# Patient Record
Sex: FEMALE | Race: WHITE | NOT HISPANIC OR LATINO
[De-identification: names, ages, dates, MRNs, and addresses within clinical notes are randomized per-mention and may not be internally consistent; named-entity substitution may affect disease eponyms.]

---

## 2017-09-20 ENCOUNTER — APPOINTMENT (OUTPATIENT)
Dept: VASCULAR SURGERY | Facility: CLINIC | Age: 72
End: 2017-09-20
Payer: MEDICARE

## 2017-09-20 VITALS
WEIGHT: 145 LBS | HEART RATE: 65 BPM | SYSTOLIC BLOOD PRESSURE: 150 MMHG | HEIGHT: 65 IN | BODY MASS INDEX: 24.16 KG/M2 | DIASTOLIC BLOOD PRESSURE: 77 MMHG | TEMPERATURE: 97.4 F

## 2017-09-20 DIAGNOSIS — I83.93 ASYMPTOMATIC VARICOSE VEINS OF BILATERAL LOWER EXTREMITIES: ICD-10-CM

## 2017-09-20 DIAGNOSIS — Z87.19 PERSONAL HISTORY OF OTHER DISEASES OF THE DIGESTIVE SYSTEM: ICD-10-CM

## 2017-09-20 DIAGNOSIS — Z87.891 PERSONAL HISTORY OF NICOTINE DEPENDENCE: ICD-10-CM

## 2017-09-20 DIAGNOSIS — Z86.79 PERSONAL HISTORY OF OTHER DISEASES OF THE CIRCULATORY SYSTEM: ICD-10-CM

## 2017-09-20 PROCEDURE — 93970 EXTREMITY STUDY: CPT

## 2017-09-20 PROCEDURE — 99204 OFFICE O/P NEW MOD 45 MIN: CPT | Mod: 25

## 2017-09-20 RX ORDER — RANITIDINE 150 MG/1
150 TABLET ORAL
Qty: 180 | Refills: 0 | Status: ACTIVE | COMMUNITY
Start: 2017-07-17

## 2017-09-20 RX ORDER — SOLIFENACIN SUCCINATE 5 MG/1
5 TABLET, FILM COATED ORAL
Qty: 90 | Refills: 0 | Status: ACTIVE | COMMUNITY
Start: 2017-05-10

## 2017-09-20 RX ORDER — ATENOLOL 50 MG/1
50 TABLET ORAL
Qty: 90 | Refills: 0 | Status: ACTIVE | COMMUNITY
Start: 2017-05-22

## 2017-09-20 RX ORDER — NAPROXEN 500 MG/1
500 TABLET ORAL
Qty: 20 | Refills: 0 | Status: ACTIVE | COMMUNITY
Start: 2017-03-30

## 2017-09-20 RX ORDER — LOSARTAN POTASSIUM 100 MG/1
100 TABLET, FILM COATED ORAL
Qty: 90 | Refills: 0 | Status: ACTIVE | COMMUNITY
Start: 2017-02-06

## 2017-12-18 ENCOUNTER — RECORD ABSTRACTING (OUTPATIENT)
Age: 72
End: 2017-12-18

## 2017-12-29 ENCOUNTER — APPOINTMENT (OUTPATIENT)
Dept: VASCULAR SURGERY | Facility: CLINIC | Age: 72
End: 2017-12-29

## 2018-01-03 ENCOUNTER — APPOINTMENT (OUTPATIENT)
Dept: CARDIOLOGY | Facility: CLINIC | Age: 73
End: 2018-01-03

## 2018-01-05 ENCOUNTER — APPOINTMENT (OUTPATIENT)
Dept: UROGYNECOLOGY | Facility: CLINIC | Age: 73
End: 2018-01-05
Payer: MEDICARE

## 2018-01-05 VITALS
SYSTOLIC BLOOD PRESSURE: 120 MMHG | HEART RATE: 60 BPM | HEIGHT: 65 IN | WEIGHT: 140 LBS | BODY MASS INDEX: 23.32 KG/M2 | DIASTOLIC BLOOD PRESSURE: 72 MMHG

## 2018-01-05 DIAGNOSIS — Z37.9 ENCOUNTER FOR FULL-TERM UNCOMPLICATED DELIVERY: ICD-10-CM

## 2018-01-05 LAB
BILIRUB UR QL STRIP: NORMAL
CLARITY UR: CLEAR
COLLECTION METHOD: NORMAL
GLUCOSE UR-MCNC: NORMAL
HCG UR QL: 0.2 EU/DL
HGB UR QL STRIP.AUTO: NORMAL
KETONES UR-MCNC: NORMAL
LEUKOCYTE ESTERASE UR QL STRIP: NORMAL
NITRITE UR QL STRIP: NORMAL
PH UR STRIP: 5
PROT UR STRIP-MCNC: NORMAL
SP GR UR STRIP: 1.02

## 2018-01-05 PROCEDURE — 51725 SIMPLE CYSTOMETROGRAM: CPT

## 2018-01-05 PROCEDURE — 99204 OFFICE O/P NEW MOD 45 MIN: CPT | Mod: 25

## 2018-01-05 RX ORDER — TIZANIDINE 4 MG/1
4 TABLET ORAL
Qty: 10 | Refills: 0 | Status: DISCONTINUED | COMMUNITY
Start: 2017-03-30 | End: 2018-01-05

## 2018-01-05 RX ORDER — FLUTICASONE PROPIONATE 50 UG/1
50 SPRAY, METERED NASAL
Qty: 16 | Refills: 0 | Status: DISCONTINUED | COMMUNITY
Start: 2017-01-11

## 2018-01-05 RX ORDER — VENLAFAXINE HYDROCHLORIDE 75 MG/1
75 CAPSULE, EXTENDED RELEASE ORAL
Qty: 90 | Refills: 0 | Status: DISCONTINUED | COMMUNITY
Start: 2016-08-30 | End: 2018-01-05

## 2018-01-17 ENCOUNTER — APPOINTMENT (OUTPATIENT)
Dept: CARDIOLOGY | Facility: CLINIC | Age: 73
End: 2018-01-17

## 2018-02-05 ENCOUNTER — EMERGENCY (EMERGENCY)
Facility: HOSPITAL | Age: 73
LOS: 1 days | Discharge: HOME | End: 2018-02-05
Attending: EMERGENCY MEDICINE | Admitting: EMERGENCY MEDICINE
Payer: MEDICARE

## 2018-02-05 VITALS
HEIGHT: 65 IN | HEART RATE: 87 BPM | SYSTOLIC BLOOD PRESSURE: 154 MMHG | DIASTOLIC BLOOD PRESSURE: 78 MMHG | TEMPERATURE: 98 F | RESPIRATION RATE: 20 BRPM | WEIGHT: 139.99 LBS

## 2018-02-05 PROCEDURE — 99282 EMERGENCY DEPT VISIT SF MDM: CPT | Mod: GC

## 2018-02-05 PROCEDURE — 99282 EMERGENCY DEPT VISIT SF MDM: CPT

## 2018-02-05 NOTE — ED PROVIDER NOTE - OBJECTIVE STATEMENT
73 yo F pmh htn, right sided sciatica presents with left lumbar paraspinal back pain with radiation into the right leg. Pt reports she first noticed it on Wednesday, 5 days ago, and was seen at an urgent care who told her she needs further MRI imaging. No trauma to the area or injury that she knows of. Pt endorses a change in sensation in the right foot. Reports a sharp pain that goes from the lumbar region down to the toes. Pt was also given a rx for oxycodone and gabapentin. There has been no change in sx since Wednesday; however pt is having difficulty making appointments for follow-up care. Was supposed to have an appt with her pcp today but pcp was unable to take her. No other systemic complaints at this time 73 yo F pmh htn, right sided sciatica presents with left lumbar paraspinal back pain with radiation into the left leg. Pt reports she first noticed it on Wednesday, 5 days ago, and was seen at an urgent care who told her she needs further MRI imaging. No trauma to the area or injury that she knows of. Pt endorses a change in sensation in the left foot. Reports a sharp pain that goes from the lumbar region down to the toes. Pt was also given a rx for oxycodone and gabapentin. There has been no change in sx since Wednesday; however pt is having difficulty making appointments for follow-up care. Was supposed to have an appt with her pcp today but pcp was unable to take her. No other systemic complaints at this time  Patient denies bowel/bladder incontinence. no fevers, no chills.

## 2018-02-05 NOTE — ED PROVIDER NOTE - MEDICAL DECISION MAKING DETAILS
Back pain with radiation into leg. Ambulating. ROM ok. Motor pain limited but focal findings on neuro exam. Pt appears well and HD stable. Came for MRI but I explained that this is an outpatient procedure, [facility] will [facility] her tomorrow and be able to give her rx. She denied pain medications and already has gabapentin. No midline spine tenderness that would warrant Xray.  Lynne Peña, PGY-1 EM left lower back pain with radiation into leg. Ambulating. ROM ok. Motor pain limited but focal findings on neuro exam. Pt appears well and HD stable. Came for MRI but I explained that this is an outpatient procedure, [facility] that will see her tomorrow and be able to give her rx. She denied pain medications and already has gabapentin. No midline spine tenderness that would warrant Xray.  Lynne Peña, PGY-1 [facility]: Patient with left buttock pain radiating down left leg x few days. 5/5 b/l le. No midline c-spine/t-spine/l-spine tenderness.  + 2 dp b/l le. patient already on multiple pain medications. requesting MRI. no indication for emergent MRI in ER at this time. no bowel/bladder incontinence. 5/5 b/l le. will give outpatient referral.

## 2018-02-05 NOTE — ED PROVIDER NOTE - EXTREMITY EXAM
Left leg strength limited 3/5, straight leg test + left side, no sensory deficits in the peroneal area/no deformity, pain or tenderness, no restriction of movement Left leg strength 5/5, straight leg test + left side, no sensory deficits in the peroneal area/no deformity, pain or tenderness, no restriction of movement

## 2018-02-10 ENCOUNTER — OUTPATIENT (OUTPATIENT)
Dept: OUTPATIENT SERVICES | Facility: HOSPITAL | Age: 73
LOS: 1 days | End: 2018-02-10
Payer: MEDICARE

## 2018-02-10 ENCOUNTER — APPOINTMENT (OUTPATIENT)
Dept: MRI IMAGING | Facility: CLINIC | Age: 73
End: 2018-02-10
Payer: MEDICARE

## 2018-02-10 DIAGNOSIS — Z00.8 ENCOUNTER FOR OTHER GENERAL EXAMINATION: ICD-10-CM

## 2018-02-10 PROCEDURE — 72148 MRI LUMBAR SPINE W/O DYE: CPT | Mod: 26

## 2018-02-10 PROCEDURE — 72148 MRI LUMBAR SPINE W/O DYE: CPT

## 2018-11-06 ENCOUNTER — RX RENEWAL (OUTPATIENT)
Age: 73
End: 2018-11-06

## 2019-01-10 ENCOUNTER — APPOINTMENT (OUTPATIENT)
Dept: UROGYNECOLOGY | Facility: CLINIC | Age: 74
End: 2019-01-10
Payer: MEDICARE

## 2019-01-10 PROCEDURE — 99214 OFFICE O/P EST MOD 30 MIN: CPT

## 2019-01-25 ENCOUNTER — APPOINTMENT (OUTPATIENT)
Dept: UROGYNECOLOGY | Facility: CLINIC | Age: 74
End: 2019-01-25

## 2019-02-20 ENCOUNTER — APPOINTMENT (OUTPATIENT)
Dept: UROGYNECOLOGY | Facility: CLINIC | Age: 74
End: 2019-02-20
Payer: MEDICARE

## 2019-02-20 ENCOUNTER — OUTPATIENT (OUTPATIENT)
Dept: OUTPATIENT SERVICES | Facility: HOSPITAL | Age: 74
LOS: 1 days | End: 2019-02-20

## 2019-02-20 LAB
BILIRUB UR QL STRIP: NORMAL
CLARITY UR: CLEAR
COLLECTION METHOD: NORMAL
GLUCOSE UR-MCNC: NORMAL
HCG UR QL: 0.2 EU/DL
HGB UR QL STRIP.AUTO: NORMAL
KETONES UR-MCNC: NORMAL
LEUKOCYTE ESTERASE UR QL STRIP: NORMAL
NITRITE UR QL STRIP: NORMAL
PH UR STRIP: 5.5
PROT UR STRIP-MCNC: 30
SP GR UR STRIP: 1.02

## 2019-02-20 PROCEDURE — 51797 INTRAABDOMINAL PRESSURE TEST: CPT | Mod: 26

## 2019-02-20 PROCEDURE — 51729 CYSTOMETROGRAM W/VP&UP: CPT | Mod: 26

## 2019-02-20 PROCEDURE — 51784 ANAL/URINARY MUSCLE STUDY: CPT | Mod: 26

## 2019-02-22 ENCOUNTER — RESULT REVIEW (OUTPATIENT)
Age: 74
End: 2019-02-22

## 2019-02-22 LAB
APPEARANCE: CLEAR
BACTERIA: NEGATIVE
BILIRUBIN URINE: NEGATIVE
BLOOD URINE: ABNORMAL
COLOR: YELLOW
GLUCOSE QUALITATIVE U: NEGATIVE
HYALINE CASTS: 0 /LPF
KETONES URINE: NEGATIVE
LEUKOCYTE ESTERASE URINE: ABNORMAL
MICROSCOPIC-UA: NORMAL
NITRITE URINE: NEGATIVE
PH URINE: 5
PROTEIN URINE: NEGATIVE
RED BLOOD CELLS URINE: 2 /HPF
SPECIFIC GRAVITY URINE: 1.02
SQUAMOUS EPITHELIAL CELLS: 1 /HPF
URINE COMMENTS: NORMAL
UROBILINOGEN URINE: NORMAL
WHITE BLOOD CELLS URINE: 4 /HPF

## 2019-02-25 ENCOUNTER — RESULT REVIEW (OUTPATIENT)
Age: 74
End: 2019-02-25

## 2019-02-25 LAB — BACTERIA UR CULT: ABNORMAL

## 2019-03-01 ENCOUNTER — APPOINTMENT (OUTPATIENT)
Dept: UROGYNECOLOGY | Facility: CLINIC | Age: 74
End: 2019-03-01

## 2019-03-07 ENCOUNTER — APPOINTMENT (OUTPATIENT)
Dept: UROGYNECOLOGY | Facility: CLINIC | Age: 74
End: 2019-03-07
Payer: MEDICARE

## 2019-03-07 PROCEDURE — 51701 INSERT BLADDER CATHETER: CPT

## 2019-03-07 PROCEDURE — 99214 OFFICE O/P EST MOD 30 MIN: CPT | Mod: 25

## 2019-03-07 NOTE — ASSESSMENT
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 We also extensively reviewed the risk of  injury to the bladder, ureters, urethra, vagina, rectum, and bowel. We also discussed the risk of sling mesh exposure, fistula formation, neuropathy, erosion, pain, and need for reoperation.  Risks were explained in layman's terms. She expressed understanding of these risks and continues to desire the planned surgical procedure. We reviewed her hospital stay as well as preoperative and postoperative instructions. She is aware that she must be NPO after midnight prior to the surgery. Consent was signed in the office and all questions were answered.\par \par She understands the overactive bladder component mat persist or be exacerbated \par \par \par All questions answered and informed consent provided\par \par

## 2019-03-13 ENCOUNTER — OUTPATIENT (OUTPATIENT)
Dept: OUTPATIENT SERVICES | Facility: HOSPITAL | Age: 74
LOS: 1 days | End: 2019-03-13
Payer: MEDICARE

## 2019-03-13 VITALS
HEIGHT: 63 IN | DIASTOLIC BLOOD PRESSURE: 77 MMHG | HEART RATE: 60 BPM | WEIGHT: 136.03 LBS | SYSTOLIC BLOOD PRESSURE: 130 MMHG | TEMPERATURE: 98 F | OXYGEN SATURATION: 98 %

## 2019-03-13 DIAGNOSIS — Z01.818 ENCOUNTER FOR OTHER PREPROCEDURAL EXAMINATION: ICD-10-CM

## 2019-03-13 DIAGNOSIS — N39.3 STRESS INCONTINENCE (FEMALE) (MALE): ICD-10-CM

## 2019-03-13 DIAGNOSIS — K85.90 ACUTE PANCREATITIS WITHOUT NECROSIS OR INFECTION, UNSPECIFIED: ICD-10-CM

## 2019-03-13 DIAGNOSIS — K21.9 GASTRO-ESOPHAGEAL REFLUX DISEASE WITHOUT ESOPHAGITIS: ICD-10-CM

## 2019-03-13 DIAGNOSIS — I10 ESSENTIAL (PRIMARY) HYPERTENSION: ICD-10-CM

## 2019-03-13 DIAGNOSIS — N81.11 CYSTOCELE, MIDLINE: ICD-10-CM

## 2019-03-13 LAB
ANION GAP SERPL CALC-SCNC: 12 MMOL/L — SIGNIFICANT CHANGE UP (ref 5–17)
BLD GP AB SCN SERPL QL: NEGATIVE — SIGNIFICANT CHANGE UP
BUN SERPL-MCNC: 17 MG/DL — SIGNIFICANT CHANGE UP (ref 7–23)
CALCIUM SERPL-MCNC: 9.5 MG/DL — SIGNIFICANT CHANGE UP (ref 8.4–10.5)
CHLORIDE SERPL-SCNC: 102 MMOL/L — SIGNIFICANT CHANGE UP (ref 96–108)
CO2 SERPL-SCNC: 26 MMOL/L — SIGNIFICANT CHANGE UP (ref 22–31)
CREAT SERPL-MCNC: 0.6 MG/DL — SIGNIFICANT CHANGE UP (ref 0.5–1.3)
GLUCOSE SERPL-MCNC: 100 MG/DL — HIGH (ref 70–99)
HCT VFR BLD CALC: 40.7 % — SIGNIFICANT CHANGE UP (ref 34.5–45)
HGB BLD-MCNC: 12.8 G/DL — SIGNIFICANT CHANGE UP (ref 11.5–15.5)
MCHC RBC-ENTMCNC: 31.4 GM/DL — LOW (ref 32–36)
MCHC RBC-ENTMCNC: 32.7 PG — SIGNIFICANT CHANGE UP (ref 27–34)
MCV RBC AUTO: 104.1 FL — HIGH (ref 80–100)
PLATELET # BLD AUTO: 317 K/UL — SIGNIFICANT CHANGE UP (ref 150–400)
POTASSIUM SERPL-MCNC: 4.1 MMOL/L — SIGNIFICANT CHANGE UP (ref 3.5–5.3)
POTASSIUM SERPL-SCNC: 4.1 MMOL/L — SIGNIFICANT CHANGE UP (ref 3.5–5.3)
RBC # BLD: 3.91 M/UL — SIGNIFICANT CHANGE UP (ref 3.8–5.2)
RBC # FLD: 13.2 % — SIGNIFICANT CHANGE UP (ref 10.3–14.5)
RH IG SCN BLD-IMP: POSITIVE — SIGNIFICANT CHANGE UP
SODIUM SERPL-SCNC: 140 MMOL/L — SIGNIFICANT CHANGE UP (ref 135–145)
WBC # BLD: 6.12 K/UL — SIGNIFICANT CHANGE UP (ref 3.8–10.5)
WBC # FLD AUTO: 6.12 K/UL — SIGNIFICANT CHANGE UP (ref 3.8–10.5)

## 2019-03-13 PROCEDURE — G0463: CPT

## 2019-03-13 PROCEDURE — 86900 BLOOD TYPING SEROLOGIC ABO: CPT

## 2019-03-13 PROCEDURE — 85027 COMPLETE CBC AUTOMATED: CPT

## 2019-03-13 PROCEDURE — 86850 RBC ANTIBODY SCREEN: CPT

## 2019-03-13 PROCEDURE — 86901 BLOOD TYPING SEROLOGIC RH(D): CPT

## 2019-03-13 PROCEDURE — 80048 BASIC METABOLIC PNL TOTAL CA: CPT

## 2019-03-13 RX ORDER — SODIUM CHLORIDE 9 MG/ML
3 INJECTION INTRAMUSCULAR; INTRAVENOUS; SUBCUTANEOUS EVERY 8 HOURS
Qty: 0 | Refills: 0 | Status: DISCONTINUED | OUTPATIENT
Start: 2019-03-27 | End: 2019-04-11

## 2019-03-13 RX ORDER — LIDOCAINE HCL 20 MG/ML
0.2 VIAL (ML) INJECTION ONCE
Qty: 0 | Refills: 0 | Status: DISCONTINUED | OUTPATIENT
Start: 2019-03-27 | End: 2019-04-11

## 2019-03-13 NOTE — H&P PST ADULT - NSANTHOSAYNRD_GEN_A_CORE
No. AYALA screening performed.  STOP BANG Legend: 0-2 = LOW Risk; 3-4 = INTERMEDIATE Risk; 5-8 = HIGH Risk

## 2019-03-13 NOTE — H&P PST ADULT - NSICDXPASTMEDICALHX_GEN_ALL_CORE_FT
PAST MEDICAL HISTORY:  Anxiety and depression     GERD (gastroesophageal reflux disease)     History of pancreatitis 2018 taking creon been stable    Hypertension     Rosacea     Stress incontinence, female     Stress incontinence, female

## 2019-03-13 NOTE — H&P PST ADULT - ACTIVITY
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

## 2019-03-13 NOTE — H&P PST ADULT - HISTORY OF PRESENT ILLNESS
73 year old female with hx of HTN, GERD, Pancreatitis, depression came in for PSt today for Midurethral sling , anterior colporrhaphy. Patient has hx of stress incontinence and [id number] symptoms gotten worse . patient was referred to Dr Palencia, evaluated ans scheduled this procedure for surgical treatment.

## 2019-03-13 NOTE — H&P PST ADULT - NSICDXPROBLEM_GEN_ALL_CORE_FT
PROBLEM DIAGNOSES  Problem: Female stress incontinence  Assessment and Plan:  Midurethral sling,anterior colporrhapy,cystoscopy  PST instruction given, CBC,BMP, type and screen , has urine culture result from [facility] office    PAtient has own appointment with Dr Valenzuela  on Monday March 18/2019    Problem: GERD (gastroesophageal reflux disease)  Assessment and Plan: To continue  taking ranitidine regularly and on DOS     Problem: Hypertension  Assessment and Plan: To continue taking blood pressure medications regularly as prescribed  and on DOS     Problem: Pancreatitis  Assessment and Plan: To continue taking creon regularly as instructed

## 2019-03-13 NOTE — H&P PST ADULT - NSICDXPASTSURGICALHX_GEN_ALL_CORE_FT
PAST SURGICAL HISTORY:  H/O dilation and curettage     H/O left inguinal hernia repair     Hx of appendectomy     Hx of tonsillectomy

## 2019-03-14 PROBLEM — Z87.19 PERSONAL HISTORY OF OTHER DISEASES OF THE DIGESTIVE SYSTEM: Chronic | Status: ACTIVE | Noted: 2019-03-13

## 2019-03-22 ENCOUNTER — OUTPATIENT (OUTPATIENT)
Dept: OUTPATIENT SERVICES | Facility: HOSPITAL | Age: 74
LOS: 1 days | End: 2019-03-22
Payer: MEDICARE

## 2019-03-22 ENCOUNTER — APPOINTMENT (OUTPATIENT)
Dept: UROGYNECOLOGY | Facility: CLINIC | Age: 74
End: 2019-03-22
Payer: MEDICARE

## 2019-03-22 DIAGNOSIS — R35.0 FREQUENCY OF MICTURITION: ICD-10-CM

## 2019-03-22 DIAGNOSIS — Z01.818 ENCOUNTER FOR OTHER PREPROCEDURAL EXAMINATION: ICD-10-CM

## 2019-03-22 DIAGNOSIS — N81.11 CYSTOCELE, MIDLINE: ICD-10-CM

## 2019-03-22 DIAGNOSIS — N39.3 STRESS INCONTINENCE (FEMALE) (MALE): ICD-10-CM

## 2019-03-22 PROCEDURE — 52000 CYSTOURETHROSCOPY: CPT

## 2019-03-26 ENCOUNTER — TRANSCRIPTION ENCOUNTER (OUTPATIENT)
Age: 74
End: 2019-03-26

## 2019-03-27 ENCOUNTER — OUTPATIENT (OUTPATIENT)
Dept: OUTPATIENT SERVICES | Facility: HOSPITAL | Age: 74
LOS: 1 days | End: 2019-03-27
Payer: MEDICARE

## 2019-03-27 ENCOUNTER — APPOINTMENT (OUTPATIENT)
Dept: UROGYNECOLOGY | Facility: HOSPITAL | Age: 74
End: 2019-03-27
Payer: MEDICARE

## 2019-03-27 ENCOUNTER — RX RENEWAL (OUTPATIENT)
Age: 74
End: 2019-03-27

## 2019-03-27 VITALS
HEART RATE: 63 BPM | RESPIRATION RATE: 16 BRPM | DIASTOLIC BLOOD PRESSURE: 77 MMHG | HEIGHT: 63 IN | SYSTOLIC BLOOD PRESSURE: 131 MMHG | OXYGEN SATURATION: 98 % | WEIGHT: 136.03 LBS | TEMPERATURE: 99 F

## 2019-03-27 VITALS
OXYGEN SATURATION: 100 % | DIASTOLIC BLOOD PRESSURE: 77 MMHG | SYSTOLIC BLOOD PRESSURE: 132 MMHG | HEART RATE: 66 BPM | RESPIRATION RATE: 15 BRPM

## 2019-03-27 DIAGNOSIS — N39.3 STRESS INCONTINENCE (FEMALE) (MALE): ICD-10-CM

## 2019-03-27 DIAGNOSIS — N81.11 CYSTOCELE, MIDLINE: ICD-10-CM

## 2019-03-27 DIAGNOSIS — Z01.818 ENCOUNTER FOR OTHER PREPROCEDURAL EXAMINATION: ICD-10-CM

## 2019-03-27 PROCEDURE — 57288 REPAIR BLADDER DEFECT: CPT

## 2019-03-27 PROCEDURE — 57240 ANTERIOR COLPORRHAPHY: CPT

## 2019-03-27 PROCEDURE — C1771: CPT

## 2019-03-27 RX ORDER — OXYCODONE HYDROCHLORIDE 5 MG/1
1 TABLET ORAL
Qty: 10 | Refills: 0
Start: 2019-03-27

## 2019-03-27 RX ORDER — METOCLOPRAMIDE HCL 10 MG
10 TABLET ORAL ONCE
Qty: 0 | Refills: 0 | Status: DISCONTINUED | OUTPATIENT
Start: 2019-03-27 | End: 2019-04-11

## 2019-03-27 RX ORDER — OXYCODONE HYDROCHLORIDE 5 MG/1
5 TABLET ORAL ONCE
Qty: 0 | Refills: 0 | Status: DISCONTINUED | OUTPATIENT
Start: 2019-03-27 | End: 2019-03-27

## 2019-03-27 RX ORDER — TROSPIUM CHLORIDE 60 MG/1
60 CAPSULE, EXTENDED RELEASE ORAL DAILY
Qty: 90 | Refills: 3 | Status: ACTIVE | COMMUNITY
Start: 2018-01-05 | End: 1900-01-01

## 2019-03-27 RX ORDER — CELECOXIB 200 MG/1
200 CAPSULE ORAL ONCE
Qty: 0 | Refills: 0 | Status: DISCONTINUED | OUTPATIENT
Start: 2019-03-27 | End: 2019-04-11

## 2019-03-27 RX ORDER — ONDANSETRON 8 MG/1
4 TABLET, FILM COATED ORAL ONCE
Qty: 0 | Refills: 0 | Status: DISCONTINUED | OUTPATIENT
Start: 2019-03-27 | End: 2019-04-11

## 2019-03-27 RX ORDER — FAMOTIDINE 10 MG/ML
20 INJECTION INTRAVENOUS ONCE
Qty: 0 | Refills: 0 | Status: COMPLETED | OUTPATIENT
Start: 2019-03-27 | End: 2019-03-27

## 2019-03-27 RX ADMIN — FAMOTIDINE 20 MILLIGRAM(S): 10 INJECTION INTRAVENOUS at 11:00

## 2019-03-27 NOTE — ASU PREOP CHECKLIST - DNR CLARIFICATION FORM COMPLETED
“You can access the FollowHealth Patient Portal, offered by [facility], by registering with the following website: [email or web address]” n/a

## 2019-03-27 NOTE — ASU DISCHARGE PLAN (ADULT/PEDIATRIC) - CARE PROVIDER_API CALL
Price Palencia (MD)  Female Pelvic MedReconst Surg; Obstetrics and Gynecology  [address]  [place] [address]  Phone: [phone]  Fax: [phone]  Follow Up Time:

## 2019-03-27 NOTE — ASU DISCHARGE PLAN (ADULT/PEDIATRIC) - CALL YOUR DOCTOR IF YOU HAVE ANY OF THE FOLLOWING:
Bleeding that does not stop/Unable to urinate/Fever greater than (need to indicate Fahrenheit or Celsius)/Inability to tolerate liquids or foods/Pain not relieved by Medications Inability to tolerate liquids or foods/Pain not relieved by Medications/Nausea and vomiting that does not stop/Increased irritability or sluggishness/Wound/Surgical Site with redness, or foul smelling discharge or pus/Bleeding that does not stop/Swelling that gets worse/Fever greater than (need to indicate Fahrenheit or Celsius)/Unable to urinate

## 2019-03-27 NOTE — BRIEF OPERATIVE NOTE - NSICDXBRIEFPROCEDURE_GEN_ALL_CORE_FT
PROCEDURES:  Cystoscopy 27-Mar-2019 12:50:53  Valery Olvera  Creation, midurethral sling, female 27-Mar-2019 12:50:35 Valery Lim

## 2019-03-27 NOTE — ASU PATIENT PROFILE, ADULT - PSH
H/O dilation and curettage    H/O left inguinal hernia repair    Hx of appendectomy    Hx of tonsillectomy

## 2019-03-27 NOTE — ASU PATIENT PROFILE, ADULT - PMH
Anxiety and depression    GERD (gastroesophageal reflux disease)    History of pancreatitis  2018 taking creon been stable  Hypertension    Rosacea    Stress incontinence, female    Stress incontinence, female

## 2019-04-25 ENCOUNTER — APPOINTMENT (OUTPATIENT)
Dept: UROGYNECOLOGY | Facility: CLINIC | Age: 74
End: 2019-04-25
Payer: MEDICARE

## 2019-04-25 DIAGNOSIS — Z98.890 OTHER SPECIFIED POSTPROCEDURAL STATES: ICD-10-CM

## 2019-04-25 PROCEDURE — 99024 POSTOP FOLLOW-UP VISIT: CPT

## 2019-04-30 NOTE — DISCUSSION/SUMMARY
[FreeTextEntry1] : Pt doing well after surgery.  She is currently taking Trospium ER 60 mg.  She would like to trial herself off to see if UUI has improved or resolved. Pt agrees to call office with any problems/concerns. Otherwise she was advised to RTO in 3-4 months for f/u after surgery.

## 2019-04-30 NOTE — SUBJECTIVE
[FreeTextEntry1] : good [FreeTextEntry7] : denies [FreeTextEntry6] : normal [FreeTextEntry5] : denies retention or incontinence [FreeTextEntry4] : soft daily [FreeTextEntry3] : normal [FreeTextEntry2] : normal

## 2019-04-30 NOTE — OBJECTIVE
[Good Support] : Good support [Healing well] : healing well [No Masses or Tenderness] : no masses or tenderness [FreeTextEntry3] : no evidence of mesh exposure

## 2019-08-15 ENCOUNTER — APPOINTMENT (OUTPATIENT)
Dept: UROGYNECOLOGY | Facility: CLINIC | Age: 74
End: 2019-08-15
Payer: MEDICARE

## 2019-08-15 DIAGNOSIS — R39.15 URGENCY OF URINATION: ICD-10-CM

## 2019-08-15 PROCEDURE — 99214 OFFICE O/P EST MOD 30 MIN: CPT

## 2019-08-15 RX ORDER — VALACYCLOVIR 500 MG/1
500 TABLET, FILM COATED ORAL TWICE DAILY
Qty: 14 | Refills: 0 | Status: ACTIVE | COMMUNITY
Start: 2019-08-15 | End: 1900-01-01

## 2019-08-15 RX ORDER — TROSPIUM CHLORIDE 60 MG/1
60 CAPSULE, EXTENDED RELEASE ORAL DAILY
Qty: 90 | Refills: 3 | Status: ACTIVE | COMMUNITY
Start: 2019-08-15 | End: 1900-01-01

## 2019-08-15 NOTE — HISTORY OF PRESENT ILLNESS
[FreeTextEntry1] : 74-year-old status post suburethral sling in March of 2019. Stress incontinence symptoms are resolved overactive bladder symptoms most notably frequency at night, and urge related leakage remain a problem for her. She is previously on Trospium with good results.\par \par Has a new complaint of a right vulvar lesion. She has had herpes in the past on her back in other areas her body but not in the vulva. On examination there is a glassy lesion on the right side and a viral culture was taken the symptoms and the appearance do seem consistent with herpes.\par \par We will prescribeTrospium for the overactive bladder risk benefits adverse reactions were reviewed\par \par We are prescribing Valtrex twice a day for 7 days.\par \par We will follow up with her in 4 weeks to see improvement and response for overactive bladder and treatment of the likely herpes of the vulva\par \par Counseling was greater than 50 percent of encounter which included  26   minute face to face time for direct counseling.\par

## 2019-08-19 ENCOUNTER — RESULT REVIEW (OUTPATIENT)
Age: 74
End: 2019-08-19

## 2019-09-19 ENCOUNTER — APPOINTMENT (OUTPATIENT)
Dept: UROGYNECOLOGY | Facility: CLINIC | Age: 74
End: 2019-09-19
Payer: MEDICARE

## 2019-09-19 LAB
BILIRUB UR QL STRIP: NORMAL
CLARITY UR: CLEAR
COLLECTION METHOD: NORMAL
GLUCOSE UR-MCNC: NORMAL
HCG UR QL: 0.2 EU/DL
HGB UR QL STRIP.AUTO: NORMAL
KETONES UR-MCNC: NORMAL
LEUKOCYTE ESTERASE UR QL STRIP: NORMAL
NITRITE UR QL STRIP: NORMAL
PH UR STRIP: 5.5
PROT UR STRIP-MCNC: NORMAL
SP GR UR STRIP: 1.02

## 2019-09-19 PROCEDURE — 51725 SIMPLE CYSTOMETROGRAM: CPT

## 2019-09-19 PROCEDURE — 99214 OFFICE O/P EST MOD 30 MIN: CPT | Mod: 25

## 2019-09-19 NOTE — HISTORY OF PRESENT ILLNESS
[FreeTextEntry1] : 74-year-old s/p MUS 3/27/19, with OAB presenting for follow up.  Symptoms of CELY remain resolved.  OAB managed on trospium with improvement.  At last visit 8/15/19 patient noted a right vulvar lesion and history of herpes.  Valtrex was started, viral culture performed which was negative.\par \par PHYSICAL EXAM\par \par Constitutional:  No acute distress, well developed well nourished good hygiene\par Neuro/psych:  Orientated x2,   normal memory\par Respiratory:   no cough, no dypsnea\par Neck:    Normal appearance,   symmetrical\par Abdomen: soft / non tender\par Occult blood: Not performed\par Skin warm and dry,  No rash,  No lesions\par Musculoskeletal:  Normal gait, No involuntary movements\par \par External Genitalia: normal , lesion resolved\par \par Vagina: normal normal.\par \par \par \par \par \par PROCEDURE: SIMPLE CYSTOMETRY\par \par In the supine position, the urethra was prepped with betadine. A 16 Hungarian catheter was gently passed into the bladder with sterile technique and secured in place. Sterile water was infused by gravity via an irrigation syringe. Patient sensation, change in flow rate, and capacity were observed: \par \par Catheterized PVR: __________\par First Sensation, First Urgency (cc): 100\par Increased Urgency  150,180\par Maximal capacity (cc): 300\par Pain will filling: neg\par Sensory Urgency: moderate\par Post Fill Void (cc):   300 (complete\par \par Involuntary detrusor contractions:  equivocal favoring contractions present\par \par cst negative\par cst wth rectocele reduced : pos spont leak and squirt with cough.\par \par Specific diagnosis for etiology of incontinence is not distinctly clear from simple testing\par \par \par IMPRESSION\par \par 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 Positive simple cystometry was performed as she failed crusting for resolution of nighttime symptoms. Her regular medications and she takes her Effexor and the mornings I don't think this is causing her problems at night. Based on simple cystometry she may have stress incontinence when the rectocele was reduced in the supine position and/or the detrusor instability is being held and checked by the rectocele during the day but again when supine is more active. 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 I did recommend timed voiding and to void every 2 hours and to not Ms. avoiding prior to bedtime. She made very happy with her improvement and declines alternative medications are tie blood pressure and her cognitive issues. She'll continue on current regimen with sanctura\par \par Counseling was greater than 50 percent of encounter which included  26   minute face to face time for direct counseling.\par \par \par \par \par Counseling was greater than 50 percent of encounter which included  26   minute face to face time for direct counseling.\par

## 2019-09-26 DIAGNOSIS — Z01.818 ENCOUNTER FOR OTHER PREPROCEDURAL EXAMINATION: ICD-10-CM

## 2019-11-26 LAB
BACTERIA UR CULT: NORMAL
HHV SPEC CULT: NORMAL

## 2020-06-06 ENCOUNTER — INPATIENT (INPATIENT)
Facility: HOSPITAL | Age: 75
LOS: 0 days | Discharge: HOME | DRG: 391 | End: 2020-06-07
Attending: INTERNAL MEDICINE | Admitting: INTERNAL MEDICINE
Payer: COMMERCIAL

## 2020-06-06 VITALS
HEIGHT: 65 IN | HEART RATE: 64 BPM | DIASTOLIC BLOOD PRESSURE: 71 MMHG | RESPIRATION RATE: 19 BRPM | OXYGEN SATURATION: 99 % | WEIGHT: 125 LBS | TEMPERATURE: 98 F | SYSTOLIC BLOOD PRESSURE: 140 MMHG

## 2020-06-06 DIAGNOSIS — I10 ESSENTIAL (PRIMARY) HYPERTENSION: ICD-10-CM

## 2020-06-06 DIAGNOSIS — Z29.9 ENCOUNTER FOR PROPHYLACTIC MEASURES, UNSPECIFIED: ICD-10-CM

## 2020-06-06 DIAGNOSIS — K86.1 OTHER CHRONIC PANCREATITIS: ICD-10-CM

## 2020-06-06 DIAGNOSIS — R10.32 LEFT LOWER QUADRANT PAIN: ICD-10-CM

## 2020-06-06 DIAGNOSIS — E87.1 HYPO-OSMOLALITY AND HYPONATREMIA: ICD-10-CM

## 2020-06-06 DIAGNOSIS — K59.04 CHRONIC IDIOPATHIC CONSTIPATION: ICD-10-CM

## 2020-06-06 DIAGNOSIS — N32.81 OVERACTIVE BLADDER: ICD-10-CM

## 2020-06-06 DIAGNOSIS — K57.90 DIVERTICULOSIS OF INTESTINE, PART UNSPECIFIED, WITHOUT PERFORATION OR ABSCESS WITHOUT BLEEDING: ICD-10-CM

## 2020-06-06 LAB
ALBUMIN SERPL ELPH-MCNC: 3.7 G/DL — SIGNIFICANT CHANGE UP (ref 3.3–5)
ALP SERPL-CCNC: 99 U/L — SIGNIFICANT CHANGE UP (ref 40–120)
ALT FLD-CCNC: 11 U/L — SIGNIFICANT CHANGE UP (ref 10–45)
ANION GAP SERPL CALC-SCNC: 11 MMOL/L — SIGNIFICANT CHANGE UP (ref 5–17)
APPEARANCE UR: CLEAR — SIGNIFICANT CHANGE UP
AST SERPL-CCNC: 25 U/L — SIGNIFICANT CHANGE UP (ref 10–40)
BASOPHILS # BLD AUTO: 0.09 K/UL — SIGNIFICANT CHANGE UP (ref 0–0.2)
BASOPHILS NFR BLD AUTO: 1.2 % — SIGNIFICANT CHANGE UP (ref 0–2)
BILIRUB SERPL-MCNC: 0.5 MG/DL — SIGNIFICANT CHANGE UP (ref 0.2–1.2)
BILIRUB UR-MCNC: NEGATIVE — SIGNIFICANT CHANGE UP
BUN SERPL-MCNC: 11 MG/DL — SIGNIFICANT CHANGE UP (ref 7–23)
CALCIUM SERPL-MCNC: 9 MG/DL — SIGNIFICANT CHANGE UP (ref 8.4–10.5)
CHLORIDE SERPL-SCNC: 96 MMOL/L — SIGNIFICANT CHANGE UP (ref 96–108)
CO2 SERPL-SCNC: 23 MMOL/L — SIGNIFICANT CHANGE UP (ref 22–31)
COLOR SPEC: SIGNIFICANT CHANGE UP
CREAT SERPL-MCNC: 0.54 MG/DL — SIGNIFICANT CHANGE UP (ref 0.5–1.3)
DIFF PNL FLD: NEGATIVE — SIGNIFICANT CHANGE UP
EOSINOPHIL # BLD AUTO: 0.05 K/UL — SIGNIFICANT CHANGE UP (ref 0–0.5)
EOSINOPHIL NFR BLD AUTO: 0.7 % — SIGNIFICANT CHANGE UP (ref 0–6)
GLUCOSE SERPL-MCNC: 135 MG/DL — HIGH (ref 70–99)
GLUCOSE UR QL: NEGATIVE — SIGNIFICANT CHANGE UP
HCT VFR BLD CALC: 38.6 % — SIGNIFICANT CHANGE UP (ref 34.5–45)
HGB BLD-MCNC: 12.7 G/DL — SIGNIFICANT CHANGE UP (ref 11.5–15.5)
IMM GRANULOCYTES NFR BLD AUTO: 0.3 % — SIGNIFICANT CHANGE UP (ref 0–1.5)
KETONES UR-MCNC: NEGATIVE — SIGNIFICANT CHANGE UP
LEUKOCYTE ESTERASE UR-ACNC: ABNORMAL
LIDOCAIN IGE QN: 61 U/L — HIGH (ref 7–60)
LYMPHOCYTES # BLD AUTO: 1.62 K/UL — SIGNIFICANT CHANGE UP (ref 1–3.3)
LYMPHOCYTES # BLD AUTO: 21.9 % — SIGNIFICANT CHANGE UP (ref 13–44)
MCHC RBC-ENTMCNC: 32.9 GM/DL — SIGNIFICANT CHANGE UP (ref 32–36)
MCHC RBC-ENTMCNC: 33.1 PG — SIGNIFICANT CHANGE UP (ref 27–34)
MCV RBC AUTO: 100.5 FL — HIGH (ref 80–100)
MONOCYTES # BLD AUTO: 0.7 K/UL — SIGNIFICANT CHANGE UP (ref 0–0.9)
MONOCYTES NFR BLD AUTO: 9.4 % — SIGNIFICANT CHANGE UP (ref 2–14)
NEUTROPHILS # BLD AUTO: 4.93 K/UL — SIGNIFICANT CHANGE UP (ref 1.8–7.4)
NEUTROPHILS NFR BLD AUTO: 66.5 % — SIGNIFICANT CHANGE UP (ref 43–77)
NITRITE UR-MCNC: NEGATIVE — SIGNIFICANT CHANGE UP
NRBC # BLD: 0 /100 WBCS — SIGNIFICANT CHANGE UP (ref 0–0)
OB PNL STL: NEGATIVE — SIGNIFICANT CHANGE UP
PH UR: 6.5 — SIGNIFICANT CHANGE UP (ref 5–8)
PLATELET # BLD AUTO: 320 K/UL — SIGNIFICANT CHANGE UP (ref 150–400)
POTASSIUM SERPL-MCNC: 3.9 MMOL/L — SIGNIFICANT CHANGE UP (ref 3.5–5.3)
POTASSIUM SERPL-SCNC: 3.9 MMOL/L — SIGNIFICANT CHANGE UP (ref 3.5–5.3)
PROT SERPL-MCNC: 6.6 G/DL — SIGNIFICANT CHANGE UP (ref 6–8.3)
PROT UR-MCNC: ABNORMAL
RBC # BLD: 3.84 M/UL — SIGNIFICANT CHANGE UP (ref 3.8–5.2)
RBC # FLD: 13.1 % — SIGNIFICANT CHANGE UP (ref 10.3–14.5)
SARS-COV-2 RNA SPEC QL NAA+PROBE: SIGNIFICANT CHANGE UP
SODIUM SERPL-SCNC: 130 MMOL/L — LOW (ref 135–145)
SP GR SPEC: 1.02 — SIGNIFICANT CHANGE UP (ref 1.01–1.02)
UROBILINOGEN FLD QL: NEGATIVE — SIGNIFICANT CHANGE UP
WBC # BLD: 7.41 K/UL — SIGNIFICANT CHANGE UP (ref 3.8–10.5)
WBC # FLD AUTO: 7.41 K/UL — SIGNIFICANT CHANGE UP (ref 3.8–10.5)

## 2020-06-06 PROCEDURE — 99285 EMERGENCY DEPT VISIT HI MDM: CPT | Mod: CS,GC

## 2020-06-06 PROCEDURE — 93010 ELECTROCARDIOGRAM REPORT: CPT | Mod: GC

## 2020-06-06 PROCEDURE — 74177 CT ABD & PELVIS W/CONTRAST: CPT | Mod: 26

## 2020-06-06 PROCEDURE — 99223 1ST HOSP IP/OBS HIGH 75: CPT

## 2020-06-06 RX ORDER — ACETAMINOPHEN 500 MG
650 TABLET ORAL ONCE
Refills: 0 | Status: COMPLETED | OUTPATIENT
Start: 2020-06-06 | End: 2020-06-06

## 2020-06-06 RX ORDER — LANOLIN ALCOHOL/MO/W.PET/CERES
5 CREAM (GRAM) TOPICAL AT BEDTIME
Refills: 0 | Status: DISCONTINUED | OUTPATIENT
Start: 2020-06-06 | End: 2020-06-06

## 2020-06-06 RX ORDER — SOLIFENACIN SUCCINATE 10 MG/1
1 TABLET ORAL
Qty: 0 | Refills: 0 | DISCHARGE

## 2020-06-06 RX ORDER — LANOLIN ALCOHOL/MO/W.PET/CERES
5 CREAM (GRAM) TOPICAL AT BEDTIME
Refills: 0 | Status: DISCONTINUED | OUTPATIENT
Start: 2020-06-06 | End: 2020-06-07

## 2020-06-06 RX ORDER — ATENOLOL 25 MG/1
50 TABLET ORAL DAILY
Refills: 0 | Status: DISCONTINUED | OUTPATIENT
Start: 2020-06-06 | End: 2020-06-07

## 2020-06-06 RX ORDER — ENOXAPARIN SODIUM 100 MG/ML
40 INJECTION SUBCUTANEOUS DAILY
Refills: 0 | Status: DISCONTINUED | OUTPATIENT
Start: 2020-06-06 | End: 2020-06-07

## 2020-06-06 RX ORDER — VENLAFAXINE HCL 75 MG
1 CAPSULE, EXT RELEASE 24 HR ORAL
Qty: 0 | Refills: 0 | DISCHARGE

## 2020-06-06 RX ORDER — SODIUM CHLORIDE 9 MG/ML
1000 INJECTION, SOLUTION INTRAVENOUS ONCE
Refills: 0 | Status: COMPLETED | OUTPATIENT
Start: 2020-06-06 | End: 2020-06-06

## 2020-06-06 RX ORDER — LOSARTAN POTASSIUM 100 MG/1
100 TABLET, FILM COATED ORAL DAILY
Refills: 0 | Status: DISCONTINUED | OUTPATIENT
Start: 2020-06-06 | End: 2020-06-07

## 2020-06-06 RX ORDER — MULTIVIT WITH MIN/MFOLATE/K2 340-15/3 G
1 POWDER (GRAM) ORAL ONCE
Refills: 0 | Status: COMPLETED | OUTPATIENT
Start: 2020-06-06 | End: 2020-06-06

## 2020-06-06 RX ORDER — SENNA PLUS 8.6 MG/1
2 TABLET ORAL AT BEDTIME
Refills: 0 | Status: DISCONTINUED | OUTPATIENT
Start: 2020-06-06 | End: 2020-06-07

## 2020-06-06 RX ORDER — LACTULOSE 10 G/15ML
20 SOLUTION ORAL ONCE
Refills: 0 | Status: COMPLETED | OUTPATIENT
Start: 2020-06-06 | End: 2020-06-06

## 2020-06-06 RX ORDER — LIPASE/PROTEASE/AMYLASE 16-48-48K
2 CAPSULE,DELAYED RELEASE (ENTERIC COATED) ORAL
Refills: 0 | Status: DISCONTINUED | OUTPATIENT
Start: 2020-06-06 | End: 2020-06-07

## 2020-06-06 RX ORDER — OXYBUTYNIN CHLORIDE 5 MG
5 TABLET ORAL
Refills: 0 | Status: DISCONTINUED | OUTPATIENT
Start: 2020-06-06 | End: 2020-06-07

## 2020-06-06 RX ORDER — DIPHENHYDRAMINE HCL 50 MG
25 CAPSULE ORAL AT BEDTIME
Refills: 0 | Status: DISCONTINUED | OUTPATIENT
Start: 2020-06-06 | End: 2020-06-07

## 2020-06-06 RX ORDER — SODIUM CHLORIDE 9 MG/ML
1000 INJECTION INTRAMUSCULAR; INTRAVENOUS; SUBCUTANEOUS ONCE
Refills: 0 | Status: COMPLETED | OUTPATIENT
Start: 2020-06-06 | End: 2020-06-06

## 2020-06-06 RX ADMIN — ATENOLOL 50 MILLIGRAM(S): 25 TABLET ORAL at 17:45

## 2020-06-06 RX ADMIN — Medication 5 MILLIGRAM(S): at 21:50

## 2020-06-06 RX ADMIN — Medication 1 BOTTLE: at 14:37

## 2020-06-06 RX ADMIN — SODIUM CHLORIDE 1000 MILLILITER(S): 9 INJECTION INTRAMUSCULAR; INTRAVENOUS; SUBCUTANEOUS at 12:03

## 2020-06-06 RX ADMIN — Medication 5 MILLIGRAM(S): at 22:00

## 2020-06-06 RX ADMIN — LOSARTAN POTASSIUM 100 MILLIGRAM(S): 100 TABLET, FILM COATED ORAL at 17:45

## 2020-06-06 RX ADMIN — ENOXAPARIN SODIUM 40 MILLIGRAM(S): 100 INJECTION SUBCUTANEOUS at 17:45

## 2020-06-06 RX ADMIN — SODIUM CHLORIDE 100 MILLILITER(S): 9 INJECTION, SOLUTION INTRAVENOUS at 21:35

## 2020-06-06 RX ADMIN — SODIUM CHLORIDE 1000 MILLILITER(S): 9 INJECTION, SOLUTION INTRAVENOUS at 14:37

## 2020-06-06 NOTE — H&P ADULT - PROBLEM SELECTOR PLAN 6
- Oxybutynin (formulary exchange) - Continue home atenolol, substitute Benicar for formulary (losartan)

## 2020-06-06 NOTE — H&P ADULT - NSHPPHYSICALEXAM_GEN_ALL_CORE
T(C): 37.2 (06-06-20 @ 15:20), Max: 37.2 (06-06-20 @ 15:20)  HR: 60 (06-06-20 @ 15:20) (59 - 64)  BP: 156/57 (06-06-20 @ 15:20) (139/69 - 156/57)  RR: 19 (06-06-20 @ 15:20) (18 - 19)  SpO2: 100% (06-06-20 @ 15:20) (99% - 100%)  General: NAD, comfortable  Eyes: no conjunctival erythema, EOMI  ENT: MMM, normal mouth and dentition  Neck: Neck supple, No JVD  Respiratory: CTA B/L, No wheezing, rales, rhonchi  CV: RRR no murmurs  Abdominal: Soft, minimally tender in LLQ to deep palpation, ND, +BS  MSK: no focal weakness, no joint tenderness, no joint erythema, edema  Extremities: No edema, moving all extremities, 2+ peripheral pulses (radial)  Neurology: A&Ox3, nonfocal, BROWNLEE x 4, no facial droop  Skin: No Rashes, Hematoma, Ecchymosis  Psych: Calm and appropriate

## 2020-06-06 NOTE — H&P ADULT - NSHPLABSRESULTS_GEN_ALL_CORE
Labs personally reviewed:                          12.7   7.41  )-----------( 320      ( 06 Jun 2020 11:56 )             38.6       06-06    130<L>  |  96  |  11  ----------------------------<  135<H>  3.9   |  23  |  0.54    Ca    9.0      06 Jun 2020 11:56    TPro  6.6  /  Alb  3.7  /  TBili  0.5  /  DBili  x   /  AST  25  /  ALT  11  /  AlkPhos  99  06-06                      Imaging personally reviewed:    EKG personally reviewed: SR, no acute ST changes

## 2020-06-06 NOTE — H&P ADULT - ASSESSMENT
75 year old female with a history of HTN, pancreatitis, overactive bladder s/p midurethral sling (2019) presents with abdominal pain and constipation found to have acute on chronic pancreatitis on imaging.

## 2020-06-06 NOTE — H&P ADULT - NSICDXPASTMEDICALHX_GEN_ALL_CORE_FT
PAST MEDICAL HISTORY:  Anxiety and depression     GERD (gastroesophageal reflux disease)     History of pancreatitis 2018 taking creon been stable    Hypertension     Stress incontinence, female S/P Sling

## 2020-06-06 NOTE — H&P ADULT - PROBLEM SELECTOR PLAN 4
No evidence of diverticulitis  - Will continue to monitor clinically  - Mgmt of constipation as above Mild, suspect hypoosmolar, hypovolemic hyponatremia in the setting of poor po intake due to constipation  - Will continue with IVF  - Reassess Na in AM with routine BMP

## 2020-06-06 NOTE — ED PROVIDER NOTE - PROGRESS NOTE DETAILS
Attending MD Rowley: Labs and CT reviewed and discussed with patient, patient with pancreatitis, will admit, patient amenable

## 2020-06-06 NOTE — ED ADULT NURSE NOTE - OBJECTIVE STATEMENT
75 female c/o constipation, abd pain for 5 days. States she had intermittent left sided abd. pain and no BM for 5 days, today denies pain but still no BM despite several laxatives and an enema at home. abd. soft, tender in the epigastrium and mid lower quadrant. non distended. denies urinary symptoms now, but states she had some hematuria last week and was written an antibiotic by her MD. 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 denies hematuria, dysuria, urgency, frequency now. VSS, no fever, chills, no SOB, no CP. no N/V/D.

## 2020-06-06 NOTE — H&P ADULT - PROBLEM SELECTOR PLAN 3
Based on type of pain, lower suspicion for acute pancreatitis; however considering imaging findings will treat with IVF   Lipase likely explained by burn out  - Discused NPO verse early feeding with patient. Will start with clears, advance as tolerated  - IVF at 100 cc/h

## 2020-06-06 NOTE — ED ADULT NURSE NOTE - NSIMPLEMENTINTERV_GEN_ALL_ED
Implemented All Universal Safety Interventions:  [place] to call system. Call bell, personal items and telephone within reach. Instruct patient to call for assistance. Room bathroom lighting operational. Non-slip footwear when patient is off stretcher. Physically safe environment: no spills, clutter or unnecessary equipment. Stretcher in lowest position, wheels locked, appropriate side rails in place.

## 2020-06-06 NOTE — H&P ADULT - PROBLEM SELECTOR PLAN 5
- Continue home atenolol, substitute Benicar for formulary (losartan) No evidence of diverticulitis  - Will continue to monitor clinically  - Mgmt of constipation as above

## 2020-06-06 NOTE — H&P ADULT - HISTORY OF PRESENT ILLNESS
75 year old female with a history of HTN, GERD, pancreatitis, depression, stress incontinence s/p midurethral sling (2019) presents with abdominal pain. 75 year old female with a history of HTN, pancreatitis, overactive bladder s/p midurethral sling (2019) presents with constipation and abdominal pain.    The patient tells me that she has not had a BM in 4 days. She has taken ex-lax, enema without symptomatic relief. She has intermittent episodes of constipation/diarrhea in the past, however, never this severe. She has had many life-stresses this week that she feels may be contributing to this episode. Yesterday, she developed cramping, left lower quadrant abdominal pain, it was intermittent but progressive and severe. It kept her from sleeping. She decided to come into the hospital. By the time she arrived, the pain nearly abated.   She tells me this pain is NOTHING like her past episodes of pancreatitis pain. She denies any nausea, vomiting. She reports some decreased oral intake over the last few days, but feels very hungry today.   She denies any pain at the time of my exam.     In the ED, vital signs were within normal limits. Labs were notable for mild hyponatremia, and lipase of 61. She has a CT A/P which showed findings of acute on chronic pancreatitis.

## 2020-06-06 NOTE — ED PROVIDER NOTE - ATTENDING CONTRIBUTION TO CARE
Attending MD Rowley: I personally have seen and examined this patient.  Resident note reviewed and agree on plan of care and except where noted.  See below for details.     Seen in [place] 3    75F with PMH/PSH including HTN, GERD, pancreatitis, s/p appendectomy, s/p midurethral sling presents to the ED with LLQ pain and constipation.  Reports that BMs are irregular.  Reports took 6 laxatives today and 3 yesterday with no BM. Denies nausea, vomiting, diarrhea.  Reports decreased appetite since last week.  Reports pain is one location in LLQ, denies radiation, denies positional.  Reports pain has been severe.  Denies chest pain, shortness of breath, palpitations, cough. COVID contacts. Denies fevers, chills.  Denies headache, dizziness.  Denies dysuria, frequency, urgency.  Reports has had hematuria vs vaginal bleeding, reports has followed with Gyn Dr. Bustos and was told needed surgery for a "cyst".  Reports had been scheduled for surgery but cancelled given COVID.  A ten (10) point review of systems was negative other than as stated in the HPI or elsewhere in the chart.     Exam:   General: NAD  HENT: head NCAT, airway patent with moist mucous membranes  Eyes: PERRL  Lungs: lungs CTAB with good inspiratory effort, no wheezing, no rhonchi, no rales  Cardiac: +S1S2, no m/r/g  GI: abdomen soft with +BS, +tenderness to LLQ, no rebound, no guarding, ND, well healed surgical scar in RLQ, chaperoned by Dr. Celis, small hemorrhoid nonthrombosed, no stool in vault  : no CVAT  MSK: FROM at neck, no tenderness to midline palpation, no stepoffs along length of spine, no calf tenderness, swelling, erythema or warmth  Neuro: moving all extremities with 5/5 strength bilateral upper and lower extremities, good and equal  strength bilaterally, sensory grossly intact, no gross neuro deficits  Psych: normal mood and affect     A/P: 75F with constipation and LLQ pain, DDx includes obstruction, diverticulitis, will obtain labs, CTAP with po and IV, po will also help with BM, will give IVFs, pain control with nonnarcotic, reassess

## 2020-06-06 NOTE — H&P ADULT - PROBLEM SELECTOR PLAN 1
Secondary to constipation >> acute on chronic pancreatitis based on clinical picture, history  - Pain free currently, will give Tylenol PRN for mild pain, high dose ibuprofen for more severe pain  - Will avoid narcotics considering history of constipation

## 2020-06-06 NOTE — ED PROVIDER NOTE - PHYSICAL EXAMINATION
GEN: Well appearing, well nourished, in no apparent distress.  HEAD: NCAT  HEENT: PERRL, Airway patent, EOMI, non-erythematous pharynx, no exudates, uvula midline, MMM, neck supple, no LAD, no JVD  LUNG: CTAB, no adventitious sounds, no retractions, no nasal flaring  CV: RRR, no murmurs,   Abd: soft, TTP LLQ, ND, no rebound or guarding, BS+ in all quadrants, no CVAT  MSK: WWP, Pulses 2+ in extremities, No edema   Neuro:  AAOx3, Ambulatory with stable gait.  Skin: Warm and dry, no evidence of rash  Psych: normal mood and affect DISCHARGE

## 2020-06-06 NOTE — ED PROVIDER NOTE - OBJECTIVE STATEMENT
75F hx htn, gerd, pancreatitis (unknown cause, on Creon), s/p Midurethral sling presents with LLQ pain + no BM x 5 days. Tried laxatives without success. Pain is non radiating, was severe but today has improved to minimal without pain meds. 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 Patient denies chest pain, SOB, f/c, cough, N/V/D/C, weakness, HA, dizziness, extremity pain or swelling or other complaints.

## 2020-06-06 NOTE — H&P ADULT - PROBLEM SELECTOR PLAN 2
No obstruction on imaging  Suspect secondary to slow transit  S/P Mg Citrate in ED  - Will start lactulose, bisacodyl suppository PRN  - Monitor for BM  - Should be on chronic stool softeners

## 2020-06-06 NOTE — ED PROVIDER NOTE - NS ED ROS FT
Constitutional: no fevers, no chills.  Eyes: no visual changes.  Ears: no ear drainage, no ear pain.  Nose: no nasal congestion.  Mouth/Throat: no sore throat.  Cardiovascular: no chest pain.  Respiratory: no shortness of breath, no wheezing, no cough  Gastrointestinal: no nausea, no vomiting, no diarrhea, +LLQ abdominal pain.  MSK: no flank pain, no back pain.  Genitourinary: no dysuria, +hematuria +brbpr  Skin: no rashes.  Neuro: no headache

## 2020-06-07 ENCOUNTER — TRANSCRIPTION ENCOUNTER (OUTPATIENT)
Age: 75
End: 2020-06-07

## 2020-06-07 VITALS
RESPIRATION RATE: 18 BRPM | SYSTOLIC BLOOD PRESSURE: 119 MMHG | TEMPERATURE: 98 F | DIASTOLIC BLOOD PRESSURE: 65 MMHG | HEART RATE: 61 BPM | OXYGEN SATURATION: 96 %

## 2020-06-07 DIAGNOSIS — Z87.19 PERSONAL HISTORY OF OTHER DISEASES OF THE DIGESTIVE SYSTEM: ICD-10-CM

## 2020-06-07 LAB
CULTURE RESULTS: SIGNIFICANT CHANGE UP
SPECIMEN SOURCE: SIGNIFICANT CHANGE UP

## 2020-06-07 PROCEDURE — 87086 URINE CULTURE/COLONY COUNT: CPT

## 2020-06-07 PROCEDURE — 80053 COMPREHEN METABOLIC PANEL: CPT

## 2020-06-07 PROCEDURE — 74177 CT ABD & PELVIS W/CONTRAST: CPT

## 2020-06-07 PROCEDURE — 93005 ELECTROCARDIOGRAM TRACING: CPT

## 2020-06-07 PROCEDURE — 99285 EMERGENCY DEPT VISIT HI MDM: CPT

## 2020-06-07 PROCEDURE — 85027 COMPLETE CBC AUTOMATED: CPT

## 2020-06-07 PROCEDURE — 82272 OCCULT BLD FECES 1-3 TESTS: CPT

## 2020-06-07 PROCEDURE — 81001 URINALYSIS AUTO W/SCOPE: CPT

## 2020-06-07 PROCEDURE — 83690 ASSAY OF LIPASE: CPT

## 2020-06-07 RX ORDER — SENNA PLUS 8.6 MG/1
2 TABLET ORAL
Qty: 60 | Refills: 0
Start: 2020-06-07 | End: 2020-07-06

## 2020-06-07 NOTE — DISCHARGE NOTE PROVIDER - CARE PROVIDER_API CALL
Esvin Benoit  [address] [place] [address]  [address] [place] [id number]  Phone: [phone]  Fax: [phone]  Follow Up Time: 1-3 days    Amari Gallardo  [address] [place] [address] [address]  [place] [address]  Phone: [phone]  Fax: (   )    -  Follow Up Time:

## 2020-06-07 NOTE — DISCHARGE NOTE PROVIDER - NSDCMRMEDTOKEN_GEN_ALL_CORE_FT
atenolol 50 mg oral tablet: 1 tab(s) orally once a day  Benicar 40 mg oral tablet: 1 tab(s) orally once a day  bisacodyl 10 mg rectal suppository: 1 suppository(ies) rectal once a day, As needed, Constipation  Creon 24,000 units oral delayed release capsule: 1 cap(s) orally 3 times a day with food  senna oral tablet: 2 tab(s) orally once a day (at bedtime), As needed, Constipation  trospium 60 mg oral capsule, extended release: 1 cap(s) orally once a day (in the morning)
66

## 2020-06-07 NOTE — DISCHARGE NOTE NURSING/CASE MANAGEMENT/SOCIAL WORK - PATIENT PORTAL LINK FT
You can access the FollowMyHealth Patient Portal offered by [facility] by registering at the following website: [email or web address]. By joining [employer]’s FollowMyHealth portal, you will also be able to view your health information using other applications (apps) compatible with our system.

## 2020-06-07 NOTE — CONSULT NOTE ADULT - SUBJECTIVE AND OBJECTIVE BOX
Patient is a 75y old  Female who presents with a chief complaint of Abdominal pain (2020 15:09)      HPI:  75 year old female with a history of HTN, pancreatitis, overactive bladder s/p midurethral sling (2019) presents with constipation and abdominal pain.  pt reports recent gi eval dr del cid -- mri / colon delayed due to covid     The patient tells me that she has not had a BM in 4 days. She has taken ex-lax, enema without symptomatic relief. She has intermittent episodes of constipation/diarrhea in the past, however, never this severe.  Yesterday she developed cramping, left lower quadrant abdominal pain, it was severe. It kept her from sleeping. She decided to come into the hospital.   She tells me this pain is NOTHING like her past episodes of pancreatitis pain. She denies any nausea, vomiting. She reports some decreased oral intake over the last few days, but feels very hungry today.  pt reports social etoh / no recent colon       PAST MEDICAL & SURGICAL HISTORY:  Stress incontinence, female: S/P Sling  History of pancreatitis: 2018 taking creon been stable  GERD (gastroesophageal reflux disease)  Anxiety and depression  Hypertension  H/O dilation and curettage  Hx of appendectomy  Hx of tonsillectomy  H/O left inguinal hernia repair      MEDICATIONS  (STANDING):  ATENolol  Tablet 50 milliGRAM(s) Oral daily  enoxaparin Injectable 40 milliGRAM(s) SubCutaneous daily  losartan 100 milliGRAM(s) Oral daily  melatonin 5 milliGRAM(s) Oral at bedtime  oxybutynin 5 milliGRAM(s) Oral two times a day  pancrelipase  (CREON 12,000 Lipase Units) 2 Capsule(s) Oral three times a day with meals    MEDICATIONS  (PRN):  bisacodyl Suppository 10 milliGRAM(s) Rectal daily PRN Constipation  diphenhydrAMINE 25 milliGRAM(s) Oral at bedtime PRN Insomnia  senna 2 Tablet(s) Oral at bedtime PRN Constipation      Allergies    penicillin (Swelling)    Intolerances        Review of Systems: see HPI    Vital Signs Last 24 Hrs  T(C): 36.6 (2020 06:02), Max: 37.2 (2020 15:20)  T(F): 97.8 (2020 06:02), Max: 98.9 (2020 15:20)  HR: 56 (2020 06:02) (56 - 99)  BP: 125/66 (2020 06:02) (125/66 - 159/71)  BP(mean): --  RR: 18 (2020 06:02) (18 - 20)  SpO2: 97% (2020 06:02) (96% - 100%)    PHYSICAL EXAM:    Constitutional: NAD, well-developed  Gastrointestinal: BS+, soft, NT/ND, neg HSM,  Psychiatric: Normal mood, normal affect    LABS:                        12.7   7.41  )-----------( 320      ( 2020 11:56 )             38.6     06-06    130<L>  |  96  |  11  ----------------------------<  135<H>  3.9   |  23  |  0.54    Ca    9.0      2020 11:56    TPro  6.6  /  Alb  3.7  /  TBili  0.5  /  DBili  x   /  AST  25  /  ALT  11  /  AlkPhos  99  06-06      Urinalysis Basic - ( 2020 11:56 )    Color: Light Yellow / Appearance: Clear / S.020 / pH: x  Gluc: x / Ketone: Negative  / Bili: Negative / Urobili: Negative   Blood: x / Protein: Trace / Nitrite: Negative   Leuk Esterase: Moderate / RBC: 1 /hpf / WBC 6 /HPF   Sq Epi: x / Non Sq Epi: 8 /hpf / Bacteria: Negative      LIVER FUNCTIONS - ( 2020 11:56 )  Alb: 3.7 g/dL / Pro: 6.6 g/dL / ALK PHOS: 99 U/L / ALT: 11 U/L / AST: 25 U/L / GGT: x             RADIOLOGY & ADDITIONAL TESTS:

## 2020-06-07 NOTE — DISCHARGE NOTE PROVIDER - PROVIDER TOKENS
FREE:[LAST:[Cy],FIRST:[Esvin],PHONE:[phone]:[phone]],ADDRESS:[[address] [place] [address]],FOLLOWUP:[1-3 days]],FREE:[LAST:[Sami],FIRST:[Amari],PHONE:[phone]],FAX:[(   [phone]    -],ADDRESS:[[address] [place] [address] [address] [place] [address]]]

## 2020-06-07 NOTE — DISCHARGE NOTE PROVIDER - NSDCCPCAREPLAN_GEN_ALL_CORE_FT
PRINCIPAL DISCHARGE DIAGNOSIS  Diagnosis: Constipation  Assessment and Plan of Treatment: -Continue with low fiber diet  -Continue with senna and dulcolax for constipation as needed      SECONDARY DISCHARGE DIAGNOSES  Diagnosis: History of pancreatitis  Assessment and Plan of Treatment: -Follow up with Dr Gallardo (GI) as outpatient.

## 2020-06-07 NOTE — DISCHARGE NOTE PROVIDER - HOSPITAL COURSE
75 year old female with a history of HTN, pancreatitis, overactive bladder s/p midurethral sling (2019) presents with abdominal pain and constipation found to have acute on chronic pancreatitis on imaging.          Problem/Recommendation - 1:    Problem: Left lower quadrant abdominal pain. Recommendation: improved after bm -- likely due to constipation    cont bowel regimen    to proceed with outpatient colonoscopy.          Problem/Recommendation - 2:    ·  Problem: History of pancreatitis.  Recommendation: undergoing recent eval    clinically stable    proceed with outpatient follow up with Dr Gallardo.

## 2020-06-08 LAB
CULTURE RESULTS: SIGNIFICANT CHANGE UP
SPECIMEN SOURCE: SIGNIFICANT CHANGE UP

## 2020-09-25 PROBLEM — N39.3 STRESS INCONTINENCE (FEMALE) (MALE): Chronic | Status: ACTIVE | Noted: 2019-03-13

## 2020-09-27 DIAGNOSIS — Z01.818 ENCOUNTER FOR OTHER PREPROCEDURAL EXAMINATION: ICD-10-CM

## 2020-09-28 ENCOUNTER — APPOINTMENT (OUTPATIENT)
Dept: DISASTER EMERGENCY | Facility: CLINIC | Age: 75
End: 2020-09-28

## 2020-09-28 ENCOUNTER — OUTPATIENT (OUTPATIENT)
Dept: OUTPATIENT SERVICES | Facility: HOSPITAL | Age: 75
LOS: 1 days | End: 2020-09-28
Payer: MEDICARE

## 2020-09-28 VITALS
DIASTOLIC BLOOD PRESSURE: 90 MMHG | SYSTOLIC BLOOD PRESSURE: 152 MMHG | HEART RATE: 61 BPM | HEIGHT: 65 IN | TEMPERATURE: 98 F | RESPIRATION RATE: 14 BRPM | OXYGEN SATURATION: 98 % | WEIGHT: 128.97 LBS

## 2020-09-28 DIAGNOSIS — N95.0 POSTMENOPAUSAL BLEEDING: ICD-10-CM

## 2020-09-28 DIAGNOSIS — N84.0 POLYP OF CORPUS UTERI: ICD-10-CM

## 2020-09-28 DIAGNOSIS — Z01.818 ENCOUNTER FOR OTHER PREPROCEDURAL EXAMINATION: ICD-10-CM

## 2020-09-28 PROCEDURE — G0463: CPT

## 2020-09-28 RX ORDER — SODIUM CHLORIDE 9 MG/ML
3 INJECTION INTRAMUSCULAR; INTRAVENOUS; SUBCUTANEOUS EVERY 8 HOURS
Refills: 0 | Status: DISCONTINUED | OUTPATIENT
Start: 2020-10-01 | End: 2020-10-15

## 2020-09-28 RX ORDER — LIDOCAINE HCL 20 MG/ML
0.2 VIAL (ML) INJECTION ONCE
Refills: 0 | Status: DISCONTINUED | OUTPATIENT
Start: 2020-10-01 | End: 2020-10-15

## 2020-09-28 NOTE — H&P PST ADULT - HISTORY OF PRESENT ILLNESS
Mrs. Yee is a 75 year old woman with HTN and chronic pancreatitis who developed vaginal post menopausal bleeding this year dx with uterine polyp now scheduled for D&C, operative hysteroscopy with symphion, for resection of endometrial polyp.    COVID testing done this morning, pending results.

## 2020-09-28 NOTE — H&P PST ADULT - NSICDXPROBLEM_GEN_ALL_CORE_FT
PROBLEM DIAGNOSES  Problem: Polyp of corpus uteri  Assessment and Plan: Scheduled for D&C, operative hysteroscopy with Symphion, for resection of endometrial polyp  Medical evaluation and labs done last week, cardio eval done 9/19/2020.  Preop instructions given.

## 2020-09-28 NOTE — H&P PST ADULT - ENMT COMMENTS
"              After Visit Summary   2017    Lilian Middleton    MRN: [id number]           Patient Information     Date Of Birth          1971        Visit Information        Provider Department      2017 7:40 AM Evi Ibarra APRN CNP [facility]        Today's Diagnoses     Moderate episode of recurrent major depressive disorder (H)    -  1       Follow-ups after your visit        Who to contact     If you have questions or need follow up information about today's clinic visit or your schedule please contact [facility] directly at [phone].  Normal or non-critical lab and imaging results will be communicated to you by [employer], letter or phone within 4 business days after the clinic has received the results. If you do not hear from us within 7 days, please contact the clinic through [employer] or phone. If you have a critical or abnormal lab result, we will notify you by phone as soon as possible.  Submit refill requests through [employer] or call your pharmacy and they will forward the refill request to us. Please allow 3 business days for your refill to be completed.          Additional Information About Your Visit        MyChart Information     [employer] lets you send messages to your doctor, view your test results, renew your prescriptions, schedule appointments and more. To sign up, go to [email or web address] . Click on \"Log in\" on the left side of the screen, which will take you to the Welcome page. Then click on \"Sign up Now\" on the right side of the page.     You will be asked to enter the access code listed below, as well as some personal information. Please follow the directions to create your username and password.     Your access code is: [email or web address]  Expires: 2018  7:14 AM     Your access code will  in 90 days. If you need help or a new code, please call your [facility] or [phone].        Care EveryWhere ID     This is your " "Care EveryWhere ID. This could be used by other organizations to access your [facility] medical records  [id number]        Your Vitals Were     Pulse Temperature Respirations Height BMI (Body Mass Index)       70 98.2  F (36.8  C) (Temporal) 20 5' 4\" (1.626 m) 39.39 kg/m2        Blood Pressure from Last 3 Encounters:   12/08/17 116/82   10/20/17 110/70   10/10/16 143/64    Weight from Last 3 Encounters:   12/08/17 229 lb 8 oz (104.1 kg)   10/20/17 229 lb 11.2 oz (104.2 kg)   05/12/17 228 lb (103.4 kg)              We Performed the Following     DEPRESSION ACTION PLAN (DAP)          Today's Medication Changes          These changes are accurate as of: 12/8/17  8:22 AM.  If you have any questions, ask your nurse or doctor.               Start taking these medicines.        Dose/Directions    citalopram 10 MG tablet   Commonly known as:  celeXA   Used for:  Moderate episode of recurrent major depressive disorder (H)   Started by:  Evi Ibarra APRN CNP        Dose:  10 mg   Take 1 tablet (10 mg) by mouth daily   Quantity:  30 tablet   Refills:  3            Where to get your medicines      These medications were sent to [facility] [place] - [address] [place] [address]  [address] [place] [address], [place] [address]     Phone:  [phone]     citalopram 10 MG tablet                Primary Care Provider Office Phone # Fax #    ZEN Lozano [phone] [address] [address] [place] [address]        Equal Access to Services     KARLOS DARDEN : Hadusssy Botello, michada luamado, qaybta kaalmarkell garza. So [facility] [phone].    ATENCIÓN: Si habla español, tiene a drew disposición servicios gratuitos de asistencia lingüística. Jacoby al [phone].    We comply with applicable federal civil rights laws and [place] laws. We do not discriminate on the basis of race, " color, national origin, age, disability, sex, sexual orientation, or gender identity.            Thank you!     Thank you for choosing [facility]  for your care. Our goal is always to provide you with excellent care. Hearing back from our patients is one way we can continue to improve our services. Please take a few minutes to complete the written survey that you may receive in the mail after your visit with us. Thank you!             Your Updated Medication List - Protect others around you: Learn how to safely use, store and throw away your medicines at www.disposemymeds.org.          This list is accurate as of: 12/8/17  8:22 AM.  Always use your most recent med list.                   Brand Name Dispense Instructions for use Diagnosis    citalopram 10 MG tablet    celeXA    30 tablet    Take 1 tablet (10 mg) by mouth daily    Moderate episode of recurrent major depressive disorder (H)       ketoconazole 2 % cream    NIZORAL    60 g    Apply topically 2 times daily    Candidal intertrigo       TYLENOL PO      Take 500 mg by mouth as needed for mild pain or fever Reported on 3/8/2017           diminished hearing on left side to finger rub, no redness/inflammation of mouth/pharynx

## 2020-09-29 LAB — SARS-COV-2 N GENE NPH QL NAA+PROBE: NOT DETECTED

## 2020-09-30 ENCOUNTER — TRANSCRIPTION ENCOUNTER (OUTPATIENT)
Age: 75
End: 2020-09-30

## 2020-10-01 ENCOUNTER — RESULT REVIEW (OUTPATIENT)
Age: 75
End: 2020-10-01

## 2020-10-01 ENCOUNTER — OUTPATIENT (OUTPATIENT)
Dept: OUTPATIENT SERVICES | Facility: HOSPITAL | Age: 75
LOS: 1 days | End: 2020-10-01
Payer: MEDICARE

## 2020-10-01 VITALS
DIASTOLIC BLOOD PRESSURE: 60 MMHG | OXYGEN SATURATION: 97 % | SYSTOLIC BLOOD PRESSURE: 100 MMHG | HEART RATE: 66 BPM | RESPIRATION RATE: 16 BRPM

## 2020-10-01 VITALS
RESPIRATION RATE: 16 BRPM | TEMPERATURE: 97 F | OXYGEN SATURATION: 98 % | DIASTOLIC BLOOD PRESSURE: 64 MMHG | SYSTOLIC BLOOD PRESSURE: 100 MMHG | HEIGHT: 65 IN | WEIGHT: 128.97 LBS | HEART RATE: 58 BPM

## 2020-10-01 DIAGNOSIS — Z01.818 ENCOUNTER FOR OTHER PREPROCEDURAL EXAMINATION: ICD-10-CM

## 2020-10-01 DIAGNOSIS — N84.0 POLYP OF CORPUS UTERI: ICD-10-CM

## 2020-10-01 DIAGNOSIS — N95.0 POSTMENOPAUSAL BLEEDING: ICD-10-CM

## 2020-10-01 PROCEDURE — 58558 HYSTEROSCOPY BIOPSY: CPT

## 2020-10-01 PROCEDURE — 88305 TISSUE EXAM BY PATHOLOGIST: CPT

## 2020-10-01 PROCEDURE — 88305 TISSUE EXAM BY PATHOLOGIST: CPT | Mod: 26

## 2020-10-01 RX ORDER — ONDANSETRON 8 MG/1
4 TABLET, FILM COATED ORAL ONCE
Refills: 0 | Status: DISCONTINUED | OUTPATIENT
Start: 2020-10-01 | End: 2020-10-15

## 2020-10-01 RX ORDER — OXYCODONE HYDROCHLORIDE 5 MG/1
5 TABLET ORAL ONCE
Refills: 0 | Status: DISCONTINUED | OUTPATIENT
Start: 2020-10-01 | End: 2020-10-01

## 2020-10-01 RX ORDER — SODIUM CHLORIDE 9 MG/ML
1000 INJECTION, SOLUTION INTRAVENOUS
Refills: 0 | Status: DISCONTINUED | OUTPATIENT
Start: 2020-10-01 | End: 2020-10-15

## 2020-10-01 NOTE — BRIEF OPERATIVE NOTE - NSICDXBRIEFPROCEDURE_GEN_ALL_CORE_FT
PROCEDURES:  Dilation and curettage, uterus 01-Oct-2020 08:46:43  Valarie Rogers  Polypectomy, uterus 01-Oct-2020 08:46:32  Valarie Rogers  Operative hysteroscopy without fluid management system 01-Oct-2020 08:46:23  Valarie Rogers

## 2020-10-01 NOTE — ASU DISCHARGE PLAN (ADULT/PEDIATRIC) - CARE PROVIDER_API CALL
Jeremiah Bustos  OBSTETRICS AND GYNECOLOGY  [address] [place] [address], [address]  [place] [address]  Phone: [phone]  Fax: [phone]  Follow Up Time: 2 weeks

## 2020-10-01 NOTE — BRIEF OPERATIVE NOTE - OPERATION/FINDINGS
6 week sized, midpositioned uterus  cervix dilated to 7.5 mm  uterus noted to be atrophic. ostia visualized bilaterally  fluid deficit 25cc  endometrial polyp noted at top of fundus, removed with symphion

## 2020-10-01 NOTE — ASU PATIENT PROFILE, ADULT - PMH
Anxiety and depression    GERD (gastroesophageal reflux disease)    History of pancreatitis  2018 taking creon been stable  Hypertension    Stress incontinence, female  S/P Sling

## 2020-10-01 NOTE — PRE-ANESTHESIA EVALUATION ADULT - NSANTHPMHFT_GEN_ALL_CORE
GERD controlled  good exercise tolerance GERD controlled  good exercise tolerance  mild AS and MI  medical clearance in chart

## 2020-10-06 LAB — SURGICAL PATHOLOGY STUDY: SIGNIFICANT CHANGE UP

## 2020-10-12 ENCOUNTER — RX RENEWAL (OUTPATIENT)
Age: 75
End: 2020-10-12

## 2021-05-14 ENCOUNTER — EMERGENCY (EMERGENCY)
Facility: HOSPITAL | Age: 76
LOS: 1 days | Discharge: HOME | End: 2021-05-14
Attending: EMERGENCY MEDICINE | Admitting: EMERGENCY MEDICINE
Payer: MEDICARE

## 2021-05-14 VITALS
DIASTOLIC BLOOD PRESSURE: 76 MMHG | SYSTOLIC BLOOD PRESSURE: 153 MMHG | TEMPERATURE: 98 F | HEART RATE: 70 BPM | OXYGEN SATURATION: 97 % | RESPIRATION RATE: 16 BRPM

## 2021-05-14 VITALS
OXYGEN SATURATION: 98 % | WEIGHT: 128.09 LBS | DIASTOLIC BLOOD PRESSURE: 79 MMHG | HEIGHT: 65 IN | TEMPERATURE: 98 F | RESPIRATION RATE: 16 BRPM | SYSTOLIC BLOOD PRESSURE: 168 MMHG | HEART RATE: 85 BPM

## 2021-05-14 DIAGNOSIS — M25.551 PAIN IN RIGHT HIP: ICD-10-CM

## 2021-05-14 DIAGNOSIS — Z88.0 ALLERGY STATUS TO PENICILLIN: ICD-10-CM

## 2021-05-14 DIAGNOSIS — Z79.899 OTHER LONG TERM (CURRENT) DRUG THERAPY: ICD-10-CM

## 2021-05-14 DIAGNOSIS — R60.0 LOCALIZED EDEMA: ICD-10-CM

## 2021-05-14 DIAGNOSIS — Z88.6 ALLERGY STATUS TO ANALGESIC AGENT: ICD-10-CM

## 2021-05-14 LAB
ALBUMIN SERPL ELPH-MCNC: 3.6 G/DL — SIGNIFICANT CHANGE UP (ref 3.3–5)
ALP SERPL-CCNC: 111 U/L — SIGNIFICANT CHANGE UP (ref 40–120)
ALT FLD-CCNC: 15 U/L — SIGNIFICANT CHANGE UP (ref 10–45)
ANION GAP SERPL CALC-SCNC: 11 MMOL/L — SIGNIFICANT CHANGE UP (ref 5–17)
ANISOCYTOSIS BLD QL: SLIGHT — SIGNIFICANT CHANGE UP
APTT BLD: 27.4 SEC — LOW (ref 27.5–35.5)
AST SERPL-CCNC: 37 U/L — SIGNIFICANT CHANGE UP (ref 10–40)
BASOPHILS # BLD AUTO: 0.07 K/UL — SIGNIFICANT CHANGE UP (ref 0–0.2)
BASOPHILS NFR BLD AUTO: 1.3 % — SIGNIFICANT CHANGE UP (ref 0–2)
BILIRUB SERPL-MCNC: 0.2 MG/DL — SIGNIFICANT CHANGE UP (ref 0.2–1.2)
BUN SERPL-MCNC: 17 MG/DL — SIGNIFICANT CHANGE UP (ref 7–23)
CALCIUM SERPL-MCNC: 8.3 MG/DL — LOW (ref 8.4–10.5)
CHLORIDE SERPL-SCNC: 106 MMOL/L — SIGNIFICANT CHANGE UP (ref 96–108)
CO2 SERPL-SCNC: 24 MMOL/L — SIGNIFICANT CHANGE UP (ref 22–31)
CREAT SERPL-MCNC: 0.6 MG/DL — SIGNIFICANT CHANGE UP (ref 0.5–1.3)
EOSINOPHIL # BLD AUTO: 0.05 K/UL — SIGNIFICANT CHANGE UP (ref 0–0.5)
EOSINOPHIL NFR BLD AUTO: 0.9 % — SIGNIFICANT CHANGE UP (ref 0–6)
GLUCOSE SERPL-MCNC: 142 MG/DL — HIGH (ref 70–99)
HCT VFR BLD CALC: 33.2 % — LOW (ref 34.5–45)
HGB BLD-MCNC: 11 G/DL — LOW (ref 11.5–15.5)
IMM GRANULOCYTES NFR BLD AUTO: 0.4 % — SIGNIFICANT CHANGE UP (ref 0–1.5)
INR BLD: 0.96 — SIGNIFICANT CHANGE UP (ref 0.88–1.16)
LIDOCAIN IGE QN: 27 U/L — SIGNIFICANT CHANGE UP (ref 7–60)
LYMPHOCYTES # BLD AUTO: 1.33 K/UL — SIGNIFICANT CHANGE UP (ref 1–3.3)
LYMPHOCYTES # BLD AUTO: 24 % — SIGNIFICANT CHANGE UP (ref 13–44)
MACROCYTES BLD QL: SIGNIFICANT CHANGE UP
MANUAL SMEAR VERIFICATION: SIGNIFICANT CHANGE UP
MCHC RBC-ENTMCNC: 33.1 GM/DL — SIGNIFICANT CHANGE UP (ref 32–36)
MCHC RBC-ENTMCNC: 35.3 PG — HIGH (ref 27–34)
MCV RBC AUTO: 106.4 FL — HIGH (ref 80–100)
MONOCYTES # BLD AUTO: 0.59 K/UL — SIGNIFICANT CHANGE UP (ref 0–0.9)
MONOCYTES NFR BLD AUTO: 10.6 % — SIGNIFICANT CHANGE UP (ref 2–14)
NEUTROPHILS # BLD AUTO: 3.49 K/UL — SIGNIFICANT CHANGE UP (ref 1.8–7.4)
NEUTROPHILS NFR BLD AUTO: 62.8 % — SIGNIFICANT CHANGE UP (ref 43–77)
NRBC # BLD: 0 /100 WBCS — SIGNIFICANT CHANGE UP (ref 0–0)
PLAT MORPH BLD: ABNORMAL
PLATELET # BLD AUTO: 260 K/UL — SIGNIFICANT CHANGE UP (ref 150–400)
POLYCHROMASIA BLD QL SMEAR: SLIGHT — SIGNIFICANT CHANGE UP
POTASSIUM SERPL-MCNC: 4.3 MMOL/L — SIGNIFICANT CHANGE UP (ref 3.5–5.3)
POTASSIUM SERPL-SCNC: 4.3 MMOL/L — SIGNIFICANT CHANGE UP (ref 3.5–5.3)
PROT SERPL-MCNC: 6.2 G/DL — SIGNIFICANT CHANGE UP (ref 6–8.3)
PROTHROM AB SERPL-ACNC: 11.5 SEC — SIGNIFICANT CHANGE UP (ref 10.6–13.6)
RBC # BLD: 3.12 M/UL — LOW (ref 3.8–5.2)
RBC # FLD: 14.6 % — HIGH (ref 10.3–14.5)
RBC BLD AUTO: ABNORMAL
SODIUM SERPL-SCNC: 141 MMOL/L — SIGNIFICANT CHANGE UP (ref 135–145)
WBC # BLD: 5.55 K/UL — SIGNIFICANT CHANGE UP (ref 3.8–10.5)
WBC # FLD AUTO: 5.55 K/UL — SIGNIFICANT CHANGE UP (ref 3.8–10.5)

## 2021-05-14 PROCEDURE — 73502 X-RAY EXAM HIP UNI 2-3 VIEWS: CPT

## 2021-05-14 PROCEDURE — 93971 EXTREMITY STUDY: CPT

## 2021-05-14 PROCEDURE — 93005 ELECTROCARDIOGRAM TRACING: CPT

## 2021-05-14 PROCEDURE — 85610 PROTHROMBIN TIME: CPT

## 2021-05-14 PROCEDURE — 71045 X-RAY EXAM CHEST 1 VIEW: CPT | Mod: 26

## 2021-05-14 PROCEDURE — 71045 X-RAY EXAM CHEST 1 VIEW: CPT

## 2021-05-14 PROCEDURE — 73700 CT LOWER EXTREMITY W/O DYE: CPT | Mod: 26,RT,MA

## 2021-05-14 PROCEDURE — 80053 COMPREHEN METABOLIC PANEL: CPT

## 2021-05-14 PROCEDURE — 73700 CT LOWER EXTREMITY W/O DYE: CPT

## 2021-05-14 PROCEDURE — 83690 ASSAY OF LIPASE: CPT

## 2021-05-14 PROCEDURE — 85025 COMPLETE CBC W/AUTO DIFF WBC: CPT

## 2021-05-14 PROCEDURE — 99284 EMERGENCY DEPT VISIT MOD MDM: CPT | Mod: 25

## 2021-05-14 PROCEDURE — 73502 X-RAY EXAM HIP UNI 2-3 VIEWS: CPT | Mod: 26,RT

## 2021-05-14 PROCEDURE — 99284 EMERGENCY DEPT VISIT MOD MDM: CPT

## 2021-05-14 PROCEDURE — 36415 COLL VENOUS BLD VENIPUNCTURE: CPT

## 2021-05-14 PROCEDURE — 85730 THROMBOPLASTIN TIME PARTIAL: CPT

## 2021-05-14 PROCEDURE — 93971 EXTREMITY STUDY: CPT | Mod: 26,RT

## 2021-05-14 RX ORDER — ACETAMINOPHEN 500 MG
1000 TABLET ORAL ONCE
Refills: 0 | Status: COMPLETED | OUTPATIENT
Start: 2021-05-14 | End: 2021-05-14

## 2021-05-14 RX ADMIN — Medication 400 MILLIGRAM(S): at 17:48

## 2021-05-14 NOTE — ED PROVIDER NOTE - PATIENT PORTAL LINK FT
commode
You can access the FollowMyHealth Patient Portal offered by [facility] by registering at the following website: [email or web address]. By joining [email or web address]’s FollowMyHealth portal, you will also be able to view your health information using other applications (apps) compatible with our system.

## 2021-05-14 NOTE — ED PROVIDER NOTE - CLINICAL SUMMARY MEDICAL DECISION MAKING FREE TEXT BOX
pt c/o atraumatic r hip /leg pain w/some swelling, no blunt trauma, no fall, not shortened nor rotated on exam, well perfused, will get labs, doppler r/o dvt, xrays, signed out to dr maki pending results and dispo

## 2021-05-14 NOTE — ED ADULT TRIAGE NOTE - CHIEF COMPLAINT QUOTE
pt BIBA complaining of right hip/ leg pain started today no trauma/ injury or falls. denies weakness, sensation intact. no obvious deformity noted however pt reports she is not able to bare weight- no pain while at rest

## 2021-05-14 NOTE — ED PROVIDER NOTE - OBJECTIVE STATEMENT
The pt is a 77 y/o F, who presents to ED c/o R leg pain and swelling today. Pt states that was working all day, pain started PTA, no pain at rest but unable to range hip and move leg 2/2 to pain, has noticed swelling to leg x few d, flew home from [place] 4 d ago. Took asa w/o relief. Denies trauma, fall, fevers, chills, back pain, abd pain, breaks in skin.

## 2021-05-14 NOTE — ED PROVIDER NOTE - MUSCULOSKELETAL, MLM
no spinal tend, no discolorations, FROM; LE + vasculitis rash to R>L LE, + swelling to R>L LE, pedal pulses 2+ b/l, + light touch b/l, muscle strength 5/5 b/l, limited ROM of R hip 2/2 to pain, no bony tend, no pelvic instability

## 2021-05-14 NOTE — ED ADULT NURSE REASSESSMENT NOTE - NS ED NURSE REASSESS COMMENT FT1
Report received from Jen HUMPHRIES, will assume care of pt at this time. Pt in ER bed OBGYN, assessment as noted.

## 2021-05-14 NOTE — ED ADULT NURSE NOTE - OBJECTIVE STATEMENT
Pt AOX4. Pt c/o right lower extremity pain and tingling that started 3 hours ago. Pt states "I was at work and made a sudden turn to the side and started to feel a sharp pain in my right hip and my leg felt weak". Swelling noted to RLE. Limited ROM to RLE. FROM to LLE. +2 pedal pulses b/l. No bruising or obvious deformity noted. Pt denies fevers, chills, n/v, SOB, chest pain. Pt speaking in full complete sentences. Respirations even and unlabored.

## 2021-05-14 NOTE — ED ADULT NURSE NOTE - NSIMPLEMENTINTERV_GEN_ALL_ED
Implemented All Universal Safety Interventions:  [place] to call system. Call bell, personal items and telephone within reach. Instruct patient to call for assistance. Room bathroom lighting operational. Non-slip footwear when patient is off stretcher. Physically safe environment: no spills, clutter or unnecessary equipment. Stretcher in lowest position, wheels locked, appropriate side rails in place.

## 2021-05-14 NOTE — ED PROVIDER NOTE - ATTENDING CONTRIBUTION TO CARE
+sudden R hip pain after twisting, no trauma, tender w hip rocking, no deformity, nl pulses , nl motor nl sensation, no C T or L spine tendernss , no apparent fx on xray, consider occult, CT ordered  also w chronic leg edema R>L no SOB , no calf tendernss, DVT ruled out, ( advised compression stocking / low salt)  if CT no fx will attempt to walk, if now ambulatory ok for d/c and likely was muscle spasm, if unable to walk will admit for further workup / PT eval and fall risk

## 2021-09-13 ENCOUNTER — APPOINTMENT (OUTPATIENT)
Dept: GASTROENTEROLOGY | Facility: CLINIC | Age: 76
End: 2021-09-13
Payer: MEDICARE

## 2021-09-13 VITALS — HEART RATE: 78 BPM | RESPIRATION RATE: 12 BRPM

## 2021-09-13 VITALS
SYSTOLIC BLOOD PRESSURE: 122 MMHG | HEIGHT: 65 IN | WEIGHT: 127 LBS | TEMPERATURE: 97.9 F | BODY MASS INDEX: 21.16 KG/M2 | DIASTOLIC BLOOD PRESSURE: 72 MMHG

## 2021-09-13 DIAGNOSIS — R55 SYNCOPE AND COLLAPSE: ICD-10-CM

## 2021-09-13 DIAGNOSIS — R00.2 PALPITATIONS: ICD-10-CM

## 2021-09-13 PROCEDURE — 99203 OFFICE O/P NEW LOW 30 MIN: CPT

## 2021-09-13 RX ORDER — SULFAMETHOXAZOLE AND TRIMETHOPRIM 800; 160 MG/1; MG/1
800-160 TABLET ORAL TWICE DAILY
Qty: 6 | Refills: 0 | Status: DISCONTINUED | COMMUNITY
Start: 2019-02-20 | End: 2021-09-13

## 2021-09-13 NOTE — REVIEW OF SYSTEMS
[As Noted in HPI] : as noted in HPI [Constipation] : constipation [Diarrhea] : diarrhea [Negative] : Heme/Lymph [FreeTextEntry5] : syncope

## 2021-09-13 NOTE — HISTORY OF PRESENT ILLNESS
[Heartburn] : denies heartburn [Nausea] : denies nausea [Vomiting] : denies vomiting [Diarrhea] : denies diarrhea [Constipation] : denies constipation [Yellow Skin Or Eyes (Jaundice)] : denies jaundice [Abdominal Pain] : denies abdominal pain [Abdominal Swelling] : denies abdominal swelling [Rectal Pain] : denies rectal pain [Abdominal Surgery] : abdominal surgery [Appendectomy] : appendectomy [Poor Compliance] : poor compliance with treatment [Fair Tolerance] : fair tolerance of treatment [Fair Symptom Control] : fair symptom control [Wt Gain ___ Lbs] : no recent weight gain [GERD] : no gastroesophageal reflux disease [Hiatus Hernia] : no hiatus hernia [Cholelithiasis] : no cholelithiasis [Kidney Stone] : no kidney stone [Inflammatory Bowel Disease] : no inflammatory bowel disease [Diverticulitis] : no diverticulitis [Alcohol Abuse] : no alcohol abuse [Malignancy] : no malignancy [Cholecystectomy] : no cholecystectomy [de-identified] : 77 yo female, under previous care of [facility], who has left the practice. She does not recall the name of the GI, nor the work up she has undergone. She is a currently practicing dentis.  She describes a history of chronic pancreatitis for several years on CREON; describes occ diarrhea and alternates with constipation. Doesn't recall last colonoscopy, but implies that had cologuard test , but does not recall the results. \par \par S/p PM 4 weeks ago. States had two syncopal episodes in last 1 week.We discussed the need to requests her full GI records for review. \par \par She also has undegone placement of a pacemaker recently and has several episodes of syncope, which she denies having told her cardiologist.

## 2021-09-13 NOTE — ASSESSMENT
[FreeTextEntry1] : 77 yo female, hx of chronic pancreatitis, desires to establish relationship with new GI due to her previous GI not associated wth his current practice . She states her bowel  movements are mostly normal on her creon.\par \par I discussed that she should discuss her syncope with her cardiologist and she acknowledged that she would.\par \par We will request her full GI records.\par \par Followup in 2-4 weeks.

## 2022-01-14 RX ORDER — ONDANSETRON 8 MG/1
8 TABLET, ORALLY DISINTEGRATING ORAL EVERY 8 HOURS
Qty: 90 | Refills: 2 | Status: ACTIVE | COMMUNITY
Start: 2022-01-14 | End: 1900-01-01

## 2022-01-15 DIAGNOSIS — R11.0 NAUSEA: ICD-10-CM

## 2022-01-19 RX ORDER — ONDANSETRON 4 MG/1
4 TABLET ORAL EVERY 6 HOURS
Qty: 120 | Refills: 4 | Status: ACTIVE | COMMUNITY
Start: 2022-01-15 | End: 1900-01-01

## 2022-01-27 RX ORDER — LOPERAMIDE HYDROCHLORIDE 2 MG/1
2 CAPSULE ORAL 4 TIMES DAILY
Qty: 40 | Refills: 0 | Status: ACTIVE | COMMUNITY
Start: 2022-01-27 | End: 1900-01-01

## 2022-02-04 ENCOUNTER — LABORATORY RESULT (OUTPATIENT)
Age: 77
End: 2022-02-04

## 2022-02-04 ENCOUNTER — APPOINTMENT (OUTPATIENT)
Dept: GASTROENTEROLOGY | Facility: CLINIC | Age: 77
End: 2022-02-04
Payer: MEDICARE

## 2022-02-04 VITALS — BODY MASS INDEX: 21.33 KG/M2 | WEIGHT: 128 LBS | RESPIRATION RATE: 12 BRPM | HEART RATE: 80 BPM | HEIGHT: 65 IN

## 2022-02-04 VITALS — HEART RATE: 76 BPM | RESPIRATION RATE: 12 BRPM

## 2022-02-04 DIAGNOSIS — R39.13 SPLITTING OF URINARY STREAM: ICD-10-CM

## 2022-02-04 PROCEDURE — 99214 OFFICE O/P EST MOD 30 MIN: CPT | Mod: 25

## 2022-02-04 PROCEDURE — 36415 COLL VENOUS BLD VENIPUNCTURE: CPT

## 2022-02-04 NOTE — ASSESSMENT
[FreeTextEntry1] : 69-year-old female with long history of reported chronic pancreatitis with occasional fecal incontinence.  She recently started to use a loperamide which I had advised her to do.  We will obtain fecal calprotectin and elastase and PCR based assay in addition to CBC CMP and lipase and nutritional markers.  I will see her in follow-up within 4 weeks after review the data.

## 2022-02-04 NOTE — HISTORY OF PRESENT ILLNESS
[___ Month(s) Ago] : [unfilled] month(s) ago [Heartburn] : denies heartburn [Nausea] : denies nausea [Vomiting] : denies vomiting [Diarrhea] : denies diarrhea [Constipation] : denies constipation [Yellow Skin Or Eyes (Jaundice)] : denies jaundice [Abdominal Pain] : denies abdominal pain [Abdominal Swelling] : denies abdominal swelling [Rectal Pain] : denies rectal pain [GERD] : no gastroesophageal reflux disease [Hiatus Hernia] : no hiatus hernia [Peptic Ulcer Disease] : no peptic ulcer disease [Pancreatitis] : no pancreatitis [Cholelithiasis] : no cholelithiasis [Kidney Stone] : no kidney stone [Inflammatory Bowel Disease] : no inflammatory bowel disease [Irritable Bowel Syndrome] : no irritable bowel syndrome [Diverticulitis] : no diverticulitis [Alcohol Abuse] : no alcohol abuse [Malignancy] : no malignancy [Abdominal Surgery] : no abdominal surgery [Appendectomy] : no appendectomy [Cholecystectomy] : no cholecystectomy [de-identified] : 77 yo female, reported hx of chronic pancreatitis, on CREON, previously followed by [facility].Esvin Benoit is Primary care.  [facility]  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  Uncertain as to her last colonoscopy.  She had a pacemaker placed in the last few months.  She had a syncopal episode while in [place].  There is no chest pain or shortness of breath she denies any antibiotic usage.

## 2022-02-05 LAB
ALBUMIN SERPL ELPH-MCNC: 3.7 G/DL
ALP BLD-CCNC: 122 U/L
ALT SERPL-CCNC: 33 U/L
ANION GAP SERPL CALC-SCNC: 13 MMOL/L
AST SERPL-CCNC: 81 U/L
BASOPHILS # BLD AUTO: 0.06 K/UL
BASOPHILS NFR BLD AUTO: 1 %
BILIRUB SERPL-MCNC: 0.6 MG/DL
BUN SERPL-MCNC: 15 MG/DL
CALCIUM SERPL-MCNC: 8.9 MG/DL
CHLORIDE SERPL-SCNC: 98 MMOL/L
CO2 SERPL-SCNC: 25 MMOL/L
CREAT SERPL-MCNC: 0.71 MG/DL
EOSINOPHIL # BLD AUTO: 0.03 K/UL
EOSINOPHIL NFR BLD AUTO: 0.5 %
GLUCOSE SERPL-MCNC: 153 MG/DL
HCT VFR BLD CALC: 35.4 %
HGB BLD-MCNC: 11.4 G/DL
IMM GRANULOCYTES NFR BLD AUTO: 0.3 %
LYMPHOCYTES # BLD AUTO: 1.64 K/UL
LYMPHOCYTES NFR BLD AUTO: 28.7 %
MAN DIFF?: NORMAL
MCHC RBC-ENTMCNC: 32.2 GM/DL
MCHC RBC-ENTMCNC: 34.8 PG
MCV RBC AUTO: 107.9 FL
MONOCYTES # BLD AUTO: 0.52 K/UL
MONOCYTES NFR BLD AUTO: 9.1 %
NEUTROPHILS # BLD AUTO: 3.45 K/UL
NEUTROPHILS NFR BLD AUTO: 60.4 %
PLATELET # BLD AUTO: 229 K/UL
POTASSIUM SERPL-SCNC: 4.4 MMOL/L
PROT SERPL-MCNC: 5.9 G/DL
RBC # BLD: 3.28 M/UL
RBC # FLD: 14.4 %
SODIUM SERPL-SCNC: 137 MMOL/L
WBC # FLD AUTO: 5.72 K/UL

## 2022-02-23 ENCOUNTER — APPOINTMENT (OUTPATIENT)
Dept: GASTROENTEROLOGY | Facility: CLINIC | Age: 77
End: 2022-02-23
Payer: MEDICARE

## 2022-02-23 VITALS — DIASTOLIC BLOOD PRESSURE: 80 MMHG | WEIGHT: 131 LBS | SYSTOLIC BLOOD PRESSURE: 130 MMHG | BODY MASS INDEX: 21.8 KG/M2

## 2022-02-23 VITALS — HEART RATE: 68 BPM | RESPIRATION RATE: 14 BRPM

## 2022-02-23 DIAGNOSIS — N39.3 STRESS INCONTINENCE (FEMALE) (MALE): ICD-10-CM

## 2022-02-23 PROCEDURE — 99214 OFFICE O/P EST MOD 30 MIN: CPT

## 2022-02-23 NOTE — ASSESSMENT
[FreeTextEntry1] : 76-year-old female history of pancreatic insufficiency with recent incontinence of stool.  Her elastase was 3.39 mcg/g documenting via pancreatic insufficiency.  Her stool was dark but it was "be negative.  I advised her to increase her Creon to 324,000 unit lipase capsules before each meal and to use Imodium up to 3 times a day.  She will telephone me in follow-up within 1 week upon her return from [place] and she will schedule the MRI of the pancreas as we discussed.

## 2022-02-23 NOTE — REASON FOR VISIT
[Acute] : an acute visit [Follow-Up: _____] : a [unfilled] follow-up visit [FreeTextEntry1] : loose bms, dark stool

## 2022-02-23 NOTE — HISTORY OF PRESENT ILLNESS
[___ Month(s) Ago] : [unfilled] month(s) ago [Heartburn] : denies heartburn [Nausea] : denies nausea [Vomiting] : denies vomiting [Constipation] : denies constipation [Yellow Skin Or Eyes (Jaundice)] : denies jaundice [Abdominal Pain] : denies abdominal pain [Abdominal Swelling] : denies abdominal swelling [Rectal Pain] : denies rectal pain [Diarrhea] : diarrhea [Abdominal Surgery] : abdominal surgery [Appendectomy] : appendectomy [GERD] : no gastroesophageal reflux disease [Peptic Ulcer Disease] : no peptic ulcer disease [Hiatus Hernia] : no hiatus hernia [Pancreatitis] : no pancreatitis [Cholelithiasis] : no cholelithiasis [Kidney Stone] : no kidney stone [Inflammatory Bowel Disease] : no inflammatory bowel disease [Irritable Bowel Syndrome] : no irritable bowel syndrome [Diverticulitis] : no diverticulitis [Alcohol Abuse] : no alcohol abuse [Malignancy] : no malignancy [de-identified] : 76-year-old female history of chronic pancreatic insufficiency.  Recent elastase was 3.39 mcg/g.  Calprotectin and PCR based assay were all normal.  She is using Creon 124,000 units of lipase to each meal.  An episode of fecal incontinence recently.  She also noted some dark stools.  She is traveling to [place] for 1 week.  Is overdue for MRI of the pancreas and has a MRI compatible pacemaker placed in [place] last year.  We reviewed the card from [employer].

## 2022-02-23 NOTE — PHYSICAL EXAM
[General Appearance - Alert] : alert [General Appearance - In No Acute Distress] : in no acute distress [Sclera] : the sclera and conjunctiva were normal [PERRL With Normal Accommodation] : pupils were equal in size, round, and reactive to light [Extraocular Movements] : extraocular movements were intact [Outer Ear] : the ears and nose were normal in appearance [Oropharynx] : the oropharynx was normal [Neck Appearance] : the appearance of the neck was normal [Neck Cervical Mass (___cm)] : no neck mass was observed [Thyroid Diffuse Enlargement] : the thyroid was not enlarged [Jugular Venous Distention Increased] : there was no jugular-venous distention [Thyroid Nodule] : there were no palpable thyroid nodules [Auscultation Breath Sounds / Voice Sounds] : lungs were clear to auscultation bilaterally [Heart Rate And Rhythm] : heart rate was normal and rhythm regular [Heart Sounds] : normal S1 and S2 [Heart Sounds Gallop] : no gallops [Murmurs] : no murmurs [Heart Sounds Pericardial Friction Rub] : no pericardial rub [Full Pulse] : the pedal pulses are present [Edema] : there was no peripheral edema [Bowel Sounds] : normal bowel sounds [Abdomen Soft] : soft [Abdomen Tenderness] : non-tender [Abdomen Mass (___ Cm)] : no abdominal mass palpated [Normal Sphincter Tone] : normal sphincter tone [Cervical Lymph Nodes Enlarged Posterior Bilaterally] : posterior cervical [Cervical Lymph Nodes Enlarged Anterior Bilaterally] : anterior cervical [Supraclavicular Lymph Nodes Enlarged Bilaterally] : supraclavicular [Axillary Lymph Nodes Enlarged Bilaterally] : axillary [Femoral Lymph Nodes Enlarged Bilaterally] : femoral [Inguinal Lymph Nodes Enlarged Bilaterally] : inguinal [Abnormal Walk] : normal gait [Nail Clubbing] : no clubbing  or cyanosis of the fingernails [Musculoskeletal - Swelling] : no joint swelling seen [Motor Tone] : muscle strength and tone were normal [Skin Color & Pigmentation] : normal skin color and pigmentation [] : no rash [Skin Turgor] : normal skin turgor [Oriented To Time, Place, And Person] : oriented to person, place, and time [Impaired Insight] : insight and judgment were intact [Affect] : the affect was normal [Occult Blood Positive] : stool was negative for occult blood [FreeTextEntry1] : dark stool OB neg

## 2022-02-23 NOTE — HISTORY OF PRESENT ILLNESS
[___ Month(s) Ago] : [unfilled] month(s) ago [Heartburn] : denies heartburn [Nausea] : denies nausea [Vomiting] : denies vomiting [Constipation] : denies constipation [Yellow Skin Or Eyes (Jaundice)] : denies jaundice [Abdominal Pain] : denies abdominal pain [Abdominal Swelling] : denies abdominal swelling [Rectal Pain] : denies rectal pain [Diarrhea] : diarrhea [Abdominal Surgery] : abdominal surgery [Appendectomy] : appendectomy [GERD] : no gastroesophageal reflux disease [Hiatus Hernia] : no hiatus hernia [Peptic Ulcer Disease] : no peptic ulcer disease [Pancreatitis] : no pancreatitis [Cholelithiasis] : no cholelithiasis [Kidney Stone] : no kidney stone [Inflammatory Bowel Disease] : no inflammatory bowel disease [Irritable Bowel Syndrome] : no irritable bowel syndrome [Diverticulitis] : no diverticulitis [Alcohol Abuse] : no alcohol abuse [Malignancy] : no malignancy [de-identified] : 76-year-old female history of chronic pancreatic insufficiency.  Recent elastase was 3.39 mcg/g.  Calprotectin and PCR based assay were all normal.  She is using Creon 124,000 units of lipase to each meal.  An episode of fecal incontinence recently.  She also noted some dark stools.  She is traveling to [place] for 1 week.  Is overdue for MRI of the pancreas and has a MRI compatible pacemaker placed in [place] last year.  We reviewed the card from [employer].

## 2022-02-23 NOTE — PHYSICAL EXAM
[General Appearance - Alert] : alert [General Appearance - In No Acute Distress] : in no acute distress [Sclera] : the sclera and conjunctiva were normal [PERRL With Normal Accommodation] : pupils were equal in size, round, and reactive to light [Extraocular Movements] : extraocular movements were intact [Outer Ear] : the ears and nose were normal in appearance [Oropharynx] : the oropharynx was normal [Neck Appearance] : the appearance of the neck was normal [Neck Cervical Mass (___cm)] : no neck mass was observed [Thyroid Diffuse Enlargement] : the thyroid was not enlarged [Jugular Venous Distention Increased] : there was no jugular-venous distention [Thyroid Nodule] : there were no palpable thyroid nodules [Auscultation Breath Sounds / Voice Sounds] : lungs were clear to auscultation bilaterally [Heart Rate And Rhythm] : heart rate was normal and rhythm regular [Heart Sounds] : normal S1 and S2 [Heart Sounds Gallop] : no gallops [Murmurs] : no murmurs [Heart Sounds Pericardial Friction Rub] : no pericardial rub [Full Pulse] : the pedal pulses are present [Edema] : there was no peripheral edema [Bowel Sounds] : normal bowel sounds [Abdomen Soft] : soft [Abdomen Tenderness] : non-tender [Abdomen Mass (___ Cm)] : no abdominal mass palpated [Normal Sphincter Tone] : normal sphincter tone [Cervical Lymph Nodes Enlarged Posterior Bilaterally] : posterior cervical [Cervical Lymph Nodes Enlarged Anterior Bilaterally] : anterior cervical [Supraclavicular Lymph Nodes Enlarged Bilaterally] : supraclavicular [Axillary Lymph Nodes Enlarged Bilaterally] : axillary [Femoral Lymph Nodes Enlarged Bilaterally] : femoral [Inguinal Lymph Nodes Enlarged Bilaterally] : inguinal [Abnormal Walk] : normal gait [Nail Clubbing] : no clubbing  or cyanosis of the fingernails [Musculoskeletal - Swelling] : no joint swelling seen [Motor Tone] : muscle strength and tone were normal [Skin Color & Pigmentation] : normal skin color and pigmentation [Skin Turgor] : normal skin turgor [] : no rash [Oriented To Time, Place, And Person] : oriented to person, place, and time [Impaired Insight] : insight and judgment were intact [Affect] : the affect was normal [Occult Blood Positive] : stool was negative for occult blood [FreeTextEntry1] : dark stool OB neg

## 2022-03-21 ENCOUNTER — APPOINTMENT (OUTPATIENT)
Dept: GASTROENTEROLOGY | Facility: CLINIC | Age: 77
End: 2022-03-21
Payer: MEDICARE

## 2022-03-21 VITALS
SYSTOLIC BLOOD PRESSURE: 128 MMHG | HEIGHT: 64 IN | BODY MASS INDEX: 22.36 KG/M2 | DIASTOLIC BLOOD PRESSURE: 78 MMHG | WEIGHT: 131 LBS

## 2022-03-21 VITALS — HEART RATE: 70 BPM | RESPIRATION RATE: 12 BRPM

## 2022-03-21 DIAGNOSIS — R39.11 HESITANCY OF MICTURITION: ICD-10-CM

## 2022-03-21 PROCEDURE — 99215 OFFICE O/P EST HI 40 MIN: CPT

## 2022-03-21 RX ORDER — DIPHENOXYLATE HYDROCHLORIDE AND ATROPINE SULFATE 2.5; .025 MG/1; MG/1
2.5-0.025 TABLET ORAL 4 TIMES DAILY
Qty: 120 | Refills: 0 | Status: ACTIVE | COMMUNITY
Start: 2022-03-21 | End: 1900-01-01

## 2022-03-21 NOTE — HISTORY OF PRESENT ILLNESS
[___ Month(s) Ago] : [unfilled] month(s) ago [Heartburn] : denies heartburn [Nausea] : denies nausea [Vomiting] : denies vomiting [Constipation] : denies constipation [Yellow Skin Or Eyes (Jaundice)] : denies jaundice [Abdominal Pain] : denies abdominal pain [Abdominal Swelling] : denies abdominal swelling [Rectal Pain] : denies rectal pain [Diarrhea] : diarrhea [Abdominal Surgery] : abdominal surgery [Appendectomy] : appendectomy [GERD] : no gastroesophageal reflux disease [Hiatus Hernia] : no hiatus hernia [Peptic Ulcer Disease] : no peptic ulcer disease [Pancreatitis] : no pancreatitis [Cholelithiasis] : no cholelithiasis [Kidney Stone] : no kidney stone [Inflammatory Bowel Disease] : no inflammatory bowel disease [Irritable Bowel Syndrome] : no irritable bowel syndrome [Diverticulitis] : no diverticulitis [Alcohol Abuse] : no alcohol abuse [Malignancy] : no malignancy [de-identified] : 76-year-old female history of chronic pancreatic insufficiency.  Recent elastase was 3.39 mcg/g.  Calprotectin and PCR based assay were all normal.  She is using Creon 124,000 units of lipase to each meal.  An episode of fecal incontinence recently.  She also noted some dark stools.  She is traveling to [place] for 1 week.  Is overdue for MRI of the pancreas and has a MRI compatible pacemaker placed in [place] last year.  We reviewed the card from [employer].\par \par 3/21/22-she returns her office today complaining of occasional nausea and vomiting while at work as well as still ongoing fecal incontinence despite using Creon 24,000 units before every meal.  There is been no weight loss.  There is no fever chills or dark stools.  She is pending the MRI of the pancreas.

## 2022-03-21 NOTE — REVIEW OF SYSTEMS
[As Noted in HPI] : as noted in HPI [Diarrhea] : diarrhea [Negative] : Heme/Lymph [Vomiting] : vomiting [FreeTextEntry7] : N/V

## 2022-03-21 NOTE — PHYSICAL EXAM
[General Appearance - Alert] : alert [General Appearance - In No Acute Distress] : in no acute distress [Sclera] : the sclera and conjunctiva were normal [PERRL With Normal Accommodation] : pupils were equal in size, round, and reactive to light [Extraocular Movements] : extraocular movements were intact [Outer Ear] : the ears and nose were normal in appearance [Oropharynx] : the oropharynx was normal [Neck Appearance] : the appearance of the neck was normal [Neck Cervical Mass (___cm)] : no neck mass was observed [Jugular Venous Distention Increased] : there was no jugular-venous distention [Thyroid Diffuse Enlargement] : the thyroid was not enlarged [Thyroid Nodule] : there were no palpable thyroid nodules [Auscultation Breath Sounds / Voice Sounds] : lungs were clear to auscultation bilaterally [Heart Rate And Rhythm] : heart rate was normal and rhythm regular [Heart Sounds] : normal S1 and S2 [Heart Sounds Gallop] : no gallops [Murmurs] : no murmurs [Heart Sounds Pericardial Friction Rub] : no pericardial rub [Full Pulse] : the pedal pulses are present [Edema] : there was no peripheral edema [Bowel Sounds] : normal bowel sounds [Abdomen Soft] : soft [Abdomen Tenderness] : non-tender [Abdomen Mass (___ Cm)] : no abdominal mass palpated [Normal Sphincter Tone] : normal sphincter tone [Cervical Lymph Nodes Enlarged Posterior Bilaterally] : posterior cervical [Cervical Lymph Nodes Enlarged Anterior Bilaterally] : anterior cervical [Supraclavicular Lymph Nodes Enlarged Bilaterally] : supraclavicular [Axillary Lymph Nodes Enlarged Bilaterally] : axillary [Femoral Lymph Nodes Enlarged Bilaterally] : femoral [Inguinal Lymph Nodes Enlarged Bilaterally] : inguinal [Abnormal Walk] : normal gait [Nail Clubbing] : no clubbing  or cyanosis of the fingernails [Musculoskeletal - Swelling] : no joint swelling seen [Motor Tone] : muscle strength and tone were normal [Skin Color & Pigmentation] : normal skin color and pigmentation [Skin Turgor] : normal skin turgor [] : no rash [Oriented To Time, Place, And Person] : oriented to person, place, and time [Impaired Insight] : insight and judgment were intact [Affect] : the affect was normal [Occult Blood Positive] : stool was negative for occult blood [FreeTextEntry1] : dark stool OB neg

## 2022-03-21 NOTE — ASSESSMENT
[FreeTextEntry1] : 76-year-old female history of pancreatic insufficiency with recent incontinence of stool.  Her elastase was 3.39 mcg/g documenting via pancreatic insufficiency.  Her stool was dark but it was "be negative.  I advised her to increase her Creon to 324,000 unit lipase capsules before each meal and to use Imodium up to 3 times a day.  She will telephone me in follow-up within 1 week upon her return from [place] and she will schedule the MRI of the pancreas as we discussed.\par \par The patient is still schedule her MRI of the pancreas.  There was a delay because of the pacemaker.  Reviewed her previous records to see if we can explain her recent nausea or vomiting or fecal incontinence.  As discussed with the patient and nausea and vomiting which were spontaneous are bit unusual but could be secondary to stricture of the duodenum due to chronic pancreatitis versus peptic ulcer disease.  Our plan is to maintain the Creon, schedule endoscopy exclude any ulcers and proceed to MRI of the pancreas.  Because of her uncontrolled bowel movements I prescribed Lomotil for 1 month after checking the [facility].\par \par She  was advised to undergo endoscopy to which she agreed. The procedure will be performed in [facility] with the assistance of an anesthesiologist. The procedure was explained in detail and she understood the risks of the procedure not limited  to infection, bleeding, perforation, risk of anesthesia and risk of IV site problems,emergency surgery, missed lesions  or non-diagnosis of stomach or esophageal  cancer.He/She]  was advised that she could not drive home alone, if the patient chooses to receive sedation. Further diagnostic and treatment recommendations will be based upon the procedure and any biopsies, if they are taken.\par \par

## 2022-04-28 ENCOUNTER — APPOINTMENT (OUTPATIENT)
Dept: MRI IMAGING | Facility: HOSPITAL | Age: 77
End: 2022-04-28
Payer: MEDICARE

## 2022-04-28 ENCOUNTER — OUTPATIENT (OUTPATIENT)
Dept: OUTPATIENT SERVICES | Facility: HOSPITAL | Age: 77
LOS: 1 days | End: 2022-04-28

## 2022-04-28 DIAGNOSIS — K86.1 OTHER CHRONIC PANCREATITIS: ICD-10-CM

## 2022-04-28 PROCEDURE — 71046 X-RAY EXAM CHEST 2 VIEWS: CPT | Mod: 26

## 2022-04-28 PROCEDURE — 74183 MRI ABD W/O CNTR FLWD CNTR: CPT | Mod: 26,ME

## 2022-04-28 PROCEDURE — G1004: CPT

## 2022-05-09 ENCOUNTER — APPOINTMENT (OUTPATIENT)
Dept: GASTROENTEROLOGY | Facility: CLINIC | Age: 77
End: 2022-05-09
Payer: MEDICARE

## 2022-05-09 VITALS — HEART RATE: 78 BPM | RESPIRATION RATE: 12 BRPM

## 2022-05-09 VITALS — WEIGHT: 129 LBS | SYSTOLIC BLOOD PRESSURE: 110 MMHG | BODY MASS INDEX: 22.14 KG/M2 | DIASTOLIC BLOOD PRESSURE: 65 MMHG

## 2022-05-09 VITALS — HEART RATE: 70 BPM | RESPIRATION RATE: 12 BRPM

## 2022-05-09 DIAGNOSIS — R01.1 CARDIAC MURMUR, UNSPECIFIED: ICD-10-CM

## 2022-05-09 PROCEDURE — 36415 COLL VENOUS BLD VENIPUNCTURE: CPT

## 2022-05-09 PROCEDURE — 99214 OFFICE O/P EST MOD 30 MIN: CPT | Mod: 25

## 2022-05-09 NOTE — PHYSICAL EXAM
[General Appearance - Alert] : alert [General Appearance - In No Acute Distress] : in no acute distress [Sclera] : the sclera and conjunctiva were normal [PERRL With Normal Accommodation] : pupils were equal in size, round, and reactive to light [Extraocular Movements] : extraocular movements were intact [Outer Ear] : the ears and nose were normal in appearance [Oropharynx] : the oropharynx was normal [Neck Appearance] : the appearance of the neck was normal [Neck Cervical Mass (___cm)] : no neck mass was observed [Jugular Venous Distention Increased] : there was no jugular-venous distention [Thyroid Diffuse Enlargement] : the thyroid was not enlarged [Thyroid Nodule] : there were no palpable thyroid nodules [Auscultation Breath Sounds / Voice Sounds] : lungs were clear to auscultation bilaterally [Heart Rate And Rhythm] : heart rate was normal and rhythm regular [Heart Sounds] : normal S1 and S2 [Heart Sounds Gallop] : no gallops [Murmurs] : no murmurs [Heart Sounds Pericardial Friction Rub] : no pericardial rub [Full Pulse] : the pedal pulses are present [Edema] : there was no peripheral edema [Bowel Sounds] : normal bowel sounds [Abdomen Soft] : soft [Abdomen Tenderness] : non-tender [Abdomen Mass (___ Cm)] : no abdominal mass palpated [Normal Sphincter Tone] : normal sphincter tone [Cervical Lymph Nodes Enlarged Posterior Bilaterally] : posterior cervical [Cervical Lymph Nodes Enlarged Anterior Bilaterally] : anterior cervical [Supraclavicular Lymph Nodes Enlarged Bilaterally] : supraclavicular [Femoral Lymph Nodes Enlarged Bilaterally] : femoral [Axillary Lymph Nodes Enlarged Bilaterally] : axillary [Inguinal Lymph Nodes Enlarged Bilaterally] : inguinal [Abnormal Walk] : normal gait [Nail Clubbing] : no clubbing  or cyanosis of the fingernails [Musculoskeletal - Swelling] : no joint swelling seen [Motor Tone] : muscle strength and tone were normal [Skin Color & Pigmentation] : normal skin color and pigmentation [Skin Turgor] : normal skin turgor [] : no rash [Oriented To Time, Place, And Person] : oriented to person, place, and time [Impaired Insight] : insight and judgment were intact [Affect] : the affect was normal [Occult Blood Positive] : stool was negative for occult blood [FreeTextEntry1] : dark stool OB neg

## 2022-05-09 NOTE — HISTORY OF PRESENT ILLNESS
[Heartburn] : denies heartburn [Nausea] : denies nausea [Vomiting] : stable vomiting [Constipation] : denies constipation [Diarrhea] : diarrhea worsened [Yellow Skin Or Eyes (Jaundice)] : denies jaundice [Abdominal Pain] : denies abdominal pain [Rectal Pain] : denies rectal pain [Pancreatitis] : pancreatitis [_________] : Performed [unfilled] [Fair Compliance] : fair compliance with treatment [Fair Tolerance] : fair tolerance of treatment [Fair Symptom Control] : fair symptom control [___ Month(s) Ago] : [unfilled] month(s) ago [Abdominal Surgery] : abdominal surgery [Appendectomy] : appendectomy [Wt Gain ___ Lbs] : no recent weight gain [Wt Loss ___ Lbs] : no recent weight loss [GERD] : no gastroesophageal reflux disease [Hiatus Hernia] : no hiatus hernia [Peptic Ulcer Disease] : no peptic ulcer disease [Cholelithiasis] : no cholelithiasis [Kidney Stone] : no kidney stone [Inflammatory Bowel Disease] : no inflammatory bowel disease [Irritable Bowel Syndrome] : no irritable bowel syndrome [Diverticulitis] : no diverticulitis [Alcohol Abuse] : no alcohol abuse [Malignancy] : no malignancy [Cholecystectomy] : no cholecystectomy [de-identified] : 75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 On dietary hx, eats CHEESES, BEEF although discussed that she should be on low-fat, frequent meals. MRCP with changes of chronic pancreatitis,likely IPMN.Uses creon qac. Seen with friend/.Denies ETOHA, but admits to glass of wine occasional in past. [de-identified] : Chronic pancreatitis, IPMN

## 2022-05-09 NOTE — REVIEW OF SYSTEMS
[Feeling Poorly] : feeling poorly [As Noted in HPI] : as noted in HPI [Diarrhea] : diarrhea [Negative] : Heme/Lymph

## 2022-05-09 NOTE — ASSESSMENT
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 On dietary hx, eats CHEESES, BEEF although discussed that she should be on low-fat, frequent meals. MRCP with changes of chronic pancreatitis,likely IPMN.Uses creon qac. Seen with friend/.Denies ETOHA, but admits to glass of wine occasional in past.\par \par Plan:\par 1. Maintain creon\par 2. Check labs\par 3. Refer to nutritionist Nan Mcleod RD\par 4. Referral to Valery Wolf MD for EUS pancreas.\par 5. Dx EGD pre EUS due to Vomiting ( r.o. partial GOO)

## 2022-05-11 ENCOUNTER — NON-APPOINTMENT (OUTPATIENT)
Age: 77
End: 2022-05-11

## 2022-05-11 LAB
ALBUMIN SERPL ELPH-MCNC: 3.4 G/DL
ALP BLD-CCNC: 112 U/L
ALT SERPL-CCNC: 16 U/L
AMYLASE/CREAT SERPL: 16 U/L
ANION GAP SERPL CALC-SCNC: 13 MMOL/L
AST SERPL-CCNC: 39 U/L
BASOPHILS # BLD AUTO: 0.08 K/UL
BASOPHILS NFR BLD AUTO: 1.4 %
BILIRUB SERPL-MCNC: 0.5 MG/DL
BUN SERPL-MCNC: 13 MG/DL
CALCIUM SERPL-MCNC: 9.1 MG/DL
CANCER AG19-9 SERPL-ACNC: 7 U/ML
CEA SERPL-MCNC: 4.3 NG/ML
CHLORIDE SERPL-SCNC: 100 MMOL/L
CO2 SERPL-SCNC: 23 MMOL/L
CREAT SERPL-MCNC: 0.96 MG/DL
EGFR: 61 ML/MIN/1.73M2
EOSINOPHIL # BLD AUTO: 0.05 K/UL
EOSINOPHIL NFR BLD AUTO: 0.9 %
FOLATE SERPL-MCNC: >20 NG/ML
GLUCOSE SERPL-MCNC: 122 MG/DL
HCT VFR BLD CALC: 34.7 %
HGB BLD-MCNC: 11.1 G/DL
IMM GRANULOCYTES NFR BLD AUTO: 0.4 %
LPL SERPL-CCNC: 9 U/L
LYMPHOCYTES # BLD AUTO: 1.15 K/UL
LYMPHOCYTES NFR BLD AUTO: 20.5 %
MAN DIFF?: NORMAL
MCHC RBC-ENTMCNC: 32 GM/DL
MCHC RBC-ENTMCNC: 34.3 PG
MCV RBC AUTO: 107.1 FL
MONOCYTES # BLD AUTO: 0.57 K/UL
MONOCYTES NFR BLD AUTO: 10.2 %
NEUTROPHILS # BLD AUTO: 3.73 K/UL
NEUTROPHILS NFR BLD AUTO: 66.6 %
PLATELET # BLD AUTO: 246 K/UL
POTASSIUM SERPL-SCNC: 4.6 MMOL/L
PROT SERPL-MCNC: 5.8 G/DL
RBC # BLD: 3.24 M/UL
RBC # FLD: 14.3 %
SODIUM SERPL-SCNC: 137 MMOL/L
VIT B12 SERPL-MCNC: 283 PG/ML
WBC # FLD AUTO: 5.6 K/UL

## 2022-05-13 DIAGNOSIS — Z01.812 ENCOUNTER FOR PREPROCEDURAL LABORATORY EXAMINATION: ICD-10-CM

## 2022-05-13 DIAGNOSIS — K86.2 CYST OF PANCREAS: ICD-10-CM

## 2022-06-02 ENCOUNTER — APPOINTMENT (OUTPATIENT)
Dept: GASTROENTEROLOGY | Facility: AMBULATORY SURGERY CENTER | Age: 77
End: 2022-06-02

## 2022-06-08 RX ORDER — PANCRELIPASE 36000; 180000; 114000 [USP'U]/1; [USP'U]/1; [USP'U]/1
36000-114000 CAPSULE, DELAYED RELEASE PELLETS ORAL 4 TIMES DAILY
Qty: 720 | Refills: 1 | Status: ACTIVE | COMMUNITY
Start: 2022-06-08 | End: 1900-01-01

## 2022-06-09 ENCOUNTER — OUTPATIENT (OUTPATIENT)
Dept: OUTPATIENT SERVICES | Facility: HOSPITAL | Age: 77
LOS: 1 days | End: 2022-06-09
Payer: MEDICARE

## 2022-06-09 VITALS
HEIGHT: 63 IN | HEART RATE: 65 BPM | OXYGEN SATURATION: 99 % | DIASTOLIC BLOOD PRESSURE: 77 MMHG | WEIGHT: 123.9 LBS | RESPIRATION RATE: 18 BRPM | SYSTOLIC BLOOD PRESSURE: 118 MMHG | TEMPERATURE: 98 F

## 2022-06-09 DIAGNOSIS — K86.2 CYST OF PANCREAS: ICD-10-CM

## 2022-06-09 DIAGNOSIS — Z95.0 PRESENCE OF CARDIAC PACEMAKER: ICD-10-CM

## 2022-06-09 DIAGNOSIS — Z95.0 PRESENCE OF CARDIAC PACEMAKER: Chronic | ICD-10-CM

## 2022-06-09 DIAGNOSIS — Z01.818 ENCOUNTER FOR OTHER PREPROCEDURAL EXAMINATION: ICD-10-CM

## 2022-06-09 LAB
ALBUMIN SERPL ELPH-MCNC: 3.3 G/DL — SIGNIFICANT CHANGE UP (ref 3.3–5)
ALP SERPL-CCNC: 120 U/L — SIGNIFICANT CHANGE UP (ref 40–120)
ALT FLD-CCNC: 20 U/L — SIGNIFICANT CHANGE UP (ref 10–45)
ANION GAP SERPL CALC-SCNC: 12 MMOL/L — SIGNIFICANT CHANGE UP (ref 5–17)
AST SERPL-CCNC: 45 U/L — HIGH (ref 10–40)
BILIRUB SERPL-MCNC: 0.2 MG/DL — SIGNIFICANT CHANGE UP (ref 0.2–1.2)
BUN SERPL-MCNC: 20 MG/DL — SIGNIFICANT CHANGE UP (ref 7–23)
CALCIUM SERPL-MCNC: 9.6 MG/DL — SIGNIFICANT CHANGE UP (ref 8.4–10.5)
CHLORIDE SERPL-SCNC: 100 MMOL/L — SIGNIFICANT CHANGE UP (ref 96–108)
CO2 SERPL-SCNC: 25 MMOL/L — SIGNIFICANT CHANGE UP (ref 22–31)
CREAT SERPL-MCNC: 1.08 MG/DL — SIGNIFICANT CHANGE UP (ref 0.5–1.3)
EGFR: 53 ML/MIN/1.73M2 — LOW
GLUCOSE SERPL-MCNC: 111 MG/DL — HIGH (ref 70–99)
HCT VFR BLD CALC: 36.7 % — SIGNIFICANT CHANGE UP (ref 34.5–45)
HGB BLD-MCNC: 11.6 G/DL — SIGNIFICANT CHANGE UP (ref 11.5–15.5)
MCHC RBC-ENTMCNC: 31.6 GM/DL — LOW (ref 32–36)
MCHC RBC-ENTMCNC: 33.4 PG — SIGNIFICANT CHANGE UP (ref 27–34)
MCV RBC AUTO: 105.8 FL — HIGH (ref 80–100)
NRBC # BLD: 0 /100 WBCS — SIGNIFICANT CHANGE UP (ref 0–0)
PLATELET # BLD AUTO: 286 K/UL — SIGNIFICANT CHANGE UP (ref 150–400)
POTASSIUM SERPL-MCNC: 5 MMOL/L — SIGNIFICANT CHANGE UP (ref 3.5–5.3)
POTASSIUM SERPL-SCNC: 5 MMOL/L — SIGNIFICANT CHANGE UP (ref 3.5–5.3)
PROT SERPL-MCNC: 6.3 G/DL — SIGNIFICANT CHANGE UP (ref 6–8.3)
RBC # BLD: 3.47 M/UL — LOW (ref 3.8–5.2)
RBC # FLD: 14.6 % — HIGH (ref 10.3–14.5)
SODIUM SERPL-SCNC: 137 MMOL/L — SIGNIFICANT CHANGE UP (ref 135–145)
WBC # BLD: 4.72 K/UL — SIGNIFICANT CHANGE UP (ref 3.8–10.5)
WBC # FLD AUTO: 4.72 K/UL — SIGNIFICANT CHANGE UP (ref 3.8–10.5)

## 2022-06-09 PROCEDURE — 80053 COMPREHEN METABOLIC PANEL: CPT

## 2022-06-09 PROCEDURE — G0463: CPT

## 2022-06-09 PROCEDURE — 85027 COMPLETE CBC AUTOMATED: CPT

## 2022-06-09 RX ORDER — RANITIDINE HYDROCHLORIDE 150 MG/1
1 TABLET, FILM COATED ORAL
Qty: 0 | Refills: 0 | DISCHARGE

## 2022-06-09 NOTE — H&P PST ADULT - HISTORY OF PRESENT ILLNESS
Mrs. Yee is a 77 year old woman with HTN and chronic pancreatitis who developed vaginal post menopausal bleeding this year dx with uterine polyp now scheduled for D&C, operative hysteroscopy with symphion, for resection of endometrial polyp.    Patient denies previous Covid19 infection/exposure,recent travel,fevers,chills,cough,SOB,loss of sense of smell/taste.   Covid19 PCR on 6/20/22 at [facility].  77 year old female presents to PST prior to scheduled Endoscopy with US on 6/23/22 with Dr. Valery Wolf. Pmhx includes HTN, MI s/p Pacemaker (4/2021), chronic pancreatitis,  post menopausal s/p D&C, operative hysteroscopy with symphion, for resection of endometrial polyp. Reports her GI referred her for further evaluation of pancreas as "numbers are changing and the Creon is not helping". Recently evaluated by Dr. Wolf and above surgery was recommended.    Patient denies previous Covid19 infection/exposure,recent travel,fevers,chills,cough,SOB,loss of sense of smell/taste.   Covid19 PCR on 6/20/22 at [facility].  Received Covid19 vaccine w/ booster.

## 2022-06-09 NOTE — H&P PST ADULT - NSICDXPASTMEDICALHX_GEN_ALL_CORE_FT
PAST MEDICAL HISTORY:  Anxiety and depression     GERD (gastroesophageal reflux disease)     Heart attack     History of pancreatitis 2018 taking creon been stable    Hypertension     Stress incontinence, female S/P Sling

## 2022-06-09 NOTE — H&P PST ADULT - NSICDXPASTSURGICALHX_GEN_ALL_CORE_FT
PAST SURGICAL HISTORY:  H/O dilation and curettage     H/O left inguinal hernia repair     Hx of appendectomy     Hx of tonsillectomy     Pacemaker

## 2022-06-09 NOTE — H&P PST ADULT - FALL HARM RISK - FALLEN IN PAST
----- Message from WINIFRED Scott sent at 3/28/2018  4:21 PM EDT -----  We can do lantus, tresiba, basaglar or nph insulin from [facility] that comes in a vial for 25.00 bottle. I am not sure what her insurance would consider generic. They will need to check with insurance or we can pick one and send over rx for pharmacy to price  ----- Message -----  From: Violetta Villanueva MA  Sent: 3/28/2018   2:28 PM  To: WINIFRED Scott    Patients  called and stated that toujeo is going to be expensive and they are wanting a cheaper option or generic. Please advise.     Spoke to the patient and she is going to contact the insurance company to see what is preferred on her insurance and give us a call back.   No

## 2022-06-09 NOTE — H&P PST ADULT - PROBLEM SELECTOR PLAN 1
EUS on 6/23/22 with Dr. Valery Wolf.  Pre-op instructions given. Questions answered.  Covid19 PCR on 6/20/22 at [facility].

## 2022-06-10 PROBLEM — I21.9 ACUTE MYOCARDIAL INFARCTION, UNSPECIFIED: Chronic | Status: ACTIVE | Noted: 2022-06-09

## 2022-06-20 ENCOUNTER — OUTPATIENT (OUTPATIENT)
Dept: OUTPATIENT SERVICES | Facility: HOSPITAL | Age: 77
LOS: 1 days | End: 2022-06-20
Payer: MEDICARE

## 2022-06-20 DIAGNOSIS — Z95.0 PRESENCE OF CARDIAC PACEMAKER: Chronic | ICD-10-CM

## 2022-06-20 DIAGNOSIS — Z11.52 ENCOUNTER FOR SCREENING FOR COVID-19: ICD-10-CM

## 2022-06-20 LAB — SARS-COV-2 RNA SPEC QL NAA+PROBE: SIGNIFICANT CHANGE UP

## 2022-06-20 PROCEDURE — C9803: CPT

## 2022-06-20 PROCEDURE — U0003: CPT

## 2022-06-20 PROCEDURE — U0005: CPT

## 2022-06-22 ENCOUNTER — RESULT REVIEW (OUTPATIENT)
Age: 77
End: 2022-06-22

## 2022-06-23 ENCOUNTER — OUTPATIENT (OUTPATIENT)
Dept: OUTPATIENT SERVICES | Facility: HOSPITAL | Age: 77
LOS: 1 days | End: 2022-06-23
Payer: MEDICARE

## 2022-06-23 ENCOUNTER — TRANSCRIPTION ENCOUNTER (OUTPATIENT)
Age: 77
End: 2022-06-23

## 2022-06-23 ENCOUNTER — RESULT REVIEW (OUTPATIENT)
Age: 77
End: 2022-06-23

## 2022-06-23 ENCOUNTER — APPOINTMENT (OUTPATIENT)
Dept: GASTROENTEROLOGY | Facility: HOSPITAL | Age: 77
End: 2022-06-23

## 2022-06-23 VITALS
DIASTOLIC BLOOD PRESSURE: 53 MMHG | HEART RATE: 62 BPM | SYSTOLIC BLOOD PRESSURE: 126 MMHG | OXYGEN SATURATION: 98 % | RESPIRATION RATE: 20 BRPM

## 2022-06-23 VITALS
SYSTOLIC BLOOD PRESSURE: 144 MMHG | RESPIRATION RATE: 19 BRPM | HEART RATE: 62 BPM | WEIGHT: 126.1 LBS | OXYGEN SATURATION: 100 % | DIASTOLIC BLOOD PRESSURE: 70 MMHG | HEIGHT: 61 IN | TEMPERATURE: 98 F

## 2022-06-23 DIAGNOSIS — K86.2 CYST OF PANCREAS: ICD-10-CM

## 2022-06-23 DIAGNOSIS — Z95.0 PRESENCE OF CARDIAC PACEMAKER: Chronic | ICD-10-CM

## 2022-06-23 PROCEDURE — 88173 CYTOPATH EVAL FNA REPORT: CPT

## 2022-06-23 PROCEDURE — 43239 EGD BIOPSY SINGLE/MULTIPLE: CPT | Mod: 59,GC

## 2022-06-23 PROCEDURE — 88305 TISSUE EXAM BY PATHOLOGIST: CPT | Mod: 26,59

## 2022-06-23 PROCEDURE — 88173 CYTOPATH EVAL FNA REPORT: CPT | Mod: 26

## 2022-06-23 PROCEDURE — 88305 TISSUE EXAM BY PATHOLOGIST: CPT | Mod: 26

## 2022-06-23 PROCEDURE — 43239 EGD BIOPSY SINGLE/MULTIPLE: CPT | Mod: XS

## 2022-06-23 PROCEDURE — 43238 EGD US FINE NEEDLE BX/ASPIR: CPT | Mod: GC

## 2022-06-23 PROCEDURE — 88305 TISSUE EXAM BY PATHOLOGIST: CPT

## 2022-06-23 PROCEDURE — 43242 EGD US FINE NEEDLE BX/ASPIR: CPT

## 2022-06-23 PROCEDURE — 82378 CARCINOEMBRYONIC ANTIGEN: CPT

## 2022-06-23 RX ORDER — SODIUM CHLORIDE 9 MG/ML
500 INJECTION, SOLUTION INTRAVENOUS
Refills: 0 | Status: DISCONTINUED | OUTPATIENT
Start: 2022-06-23 | End: 2022-07-07

## 2022-06-23 NOTE — ASU PATIENT PROFILE, ADULT - FALL HARM RISK - UNIVERSAL INTERVENTIONS
Bed in lowest position, wheels locked, appropriate side rails in place/Call bell, personal items and telephone in reach/Instruct patient to call for assistance before getting out of bed or chair/Non-slip footwear when patient is out of bed/[place] to call system/Physically safe environment - no spills, clutter or unnecessary equipment/Purposeful Proactive Rounding/Room/bathroom lighting operational, light cord in reach

## 2022-06-23 NOTE — PRE PROCEDURE NOTE - PRE PROCEDURE EVALUATION
Attending Physician:  Valery Wolf M.D.                          Procedure: EUS    Indication for Procedure: Abnormal MRCP; chronic pancreatitis  ________________________________________________________  PAST MEDICAL & SURGICAL HISTORY:  Hypertension      Anxiety and depression      GERD (gastroesophageal reflux disease)      History of pancreatitis  2018 taking creon been stable      Stress incontinence, female  S/P Sling      Heart attack      H/O left inguinal hernia repair      Hx of tonsillectomy      Hx of appendectomy      H/O dilation and curettage      Pacemaker        ALLERGIES:  epinephrine (Other)  penicillin (Swelling)    HOME MEDICATIONS:  atenolol 50 mg oral tablet: 1 tab(s) orally once a day  Benicar 40 mg oral tablet: 1 tab(s) orally once a day  Creon 24,000 units oral delayed release capsule: 1 cap(s) orally 3 times a day with food  trospium 60 mg oral capsule, extended release: 1 cap(s) orally once a day (in the morning)    AICD/PPM: [ ] yes   [ ] no    PERTINENT LAB DATA:                      PHYSICAL EXAMINATION:    T(C): --  HR: --  BP: --  RR: --  SpO2: --    Constitutional: NAD  HEENT: PERRLA, EOMI,    Neck:  No JVD  Respiratory: CTAB/L  Cardiovascular: S1 and S2  Gastrointestinal: BS+, soft, NT/ND  Extremities: No peripheral edema  Neurological: A/O x 3, no focal deficits  Psychiatric: Normal mood, normal affect  Skin: No rashes    ASA Class: I [ ]  II [ ]  III [ ]  IV [ ]    COMMENTS:    The patient is a suitable candidate for the planned procedure unless box checked [ ]  No, explain:

## 2022-06-23 NOTE — PRE PROCEDURE NOTE - PROCEDURE SERVICE
Keep foot wound clean and dry.    Try walking on your heel as that may help keep some of the pressure off the ball of the foot.    Keep a light dressing on the foot.    Return to the ER for any worsening foot pain, for swelling to the foot, for redness around the wounds, for drainage of pus from the wound, red streaks up the foot, or discoloration to the toes, or for any concerns.  
Endoscopy

## 2022-06-23 NOTE — ASU DISCHARGE PLAN (ADULT/PEDIATRIC) - NS MD DC FALL RISK RISK
For information on Fall & Injury Prevention, visit: [email or web address] OR  [email or web address] OR  https://www.cdc.gov/steadi/patient.html

## 2022-06-24 LAB — SURGICAL PATHOLOGY STUDY: SIGNIFICANT CHANGE UP

## 2022-06-25 LAB
CEA FLD-MCNC: 5752 NG/ML — SIGNIFICANT CHANGE UP
SPECIMEN SOURCE FLD: SIGNIFICANT CHANGE UP

## 2022-06-27 LAB — NON-GYNECOLOGICAL CYTOLOGY STUDY: SIGNIFICANT CHANGE UP

## 2022-07-01 ENCOUNTER — NON-APPOINTMENT (OUTPATIENT)
Age: 77
End: 2022-07-01

## 2022-07-21 ENCOUNTER — APPOINTMENT (OUTPATIENT)
Dept: GASTROENTEROLOGY | Facility: CLINIC | Age: 77
End: 2022-07-21

## 2022-07-21 VITALS
HEART RATE: 83 BPM | WEIGHT: 130 LBS | SYSTOLIC BLOOD PRESSURE: 122 MMHG | TEMPERATURE: 97.8 F | BODY MASS INDEX: 22.2 KG/M2 | DIASTOLIC BLOOD PRESSURE: 70 MMHG | HEIGHT: 64 IN | OXYGEN SATURATION: 98 %

## 2022-07-21 VITALS — RESPIRATION RATE: 12 BRPM

## 2022-07-21 DIAGNOSIS — R19.7 DIARRHEA, UNSPECIFIED: ICD-10-CM

## 2022-07-21 DIAGNOSIS — R11.2 NAUSEA WITH VOMITING, UNSPECIFIED: ICD-10-CM

## 2022-07-21 PROCEDURE — 99214 OFFICE O/P EST MOD 30 MIN: CPT

## 2022-07-21 RX ORDER — METOCLOPRAMIDE 5 MG/1
5 TABLET ORAL 3 TIMES DAILY
Qty: 90 | Refills: 1 | Status: ACTIVE | COMMUNITY
Start: 2022-07-21 | End: 1900-01-01

## 2022-07-21 NOTE — ASSESSMENT
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 On dietary hx, eats CHEESES, BEEF although discussed that she should be on low-fat, frequent meals. MRCP with changes of chronic pancreatitis,likely IPMN.Uses creon qac. Seen with friend/.Denies ETOHA, but admits to glass of wine occasional in past.\par \par Plan:\par 1. Maintain creon\par 2. Trial of reglan\par 3. Encouraged to visit dietician

## 2022-07-21 NOTE — HISTORY OF PRESENT ILLNESS
[___ Month(s) Ago] : [unfilled] month(s) ago [Heartburn] : denies heartburn [Nausea] : denies nausea [Vomiting] : stable vomiting [Diarrhea] : diarrhea worsened [Constipation] : denies constipation [Yellow Skin Or Eyes (Jaundice)] : denies jaundice [Abdominal Pain] : denies abdominal pain [Rectal Pain] : denies rectal pain [Pancreatitis] : pancreatitis [Abdominal Surgery] : abdominal surgery [Appendectomy] : appendectomy [_________] : Performed [unfilled] [Fair Compliance] : fair compliance with treatment [Fair Tolerance] : fair tolerance of treatment [Fair Symptom Control] : fair symptom control [Wt Gain ___ Lbs] : no recent weight gain [Wt Loss ___ Lbs] : no recent weight loss [GERD] : no gastroesophageal reflux disease [Hiatus Hernia] : no hiatus hernia [Peptic Ulcer Disease] : no peptic ulcer disease [Cholelithiasis] : no cholelithiasis [Kidney Stone] : no kidney stone [Inflammatory Bowel Disease] : no inflammatory bowel disease [Irritable Bowel Syndrome] : no irritable bowel syndrome [Diverticulitis] : no diverticulitis [Alcohol Abuse] : no alcohol abuse [Malignancy] : no malignancy [Cholecystectomy] : no cholecystectomy [de-identified] : 77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 On dietary hx, eats CHEESES, BEEF although discussed that she should be on low-fat, frequent meals. MRCP with changes of chronic pancreatitis,likely IPMN.Uses creon qac. Seen with friend/.Denies ETOHA, but admits to glass of wine occasional in past.\par \par Returns in followup - Had EGD/EUS/FNA by Valery Wolf MD with findings of atrophic pancreas and changes of chronic pancreatitis. Pancreaic head cyst and side branch IPMN ( CEA 5752 and cytology neg).Was referred to Glory Sapp RD No weight loss since March. [de-identified] : Chronic pancreatitis, IPMN

## 2022-11-02 NOTE — PACU DISCHARGE NOTE - NSPTMEETSDISCHCRITERIADT_GEN_A_CORE
//////////PHONE NUMBERS//////////    Bariatrics---[phone]  Breast Surgery---[phone]  Case Management---[phone]  Colonoscopy---[phone]  Imaging, Xray, CT, MRI, Ultrasound---[phone]  Infectious Disease---[phone]  Interventional Radiology---[phone]  Medical Records---[phone]  [facility] On [facility]---[phone]  DME---[phone]  Optometry/Ophthalmology---[phone]  [facility]---[phone]  Physical Therapy---[phone]  Psychiatry---[phone]  Plastic Surgery---[phone]  Sleep Study---[phone]  Smoking Cessation---[phone]  Vaccine Hotline---[phone]  Wound Care---[phone]  COVID Vaccine center---[phone]  Referral Desk---[phone]    /////////////////////////////////////////////////    Patient Education       Sinusitis in Adults   The Basics   Written by the doctors and editors at [facility]   What is sinusitis? -- Sinusitis is a condition that can cause a stuffy nose, pain in the face, and discharge (mucus) from the nose. The sinuses are hollow areas in the bones of the face (figure 1). They have a thin lining that normally makes a small amount of mucus. When this lining gets irritated or infected, it swells and makes extra mucus. This causes symptoms.  Sinusitis can occur when a person gets sick with a cold. The germs causing the cold can also infect the sinuses. Many times, a person feels like their cold is getting better. But then they get sinusitis and begin to feel sick again.  What are the symptoms of sinusitis? -- Common symptoms of sinusitis include:  Stuffy or blocked nose  Thick white, yellow, or green discharge from the nose  Pain in the teeth  Pain or pressure in the face - This often feels worse when a person bends forward.  People with sinusitis can also have other symptoms that include:  Fever  Cough  Trouble smelling  Ear pressure or fullness  Headache  Bad breath  Feeling tired  Most of the time, symptoms start to improve in 7 to 10  days.  Should I see a doctor or nurse? -- See your doctor or nurse if your symptoms last more than 10 days, or if your symptoms first get better but then get worse.  Rarely, sinusitis can lead to serious problems. See your doctor or nurse right away (do not wait 10 days) if you have:  Fever higher than 102°F (38.9°C)  Sudden and severe pain in the face and head  Trouble seeing or seeing double  Trouble thinking clearly  Swelling or redness around one or both eyes  A stiff neck  Is there anything I can do on my own to feel better? -- Yes. To reduce your symptoms, you can:  Take an over-the-counter pain reliever to reduce the pain  Rinse your nose and sinuses with salt water a few times a day - Ask your doctor or nurse about the best way to do this.  Your doctor might also prescribe a steroid nose spray to reduce the swelling in your nose. (These kinds of steroid nose sprays are safe to take, and do not contain the same steroids that some athletes take illegally.)  How is sinusitis treated? -- Most of the time, sinusitis does not need to be treated with antibiotic medicines. This is because most sinusitis is caused by viruses, not bacteria, and antibiotics do not kill viruses. In fact, even sinusitis caused by bacteria will usually get better on its own without antibiotics.   Some people with sinusitis do need treatment with antibiotics. If your symptoms have not improved after 10 days, ask your doctor if you should take antibiotics. Your doctor might recommend that you wait 1 more week to see if your symptoms improve. But if you have symptoms such as a fever or a lot of pain, they might prescribe antibiotics. It is important to follow your doctor's instructions about taking your antibiotics.  What if my symptoms do not get better? -- If your symptoms do not get better, talk with your doctor or nurse. They might order tests to figure out why you still have symptoms. These can include:  CT scan or other imaging tests -  "Imaging tests create pictures of the inside of the body.  A test to look inside the sinuses - For this test, a doctor puts a thin tube with a camera on the end into the nose and up into the sinuses.  Some people get a lot of sinus infections or have symptoms that last at least 3 months. These people can have a different type of sinusitis called "chronic sinusitis." Chronic sinusitis can be caused by different things. For example, some people have growths inside their nose or sinuses that are called "polyps." Other people have allergies that cause their symptoms.  Chronic sinusitis can be treated in different ways. If you have chronic sinusitis, talk with your doctor about which treatments are right for you.  All topics are updated as new evidence becomes available and our peer review process is complete.  This topic retrieved from [employer] on: Sep 21, 2021.  Topic 46285 Version 17.0  Release: 29.4.2 - C29.263  © 2021 UpToDate, Inc. and/or its affiliates. All rights reserved.  figure 1: Sinuses of the face     This drawing shows the sinuses of the face, from the side and front views.  Graphic 539521 Version 1.0    Consumer Information Use and Disclaimer   This information is not specific medical advice and does not replace information you receive from your health care provider. This is only a brief summary of general information. It does NOT include all information about conditions, illnesses, injuries, tests, procedures, treatments, therapies, discharge instructions or life-style choices that may apply to you. You must talk with your health care provider for complete information about your health and treatment options. This information should not be used to decide whether or not to accept your health care provider's advice, instructions or recommendations. Only your health care provider has the knowledge and training to provide advice that is right for you. The use of this information is governed by the [employer] " User License Agreement, available at https://www.Like.fmer.com/en/solutions/lexicomp/about/jeff.The use of [employer] content is governed by the [employer] Terms of Use. ©2021 UpToDate, Inc. All rights reserved.  Copyright   © 2021 UpToDate, Inc. and/or its affiliates. All rights reserved.  Patient Education       Sinusitis Discharge Instructions, Adult   About this topic   Your sinuses are hollow areas in the bones of your face. They have a thin lining that normally makes a small amount of mucus. When you have sinusitis, the lining gets swollen and makes extra mucus. You may have sinusitis with or after a cold. Most of the time sinusitis will get better in 1 to 2 weeks.  Sinusitis is most often caused by a virus, so antibiotics wont help. But some people do need antibiotics. If the doctor ordered antibiotics for you, be sure to follow the instructions. It is important to take all of your antibiotics even if you start to feel better.       What care is needed at home?   Ask your doctor what you need to do when you go home. Make sure you ask questions if you do not understand what you need to do.  Try to thin the mucus.  Drink lots of liquids to stay hydrated.  Use a cool mist humidifier to avoid dry air.  Use saline nose drops or a saline nose rinse to relieve stuffiness.  Wash your hands often. This will help keep others healthy.  Do not smoke or be in smoke-filled places. Avoid things that may cause breathing problems like fumes, pollution, dust, and other common allergens and irritants.  You may want to take medicine like ibuprofen, naproxen, or acetaminophen to help with pain.  What follow-up care is needed?   Your doctor may ask you to make visits to the office to check on your progress. Be sure to keep your visits.  Your doctor will tell you if other tests are needed.  Your doctor may send you for allergy tests or to an allergy expert.   What lifestyle changes are needed?   Avoid drinks that contain caffeine  or alcohol.  Try to stop smoking. Avoid being around others who smoke. Smoke can damage the small hairs inside your sinuses.  What drugs may be needed?   The doctor may order drugs to:  Help with pain and swelling  Fight an infection  Control coughing  Dry up the sinuses  Help a runny or stuffy nose  Will physical activity be limited?   You do not have to limit your activity. You may want to rest more if you have a fever or headache.   What problems could happen?   Infections that happen again and again  Asthma attack  Coughing  Loss of voice  What can be done to prevent this health problem?   Keep your nose as moist as possible. Use saline sprays, washes, and a humidifier often.  Avoid being around cigarette and cigar smoke or strong odors from chemicals.  Avoid long periods of swimming in pools treated with chlorine. This can bother the lining of the nose and sinuses.  Avoid water diving. This forces water into the sinuses from the nasal passages.  Manage your allergies with your doctor's help.  Use an air conditioner if allergies are a problem.  Before air travel, use a drug to dry up mucus. As the plane takes off or lands, the pressure can cause sinus pain.  When do I need to call the doctor?   You have a stiff neck, especially if you also have fever, chills, vomiting, or severe headache  You have trouble thinking clearly.  You have trouble seeing or have double vision.  You have swelling or redness or pain around one or both eyes.  You have a fever of 102°F (38.9°C) or higher, or have shaking chills or sweats.  You have an upset stomach and throwing up.  You have more pain in your face and head.  You are not getting better within 1 to 2 weeks.  Teach Back: Helping You Understand   The Teach Back Method helps you understand the information we are giving you. After you talk with the staff, tell them in your own words what you learned. This helps to make sure the staff has covered each thing clearly. It also helps  to explain things that may have been confusing. Before going home, make sure you can do these:  I can tell you about my condition.  I can tell you what may help ease my breathing.  I can tell you what I will do if I have a fever, chills, or trouble breathing.  Where can I learn more?   American Academy of Family Physicians  https://familydoctor.org/condition/sinusitis/   Centers for Disease Control and Prevention  https://www.cdc.gov/antibiotic-use/community/for-hcp/outpatient-hcp/adult-treatment-rec.html   Last Reviewed Date   2021-06-09  Consumer Information Use and Disclaimer   This information is not specific medical advice and does not replace information you receive from your health care provider. This is only a brief summary of general information. It does NOT include all information about conditions, illnesses, injuries, tests, procedures, treatments, therapies, discharge instructions or life-style choices that may apply to you. You must talk with your health care provider for complete information about your health and treatment options. This information should not be used to decide whether or not to accept your health care providers advice, instructions or recommendations. Only your health care provider has the knowledge and training to provide advice that is right for you.  Copyright   Copyright © 2021 UpToDate, Inc. and its affiliates and/or licensors. All rights reserved.         01-Oct-2020 09:51

## 2023-01-01 ENCOUNTER — NON-APPOINTMENT (OUTPATIENT)
Age: 78
End: 2023-01-01

## 2023-01-01 ENCOUNTER — APPOINTMENT (OUTPATIENT)
Dept: GASTROENTEROLOGY | Facility: CLINIC | Age: 78
End: 2023-01-01
Payer: MEDICARE

## 2023-01-01 ENCOUNTER — APPOINTMENT (OUTPATIENT)
Dept: OBGYN | Facility: CLINIC | Age: 78
End: 2023-01-01
Payer: MEDICARE

## 2023-01-01 ENCOUNTER — APPOINTMENT (OUTPATIENT)
Dept: OBGYN | Facility: CLINIC | Age: 78
End: 2023-01-01

## 2023-01-01 ENCOUNTER — LABORATORY RESULT (OUTPATIENT)
Age: 78
End: 2023-01-01

## 2023-01-01 VITALS
HEIGHT: 64 IN | RESPIRATION RATE: 14 BRPM | BODY MASS INDEX: 21 KG/M2 | WEIGHT: 123 LBS | TEMPERATURE: 97.9 F | SYSTOLIC BLOOD PRESSURE: 90 MMHG | DIASTOLIC BLOOD PRESSURE: 60 MMHG | HEART RATE: 80 BPM | OXYGEN SATURATION: 97 %

## 2023-01-01 DIAGNOSIS — A60.00 HERPESVIRAL INFECTION OF UROGENITAL SYSTEM, UNSPECIFIED: ICD-10-CM

## 2023-01-01 DIAGNOSIS — I10 ESSENTIAL (PRIMARY) HYPERTENSION: ICD-10-CM

## 2023-01-01 DIAGNOSIS — R06.09 OTHER FORMS OF DYSPNEA: ICD-10-CM

## 2023-01-01 DIAGNOSIS — R35.0 FREQUENCY OF MICTURITION: ICD-10-CM

## 2023-01-01 DIAGNOSIS — K86.1 OTHER CHRONIC PANCREATITIS: ICD-10-CM

## 2023-01-01 LAB
ALBUMIN SERPL ELPH-MCNC: 3.3 G/DL
ALP BLD-CCNC: 92 U/L
ALT SERPL-CCNC: 15 U/L
ANION GAP SERPL CALC-SCNC: 19 MMOL/L
AST SERPL-CCNC: 42 U/L
BILIRUB SERPL-MCNC: 0.2 MG/DL
BUN SERPL-MCNC: 15 MG/DL
CALCIUM SERPL-MCNC: 8.3 MG/DL
CANCER AG19-9 SERPL-ACNC: 5 U/ML
CEA SERPL-MCNC: 5 NG/ML
CHLORIDE SERPL-SCNC: 101 MMOL/L
CO2 SERPL-SCNC: 16 MMOL/L
CREAT SERPL-MCNC: 0.85 MG/DL
EGFR: 70 ML/MIN/1.73M2
FERRITIN SERPL-MCNC: 300 NG/ML
FOLATE SERPL-MCNC: 11.5 NG/ML
GLUCOSE SERPL-MCNC: 112 MG/DL
INR PPP: 1.06 RATIO
IRON SATN MFR SERPL: 51 %
IRON SERPL-MCNC: 126 UG/DL
LPL SERPL-CCNC: 9 U/L
POTASSIUM SERPL-SCNC: 4.2 MMOL/L
PROT SERPL-MCNC: 5.5 G/DL
PT BLD: 12.3 SEC
SODIUM SERPL-SCNC: 136 MMOL/L
TIBC SERPL-MCNC: 245 UG/DL
UIBC SERPL-MCNC: 120 UG/DL
VIT B12 SERPL-MCNC: 271 PG/ML

## 2023-01-01 PROCEDURE — 36415 COLL VENOUS BLD VENIPUNCTURE: CPT

## 2023-01-01 PROCEDURE — 99213 OFFICE O/P EST LOW 20 MIN: CPT

## 2023-01-01 PROCEDURE — 99214 OFFICE O/P EST MOD 30 MIN: CPT | Mod: 25

## 2023-01-01 RX ORDER — PANCRELIPASE 120000; 24000; 76000 [USP'U]/1; [USP'U]/1; [USP'U]/1
24000-76000 CAPSULE, DELAYED RELEASE PELLETS ORAL 3 TIMES DAILY
Qty: 810 | Refills: 2 | Status: ACTIVE | COMMUNITY
Start: 2023-01-01 | End: 1900-01-01

## 2023-01-01 RX ORDER — PANCRELIPASE 120000; 24000; 76000 [USP'U]/1; [USP'U]/1; [USP'U]/1
24000-76000 CAPSULE, DELAYED RELEASE PELLETS ORAL
Qty: 810 | Refills: 3 | Status: ACTIVE | COMMUNITY
Start: 2022-06-13 | End: 1900-01-01

## 2023-01-01 RX ORDER — LEVOFLOXACIN 500 MG/1
500 TABLET, FILM COATED ORAL DAILY
Qty: 2 | Refills: 0 | Status: DISCONTINUED | COMMUNITY
Start: 2022-06-23 | End: 2023-01-01

## 2023-01-01 RX ORDER — PANCRELIPASE 120000; 24000; 76000 [USP'U]/1; [USP'U]/1; [USP'U]/1
24000-76000 CAPSULE, DELAYED RELEASE PELLETS ORAL
Qty: 810 | Refills: 2 | Status: ACTIVE | COMMUNITY
Start: 2016-12-01

## 2023-02-13 ENCOUNTER — RX RENEWAL (OUTPATIENT)
Age: 78
End: 2023-02-13

## 2023-05-17 NOTE — DISCHARGE NOTE NURSING/CASE MANAGEMENT/SOCIAL WORK - NSDCVIVACCINE_GEN_ALL_CORE_FT
Radiation Oncology Follow Up Visit    Date of Service: 5/17/23    Diagnosis:   Adenocarcinoma of the Rectum    Staging/Documentation of Metastatic Disease:  T3 N2     Previous Treatments:  FOLFOX chemotherapy started on 12/21/2022    History of Present Illness  Josh returns to the radiation oncology clinic after completing his chemotherapy portion of his SHANDA. He had a near complete resolution on his most updated PET and MRIs. He is here today to discuss chemoradiation prior to his surgery. He reports that he tolerated chemotherapy well.     Review of Systems  A 10 point review of Systems is negative    Physical Exam  GEN: In No distress  HEENT: The pupils are equal, round, reactive to light. The extraocular muscles are intact. The sclerae are anicteric.   LUNGS: No respiratory distress.  PSYCHIATRIC: Normal affect  NEUROLOGIC: Neurologically the patient is grossly intact.    Assessment and Plan  The role of adjuvant radiotherapy to help improve local regional control was discussed in detail. Temporary side effects of radiation including fatigue, urinary frequency, dysuria and diarrhea were discussed. Long-term complications including but not limited to small bowel injury, bladder fibrosis and rectal injury were discussed in detail. However, utilizing CT-based treatment planning and generation of dose volume histograms these long term complications are uncommon.  He will undergo next available simulation with CT treatment planning. All questions were answered. Consent form has been signed.      The time spent with the patient was 20 minutes, of which more than 50% was spent on counseling and coordination of care.     I appreciate the opportunity to participate in this pleasant patient's care. Please do not hesitate to contact me if I can be of further assistance.    Kirill Balderas MD, MPH  Radiation Oncologist  [facility] [place] [facility] [place]   No Vaccines Administered.

## 2023-07-06 PROBLEM — K86.1 CHRONIC PANCREATITIS: Status: ACTIVE | Noted: 2017-12-18

## 2023-07-06 PROBLEM — R35.0 FREQUENCY OF MICTURITION: Status: ACTIVE | Noted: 2019-03-22

## 2023-07-06 PROBLEM — R06.09 DYSPNEA ON EXERTION: Status: ACTIVE | Noted: 2017-12-18

## 2023-07-06 PROBLEM — A60.00 HERPES, GENITAL: Status: ACTIVE | Noted: 2019-08-15

## 2023-07-06 PROBLEM — I10 HYPERTENSION: Status: ACTIVE | Noted: 2017-12-18

## 2023-07-06 NOTE — HISTORY OF PRESENT ILLNESS
[___ Month(s) Ago] : [unfilled] month(s) ago [Heartburn] : denies heartburn [Nausea] : denies nausea [Vomiting] : stable vomiting [Diarrhea] : diarrhea worsened [Constipation] : denies constipation [Yellow Skin Or Eyes (Jaundice)] : denies jaundice [Abdominal Pain] : denies abdominal pain [Rectal Pain] : denies rectal pain [Pancreatitis] : pancreatitis [Abdominal Surgery] : abdominal surgery [Appendectomy] : appendectomy [_________] : Performed [unfilled] [Fair Compliance] : fair compliance with treatment [Fair Tolerance] : fair tolerance of treatment [Fair Symptom Control] : fair symptom control [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 On dietary hx, eats CHEESES, BEEF although discussed that she should be on low-fat, frequent meals. MRCP with changes of chronic pancreatitis,likely IPMN.Uses creon qac. \par \par Returns in followup - Had EGD/EUS/FNA by Valery Wolf MD with findings of atrophic pancreas and changes of chronic pancreatitis. Pancreaic head cyst and side branch IPMN ( CEA 5752 and cytology neg).Was referred to Glory Sapp RD Has had 7 lb weight loss. Still working 4 days a week. Using CREON before meals. Has occasional loose bms, occasional formed. [de-identified] : 06/2022 pancreati cyst, chronic pancreatitis, sludge in gb. [Wt Gain ___ Lbs] : no recent weight gain [Wt Loss ___ Lbs] : no recent weight loss [GERD] : no gastroesophageal reflux disease [Hiatus Hernia] : no hiatus hernia [Peptic Ulcer Disease] : no peptic ulcer disease [Cholelithiasis] : no cholelithiasis [Kidney Stone] : no kidney stone [Inflammatory Bowel Disease] : no inflammatory bowel disease [Irritable Bowel Syndrome] : no irritable bowel syndrome [Diverticulitis] : no diverticulitis [Alcohol Abuse] : no alcohol abuse [Malignancy] : no malignancy [Cholecystectomy] : no cholecystectomy [de-identified] : Chronic pancreatitis, IPMN

## 2023-07-06 NOTE — REVIEW OF SYSTEMS
[Recent Weight Loss (___ Lbs)] : recent [unfilled] ~Ulb weight loss [Vomiting] : vomiting [Feeling Poorly] : feeling poorly [As Noted in HPI] : as noted in HPI [Diarrhea] : diarrhea [Negative] : Heme/Lymph

## 2023-07-06 NOTE — PHYSICAL EXAM
[General Appearance - Alert] : alert [General Appearance - In No Acute Distress] : in no acute distress [PERRL With Normal Accommodation] : pupils were equal in size, round, and reactive to light [Extraocular Movements] : extraocular movements were intact [Outer Ear] : the ears and nose were normal in appearance [Neck Appearance] : the appearance of the neck was normal [Jugular Venous Distention Increased] : there was no jugular-venous distention [Neck Cervical Mass (___cm)] : no neck mass was observed [Thyroid Diffuse Enlargement] : the thyroid was not enlarged [Thyroid Nodule] : there were no palpable thyroid nodules [Heart Sounds] : normal S1 and S2 [Heart Sounds Gallop] : no gallops [Heart Sounds Pericardial Friction Rub] : no pericardial rub [Full Pulse] : the pedal pulses are present [Edema] : there was no peripheral edema [Abdomen Mass (___ Cm)] : no abdominal mass palpated [Normal Sphincter Tone] : normal sphincter tone [Cervical Lymph Nodes Enlarged Posterior Bilaterally] : posterior cervical [Cervical Lymph Nodes Enlarged Anterior Bilaterally] : anterior cervical [Supraclavicular Lymph Nodes Enlarged Bilaterally] : supraclavicular [Axillary Lymph Nodes Enlarged Bilaterally] : axillary [Femoral Lymph Nodes Enlarged Bilaterally] : femoral [Inguinal Lymph Nodes Enlarged Bilaterally] : inguinal [Abnormal Walk] : normal gait [Nail Clubbing] : no clubbing  or cyanosis of the fingernails [Musculoskeletal - Swelling] : no joint swelling seen [Motor Tone] : muscle strength and tone were normal [Skin Color & Pigmentation] : normal skin color and pigmentation [Skin Turgor] : normal skin turgor [Impaired Insight] : insight and judgment were intact [Affect] : the affect was normal [Alert] : alert [Normal Voice/Communication] : normal voice/communication [Healthy Appearing] : healthy appearing [No Acute Distress] : no acute distress [Sclera] : the sclera and conjunctiva were normal [Hearing Threshold Finger Rub Not [place]] : hearing was normal [Normal Lips/Gums] : the lips and gums were normal [Oropharynx] : the oropharynx was normal [Normal Appearance] : the appearance of the neck was normal [No Neck Mass] : no neck mass was observed [No Respiratory Distress] : no respiratory distress [No Acc Muscle Use] : no accessory muscle use [Respiration, Rhythm And Depth] : normal respiratory rhythm and effort [Auscultation Breath Sounds / Voice Sounds] : lungs were clear to auscultation bilaterally [Heart Rate And Rhythm] : heart rate was normal and rhythm regular [Normal S1, S2] : normal S1 and S2 [Murmurs] : no murmurs [Bowel Sounds] : normal bowel sounds [Abdomen Tenderness] : non-tender [No Masses] : no abdominal mass palpated [Abdomen Soft] : soft [] : no hepatosplenomegaly [Oriented To Time, Place, And Person] : oriented to person, place, and time [Occult Blood Positive] : stool was negative for occult blood [FreeTextEntry1] : dark stool OB neg

## 2023-07-06 NOTE — ASSESSMENT
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 On dietary hx, eats CHEESES, BEEF although discussed that she should be on low-fat, frequent meals. MRCP with changes of chronic pancreatitis,likely IPMN.Uses creon qac. \par \par Returns in followup - Had EGD/EUS/FNA by Valery Wolf MD with findings of atrophic pancreas and changes of chronic pancreatitis. Pancreaic head cyst and side branch IPMN ( CEA 5752 and cytology neg).Was referred to Glory Sapp RD Has had 7 lb weight loss. Still working 4 days a week. Using CREON before meals. Has occasional loose bms, occasional formed.\par Plan:\par 1. Maintain creon\par 2. cea, ca 19-9 amylase, lipase, cbc, cmp\par 3. RTO 3months\par 4. MRI pancreas after review of labs.

## 2023-10-28 NOTE — ASU PATIENT PROFILE, ADULT - PSH
toe injury
H/O dilation and curettage    H/O left inguinal hernia repair    Hx of appendectomy    Hx of tonsillectomy

## 2024-01-01 ENCOUNTER — INPATIENT (INPATIENT)
Facility: HOSPITAL | Age: 79
LOS: 9 days | Discharge: NURSING, REHAB OR LONG-TERM CARE | DRG: 481 | End: 2024-01-30
Attending: ORTHOPAEDIC SURGERY | Admitting: ORTHOPAEDIC SURGERY
Payer: MEDICARE

## 2024-01-01 ENCOUNTER — RESULT REVIEW (OUTPATIENT)
Age: 79
End: 2024-01-01

## 2024-01-01 ENCOUNTER — TRANSCRIPTION ENCOUNTER (OUTPATIENT)
Age: 79
End: 2024-01-01

## 2024-01-01 ENCOUNTER — APPOINTMENT (OUTPATIENT)
Dept: GASTROENTEROLOGY | Facility: CLINIC | Age: 79
End: 2024-01-01

## 2024-01-01 ENCOUNTER — INPATIENT (INPATIENT)
Facility: HOSPITAL | Age: 79
LOS: 25 days | DRG: 871 | End: 2024-06-14
Attending: INTERNAL MEDICINE | Admitting: SPECIALIST
Payer: MEDICARE

## 2024-01-01 VITALS
HEIGHT: 64 IN | HEART RATE: 86 BPM | DIASTOLIC BLOOD PRESSURE: 47 MMHG | RESPIRATION RATE: 20 BRPM | OXYGEN SATURATION: 98 % | WEIGHT: 119.93 LBS | TEMPERATURE: 98 F | SYSTOLIC BLOOD PRESSURE: 127 MMHG

## 2024-01-01 VITALS
SYSTOLIC BLOOD PRESSURE: 101 MMHG | RESPIRATION RATE: 16 BRPM | HEIGHT: 61 IN | OXYGEN SATURATION: 93 % | HEART RATE: 89 BPM | WEIGHT: 115.08 LBS | DIASTOLIC BLOOD PRESSURE: 69 MMHG

## 2024-01-01 VITALS
RESPIRATION RATE: 18 BRPM | OXYGEN SATURATION: 98 % | HEART RATE: 66 BPM | SYSTOLIC BLOOD PRESSURE: 105 MMHG | DIASTOLIC BLOOD PRESSURE: 67 MMHG | TEMPERATURE: 98 F

## 2024-01-01 VITALS — HEART RATE: 84 BPM | RESPIRATION RATE: 12 BRPM

## 2024-01-01 DIAGNOSIS — R06.00 DYSPNEA, UNSPECIFIED: ICD-10-CM

## 2024-01-01 DIAGNOSIS — N17.9 ACUTE KIDNEY FAILURE, UNSPECIFIED: ICD-10-CM

## 2024-01-01 DIAGNOSIS — S72.001A FRACTURE OF UNSPECIFIED PART OF NECK OF RIGHT FEMUR, INITIAL ENCOUNTER FOR CLOSED FRACTURE: ICD-10-CM

## 2024-01-01 DIAGNOSIS — R09.89 OTHER SPECIFIED SYMPTOMS AND SIGNS INVOLVING THE CIRCULATORY AND RESPIRATORY SYSTEMS: ICD-10-CM

## 2024-01-01 DIAGNOSIS — R52 PAIN, UNSPECIFIED: ICD-10-CM

## 2024-01-01 DIAGNOSIS — S72.141A DISPLACED INTERTROCHANTERIC FRACTURE OF RIGHT FEMUR, INITIAL ENCOUNTER FOR CLOSED FRACTURE: ICD-10-CM

## 2024-01-01 DIAGNOSIS — F05 DELIRIUM DUE TO KNOWN PHYSIOLOGICAL CONDITION: ICD-10-CM

## 2024-01-01 DIAGNOSIS — Z95.0 PRESENCE OF CARDIAC PACEMAKER: Chronic | ICD-10-CM

## 2024-01-01 DIAGNOSIS — Z51.5 ENCOUNTER FOR PALLIATIVE CARE: ICD-10-CM

## 2024-01-01 DIAGNOSIS — E87.20 ACIDOSIS, UNSPECIFIED: ICD-10-CM

## 2024-01-01 DIAGNOSIS — Z71.89 OTHER SPECIFIED COUNSELING: ICD-10-CM

## 2024-01-01 DIAGNOSIS — L89.90 PRESSURE ULCER OF UNSPECIFIED SITE, UNSPECIFIED STAGE: ICD-10-CM

## 2024-01-01 DIAGNOSIS — Z95.0 PRESENCE OF CARDIAC PACEMAKER: ICD-10-CM

## 2024-01-01 DIAGNOSIS — G93.40 ENCEPHALOPATHY, UNSPECIFIED: ICD-10-CM

## 2024-01-01 DIAGNOSIS — R78.81 BACTEREMIA: ICD-10-CM

## 2024-01-01 DIAGNOSIS — I10 ESSENTIAL (PRIMARY) HYPERTENSION: ICD-10-CM

## 2024-01-01 DIAGNOSIS — K86.1 OTHER CHRONIC PANCREATITIS: ICD-10-CM

## 2024-01-01 DIAGNOSIS — R41.0 DISORIENTATION, UNSPECIFIED: ICD-10-CM

## 2024-01-01 DIAGNOSIS — R53.2 FUNCTIONAL QUADRIPLEGIA: ICD-10-CM

## 2024-01-01 DIAGNOSIS — A41.9 SEPSIS, UNSPECIFIED ORGANISM: ICD-10-CM

## 2024-01-01 LAB
% ALBUMIN: 34.8 % — SIGNIFICANT CHANGE UP
% ALPHA 1: 12.3 % — SIGNIFICANT CHANGE UP
% ALPHA 2: 16.5 % — SIGNIFICANT CHANGE UP
% BETA: 20.4 % — SIGNIFICANT CHANGE UP
% GAMMA: 16 % — SIGNIFICANT CHANGE UP
% M SPIKE: 12.8 % — SIGNIFICANT CHANGE UP
-  AMOXICILLIN/CLAVULANIC ACID: SIGNIFICANT CHANGE UP
-  AMOXICILLIN/CLAVULANIC ACID: SIGNIFICANT CHANGE UP
-  AMPICILLIN/SULBACTAM: SIGNIFICANT CHANGE UP
-  AMPICILLIN: SIGNIFICANT CHANGE UP
-  AZTREONAM: SIGNIFICANT CHANGE UP
-  CEFAZOLIN: SIGNIFICANT CHANGE UP
-  CEFEPIME: SIGNIFICANT CHANGE UP
-  CEFOXITIN: SIGNIFICANT CHANGE UP
-  CEFTRIAXONE: SIGNIFICANT CHANGE UP
-  CEFUROXIME: SIGNIFICANT CHANGE UP
-  CEFUROXIME: SIGNIFICANT CHANGE UP
-  CIPROFLOXACIN: SIGNIFICANT CHANGE UP
-  DAPTOMYCIN: SIGNIFICANT CHANGE UP
-  ERTAPENEM: SIGNIFICANT CHANGE UP
-  GENTAMICIN: SIGNIFICANT CHANGE UP
-  IMIPENEM: SIGNIFICANT CHANGE UP
-  LEVOFLOXACIN: SIGNIFICANT CHANGE UP
-  LINEZOLID: SIGNIFICANT CHANGE UP
-  MEROPENEM: SIGNIFICANT CHANGE UP
-  NITROFURANTOIN: SIGNIFICANT CHANGE UP
-  PIPERACILLIN/TAZOBACTAM: SIGNIFICANT CHANGE UP
-  TETRACYCLINE: SIGNIFICANT CHANGE UP
-  TETRACYCLINE: SIGNIFICANT CHANGE UP
-  TOBRAMYCIN: SIGNIFICANT CHANGE UP
-  TRIMETHOPRIM/SULFAMETHOXAZOLE: SIGNIFICANT CHANGE UP
-  VANCOMYCIN: SIGNIFICANT CHANGE UP
-  VANCOMYCIN: SIGNIFICANT CHANGE UP
24R-OH-CALCIDIOL SERPL-MCNC: 11.2 NG/ML — LOW (ref 30–80)
24R-OH-CALCIDIOL SERPL-MCNC: 20.3 NG/ML — LOW (ref 30–80)
A-TOCOPHEROL VIT E SERPL-MCNC: 9.8 MG/L — SIGNIFICANT CHANGE UP (ref 9–29)
ADD ON TEST-SPECIMEN IN LAB: SIGNIFICANT CHANGE UP
ALBUMIN SERPL ELPH-MCNC: 1.4 G/DL — LOW (ref 3.3–5)
ALBUMIN SERPL ELPH-MCNC: 1.7 G/DL — LOW (ref 3.6–5.5)
ALBUMIN SERPL ELPH-MCNC: 1.8 G/DL — LOW (ref 3.3–5)
ALBUMIN SERPL ELPH-MCNC: 1.8 G/DL — LOW (ref 3.3–5)
ALBUMIN SERPL ELPH-MCNC: 2 G/DL — LOW (ref 3.3–5)
ALBUMIN SERPL ELPH-MCNC: 2 G/DL — LOW (ref 3.3–5)
ALBUMIN SERPL ELPH-MCNC: 2.1 G/DL — LOW (ref 3.3–5)
ALBUMIN SERPL ELPH-MCNC: 2.2 G/DL — LOW (ref 3.3–5)
ALBUMIN SERPL ELPH-MCNC: 2.3 G/DL — LOW (ref 3.3–5)
ALBUMIN SERPL ELPH-MCNC: 2.4 G/DL — LOW (ref 3.3–5)
ALBUMIN SERPL ELPH-MCNC: 2.5 G/DL — LOW (ref 3.3–5)
ALBUMIN SERPL ELPH-MCNC: 2.7 G/DL — LOW (ref 3.3–5)
ALBUMIN SERPL ELPH-MCNC: 2.8 G/DL — LOW (ref 3.3–5)
ALBUMIN/GLOB SERPL ELPH: 0.5 RATIO — SIGNIFICANT CHANGE UP
ALP SERPL-CCNC: 101 U/L — SIGNIFICANT CHANGE UP (ref 40–120)
ALP SERPL-CCNC: 101 U/L — SIGNIFICANT CHANGE UP (ref 40–120)
ALP SERPL-CCNC: 103 U/L — SIGNIFICANT CHANGE UP (ref 40–120)
ALP SERPL-CCNC: 106 U/L — SIGNIFICANT CHANGE UP (ref 40–120)
ALP SERPL-CCNC: 112 U/L — SIGNIFICANT CHANGE UP (ref 40–120)
ALP SERPL-CCNC: 117 U/L — SIGNIFICANT CHANGE UP (ref 40–120)
ALP SERPL-CCNC: 128 U/L — HIGH (ref 40–120)
ALP SERPL-CCNC: 130 U/L — HIGH (ref 40–120)
ALP SERPL-CCNC: 130 U/L — HIGH (ref 40–120)
ALP SERPL-CCNC: 131 U/L — HIGH (ref 40–120)
ALP SERPL-CCNC: 141 U/L — HIGH (ref 40–120)
ALP SERPL-CCNC: 151 U/L — HIGH (ref 40–120)
ALP SERPL-CCNC: 152 U/L — HIGH (ref 40–120)
ALP SERPL-CCNC: 174 U/L — HIGH (ref 40–120)
ALP SERPL-CCNC: 90 U/L — SIGNIFICANT CHANGE UP (ref 40–120)
ALPHA1 GLOB SERPL ELPH-MCNC: 0.6 G/DL — HIGH (ref 0.1–0.4)
ALPHA2 GLOB SERPL ELPH-MCNC: 0.8 G/DL — SIGNIFICANT CHANGE UP (ref 0.5–1)
ALT FLD-CCNC: 11 U/L — SIGNIFICANT CHANGE UP (ref 10–45)
ALT FLD-CCNC: 11 U/L — SIGNIFICANT CHANGE UP (ref 10–45)
ALT FLD-CCNC: 28 U/L — SIGNIFICANT CHANGE UP (ref 10–45)
ALT FLD-CCNC: 36 U/L — SIGNIFICANT CHANGE UP (ref 10–45)
ALT FLD-CCNC: 7 U/L — LOW (ref 10–45)
ALT FLD-CCNC: 8 U/L — LOW (ref 10–45)
ALT FLD-CCNC: 9 U/L — LOW (ref 10–45)
AMMONIA BLD-MCNC: 19 UMOL/L — SIGNIFICANT CHANGE UP (ref 11–55)
AMPHET UR-MCNC: NEGATIVE NG/ML — SIGNIFICANT CHANGE UP
ANA TITR SER: NEGATIVE — SIGNIFICANT CHANGE UP
ANION GAP SERPL CALC-SCNC: 10 MMOL/L — SIGNIFICANT CHANGE UP (ref 5–17)
ANION GAP SERPL CALC-SCNC: 10 MMOL/L — SIGNIFICANT CHANGE UP (ref 5–17)
ANION GAP SERPL CALC-SCNC: 12 MMOL/L — SIGNIFICANT CHANGE UP (ref 5–17)
ANION GAP SERPL CALC-SCNC: 13 MMOL/L — SIGNIFICANT CHANGE UP (ref 5–17)
ANION GAP SERPL CALC-SCNC: 13 MMOL/L — SIGNIFICANT CHANGE UP (ref 5–17)
ANION GAP SERPL CALC-SCNC: 14 MMOL/L — SIGNIFICANT CHANGE UP (ref 5–17)
ANION GAP SERPL CALC-SCNC: 14 MMOL/L — SIGNIFICANT CHANGE UP (ref 5–17)
ANION GAP SERPL CALC-SCNC: 16 MMOL/L — SIGNIFICANT CHANGE UP (ref 5–17)
ANION GAP SERPL CALC-SCNC: 17 MMOL/L — SIGNIFICANT CHANGE UP (ref 5–17)
ANION GAP SERPL CALC-SCNC: 17 MMOL/L — SIGNIFICANT CHANGE UP (ref 5–17)
ANION GAP SERPL CALC-SCNC: 18 MMOL/L — HIGH (ref 5–17)
ANION GAP SERPL CALC-SCNC: 19 MMOL/L — HIGH (ref 5–17)
ANION GAP SERPL CALC-SCNC: 20 MMOL/L — HIGH (ref 5–17)
ANION GAP SERPL CALC-SCNC: 21 MMOL/L — HIGH (ref 5–17)
ANION GAP SERPL CALC-SCNC: 22 MMOL/L — HIGH (ref 5–17)
ANION GAP SERPL CALC-SCNC: 22 MMOL/L — HIGH (ref 5–17)
ANION GAP SERPL CALC-SCNC: 23 MMOL/L — HIGH (ref 5–17)
ANION GAP SERPL CALC-SCNC: 23 MMOL/L — HIGH (ref 5–17)
ANION GAP SERPL CALC-SCNC: 24 MMOL/L — HIGH (ref 5–17)
ANION GAP SERPL CALC-SCNC: 24 MMOL/L — HIGH (ref 5–17)
ANION GAP SERPL CALC-SCNC: 28 MMOL/L — HIGH (ref 5–17)
ANION GAP SERPL CALC-SCNC: 7 MMOL/L — SIGNIFICANT CHANGE UP (ref 5–17)
ANION GAP SERPL CALC-SCNC: 8 MMOL/L — SIGNIFICANT CHANGE UP (ref 5–17)
ANION GAP SERPL CALC-SCNC: 9 MMOL/L — SIGNIFICANT CHANGE UP (ref 5–17)
ANISOCYTOSIS BLD QL: SIGNIFICANT CHANGE UP
ANISOCYTOSIS BLD QL: SLIGHT — SIGNIFICANT CHANGE UP
ANISOCYTOSIS BLD QL: SLIGHT — SIGNIFICANT CHANGE UP
APPEARANCE UR: ABNORMAL
APTT BLD: 23.9 SEC — LOW (ref 24.5–35.6)
APTT BLD: 26.6 SEC — SIGNIFICANT CHANGE UP (ref 24.5–35.6)
APTT BLD: 31.7 SEC — SIGNIFICANT CHANGE UP (ref 24.5–35.6)
APTT BLD: 31.8 SEC — SIGNIFICANT CHANGE UP (ref 24.5–35.6)
APTT BLD: 32.9 SEC — SIGNIFICANT CHANGE UP (ref 24.5–35.6)
APTT BLD: 38.7 SEC — HIGH (ref 24.5–35.6)
APTT BLD: 39.9 SEC — HIGH (ref 24.5–35.6)
APTT BLD: 41 SEC — HIGH (ref 24.5–35.6)
APTT BLD: 41.1 SEC — HIGH (ref 24.5–35.6)
APTT BLD: 41.5 SEC — HIGH (ref 24.5–35.6)
APTT BLD: 44.9 SEC — HIGH (ref 24.5–35.6)
AST SERPL-CCNC: 10 U/L — SIGNIFICANT CHANGE UP (ref 10–40)
AST SERPL-CCNC: 10 U/L — SIGNIFICANT CHANGE UP (ref 10–40)
AST SERPL-CCNC: 11 U/L — SIGNIFICANT CHANGE UP (ref 10–40)
AST SERPL-CCNC: 12 U/L — SIGNIFICANT CHANGE UP (ref 10–40)
AST SERPL-CCNC: 14 U/L — SIGNIFICANT CHANGE UP (ref 10–40)
AST SERPL-CCNC: 15 U/L — SIGNIFICANT CHANGE UP (ref 10–40)
AST SERPL-CCNC: 15 U/L — SIGNIFICANT CHANGE UP (ref 10–40)
AST SERPL-CCNC: 28 U/L — SIGNIFICANT CHANGE UP (ref 10–40)
AST SERPL-CCNC: 28 U/L — SIGNIFICANT CHANGE UP (ref 10–40)
AST SERPL-CCNC: 36 U/L — SIGNIFICANT CHANGE UP (ref 10–40)
AST SERPL-CCNC: 50 U/L — HIGH (ref 10–40)
AST SERPL-CCNC: 64 U/L — HIGH (ref 10–40)
AST SERPL-CCNC: 9 U/L — LOW (ref 10–40)
AUTO DIFF PNL BLD: NEGATIVE — SIGNIFICANT CHANGE UP
B-GLOBULIN SERPL ELPH-MCNC: 1 G/DL — SIGNIFICANT CHANGE UP (ref 0.5–1)
B-OH-BUTYR SERPL-SCNC: 0.3 MMOL/L — SIGNIFICANT CHANGE UP
BACTERIA # UR AUTO: ABNORMAL /HPF
BARBITURATES UR QL SCN: NEGATIVE NG/ML — SIGNIFICANT CHANGE UP
BARBITURATES UR-MCNC: NEGATIVE NG/ML — SIGNIFICANT CHANGE UP
BASE EXCESS BLDV CALC-SCNC: -0.9 MMOL/L — SIGNIFICANT CHANGE UP (ref -2–3)
BASE EXCESS BLDV CALC-SCNC: -10.8 MMOL/L — LOW (ref -2–3)
BASE EXCESS BLDV CALC-SCNC: -11 MMOL/L — LOW (ref -2–3)
BASE EXCESS BLDV CALC-SCNC: -17 MMOL/L — LOW (ref -2–3)
BASE EXCESS BLDV CALC-SCNC: -17.2 MMOL/L — LOW (ref -2–3)
BASE EXCESS BLDV CALC-SCNC: -18.1 MMOL/L — LOW (ref -2–3)
BASE EXCESS BLDV CALC-SCNC: -19.5 MMOL/L — LOW (ref -2–3)
BASE EXCESS BLDV CALC-SCNC: -19.8 MMOL/L — LOW (ref -2–3)
BASE EXCESS BLDV CALC-SCNC: -4 MMOL/L — LOW (ref -2–3)
BASE EXCESS BLDV CALC-SCNC: -9.1 MMOL/L — LOW (ref -2–3)
BASE EXCESS BLDV CALC-SCNC: -9.5 MMOL/L — LOW (ref -2–3)
BASE EXCESS BLDV CALC-SCNC: 15.2 MMOL/L — HIGH (ref -2–3)
BASE EXCESS BLDV CALC-SCNC: 59.9 MMOL/L — HIGH (ref -2–3)
BASOPHILS # BLD AUTO: 0 K/UL — SIGNIFICANT CHANGE UP (ref 0–0.2)
BASOPHILS # BLD AUTO: 0.01 K/UL — SIGNIFICANT CHANGE UP (ref 0–0.2)
BASOPHILS # BLD AUTO: 0.01 K/UL — SIGNIFICANT CHANGE UP (ref 0–0.2)
BASOPHILS # BLD AUTO: 0.02 K/UL — SIGNIFICANT CHANGE UP (ref 0–0.2)
BASOPHILS # BLD AUTO: 0.03 K/UL — SIGNIFICANT CHANGE UP (ref 0–0.2)
BASOPHILS # BLD AUTO: 0.03 K/UL — SIGNIFICANT CHANGE UP (ref 0–0.2)
BASOPHILS # BLD AUTO: 0.04 K/UL — SIGNIFICANT CHANGE UP (ref 0–0.2)
BASOPHILS # BLD AUTO: 0.05 K/UL — SIGNIFICANT CHANGE UP (ref 0–0.2)
BASOPHILS # BLD AUTO: 0.05 K/UL — SIGNIFICANT CHANGE UP (ref 0–0.2)
BASOPHILS # BLD AUTO: 0.06 K/UL — SIGNIFICANT CHANGE UP (ref 0–0.2)
BASOPHILS # BLD AUTO: 0.06 K/UL — SIGNIFICANT CHANGE UP (ref 0–0.2)
BASOPHILS NFR BLD AUTO: 0 % — SIGNIFICANT CHANGE UP (ref 0–2)
BASOPHILS NFR BLD AUTO: 0.1 % — SIGNIFICANT CHANGE UP (ref 0–2)
BASOPHILS NFR BLD AUTO: 0.2 % — SIGNIFICANT CHANGE UP (ref 0–2)
BASOPHILS NFR BLD AUTO: 0.2 % — SIGNIFICANT CHANGE UP (ref 0–2)
BASOPHILS NFR BLD AUTO: 0.9 % — SIGNIFICANT CHANGE UP (ref 0–2)
BASOPHILS NFR BLD AUTO: 0.9 % — SIGNIFICANT CHANGE UP (ref 0–2)
BENZODIAZ UR-MCNC: NEGATIVE NG/ML — SIGNIFICANT CHANGE UP
BETA+GAMMA TOCOPHEROL SERPL-MCNC: 0.4 MG/L — LOW (ref 0.5–4.9)
BILIRUB DIRECT SERPL-MCNC: 0.3 MG/DL — SIGNIFICANT CHANGE UP (ref 0–0.3)
BILIRUB DIRECT SERPL-MCNC: 0.4 MG/DL — HIGH (ref 0–0.3)
BILIRUB INDIRECT FLD-MCNC: 0.4 MG/DL — SIGNIFICANT CHANGE UP (ref 0.2–1)
BILIRUB INDIRECT FLD-MCNC: 0.5 MG/DL — SIGNIFICANT CHANGE UP (ref 0.2–1)
BILIRUB SERPL-MCNC: 0.1 MG/DL — LOW (ref 0.2–1.2)
BILIRUB SERPL-MCNC: 0.1 MG/DL — LOW (ref 0.2–1.2)
BILIRUB SERPL-MCNC: 0.2 MG/DL — SIGNIFICANT CHANGE UP (ref 0.2–1.2)
BILIRUB SERPL-MCNC: 0.3 MG/DL — SIGNIFICANT CHANGE UP (ref 0.2–1.2)
BILIRUB SERPL-MCNC: 0.7 MG/DL — SIGNIFICANT CHANGE UP (ref 0.2–1.2)
BILIRUB SERPL-MCNC: 0.8 MG/DL — SIGNIFICANT CHANGE UP (ref 0.2–1.2)
BILIRUB SERPL-MCNC: 0.9 MG/DL — SIGNIFICANT CHANGE UP (ref 0.2–1.2)
BILIRUB UR-MCNC: ABNORMAL
BILIRUB UR-MCNC: NEGATIVE — SIGNIFICANT CHANGE UP
BLD GP AB SCN SERPL QL: NEGATIVE — SIGNIFICANT CHANGE UP
BUN SERPL-MCNC: 10 MG/DL — SIGNIFICANT CHANGE UP (ref 7–23)
BUN SERPL-MCNC: 10 MG/DL — SIGNIFICANT CHANGE UP (ref 7–23)
BUN SERPL-MCNC: 103 MG/DL — HIGH (ref 7–23)
BUN SERPL-MCNC: 11 MG/DL — SIGNIFICANT CHANGE UP (ref 7–23)
BUN SERPL-MCNC: 12 MG/DL — SIGNIFICANT CHANGE UP (ref 7–23)
BUN SERPL-MCNC: 12 MG/DL — SIGNIFICANT CHANGE UP (ref 7–23)
BUN SERPL-MCNC: 15 MG/DL — SIGNIFICANT CHANGE UP (ref 7–23)
BUN SERPL-MCNC: 16 MG/DL — SIGNIFICANT CHANGE UP (ref 7–23)
BUN SERPL-MCNC: 29 MG/DL — HIGH (ref 7–23)
BUN SERPL-MCNC: 40 MG/DL — HIGH (ref 7–23)
BUN SERPL-MCNC: 43 MG/DL — HIGH (ref 7–23)
BUN SERPL-MCNC: 44 MG/DL — HIGH (ref 7–23)
BUN SERPL-MCNC: 46 MG/DL — HIGH (ref 7–23)
BUN SERPL-MCNC: 46 MG/DL — HIGH (ref 7–23)
BUN SERPL-MCNC: 50 MG/DL — HIGH (ref 7–23)
BUN SERPL-MCNC: 51 MG/DL — HIGH (ref 7–23)
BUN SERPL-MCNC: 54 MG/DL — HIGH (ref 7–23)
BUN SERPL-MCNC: 56 MG/DL — HIGH (ref 7–23)
BUN SERPL-MCNC: 58 MG/DL — HIGH (ref 7–23)
BUN SERPL-MCNC: 63 MG/DL — HIGH (ref 7–23)
BUN SERPL-MCNC: 64 MG/DL — HIGH (ref 7–23)
BUN SERPL-MCNC: 65 MG/DL — HIGH (ref 7–23)
BUN SERPL-MCNC: 7 MG/DL — SIGNIFICANT CHANGE UP (ref 7–23)
BUN SERPL-MCNC: 71 MG/DL — HIGH (ref 7–23)
BUN SERPL-MCNC: 84 MG/DL — HIGH (ref 7–23)
BUN SERPL-MCNC: 91 MG/DL — HIGH (ref 7–23)
BUN SERPL-MCNC: 92 MG/DL — HIGH (ref 7–23)
BUN SERPL-MCNC: 99 MG/DL — HIGH (ref 7–23)
BURR CELLS BLD QL SMEAR: PRESENT — SIGNIFICANT CHANGE UP
C-ANCA SER-ACNC: NEGATIVE — SIGNIFICANT CHANGE UP
C3 SERPL-MCNC: 75 MG/DL — LOW (ref 81–157)
C4 SERPL-MCNC: 28 MG/DL — SIGNIFICANT CHANGE UP (ref 13–39)
CA-I SERPL-SCNC: 0.57 MMOL/L — CRITICAL LOW (ref 1.15–1.33)
CA-I SERPL-SCNC: 0.95 MMOL/L — LOW (ref 1.15–1.33)
CA-I SERPL-SCNC: 1 MMOL/L — LOW (ref 1.15–1.33)
CA-I SERPL-SCNC: 1 MMOL/L — LOW (ref 1.15–1.33)
CA-I SERPL-SCNC: 1.02 MMOL/L — LOW (ref 1.15–1.33)
CA-I SERPL-SCNC: 1.06 MMOL/L — LOW (ref 1.15–1.33)
CA-I SERPL-SCNC: 1.12 MMOL/L — LOW (ref 1.15–1.33)
CA-I SERPL-SCNC: 1.14 MMOL/L — LOW (ref 1.15–1.33)
CA-I SERPL-SCNC: 1.18 MMOL/L — SIGNIFICANT CHANGE UP (ref 1.15–1.33)
CA-I SERPL-SCNC: 1.19 MMOL/L — SIGNIFICANT CHANGE UP (ref 1.15–1.33)
CA-I SERPL-SCNC: 1.23 MMOL/L — SIGNIFICANT CHANGE UP (ref 1.15–1.33)
CA-I SERPL-SCNC: 1.23 MMOL/L — SIGNIFICANT CHANGE UP (ref 1.15–1.33)
CA-I SERPL-SCNC: 1.24 MMOL/L — SIGNIFICANT CHANGE UP (ref 1.15–1.33)
CALCIUM SERPL-MCNC: 6.5 MG/DL — CRITICAL LOW (ref 8.4–10.5)
CALCIUM SERPL-MCNC: 7.3 MG/DL — LOW (ref 8.4–10.5)
CALCIUM SERPL-MCNC: 7.4 MG/DL — LOW (ref 8.4–10.5)
CALCIUM SERPL-MCNC: 7.4 MG/DL — LOW (ref 8.4–10.5)
CALCIUM SERPL-MCNC: 7.9 MG/DL — LOW (ref 8.4–10.5)
CALCIUM SERPL-MCNC: 8.1 MG/DL — LOW (ref 8.4–10.5)
CALCIUM SERPL-MCNC: 8.2 MG/DL — LOW (ref 8.4–10.5)
CALCIUM SERPL-MCNC: 8.3 MG/DL — LOW (ref 8.4–10.5)
CALCIUM SERPL-MCNC: 8.3 MG/DL — LOW (ref 8.4–10.5)
CALCIUM SERPL-MCNC: 8.4 MG/DL — SIGNIFICANT CHANGE UP (ref 8.4–10.5)
CALCIUM SERPL-MCNC: 8.6 MG/DL — SIGNIFICANT CHANGE UP (ref 8.4–10.5)
CALCIUM SERPL-MCNC: 8.7 MG/DL — SIGNIFICANT CHANGE UP (ref 8.4–10.5)
CALCIUM SERPL-MCNC: 8.7 MG/DL — SIGNIFICANT CHANGE UP (ref 8.4–10.5)
CALCIUM SERPL-MCNC: 8.9 MG/DL — SIGNIFICANT CHANGE UP (ref 8.4–10.5)
CAST: 0 /LPF — SIGNIFICANT CHANGE UP (ref 0–4)
CAST: 0 /LPF — SIGNIFICANT CHANGE UP (ref 0–4)
CAST: 2 /LPF — SIGNIFICANT CHANGE UP (ref 0–4)
CHLORIDE BLDV-SCNC: 102 MMOL/L — SIGNIFICANT CHANGE UP (ref 96–108)
CHLORIDE BLDV-SCNC: 102 MMOL/L — SIGNIFICANT CHANGE UP (ref 96–108)
CHLORIDE BLDV-SCNC: 103 MMOL/L — SIGNIFICANT CHANGE UP (ref 96–108)
CHLORIDE BLDV-SCNC: 103 MMOL/L — SIGNIFICANT CHANGE UP (ref 96–108)
CHLORIDE BLDV-SCNC: 104 MMOL/L — SIGNIFICANT CHANGE UP (ref 96–108)
CHLORIDE BLDV-SCNC: 106 MMOL/L — SIGNIFICANT CHANGE UP (ref 96–108)
CHLORIDE BLDV-SCNC: 108 MMOL/L — SIGNIFICANT CHANGE UP (ref 96–108)
CHLORIDE BLDV-SCNC: 109 MMOL/L — HIGH (ref 96–108)
CHLORIDE BLDV-SCNC: 90 MMOL/L — LOW (ref 96–108)
CHLORIDE BLDV-SCNC: <72 MMOL/L — LOW (ref 96–108)
CHLORIDE SERPL-SCNC: 100 MMOL/L — SIGNIFICANT CHANGE UP (ref 96–108)
CHLORIDE SERPL-SCNC: 100 MMOL/L — SIGNIFICANT CHANGE UP (ref 96–108)
CHLORIDE SERPL-SCNC: 101 MMOL/L — SIGNIFICANT CHANGE UP (ref 96–108)
CHLORIDE SERPL-SCNC: 102 MMOL/L — SIGNIFICANT CHANGE UP (ref 96–108)
CHLORIDE SERPL-SCNC: 102 MMOL/L — SIGNIFICANT CHANGE UP (ref 96–108)
CHLORIDE SERPL-SCNC: 103 MMOL/L — SIGNIFICANT CHANGE UP (ref 96–108)
CHLORIDE SERPL-SCNC: 104 MMOL/L — SIGNIFICANT CHANGE UP (ref 96–108)
CHLORIDE SERPL-SCNC: 105 MMOL/L — SIGNIFICANT CHANGE UP (ref 96–108)
CHLORIDE SERPL-SCNC: 106 MMOL/L — SIGNIFICANT CHANGE UP (ref 96–108)
CHLORIDE SERPL-SCNC: 107 MMOL/L — SIGNIFICANT CHANGE UP (ref 96–108)
CHLORIDE SERPL-SCNC: 89 MMOL/L — LOW (ref 96–108)
CHLORIDE SERPL-SCNC: 97 MMOL/L — SIGNIFICANT CHANGE UP (ref 96–108)
CK SERPL-CCNC: 21 U/L — LOW (ref 25–170)
CK SERPL-CCNC: 81 U/L — SIGNIFICANT CHANGE UP (ref 25–170)
CO2 BLDV-SCNC: 10 MMOL/L — LOW (ref 22–26)
CO2 BLDV-SCNC: 13 MMOL/L — LOW (ref 22–26)
CO2 BLDV-SCNC: 15 MMOL/L — LOW (ref 22–26)
CO2 BLDV-SCNC: 16 MMOL/L — LOW (ref 22–26)
CO2 BLDV-SCNC: 17 MMOL/L — LOW (ref 22–26)
CO2 BLDV-SCNC: 22 MMOL/L — SIGNIFICANT CHANGE UP (ref 22–26)
CO2 BLDV-SCNC: 23 MMOL/L — SIGNIFICANT CHANGE UP (ref 22–26)
CO2 BLDV-SCNC: 41 MMOL/L — HIGH (ref 22–26)
CO2 BLDV-SCNC: 8 MMOL/L — LOW (ref 22–26)
CO2 BLDV-SCNC: 8 MMOL/L — LOW (ref 22–26)
CO2 BLDV-SCNC: 85 MMOL/L — HIGH (ref 22–26)
CO2 BLDV-SCNC: 9 MMOL/L — LOW (ref 22–26)
CO2 BLDV-SCNC: 9 MMOL/L — LOW (ref 22–26)
CO2 SERPL-SCNC: 12 MMOL/L — LOW (ref 22–31)
CO2 SERPL-SCNC: 13 MMOL/L — LOW (ref 22–31)
CO2 SERPL-SCNC: 13 MMOL/L — LOW (ref 22–31)
CO2 SERPL-SCNC: 16 MMOL/L — LOW (ref 22–31)
CO2 SERPL-SCNC: 18 MMOL/L — LOW (ref 22–31)
CO2 SERPL-SCNC: 18 MMOL/L — LOW (ref 22–31)
CO2 SERPL-SCNC: 19 MMOL/L — LOW (ref 22–31)
CO2 SERPL-SCNC: 20 MMOL/L — LOW (ref 22–31)
CO2 SERPL-SCNC: 21 MMOL/L — LOW (ref 22–31)
CO2 SERPL-SCNC: 22 MMOL/L — SIGNIFICANT CHANGE UP (ref 22–31)
CO2 SERPL-SCNC: 23 MMOL/L — SIGNIFICANT CHANGE UP (ref 22–31)
CO2 SERPL-SCNC: 23 MMOL/L — SIGNIFICANT CHANGE UP (ref 22–31)
CO2 SERPL-SCNC: 24 MMOL/L — SIGNIFICANT CHANGE UP (ref 22–31)
CO2 SERPL-SCNC: 26 MMOL/L — SIGNIFICANT CHANGE UP (ref 22–31)
CO2 SERPL-SCNC: 33 MMOL/L — HIGH (ref 22–31)
CO2 SERPL-SCNC: <10 MMOL/L — CRITICAL LOW (ref 22–31)
COCAINE METAB.OTHER UR-MCNC: NEGATIVE NG/ML — SIGNIFICANT CHANGE UP
COLOR SPEC: SIGNIFICANT CHANGE UP
COLOR SPEC: YELLOW — SIGNIFICANT CHANGE UP
CORTIS AM PEAK SERPL-MCNC: 166 UG/DL — HIGH (ref 6–18.4)
CREAT SERPL-MCNC: 0.61 MG/DL — SIGNIFICANT CHANGE UP (ref 0.5–1.3)
CREAT SERPL-MCNC: 0.61 MG/DL — SIGNIFICANT CHANGE UP (ref 0.5–1.3)
CREAT SERPL-MCNC: 0.65 MG/DL — SIGNIFICANT CHANGE UP (ref 0.5–1.3)
CREAT SERPL-MCNC: 0.66 MG/DL — SIGNIFICANT CHANGE UP (ref 0.5–1.3)
CREAT SERPL-MCNC: 0.68 MG/DL — SIGNIFICANT CHANGE UP (ref 0.5–1.3)
CREAT SERPL-MCNC: 0.68 MG/DL — SIGNIFICANT CHANGE UP (ref 0.5–1.3)
CREAT SERPL-MCNC: 0.74 MG/DL — SIGNIFICANT CHANGE UP (ref 0.5–1.3)
CREAT SERPL-MCNC: 0.75 MG/DL — SIGNIFICANT CHANGE UP (ref 0.5–1.3)
CREAT SERPL-MCNC: 0.76 MG/DL — SIGNIFICANT CHANGE UP (ref 0.5–1.3)
CREAT SERPL-MCNC: 0.83 MG/DL — SIGNIFICANT CHANGE UP (ref 0.5–1.3)
CREAT SERPL-MCNC: 2.24 MG/DL — HIGH (ref 0.5–1.3)
CREAT SERPL-MCNC: 2.29 MG/DL — HIGH (ref 0.5–1.3)
CREAT SERPL-MCNC: 2.4 MG/DL — HIGH (ref 0.5–1.3)
CREAT SERPL-MCNC: 2.4 MG/DL — HIGH (ref 0.5–1.3)
CREAT SERPL-MCNC: 2.48 MG/DL — HIGH (ref 0.5–1.3)
CREAT SERPL-MCNC: 2.54 MG/DL — HIGH (ref 0.5–1.3)
CREAT SERPL-MCNC: 2.57 MG/DL — HIGH (ref 0.5–1.3)
CREAT SERPL-MCNC: 2.79 MG/DL — HIGH (ref 0.5–1.3)
CREAT SERPL-MCNC: 2.83 MG/DL — HIGH (ref 0.5–1.3)
CREAT SERPL-MCNC: 2.83 MG/DL — HIGH (ref 0.5–1.3)
CREAT SERPL-MCNC: 2.86 MG/DL — HIGH (ref 0.5–1.3)
CREAT SERPL-MCNC: 2.9 MG/DL — HIGH (ref 0.5–1.3)
CREAT SERPL-MCNC: 2.94 MG/DL — HIGH (ref 0.5–1.3)
CREAT SERPL-MCNC: 3.16 MG/DL — HIGH (ref 0.5–1.3)
CREAT SERPL-MCNC: 3.33 MG/DL — HIGH (ref 0.5–1.3)
CREAT SERPL-MCNC: 3.71 MG/DL — HIGH (ref 0.5–1.3)
CREAT SERPL-MCNC: 4.83 MG/DL — HIGH (ref 0.5–1.3)
CREAT SERPL-MCNC: 4.85 MG/DL — HIGH (ref 0.5–1.3)
CREAT SERPL-MCNC: 5.08 MG/DL — HIGH (ref 0.5–1.3)
CREAT SERPL-MCNC: 5.56 MG/DL — HIGH (ref 0.5–1.3)
CREATININE, URINE THERAPEUTIC: 28.2 MG/DL — SIGNIFICANT CHANGE UP (ref 20–300)
CULTURE RESULTS: ABNORMAL
CULTURE RESULTS: SIGNIFICANT CHANGE UP
DACRYOCYTES BLD QL SMEAR: SLIGHT — SIGNIFICANT CHANGE UP
DEPRECATED KAPPA LC FREE/LAMBDA SER: 4.05 RATIO — SIGNIFICANT CHANGE UP (ref 0.7–6.02)
DIFF PNL FLD: ABNORMAL
DIFF PNL FLD: NEGATIVE — SIGNIFICANT CHANGE UP
DRUG SCREEN, SERUM: SIGNIFICANT CHANGE UP
DSDNA AB SER-ACNC: <1 IU/ML — SIGNIFICANT CHANGE UP
E COLI DNA BLD POS QL NAA+NON-PROBE: SIGNIFICANT CHANGE UP
EGFR: 12 ML/MIN/1.73M2 — LOW
EGFR: 13 ML/MIN/1.73M2 — LOW
EGFR: 14 ML/MIN/1.73M2 — LOW
EGFR: 16 ML/MIN/1.73M2 — LOW
EGFR: 17 ML/MIN/1.73M2 — LOW
EGFR: 18 ML/MIN/1.73M2 — LOW
EGFR: 19 ML/MIN/1.73M2 — LOW
EGFR: 19 ML/MIN/1.73M2 — LOW
EGFR: 20 ML/MIN/1.73M2 — LOW
EGFR: 20 ML/MIN/1.73M2 — LOW
EGFR: 21 ML/MIN/1.73M2 — LOW
EGFR: 22 ML/MIN/1.73M2 — LOW
EGFR: 7 ML/MIN/1.73M2 — LOW
EGFR: 72 ML/MIN/1.73M2 — SIGNIFICANT CHANGE UP
EGFR: 8 ML/MIN/1.73M2 — LOW
EGFR: 80 ML/MIN/1.73M2 — SIGNIFICANT CHANGE UP
EGFR: 81 ML/MIN/1.73M2 — SIGNIFICANT CHANGE UP
EGFR: 82 ML/MIN/1.73M2 — SIGNIFICANT CHANGE UP
EGFR: 89 ML/MIN/1.73M2 — SIGNIFICANT CHANGE UP
EGFR: 9 ML/MIN/1.73M2 — LOW
EGFR: 9 ML/MIN/1.73M2 — LOW
EGFR: 90 ML/MIN/1.73M2 — SIGNIFICANT CHANGE UP
EGFR: 91 ML/MIN/1.73M2 — SIGNIFICANT CHANGE UP
EGFR: 91 ML/MIN/1.73M2 — SIGNIFICANT CHANGE UP
ELLIPTOCYTES BLD QL SMEAR: SLIGHT — SIGNIFICANT CHANGE UP
EOSINOPHIL # BLD AUTO: 0 K/UL — SIGNIFICANT CHANGE UP (ref 0–0.5)
EOSINOPHIL # BLD AUTO: 0.01 K/UL — SIGNIFICANT CHANGE UP (ref 0–0.5)
EOSINOPHIL # BLD AUTO: 0.03 K/UL — SIGNIFICANT CHANGE UP (ref 0–0.5)
EOSINOPHIL # BLD AUTO: 0.05 K/UL — SIGNIFICANT CHANGE UP (ref 0–0.5)
EOSINOPHIL # BLD AUTO: 0.05 K/UL — SIGNIFICANT CHANGE UP (ref 0–0.5)
EOSINOPHIL NFR BLD AUTO: 0 % — SIGNIFICANT CHANGE UP (ref 0–6)
EOSINOPHIL NFR BLD AUTO: 0.1 % — SIGNIFICANT CHANGE UP (ref 0–6)
EOSINOPHIL NFR BLD AUTO: 0.1 % — SIGNIFICANT CHANGE UP (ref 0–6)
EOSINOPHIL NFR BLD AUTO: 0.2 % — SIGNIFICANT CHANGE UP (ref 0–6)
EPI CELLS # UR: 10 — SIGNIFICANT CHANGE UP
ETHANOL SERPL-MCNC: <10 MG/DL — SIGNIFICANT CHANGE UP (ref 0–10)
FENTANYL RESULT, UR: NEGATIVE NG/ML — SIGNIFICANT CHANGE UP
FENTANYL UR QL SCN: NEGATIVE NG/ML — SIGNIFICANT CHANGE UP
FLUAV AG NPH QL: SIGNIFICANT CHANGE UP
FLUBV AG NPH QL: SIGNIFICANT CHANGE UP
FOLATE SERPL-MCNC: >20 NG/ML — SIGNIFICANT CHANGE UP
FOLATE SERPL-MCNC: >20 NG/ML — SIGNIFICANT CHANGE UP
GAMMA GLOBULIN: 0.8 G/DL — SIGNIFICANT CHANGE UP (ref 0.6–1.6)
GAS PNL BLDA: SIGNIFICANT CHANGE UP
GAS PNL BLDA: SIGNIFICANT CHANGE UP
GAS PNL BLDV: 127 MMOL/L — LOW (ref 136–145)
GAS PNL BLDV: 131 MMOL/L — LOW (ref 136–145)
GAS PNL BLDV: 132 MMOL/L — LOW (ref 136–145)
GAS PNL BLDV: 134 MMOL/L — LOW (ref 136–145)
GAS PNL BLDV: 135 MMOL/L — LOW (ref 136–145)
GAS PNL BLDV: 136 MMOL/L — SIGNIFICANT CHANGE UP (ref 136–145)
GAS PNL BLDV: 136 MMOL/L — SIGNIFICANT CHANGE UP (ref 136–145)
GAS PNL BLDV: SIGNIFICANT CHANGE UP
GIANT PLATELETS BLD QL SMEAR: PRESENT — SIGNIFICANT CHANGE UP
GLUCOSE BLDC GLUCOMTR-MCNC: 101 MG/DL — HIGH (ref 70–99)
GLUCOSE BLDC GLUCOMTR-MCNC: 110 MG/DL — HIGH (ref 70–99)
GLUCOSE BLDC GLUCOMTR-MCNC: 113 MG/DL — HIGH (ref 70–99)
GLUCOSE BLDC GLUCOMTR-MCNC: 124 MG/DL — HIGH (ref 70–99)
GLUCOSE BLDC GLUCOMTR-MCNC: 136 MG/DL — HIGH (ref 70–99)
GLUCOSE BLDC GLUCOMTR-MCNC: 138 MG/DL — HIGH (ref 70–99)
GLUCOSE BLDC GLUCOMTR-MCNC: 141 MG/DL — HIGH (ref 70–99)
GLUCOSE BLDC GLUCOMTR-MCNC: 145 MG/DL — HIGH (ref 70–99)
GLUCOSE BLDC GLUCOMTR-MCNC: 147 MG/DL — HIGH (ref 70–99)
GLUCOSE BLDC GLUCOMTR-MCNC: 148 MG/DL — HIGH (ref 70–99)
GLUCOSE BLDC GLUCOMTR-MCNC: 151 MG/DL — HIGH (ref 70–99)
GLUCOSE BLDC GLUCOMTR-MCNC: 154 MG/DL — HIGH (ref 70–99)
GLUCOSE BLDC GLUCOMTR-MCNC: 158 MG/DL — HIGH (ref 70–99)
GLUCOSE BLDC GLUCOMTR-MCNC: 158 MG/DL — HIGH (ref 70–99)
GLUCOSE BLDC GLUCOMTR-MCNC: 159 MG/DL — HIGH (ref 70–99)
GLUCOSE BLDC GLUCOMTR-MCNC: 159 MG/DL — HIGH (ref 70–99)
GLUCOSE BLDC GLUCOMTR-MCNC: 160 MG/DL — HIGH (ref 70–99)
GLUCOSE BLDC GLUCOMTR-MCNC: 162 MG/DL — HIGH (ref 70–99)
GLUCOSE BLDC GLUCOMTR-MCNC: 163 MG/DL — HIGH (ref 70–99)
GLUCOSE BLDC GLUCOMTR-MCNC: 173 MG/DL — HIGH (ref 70–99)
GLUCOSE BLDC GLUCOMTR-MCNC: 174 MG/DL — HIGH (ref 70–99)
GLUCOSE BLDC GLUCOMTR-MCNC: 175 MG/DL — HIGH (ref 70–99)
GLUCOSE BLDC GLUCOMTR-MCNC: 177 MG/DL — HIGH (ref 70–99)
GLUCOSE BLDC GLUCOMTR-MCNC: 178 MG/DL — HIGH (ref 70–99)
GLUCOSE BLDC GLUCOMTR-MCNC: 178 MG/DL — HIGH (ref 70–99)
GLUCOSE BLDC GLUCOMTR-MCNC: 188 MG/DL — HIGH (ref 70–99)
GLUCOSE BLDC GLUCOMTR-MCNC: 273 MG/DL — HIGH (ref 70–99)
GLUCOSE BLDV-MCNC: 101 MG/DL — HIGH (ref 70–99)
GLUCOSE BLDV-MCNC: 118 MG/DL — HIGH (ref 70–99)
GLUCOSE BLDV-MCNC: 125 MG/DL — HIGH (ref 70–99)
GLUCOSE BLDV-MCNC: 130 MG/DL — HIGH (ref 70–99)
GLUCOSE BLDV-MCNC: 136 MG/DL — HIGH (ref 70–99)
GLUCOSE BLDV-MCNC: 156 MG/DL — HIGH (ref 70–99)
GLUCOSE BLDV-MCNC: 197 MG/DL — HIGH (ref 70–99)
GLUCOSE BLDV-MCNC: 221 MG/DL — HIGH (ref 70–99)
GLUCOSE BLDV-MCNC: 221 MG/DL — HIGH (ref 70–99)
GLUCOSE BLDV-MCNC: 227 MG/DL — HIGH (ref 70–99)
GLUCOSE BLDV-MCNC: 261 MG/DL — HIGH (ref 70–99)
GLUCOSE BLDV-MCNC: >660 MG/DL — CRITICAL HIGH (ref 70–99)
GLUCOSE BLDV-MCNC: >660 MG/DL — CRITICAL HIGH (ref 70–99)
GLUCOSE SERPL-MCNC: 102 MG/DL — HIGH (ref 70–99)
GLUCOSE SERPL-MCNC: 104 MG/DL — HIGH (ref 70–99)
GLUCOSE SERPL-MCNC: 109 MG/DL — HIGH (ref 70–99)
GLUCOSE SERPL-MCNC: 112 MG/DL — HIGH (ref 70–99)
GLUCOSE SERPL-MCNC: 113 MG/DL — HIGH (ref 70–99)
GLUCOSE SERPL-MCNC: 118 MG/DL — HIGH (ref 70–99)
GLUCOSE SERPL-MCNC: 122 MG/DL — HIGH (ref 70–99)
GLUCOSE SERPL-MCNC: 123 MG/DL — HIGH (ref 70–99)
GLUCOSE SERPL-MCNC: 123 MG/DL — HIGH (ref 70–99)
GLUCOSE SERPL-MCNC: 131 MG/DL — HIGH (ref 70–99)
GLUCOSE SERPL-MCNC: 132 MG/DL — HIGH (ref 70–99)
GLUCOSE SERPL-MCNC: 133 MG/DL — HIGH (ref 70–99)
GLUCOSE SERPL-MCNC: 143 MG/DL — HIGH (ref 70–99)
GLUCOSE SERPL-MCNC: 149 MG/DL — HIGH (ref 70–99)
GLUCOSE SERPL-MCNC: 150 MG/DL — HIGH (ref 70–99)
GLUCOSE SERPL-MCNC: 152 MG/DL — HIGH (ref 70–99)
GLUCOSE SERPL-MCNC: 156 MG/DL — HIGH (ref 70–99)
GLUCOSE SERPL-MCNC: 158 MG/DL — HIGH (ref 70–99)
GLUCOSE SERPL-MCNC: 163 MG/DL — HIGH (ref 70–99)
GLUCOSE SERPL-MCNC: 165 MG/DL — HIGH (ref 70–99)
GLUCOSE SERPL-MCNC: 176 MG/DL — HIGH (ref 70–99)
GLUCOSE SERPL-MCNC: 177 MG/DL — HIGH (ref 70–99)
GLUCOSE SERPL-MCNC: 215 MG/DL — HIGH (ref 70–99)
GLUCOSE SERPL-MCNC: 215 MG/DL — HIGH (ref 70–99)
GLUCOSE SERPL-MCNC: 233 MG/DL — HIGH (ref 70–99)
GLUCOSE SERPL-MCNC: 297 MG/DL — HIGH (ref 70–99)
GLUCOSE SERPL-MCNC: 695 MG/DL — CRITICAL HIGH (ref 70–99)
GLUCOSE SERPL-MCNC: 81 MG/DL — SIGNIFICANT CHANGE UP (ref 70–99)
GLUCOSE SERPL-MCNC: 95 MG/DL — SIGNIFICANT CHANGE UP (ref 70–99)
GLUCOSE SERPL-MCNC: 96 MG/DL — SIGNIFICANT CHANGE UP (ref 70–99)
GLUCOSE UR QL: NEGATIVE MG/DL — SIGNIFICANT CHANGE UP
GRAM STN FLD: ABNORMAL
HAV IGM SER-ACNC: SIGNIFICANT CHANGE UP
HBV CORE IGM SER-ACNC: SIGNIFICANT CHANGE UP
HBV SURFACE AG SER-ACNC: SIGNIFICANT CHANGE UP
HCO3 BLDV-SCNC: 12 MMOL/L — LOW (ref 22–29)
HCO3 BLDV-SCNC: 14 MMOL/L — LOW (ref 22–29)
HCO3 BLDV-SCNC: 16 MMOL/L — LOW (ref 22–29)
HCO3 BLDV-SCNC: 16 MMOL/L — LOW (ref 22–29)
HCO3 BLDV-SCNC: 21 MMOL/L — LOW (ref 22–29)
HCO3 BLDV-SCNC: 22 MMOL/L — SIGNIFICANT CHANGE UP (ref 22–29)
HCO3 BLDV-SCNC: 39 MMOL/L — HIGH (ref 22–29)
HCO3 BLDV-SCNC: 8 MMOL/L — CRITICAL LOW (ref 22–29)
HCO3 BLDV-SCNC: 83 MMOL/L — CRITICAL HIGH (ref 22–29)
HCO3 BLDV-SCNC: 9 MMOL/L — CRITICAL LOW (ref 22–29)
HCO3 BLDV-SCNC: 9 MMOL/L — CRITICAL LOW (ref 22–29)
HCT VFR BLD CALC: 20 % — CRITICAL LOW (ref 34.5–45)
HCT VFR BLD CALC: 21.6 % — LOW (ref 34.5–45)
HCT VFR BLD CALC: 21.7 % — LOW (ref 34.5–45)
HCT VFR BLD CALC: 22.3 % — LOW (ref 34.5–45)
HCT VFR BLD CALC: 22.9 % — LOW (ref 34.5–45)
HCT VFR BLD CALC: 24.6 % — LOW (ref 34.5–45)
HCT VFR BLD CALC: 24.7 % — LOW (ref 34.5–45)
HCT VFR BLD CALC: 25.5 % — LOW (ref 34.5–45)
HCT VFR BLD CALC: 26.2 % — LOW (ref 34.5–45)
HCT VFR BLD CALC: 26.3 % — LOW (ref 34.5–45)
HCT VFR BLD CALC: 26.8 % — LOW (ref 34.5–45)
HCT VFR BLD CALC: 27.2 % — LOW (ref 34.5–45)
HCT VFR BLD CALC: 27.4 % — LOW (ref 34.5–45)
HCT VFR BLD CALC: 27.4 % — LOW (ref 34.5–45)
HCT VFR BLD CALC: 28.2 % — LOW (ref 34.5–45)
HCT VFR BLD CALC: 28.6 % — LOW (ref 34.5–45)
HCT VFR BLD CALC: 28.7 % — LOW (ref 34.5–45)
HCT VFR BLD CALC: 28.7 % — LOW (ref 34.5–45)
HCT VFR BLD CALC: 28.9 % — LOW (ref 34.5–45)
HCT VFR BLD CALC: 29 % — LOW (ref 34.5–45)
HCT VFR BLD CALC: 30.5 % — LOW (ref 34.5–45)
HCT VFR BLD CALC: 30.5 % — LOW (ref 34.5–45)
HCT VFR BLD CALC: 31.1 % — LOW (ref 34.5–45)
HCT VFR BLD CALC: 31.3 % — LOW (ref 34.5–45)
HCT VFR BLD CALC: 33.7 % — LOW (ref 34.5–45)
HCT VFR BLDA CALC: 19 % — CRITICAL LOW (ref 34.5–46.5)
HCT VFR BLDA CALC: 20 % — CRITICAL LOW (ref 34.5–46.5)
HCT VFR BLDA CALC: 22 % — LOW (ref 34.5–46.5)
HCT VFR BLDA CALC: 23 % — LOW (ref 34.5–46.5)
HCT VFR BLDA CALC: 23 % — LOW (ref 34.5–46.5)
HCT VFR BLDA CALC: 24 % — LOW (ref 34.5–46.5)
HCT VFR BLDA CALC: 26 % — LOW (ref 34.5–46.5)
HCT VFR BLDA CALC: 26 % — LOW (ref 34.5–46.5)
HCT VFR BLDA CALC: 27 % — LOW (ref 34.5–46.5)
HCT VFR BLDA CALC: 27 % — LOW (ref 34.5–46.5)
HCT VFR BLDA CALC: 28 % — LOW (ref 34.5–46.5)
HCT VFR BLDA CALC: 28 % — LOW (ref 34.5–46.5)
HCT VFR BLDA CALC: 9 % — CRITICAL LOW (ref 34.5–46.5)
HCV AB S/CO SERPL IA: 0.09 S/CO — SIGNIFICANT CHANGE UP (ref 0–0.99)
HCV AB SERPL-IMP: SIGNIFICANT CHANGE UP
HGB BLD CALC-MCNC: 6.4 G/DL — CRITICAL LOW (ref 11.7–16.1)
HGB BLD CALC-MCNC: 6.6 G/DL — CRITICAL LOW (ref 11.7–16.1)
HGB BLD CALC-MCNC: 7.4 G/DL — LOW (ref 11.7–16.1)
HGB BLD CALC-MCNC: 7.5 G/DL — LOW (ref 11.7–16.1)
HGB BLD CALC-MCNC: 7.6 G/DL — LOW (ref 11.7–16.1)
HGB BLD CALC-MCNC: 8 G/DL — LOW (ref 11.7–16.1)
HGB BLD CALC-MCNC: 8.5 G/DL — LOW (ref 11.7–16.1)
HGB BLD CALC-MCNC: 8.8 G/DL — LOW (ref 11.7–16.1)
HGB BLD CALC-MCNC: 8.9 G/DL — LOW (ref 11.7–16.1)
HGB BLD CALC-MCNC: 9.1 G/DL — LOW (ref 11.7–16.1)
HGB BLD CALC-MCNC: 9.3 G/DL — LOW (ref 11.7–16.1)
HGB BLD CALC-MCNC: 9.4 G/DL — LOW (ref 11.7–16.1)
HGB BLD CALC-MCNC: <4 G/DL — CRITICAL LOW (ref 11.7–16.1)
HGB BLD-MCNC: 10 G/DL — LOW (ref 11.5–15.5)
HGB BLD-MCNC: 10.2 G/DL — LOW (ref 11.5–15.5)
HGB BLD-MCNC: 10.3 G/DL — LOW (ref 11.5–15.5)
HGB BLD-MCNC: 10.6 G/DL — LOW (ref 11.5–15.5)
HGB BLD-MCNC: 6.6 G/DL — CRITICAL LOW (ref 11.5–15.5)
HGB BLD-MCNC: 6.8 G/DL — CRITICAL LOW (ref 11.5–15.5)
HGB BLD-MCNC: 7.3 G/DL — LOW (ref 11.5–15.5)
HGB BLD-MCNC: 7.4 G/DL — LOW (ref 11.5–15.5)
HGB BLD-MCNC: 7.7 G/DL — LOW (ref 11.5–15.5)
HGB BLD-MCNC: 7.9 G/DL — LOW (ref 11.5–15.5)
HGB BLD-MCNC: 8.1 G/DL — LOW (ref 11.5–15.5)
HGB BLD-MCNC: 8.1 G/DL — LOW (ref 11.5–15.5)
HGB BLD-MCNC: 8.2 G/DL — LOW (ref 11.5–15.5)
HGB BLD-MCNC: 8.4 G/DL — LOW (ref 11.5–15.5)
HGB BLD-MCNC: 8.4 G/DL — LOW (ref 11.5–15.5)
HGB BLD-MCNC: 8.5 G/DL — LOW (ref 11.5–15.5)
HGB BLD-MCNC: 8.5 G/DL — LOW (ref 11.5–15.5)
HGB BLD-MCNC: 8.8 G/DL — LOW (ref 11.5–15.5)
HGB BLD-MCNC: 8.9 G/DL — LOW (ref 11.5–15.5)
HGB BLD-MCNC: 8.9 G/DL — LOW (ref 11.5–15.5)
HGB BLD-MCNC: 9.1 G/DL — LOW (ref 11.5–15.5)
HGB BLD-MCNC: 9.4 G/DL — LOW (ref 11.5–15.5)
HGB BLD-MCNC: 9.6 G/DL — LOW (ref 11.5–15.5)
HGB BLD-MCNC: 9.7 G/DL — LOW (ref 11.5–15.5)
HGB BLD-MCNC: 9.8 G/DL — LOW (ref 11.5–15.5)
HIV 1+2 AB+HIV1 P24 AG SERPL QL IA: SIGNIFICANT CHANGE UP
HOROWITZ INDEX BLDV+IHG-RTO: 21 — SIGNIFICANT CHANGE UP
HYALINE CASTS # UR AUTO: PRESENT
IGA FLD-MCNC: 133 MG/DL — SIGNIFICANT CHANGE UP (ref 84–499)
IGG FLD-MCNC: 711 MG/DL — SIGNIFICANT CHANGE UP (ref 610–1660)
IGM SERPL-MCNC: 526 MG/DL — HIGH (ref 35–242)
IMM GRANULOCYTES NFR BLD AUTO: 0.4 % — SIGNIFICANT CHANGE UP (ref 0–0.9)
IMM GRANULOCYTES NFR BLD AUTO: 0.7 % — SIGNIFICANT CHANGE UP (ref 0–0.9)
IMM GRANULOCYTES NFR BLD AUTO: 0.7 % — SIGNIFICANT CHANGE UP (ref 0–0.9)
IMM GRANULOCYTES NFR BLD AUTO: 0.8 % — SIGNIFICANT CHANGE UP (ref 0–0.9)
IMM GRANULOCYTES NFR BLD AUTO: 0.8 % — SIGNIFICANT CHANGE UP (ref 0–0.9)
IMM GRANULOCYTES NFR BLD AUTO: 1 % — HIGH (ref 0–0.9)
IMM GRANULOCYTES NFR BLD AUTO: 1.1 % — HIGH (ref 0–0.9)
IMM GRANULOCYTES NFR BLD AUTO: 1.1 % — HIGH (ref 0–0.9)
IMM GRANULOCYTES NFR BLD AUTO: 1.4 % — HIGH (ref 0–0.9)
IMM GRANULOCYTES NFR BLD AUTO: 1.7 % — HIGH (ref 0–0.9)
INR BLD: 0.97 RATIO — SIGNIFICANT CHANGE UP (ref 0.85–1.18)
INR BLD: 1.04 RATIO — SIGNIFICANT CHANGE UP (ref 0.85–1.18)
INR BLD: 1.27 RATIO — HIGH (ref 0.85–1.18)
INR BLD: 1.34 RATIO — HIGH (ref 0.85–1.18)
INR BLD: 1.5 RATIO — HIGH (ref 0.85–1.18)
INR BLD: 1.65 RATIO — HIGH (ref 0.85–1.18)
INR BLD: 1.68 RATIO — HIGH (ref 0.85–1.18)
INR BLD: 1.77 RATIO — HIGH (ref 0.85–1.18)
INR BLD: 1.87 RATIO — HIGH (ref 0.85–1.18)
INR BLD: 1.92 RATIO — HIGH (ref 0.85–1.18)
INR BLD: 1.98 RATIO — HIGH (ref 0.85–1.18)
INTERPRETATION 24H UR IFE-IMP: SIGNIFICANT CHANGE UP
INTERPRETATION SERPL IFE-IMP: SIGNIFICANT CHANGE UP
INTERPRETATION SERPL IFE-IMP: SIGNIFICANT CHANGE UP
KAPPA LC SER QL IFE: 13.9 MG/DL — HIGH (ref 0.33–1.94)
KAPPA LC SER QL IFE: 22.09 MG/DL — HIGH (ref 0.33–1.94)
KAPPA LC UR-MCNC: 631.37 MG/L — HIGH
KAPPA/LAMBDA FREE LIGHT CHAIN RATIO, SERUM: 0.85 RATIO — SIGNIFICANT CHANGE UP (ref 0.26–1.65)
KAPPA/LAMBDA FREE LIGHT CHAIN RATIO, SERUM: 1.05 RATIO — SIGNIFICANT CHANGE UP (ref 0.26–1.65)
KETONES UR-MCNC: 15 MG/DL
KETONES UR-MCNC: NEGATIVE MG/DL — SIGNIFICANT CHANGE UP
LACTATE BLDV-MCNC: 0.8 MMOL/L — SIGNIFICANT CHANGE UP (ref 0.5–2)
LACTATE BLDV-MCNC: 0.9 MMOL/L — SIGNIFICANT CHANGE UP (ref 0.5–2)
LACTATE BLDV-MCNC: 1 MMOL/L — SIGNIFICANT CHANGE UP (ref 0.5–2)
LACTATE BLDV-MCNC: 1.2 MMOL/L — SIGNIFICANT CHANGE UP (ref 0.5–2)
LACTATE BLDV-MCNC: 1.3 MMOL/L — SIGNIFICANT CHANGE UP (ref 0.5–2)
LACTATE BLDV-MCNC: 1.7 MMOL/L — SIGNIFICANT CHANGE UP (ref 0.5–2)
LACTATE BLDV-MCNC: 1.8 MMOL/L — SIGNIFICANT CHANGE UP (ref 0.5–2)
LACTATE BLDV-MCNC: 1.8 MMOL/L — SIGNIFICANT CHANGE UP (ref 0.5–2)
LACTATE BLDV-MCNC: 2.4 MMOL/L — HIGH (ref 0.5–2)
LACTATE BLDV-MCNC: 3.8 MMOL/L — HIGH (ref 0.5–2)
LACTATE SERPL-SCNC: 1.9 MMOL/L — SIGNIFICANT CHANGE UP (ref 0.5–2)
LAMBDA LC SER QL IFE: 16.26 MG/DL — HIGH (ref 0.57–2.63)
LAMBDA LC SER QL IFE: 21.07 MG/DL — HIGH (ref 0.57–2.63)
LAMBDA LC UR-MCNC: 155.97 MG/L — HIGH
LDH SERPL L TO P-CCNC: 156 U/L — SIGNIFICANT CHANGE UP (ref 50–242)
LEUKOCYTE ESTERASE UR-ACNC: ABNORMAL
LIDOCAIN IGE QN: 7 U/L — SIGNIFICANT CHANGE UP (ref 7–60)
LIDOCAIN IGE QN: 9 U/L — SIGNIFICANT CHANGE UP (ref 7–60)
LYMPHOCYTES # BLD AUTO: 0.32 K/UL — LOW (ref 1–3.3)
LYMPHOCYTES # BLD AUTO: 0.37 K/UL — LOW (ref 1–3.3)
LYMPHOCYTES # BLD AUTO: 0.37 K/UL — LOW (ref 1–3.3)
LYMPHOCYTES # BLD AUTO: 0.5 K/UL — LOW (ref 1–3.3)
LYMPHOCYTES # BLD AUTO: 0.52 K/UL — LOW (ref 1–3.3)
LYMPHOCYTES # BLD AUTO: 0.53 K/UL — LOW (ref 1–3.3)
LYMPHOCYTES # BLD AUTO: 0.53 K/UL — LOW (ref 1–3.3)
LYMPHOCYTES # BLD AUTO: 0.59 K/UL — LOW (ref 1–3.3)
LYMPHOCYTES # BLD AUTO: 0.66 K/UL — LOW (ref 1–3.3)
LYMPHOCYTES # BLD AUTO: 0.82 K/UL — LOW (ref 1–3.3)
LYMPHOCYTES # BLD AUTO: 0.83 K/UL — LOW (ref 1–3.3)
LYMPHOCYTES # BLD AUTO: 0.83 K/UL — LOW (ref 1–3.3)
LYMPHOCYTES # BLD AUTO: 0.9 % — LOW (ref 13–44)
LYMPHOCYTES # BLD AUTO: 0.97 K/UL — LOW (ref 1–3.3)
LYMPHOCYTES # BLD AUTO: 1.6 % — LOW (ref 13–44)
LYMPHOCYTES # BLD AUTO: 1.7 % — LOW (ref 13–44)
LYMPHOCYTES # BLD AUTO: 2 % — LOW (ref 13–44)
LYMPHOCYTES # BLD AUTO: 2 % — LOW (ref 13–44)
LYMPHOCYTES # BLD AUTO: 2.2 % — LOW (ref 13–44)
LYMPHOCYTES # BLD AUTO: 2.9 % — LOW (ref 13–44)
LYMPHOCYTES # BLD AUTO: 4.6 % — LOW (ref 13–44)
LYMPHOCYTES # BLD AUTO: 5.6 % — LOW (ref 13–44)
LYMPHOCYTES # BLD AUTO: 6.5 % — LOW (ref 13–44)
LYMPHOCYTES # BLD AUTO: 7 % — LOW (ref 13–44)
LYMPHOCYTES # BLD AUTO: 7.5 % — LOW (ref 13–44)
LYMPHOCYTES # BLD AUTO: 8.3 % — LOW (ref 13–44)
M-SPIKE: 0.6 G/DL — HIGH (ref 0–0)
MACROCYTES BLD QL: SIGNIFICANT CHANGE UP
MACROCYTES BLD QL: SLIGHT — SIGNIFICANT CHANGE UP
MAGNESIUM SERPL-MCNC: 1.4 MG/DL — LOW (ref 1.6–2.6)
MAGNESIUM SERPL-MCNC: 1.4 MG/DL — LOW (ref 1.6–2.6)
MAGNESIUM SERPL-MCNC: 1.5 MG/DL — LOW (ref 1.6–2.6)
MAGNESIUM SERPL-MCNC: 1.6 MG/DL — SIGNIFICANT CHANGE UP (ref 1.6–2.6)
MAGNESIUM SERPL-MCNC: 1.7 MG/DL — SIGNIFICANT CHANGE UP (ref 1.6–2.6)
MAGNESIUM SERPL-MCNC: 1.8 MG/DL — SIGNIFICANT CHANGE UP (ref 1.6–2.6)
MAGNESIUM SERPL-MCNC: 1.8 MG/DL — SIGNIFICANT CHANGE UP (ref 1.6–2.6)
MAGNESIUM SERPL-MCNC: 1.9 MG/DL — SIGNIFICANT CHANGE UP (ref 1.6–2.6)
MAGNESIUM SERPL-MCNC: 2 MG/DL — SIGNIFICANT CHANGE UP (ref 1.6–2.6)
MAGNESIUM SERPL-MCNC: 2.1 MG/DL — SIGNIFICANT CHANGE UP (ref 1.6–2.6)
MAGNESIUM SERPL-MCNC: 2.2 MG/DL — SIGNIFICANT CHANGE UP (ref 1.6–2.6)
MAGNESIUM SERPL-MCNC: 2.6 MG/DL — SIGNIFICANT CHANGE UP (ref 1.6–2.6)
MANUAL SMEAR VERIFICATION: SIGNIFICANT CHANGE UP
MCHC RBC-ENTMCNC: 29.1 GM/DL — LOW (ref 32–36)
MCHC RBC-ENTMCNC: 29.3 GM/DL — LOW (ref 32–36)
MCHC RBC-ENTMCNC: 29.4 PG — SIGNIFICANT CHANGE UP (ref 27–34)
MCHC RBC-ENTMCNC: 29.4 PG — SIGNIFICANT CHANGE UP (ref 27–34)
MCHC RBC-ENTMCNC: 29.6 PG — SIGNIFICANT CHANGE UP (ref 27–34)
MCHC RBC-ENTMCNC: 29.9 PG — SIGNIFICANT CHANGE UP (ref 27–34)
MCHC RBC-ENTMCNC: 29.9 PG — SIGNIFICANT CHANGE UP (ref 27–34)
MCHC RBC-ENTMCNC: 30 PG — SIGNIFICANT CHANGE UP (ref 27–34)
MCHC RBC-ENTMCNC: 30.1 PG — SIGNIFICANT CHANGE UP (ref 27–34)
MCHC RBC-ENTMCNC: 30.2 GM/DL — LOW (ref 32–36)
MCHC RBC-ENTMCNC: 30.5 PG — SIGNIFICANT CHANGE UP (ref 27–34)
MCHC RBC-ENTMCNC: 30.5 PG — SIGNIFICANT CHANGE UP (ref 27–34)
MCHC RBC-ENTMCNC: 30.6 GM/DL — LOW (ref 32–36)
MCHC RBC-ENTMCNC: 30.6 PG — SIGNIFICANT CHANGE UP (ref 27–34)
MCHC RBC-ENTMCNC: 30.7 PG — SIGNIFICANT CHANGE UP (ref 27–34)
MCHC RBC-ENTMCNC: 30.9 PG — SIGNIFICANT CHANGE UP (ref 27–34)
MCHC RBC-ENTMCNC: 30.9 PG — SIGNIFICANT CHANGE UP (ref 27–34)
MCHC RBC-ENTMCNC: 31 GM/DL — LOW (ref 32–36)
MCHC RBC-ENTMCNC: 31.1 PG — SIGNIFICANT CHANGE UP (ref 27–34)
MCHC RBC-ENTMCNC: 31.2 GM/DL — LOW (ref 32–36)
MCHC RBC-ENTMCNC: 31.2 GM/DL — LOW (ref 32–36)
MCHC RBC-ENTMCNC: 31.2 PG — SIGNIFICANT CHANGE UP (ref 27–34)
MCHC RBC-ENTMCNC: 31.2 PG — SIGNIFICANT CHANGE UP (ref 27–34)
MCHC RBC-ENTMCNC: 31.5 GM/DL — LOW (ref 32–36)
MCHC RBC-ENTMCNC: 31.5 GM/DL — LOW (ref 32–36)
MCHC RBC-ENTMCNC: 31.9 GM/DL — LOW (ref 32–36)
MCHC RBC-ENTMCNC: 32.3 PG — SIGNIFICANT CHANGE UP (ref 27–34)
MCHC RBC-ENTMCNC: 32.4 PG — SIGNIFICANT CHANGE UP (ref 27–34)
MCHC RBC-ENTMCNC: 32.5 GM/DL — SIGNIFICANT CHANGE UP (ref 32–36)
MCHC RBC-ENTMCNC: 32.7 GM/DL — SIGNIFICANT CHANGE UP (ref 32–36)
MCHC RBC-ENTMCNC: 32.8 GM/DL — SIGNIFICANT CHANGE UP (ref 32–36)
MCHC RBC-ENTMCNC: 32.9 GM/DL — SIGNIFICANT CHANGE UP (ref 32–36)
MCHC RBC-ENTMCNC: 32.9 GM/DL — SIGNIFICANT CHANGE UP (ref 32–36)
MCHC RBC-ENTMCNC: 33.1 GM/DL — SIGNIFICANT CHANGE UP (ref 32–36)
MCHC RBC-ENTMCNC: 33.2 GM/DL — SIGNIFICANT CHANGE UP (ref 32–36)
MCHC RBC-ENTMCNC: 33.3 GM/DL — SIGNIFICANT CHANGE UP (ref 32–36)
MCHC RBC-ENTMCNC: 33.4 GM/DL — SIGNIFICANT CHANGE UP (ref 32–36)
MCHC RBC-ENTMCNC: 33.4 GM/DL — SIGNIFICANT CHANGE UP (ref 32–36)
MCHC RBC-ENTMCNC: 33.6 GM/DL — SIGNIFICANT CHANGE UP (ref 32–36)
MCHC RBC-ENTMCNC: 33.6 GM/DL — SIGNIFICANT CHANGE UP (ref 32–36)
MCHC RBC-ENTMCNC: 33.8 PG — SIGNIFICANT CHANGE UP (ref 27–34)
MCHC RBC-ENTMCNC: 34 GM/DL — SIGNIFICANT CHANGE UP (ref 32–36)
MCHC RBC-ENTMCNC: 34 GM/DL — SIGNIFICANT CHANGE UP (ref 32–36)
MCHC RBC-ENTMCNC: 34.1 GM/DL — SIGNIFICANT CHANGE UP (ref 32–36)
MCHC RBC-ENTMCNC: 34.2 PG — HIGH (ref 27–34)
MCHC RBC-ENTMCNC: 34.7 PG — HIGH (ref 27–34)
MCHC RBC-ENTMCNC: 34.8 PG — HIGH (ref 27–34)
MCHC RBC-ENTMCNC: 35.1 PG — HIGH (ref 27–34)
MCHC RBC-ENTMCNC: 35.3 PG — HIGH (ref 27–34)
MCHC RBC-ENTMCNC: 35.4 PG — HIGH (ref 27–34)
MCV RBC AUTO: 102 FL — HIGH (ref 80–100)
MCV RBC AUTO: 102.3 FL — HIGH (ref 80–100)
MCV RBC AUTO: 103.7 FL — HIGH (ref 80–100)
MCV RBC AUTO: 103.9 FL — HIGH (ref 80–100)
MCV RBC AUTO: 104.4 FL — HIGH (ref 80–100)
MCV RBC AUTO: 104.6 FL — HIGH (ref 80–100)
MCV RBC AUTO: 105.1 FL — HIGH (ref 80–100)
MCV RBC AUTO: 105.8 FL — HIGH (ref 80–100)
MCV RBC AUTO: 106.5 FL — HIGH (ref 80–100)
MCV RBC AUTO: 106.7 FL — HIGH (ref 80–100)
MCV RBC AUTO: 87.4 FL — SIGNIFICANT CHANGE UP (ref 80–100)
MCV RBC AUTO: 88 FL — SIGNIFICANT CHANGE UP (ref 80–100)
MCV RBC AUTO: 90.1 FL — SIGNIFICANT CHANGE UP (ref 80–100)
MCV RBC AUTO: 91.7 FL — SIGNIFICANT CHANGE UP (ref 80–100)
MCV RBC AUTO: 91.8 FL — SIGNIFICANT CHANGE UP (ref 80–100)
MCV RBC AUTO: 92.3 FL — SIGNIFICANT CHANGE UP (ref 80–100)
MCV RBC AUTO: 94.8 FL — SIGNIFICANT CHANGE UP (ref 80–100)
MCV RBC AUTO: 95.9 FL — SIGNIFICANT CHANGE UP (ref 80–100)
MCV RBC AUTO: 96 FL — SIGNIFICANT CHANGE UP (ref 80–100)
MCV RBC AUTO: 96.6 FL — SIGNIFICANT CHANGE UP (ref 80–100)
MCV RBC AUTO: 97.1 FL — SIGNIFICANT CHANGE UP (ref 80–100)
MCV RBC AUTO: 97.7 FL — SIGNIFICANT CHANGE UP (ref 80–100)
MCV RBC AUTO: 98.5 FL — SIGNIFICANT CHANGE UP (ref 80–100)
MCV RBC AUTO: 98.6 FL — SIGNIFICANT CHANGE UP (ref 80–100)
MCV RBC AUTO: 98.8 FL — SIGNIFICANT CHANGE UP (ref 80–100)
METAMYELOCYTES # FLD: 0.8 % — HIGH (ref 0–0)
METHADONE UR QL SCN: NEGATIVE NG/ML — SIGNIFICANT CHANGE UP
METHOD TYPE: SIGNIFICANT CHANGE UP
MICROCYTES BLD QL: SLIGHT — SIGNIFICANT CHANGE UP
MONOCYTES # BLD AUTO: 0.13 K/UL — SIGNIFICANT CHANGE UP (ref 0–0.9)
MONOCYTES # BLD AUTO: 0.15 K/UL — SIGNIFICANT CHANGE UP (ref 0–0.9)
MONOCYTES # BLD AUTO: 0.25 K/UL — SIGNIFICANT CHANGE UP (ref 0–0.9)
MONOCYTES # BLD AUTO: 0.28 K/UL — SIGNIFICANT CHANGE UP (ref 0–0.9)
MONOCYTES # BLD AUTO: 0.29 K/UL — SIGNIFICANT CHANGE UP (ref 0–0.9)
MONOCYTES # BLD AUTO: 0.32 K/UL — SIGNIFICANT CHANGE UP (ref 0–0.9)
MONOCYTES # BLD AUTO: 0.36 K/UL — SIGNIFICANT CHANGE UP (ref 0–0.9)
MONOCYTES # BLD AUTO: 0.44 K/UL — SIGNIFICANT CHANGE UP (ref 0–0.9)
MONOCYTES # BLD AUTO: 0.75 K/UL — SIGNIFICANT CHANGE UP (ref 0–0.9)
MONOCYTES # BLD AUTO: 1.04 K/UL — HIGH (ref 0–0.9)
MONOCYTES # BLD AUTO: 1.06 K/UL — HIGH (ref 0–0.9)
MONOCYTES # BLD AUTO: 1.07 K/UL — HIGH (ref 0–0.9)
MONOCYTES # BLD AUTO: 1.4 K/UL — HIGH (ref 0–0.9)
MONOCYTES NFR BLD AUTO: 0.5 % — LOW (ref 2–14)
MONOCYTES NFR BLD AUTO: 0.7 % — LOW (ref 2–14)
MONOCYTES NFR BLD AUTO: 1.6 % — LOW (ref 2–14)
MONOCYTES NFR BLD AUTO: 2.2 % — SIGNIFICANT CHANGE UP (ref 2–14)
MONOCYTES NFR BLD AUTO: 2.6 % — SIGNIFICANT CHANGE UP (ref 2–14)
MONOCYTES NFR BLD AUTO: 2.9 % — SIGNIFICANT CHANGE UP (ref 2–14)
MONOCYTES NFR BLD AUTO: 3.2 % — SIGNIFICANT CHANGE UP (ref 2–14)
MONOCYTES NFR BLD AUTO: 4.4 % — SIGNIFICANT CHANGE UP (ref 2–14)
MONOCYTES NFR BLD AUTO: 4.6 % — SIGNIFICANT CHANGE UP (ref 2–14)
MONOCYTES NFR BLD AUTO: 4.9 % — SIGNIFICANT CHANGE UP (ref 2–14)
MONOCYTES NFR BLD AUTO: 6.1 % — SIGNIFICANT CHANGE UP (ref 2–14)
MPO AB + PR3 PNL SER: SIGNIFICANT CHANGE UP
MRSA PCR RESULT.: SIGNIFICANT CHANGE UP
NEUTROPHILS # BLD AUTO: 11.24 K/UL — HIGH (ref 1.8–7.4)
NEUTROPHILS # BLD AUTO: 16.81 K/UL — HIGH (ref 1.8–7.4)
NEUTROPHILS # BLD AUTO: 21.94 K/UL — HIGH (ref 1.8–7.4)
NEUTROPHILS # BLD AUTO: 25.66 K/UL — HIGH (ref 1.8–7.4)
NEUTROPHILS # BLD AUTO: 27.93 K/UL — HIGH (ref 1.8–7.4)
NEUTROPHILS # BLD AUTO: 30.54 K/UL — HIGH (ref 1.8–7.4)
NEUTROPHILS # BLD AUTO: 30.61 K/UL — HIGH (ref 1.8–7.4)
NEUTROPHILS # BLD AUTO: 31.94 K/UL — HIGH (ref 1.8–7.4)
NEUTROPHILS # BLD AUTO: 39.18 K/UL — HIGH (ref 1.8–7.4)
NEUTROPHILS # BLD AUTO: 4.57 K/UL — SIGNIFICANT CHANGE UP (ref 1.8–7.4)
NEUTROPHILS # BLD AUTO: 5.02 K/UL — SIGNIFICANT CHANGE UP (ref 1.8–7.4)
NEUTROPHILS # BLD AUTO: 6.98 K/UL — SIGNIFICANT CHANGE UP (ref 1.8–7.4)
NEUTROPHILS # BLD AUTO: 8.66 K/UL — HIGH (ref 1.8–7.4)
NEUTROPHILS NFR BLD AUTO: 84.1 % — HIGH (ref 43–77)
NEUTROPHILS NFR BLD AUTO: 84.2 % — HIGH (ref 43–77)
NEUTROPHILS NFR BLD AUTO: 86.5 % — HIGH (ref 43–77)
NEUTROPHILS NFR BLD AUTO: 88 % — HIGH (ref 43–77)
NEUTROPHILS NFR BLD AUTO: 88.4 % — HIGH (ref 43–77)
NEUTROPHILS NFR BLD AUTO: 89.4 % — HIGH (ref 43–77)
NEUTROPHILS NFR BLD AUTO: 91.8 % — HIGH (ref 43–77)
NEUTROPHILS NFR BLD AUTO: 92.2 % — HIGH (ref 43–77)
NEUTROPHILS NFR BLD AUTO: 92.6 % — HIGH (ref 43–77)
NEUTROPHILS NFR BLD AUTO: 94.5 % — HIGH (ref 43–77)
NEUTROPHILS NFR BLD AUTO: 96.3 % — HIGH (ref 43–77)
NEUTROPHILS NFR BLD AUTO: 96.4 % — HIGH (ref 43–77)
NEUTROPHILS NFR BLD AUTO: 96.5 % — HIGH (ref 43–77)
NEUTS BAND # BLD: 1.8 % — SIGNIFICANT CHANGE UP (ref 0–8)
NEUTS BAND # BLD: 10.3 % — HIGH (ref 0–8)
NITRITE UR-MCNC: NEGATIVE — SIGNIFICANT CHANGE UP
NRBC # BLD: 0 /100 WBCS — SIGNIFICANT CHANGE UP (ref 0–0)
OPIATES UR-MCNC: NEGATIVE NG/ML — SIGNIFICANT CHANGE UP
ORGANISM # SPEC MICROSCOPIC CNT: ABNORMAL
OSMOLALITY SERPL: 290 MOSMOL/KG — SIGNIFICANT CHANGE UP (ref 280–301)
OSMOLALITY SERPL: 299 MOSMOL/KG — SIGNIFICANT CHANGE UP (ref 280–301)
OSMOLALITY SERPL: 315 MOSMOL/KG — HIGH (ref 280–301)
OTHER CELLS CSF MANUAL: 4.2 ML/DL — LOW (ref 18–22)
OTHER CELLS CSF MANUAL: 9.4 ML/DL — LOW (ref 18–22)
OVALOCYTES BLD QL SMEAR: SLIGHT — SIGNIFICANT CHANGE UP
OVALOCYTES BLD QL SMEAR: SLIGHT — SIGNIFICANT CHANGE UP
OXYCODONE UR QL SCN: NEGATIVE NG/ML — SIGNIFICANT CHANGE UP
P-ANCA SER-ACNC: NEGATIVE — SIGNIFICANT CHANGE UP
PCO2 BLDV: 20 MMHG — LOW (ref 39–42)
PCO2 BLDV: 21 MMHG — LOW (ref 39–42)
PCO2 BLDV: 23 MMHG — LOW (ref 39–42)
PCO2 BLDV: 24 MMHG — LOW (ref 39–42)
PCO2 BLDV: 24 MMHG — LOW (ref 39–42)
PCO2 BLDV: 27 MMHG — LOW (ref 39–42)
PCO2 BLDV: 29 MMHG — LOW (ref 39–42)
PCO2 BLDV: 30 MMHG — LOW (ref 39–42)
PCO2 BLDV: 31 MMHG — LOW (ref 39–42)
PCO2 BLDV: 35 MMHG — LOW (ref 39–42)
PCO2 BLDV: 47 MMHG — HIGH (ref 39–42)
PCO2 BLDV: 60 MMHG — HIGH (ref 39–42)
PCO2 BLDV: <19 MMHG — LOW (ref 39–42)
PCP UR-MCNC: NEGATIVE NG/ML — SIGNIFICANT CHANGE UP
PH BLDV: 7.13 — CRITICAL LOW (ref 7.32–7.43)
PH BLDV: 7.13 — CRITICAL LOW (ref 7.32–7.43)
PH BLDV: 7.17 — CRITICAL LOW (ref 7.32–7.43)
PH BLDV: 7.23 — LOW (ref 7.32–7.43)
PH BLDV: 7.28 — LOW (ref 7.32–7.43)
PH BLDV: 7.32 — SIGNIFICANT CHANGE UP (ref 7.32–7.43)
PH BLDV: 7.38 — SIGNIFICANT CHANGE UP (ref 7.32–7.43)
PH BLDV: 7.4 — SIGNIFICANT CHANGE UP (ref 7.32–7.43)
PH BLDV: 7.49 — HIGH (ref 7.32–7.43)
PH BLDV: 7.53 — HIGH (ref 7.32–7.43)
PH BLDV: >7.7 — CRITICAL HIGH (ref 7.32–7.43)
PH UR: 5 — SIGNIFICANT CHANGE UP (ref 5–8)
PH UR: 5.5 — SIGNIFICANT CHANGE UP (ref 5–8)
PH, URINE RESULT: 6.4 — SIGNIFICANT CHANGE UP (ref 4.5–8.9)
PHOSPHATE SERPL-MCNC: 2.9 MG/DL — SIGNIFICANT CHANGE UP (ref 2.5–4.5)
PHOSPHATE SERPL-MCNC: 3 MG/DL — SIGNIFICANT CHANGE UP (ref 2.5–4.5)
PHOSPHATE SERPL-MCNC: 3.1 MG/DL — SIGNIFICANT CHANGE UP (ref 2.5–4.5)
PHOSPHATE SERPL-MCNC: 3.4 MG/DL — SIGNIFICANT CHANGE UP (ref 2.5–4.5)
PHOSPHATE SERPL-MCNC: 3.5 MG/DL — SIGNIFICANT CHANGE UP (ref 2.5–4.5)
PHOSPHATE SERPL-MCNC: 3.7 MG/DL — SIGNIFICANT CHANGE UP (ref 2.5–4.5)
PHOSPHATE SERPL-MCNC: 3.9 MG/DL — SIGNIFICANT CHANGE UP (ref 2.5–4.5)
PHOSPHATE SERPL-MCNC: 4.6 MG/DL — HIGH (ref 2.5–4.5)
PHOSPHATE SERPL-MCNC: 4.9 MG/DL — HIGH (ref 2.5–4.5)
PHOSPHATE SERPL-MCNC: 5.9 MG/DL — HIGH (ref 2.5–4.5)
PHOSPHATE SERPL-MCNC: 6.1 MG/DL — HIGH (ref 2.5–4.5)
PHOSPHATE SERPL-MCNC: 7.7 MG/DL — HIGH (ref 2.5–4.5)
PHOSPHOLIPASE A2 RECEPTOR ELISA: <1.8 RU/ML — SIGNIFICANT CHANGE UP (ref 0–19.9)
PLAT MORPH BLD: ABNORMAL
PLAT MORPH BLD: NORMAL — SIGNIFICANT CHANGE UP
PLAT MORPH BLD: NORMAL — SIGNIFICANT CHANGE UP
PLATELET # BLD AUTO: 128 K/UL — LOW (ref 150–400)
PLATELET # BLD AUTO: 144 K/UL — LOW (ref 150–400)
PLATELET # BLD AUTO: 147 K/UL — LOW (ref 150–400)
PLATELET # BLD AUTO: 148 K/UL — LOW (ref 150–400)
PLATELET # BLD AUTO: 174 K/UL — SIGNIFICANT CHANGE UP (ref 150–400)
PLATELET # BLD AUTO: 198 K/UL — SIGNIFICANT CHANGE UP (ref 150–400)
PLATELET # BLD AUTO: 200 K/UL — SIGNIFICANT CHANGE UP (ref 150–400)
PLATELET # BLD AUTO: 202 K/UL — SIGNIFICANT CHANGE UP (ref 150–400)
PLATELET # BLD AUTO: 218 K/UL — SIGNIFICANT CHANGE UP (ref 150–400)
PLATELET # BLD AUTO: 227 K/UL — SIGNIFICANT CHANGE UP (ref 150–400)
PLATELET # BLD AUTO: 228 K/UL — SIGNIFICANT CHANGE UP (ref 150–400)
PLATELET # BLD AUTO: 241 K/UL — SIGNIFICANT CHANGE UP (ref 150–400)
PLATELET # BLD AUTO: 245 K/UL — SIGNIFICANT CHANGE UP (ref 150–400)
PLATELET # BLD AUTO: 274 K/UL — SIGNIFICANT CHANGE UP (ref 150–400)
PLATELET # BLD AUTO: 278 K/UL — SIGNIFICANT CHANGE UP (ref 150–400)
PLATELET # BLD AUTO: 280 K/UL — SIGNIFICANT CHANGE UP (ref 150–400)
PLATELET # BLD AUTO: 313 K/UL — SIGNIFICANT CHANGE UP (ref 150–400)
PLATELET # BLD AUTO: 323 K/UL — SIGNIFICANT CHANGE UP (ref 150–400)
PLATELET # BLD AUTO: 333 K/UL — SIGNIFICANT CHANGE UP (ref 150–400)
PLATELET # BLD AUTO: 347 K/UL — SIGNIFICANT CHANGE UP (ref 150–400)
PLATELET # BLD AUTO: 368 K/UL — SIGNIFICANT CHANGE UP (ref 150–400)
PLATELET # BLD AUTO: 374 K/UL — SIGNIFICANT CHANGE UP (ref 150–400)
PLATELET # BLD AUTO: 374 K/UL — SIGNIFICANT CHANGE UP (ref 150–400)
PLATELET # BLD AUTO: 439 K/UL — HIGH (ref 150–400)
PLATELET # BLD AUTO: 597 K/UL — HIGH (ref 150–400)
PO2 BLDV: 156 MMHG — HIGH (ref 25–45)
PO2 BLDV: 179 MMHG — HIGH (ref 25–45)
PO2 BLDV: 24 MMHG — LOW (ref 25–45)
PO2 BLDV: 24 MMHG — LOW (ref 25–45)
PO2 BLDV: 29 MMHG — SIGNIFICANT CHANGE UP (ref 25–45)
PO2 BLDV: 30 MMHG — SIGNIFICANT CHANGE UP (ref 25–45)
PO2 BLDV: 30 MMHG — SIGNIFICANT CHANGE UP (ref 25–45)
PO2 BLDV: 32 MMHG — SIGNIFICANT CHANGE UP (ref 25–45)
PO2 BLDV: 33 MMHG — SIGNIFICANT CHANGE UP (ref 25–45)
PO2 BLDV: 39 MMHG — SIGNIFICANT CHANGE UP (ref 25–45)
PO2 BLDV: 44 MMHG — SIGNIFICANT CHANGE UP (ref 25–45)
PO2 BLDV: 55 MMHG — HIGH (ref 25–45)
PO2 BLDV: 63 MMHG — HIGH (ref 25–45)
POIKILOCYTOSIS BLD QL AUTO: SIGNIFICANT CHANGE UP
POIKILOCYTOSIS BLD QL AUTO: SLIGHT — SIGNIFICANT CHANGE UP
POLYCHROMASIA BLD QL SMEAR: SLIGHT — SIGNIFICANT CHANGE UP
POTASSIUM BLDV-SCNC: 1.7 MMOL/L — CRITICAL LOW (ref 3.5–5.1)
POTASSIUM BLDV-SCNC: 2.5 MMOL/L — CRITICAL LOW (ref 3.5–5.1)
POTASSIUM BLDV-SCNC: 2.9 MMOL/L — CRITICAL LOW (ref 3.5–5.1)
POTASSIUM BLDV-SCNC: 3.3 MMOL/L — LOW (ref 3.5–5.1)
POTASSIUM BLDV-SCNC: 3.3 MMOL/L — LOW (ref 3.5–5.1)
POTASSIUM BLDV-SCNC: 3.5 MMOL/L — SIGNIFICANT CHANGE UP (ref 3.5–5.1)
POTASSIUM BLDV-SCNC: 3.6 MMOL/L — SIGNIFICANT CHANGE UP (ref 3.5–5.1)
POTASSIUM BLDV-SCNC: 3.9 MMOL/L — SIGNIFICANT CHANGE UP (ref 3.5–5.1)
POTASSIUM BLDV-SCNC: 4 MMOL/L — SIGNIFICANT CHANGE UP (ref 3.5–5.1)
POTASSIUM BLDV-SCNC: 4.3 MMOL/L — SIGNIFICANT CHANGE UP (ref 3.5–5.1)
POTASSIUM BLDV-SCNC: 4.4 MMOL/L — SIGNIFICANT CHANGE UP (ref 3.5–5.1)
POTASSIUM BLDV-SCNC: 4.5 MMOL/L — SIGNIFICANT CHANGE UP (ref 3.5–5.1)
POTASSIUM BLDV-SCNC: 4.6 MMOL/L — SIGNIFICANT CHANGE UP (ref 3.5–5.1)
POTASSIUM SERPL-MCNC: 2.7 MMOL/L — CRITICAL LOW (ref 3.5–5.3)
POTASSIUM SERPL-MCNC: 2.9 MMOL/L — CRITICAL LOW (ref 3.5–5.3)
POTASSIUM SERPL-MCNC: 3.3 MMOL/L — LOW (ref 3.5–5.3)
POTASSIUM SERPL-MCNC: 3.4 MMOL/L — LOW (ref 3.5–5.3)
POTASSIUM SERPL-MCNC: 3.5 MMOL/L — SIGNIFICANT CHANGE UP (ref 3.5–5.3)
POTASSIUM SERPL-MCNC: 3.6 MMOL/L — SIGNIFICANT CHANGE UP (ref 3.5–5.3)
POTASSIUM SERPL-MCNC: 3.7 MMOL/L — SIGNIFICANT CHANGE UP (ref 3.5–5.3)
POTASSIUM SERPL-MCNC: 3.8 MMOL/L — SIGNIFICANT CHANGE UP (ref 3.5–5.3)
POTASSIUM SERPL-MCNC: 3.9 MMOL/L — SIGNIFICANT CHANGE UP (ref 3.5–5.3)
POTASSIUM SERPL-MCNC: 4 MMOL/L — SIGNIFICANT CHANGE UP (ref 3.5–5.3)
POTASSIUM SERPL-MCNC: 4 MMOL/L — SIGNIFICANT CHANGE UP (ref 3.5–5.3)
POTASSIUM SERPL-MCNC: 4.1 MMOL/L — SIGNIFICANT CHANGE UP (ref 3.5–5.3)
POTASSIUM SERPL-MCNC: 4.2 MMOL/L — SIGNIFICANT CHANGE UP (ref 3.5–5.3)
POTASSIUM SERPL-MCNC: 4.3 MMOL/L — SIGNIFICANT CHANGE UP (ref 3.5–5.3)
POTASSIUM SERPL-MCNC: 4.4 MMOL/L — SIGNIFICANT CHANGE UP (ref 3.5–5.3)
POTASSIUM SERPL-MCNC: 4.4 MMOL/L — SIGNIFICANT CHANGE UP (ref 3.5–5.3)
POTASSIUM SERPL-MCNC: 4.5 MMOL/L — SIGNIFICANT CHANGE UP (ref 3.5–5.3)
POTASSIUM SERPL-MCNC: 4.5 MMOL/L — SIGNIFICANT CHANGE UP (ref 3.5–5.3)
POTASSIUM SERPL-MCNC: 4.8 MMOL/L — SIGNIFICANT CHANGE UP (ref 3.5–5.3)
POTASSIUM SERPL-SCNC: 2.7 MMOL/L — CRITICAL LOW (ref 3.5–5.3)
POTASSIUM SERPL-SCNC: 2.9 MMOL/L — CRITICAL LOW (ref 3.5–5.3)
POTASSIUM SERPL-SCNC: 3.3 MMOL/L — LOW (ref 3.5–5.3)
POTASSIUM SERPL-SCNC: 3.4 MMOL/L — LOW (ref 3.5–5.3)
POTASSIUM SERPL-SCNC: 3.5 MMOL/L — SIGNIFICANT CHANGE UP (ref 3.5–5.3)
POTASSIUM SERPL-SCNC: 3.6 MMOL/L — SIGNIFICANT CHANGE UP (ref 3.5–5.3)
POTASSIUM SERPL-SCNC: 3.7 MMOL/L — SIGNIFICANT CHANGE UP (ref 3.5–5.3)
POTASSIUM SERPL-SCNC: 3.8 MMOL/L — SIGNIFICANT CHANGE UP (ref 3.5–5.3)
POTASSIUM SERPL-SCNC: 3.9 MMOL/L — SIGNIFICANT CHANGE UP (ref 3.5–5.3)
POTASSIUM SERPL-SCNC: 4 MMOL/L — SIGNIFICANT CHANGE UP (ref 3.5–5.3)
POTASSIUM SERPL-SCNC: 4 MMOL/L — SIGNIFICANT CHANGE UP (ref 3.5–5.3)
POTASSIUM SERPL-SCNC: 4.1 MMOL/L — SIGNIFICANT CHANGE UP (ref 3.5–5.3)
POTASSIUM SERPL-SCNC: 4.2 MMOL/L — SIGNIFICANT CHANGE UP (ref 3.5–5.3)
POTASSIUM SERPL-SCNC: 4.3 MMOL/L — SIGNIFICANT CHANGE UP (ref 3.5–5.3)
POTASSIUM SERPL-SCNC: 4.4 MMOL/L — SIGNIFICANT CHANGE UP (ref 3.5–5.3)
POTASSIUM SERPL-SCNC: 4.4 MMOL/L — SIGNIFICANT CHANGE UP (ref 3.5–5.3)
POTASSIUM SERPL-SCNC: 4.5 MMOL/L — SIGNIFICANT CHANGE UP (ref 3.5–5.3)
POTASSIUM SERPL-SCNC: 4.5 MMOL/L — SIGNIFICANT CHANGE UP (ref 3.5–5.3)
POTASSIUM SERPL-SCNC: 4.8 MMOL/L — SIGNIFICANT CHANGE UP (ref 3.5–5.3)
PROT ?TM UR-MCNC: 342 MG/DL — HIGH (ref 0–12)
PROT PATTERN SERPL ELPH-IMP: SIGNIFICANT CHANGE UP
PROT SERPL-MCNC: 4.1 G/DL — LOW (ref 6–8.3)
PROT SERPL-MCNC: 4.2 G/DL — LOW (ref 6–8.3)
PROT SERPL-MCNC: 4.3 G/DL — LOW (ref 6–8.3)
PROT SERPL-MCNC: 4.4 G/DL — LOW (ref 6–8.3)
PROT SERPL-MCNC: 4.8 G/DL — LOW (ref 6–8.3)
PROT SERPL-MCNC: 4.8 G/DL — LOW (ref 6–8.3)
PROT SERPL-MCNC: 4.9 G/DL — LOW (ref 6–8.3)
PROT SERPL-MCNC: 5 G/DL — LOW (ref 6–8.3)
PROT SERPL-MCNC: 5.1 G/DL — LOW (ref 6–8.3)
PROT SERPL-MCNC: 5.3 G/DL — LOW (ref 6–8.3)
PROT SERPL-MCNC: 6.4 G/DL — SIGNIFICANT CHANGE UP (ref 6–8.3)
PROT UR-MCNC: 100 MG/DL
PROT UR-MCNC: 100 MG/DL
PROT UR-MCNC: 30 MG/DL
PROT UR-MCNC: 300 MG/DL
PROTHROM AB SERPL-ACNC: 10.2 SEC — SIGNIFICANT CHANGE UP (ref 9.5–13)
PROTHROM AB SERPL-ACNC: 10.9 SEC — SIGNIFICANT CHANGE UP (ref 9.5–13)
PROTHROM AB SERPL-ACNC: 13.9 SEC — HIGH (ref 9.5–13)
PROTHROM AB SERPL-ACNC: 14 SEC — HIGH (ref 9.5–13)
PROTHROM AB SERPL-ACNC: 16.3 SEC — HIGH (ref 9.5–13)
PROTHROM AB SERPL-ACNC: 17.4 SEC — HIGH (ref 9.5–13)
PROTHROM AB SERPL-ACNC: 17.9 SEC — HIGH (ref 9.5–13)
PROTHROM AB SERPL-ACNC: 18.3 SEC — HIGH (ref 9.5–13)
PROTHROM AB SERPL-ACNC: 19.3 SEC — HIGH (ref 9.5–13)
PROTHROM AB SERPL-ACNC: 19.8 SEC — HIGH (ref 9.5–13)
PROTHROM AB SERPL-ACNC: 20.4 SEC — HIGH (ref 9.5–13)
PYRIDOXAL PHOS SERPL-MCNC: 3.4 UG/L — SIGNIFICANT CHANGE UP (ref 3.4–65.2)
RBC # BLD: 2.09 M/UL — LOW (ref 3.8–5.2)
RBC # BLD: 2.18 M/UL — LOW (ref 3.8–5.2)
RBC # BLD: 2.21 M/UL — LOW (ref 3.8–5.2)
RBC # BLD: 2.26 M/UL — LOW (ref 3.8–5.2)
RBC # BLD: 2.31 M/UL — LOW (ref 3.8–5.2)
RBC # BLD: 2.41 M/UL — LOW (ref 3.8–5.2)
RBC # BLD: 2.56 M/UL — LOW (ref 3.8–5.2)
RBC # BLD: 2.6 M/UL — LOW (ref 3.8–5.2)
RBC # BLD: 2.62 M/UL — LOW (ref 3.8–5.2)
RBC # BLD: 2.67 M/UL — LOW (ref 3.8–5.2)
RBC # BLD: 2.74 M/UL — LOW (ref 3.8–5.2)
RBC # BLD: 2.75 M/UL — LOW (ref 3.8–5.2)
RBC # BLD: 2.75 M/UL — LOW (ref 3.8–5.2)
RBC # BLD: 2.79 M/UL — LOW (ref 3.8–5.2)
RBC # BLD: 2.89 M/UL — LOW (ref 3.8–5.2)
RBC # BLD: 2.9 M/UL — LOW (ref 3.8–5.2)
RBC # BLD: 2.92 M/UL — LOW (ref 3.8–5.2)
RBC # BLD: 2.94 M/UL — LOW (ref 3.8–5.2)
RBC # BLD: 2.94 M/UL — LOW (ref 3.8–5.2)
RBC # BLD: 2.97 M/UL — LOW (ref 3.8–5.2)
RBC # BLD: 3.05 M/UL — LOW (ref 3.8–5.2)
RBC # BLD: 3.14 M/UL — LOW (ref 3.8–5.2)
RBC # BLD: 3.24 M/UL — LOW (ref 3.8–5.2)
RBC # BLD: 3.26 M/UL — LOW (ref 3.8–5.2)
RBC # BLD: 3.41 M/UL — LOW (ref 3.8–5.2)
RBC # FLD: 13.3 % — SIGNIFICANT CHANGE UP (ref 10.3–14.5)
RBC # FLD: 13.4 % — SIGNIFICANT CHANGE UP (ref 10.3–14.5)
RBC # FLD: 13.4 % — SIGNIFICANT CHANGE UP (ref 10.3–14.5)
RBC # FLD: 13.5 % — SIGNIFICANT CHANGE UP (ref 10.3–14.5)
RBC # FLD: 13.5 % — SIGNIFICANT CHANGE UP (ref 10.3–14.5)
RBC # FLD: 14 % — SIGNIFICANT CHANGE UP (ref 10.3–14.5)
RBC # FLD: 16 % — HIGH (ref 10.3–14.5)
RBC # FLD: 16.3 % — HIGH (ref 10.3–14.5)
RBC # FLD: 16.6 % — HIGH (ref 10.3–14.5)
RBC # FLD: 19 % — HIGH (ref 10.3–14.5)
RBC # FLD: 19 % — HIGH (ref 10.3–14.5)
RBC # FLD: 19.3 % — HIGH (ref 10.3–14.5)
RBC # FLD: 19.3 % — HIGH (ref 10.3–14.5)
RBC # FLD: 19.4 % — HIGH (ref 10.3–14.5)
RBC # FLD: 19.8 % — HIGH (ref 10.3–14.5)
RBC # FLD: 19.8 % — HIGH (ref 10.3–14.5)
RBC # FLD: 20.4 % — HIGH (ref 10.3–14.5)
RBC # FLD: 20.5 % — HIGH (ref 10.3–14.5)
RBC # FLD: 20.9 % — HIGH (ref 10.3–14.5)
RBC # FLD: 21.2 % — HIGH (ref 10.3–14.5)
RBC # FLD: 21.3 % — HIGH (ref 10.3–14.5)
RBC # FLD: 22 % — HIGH (ref 10.3–14.5)
RBC # FLD: 22.1 % — HIGH (ref 10.3–14.5)
RBC # FLD: 22.3 % — HIGH (ref 10.3–14.5)
RBC # FLD: 22.9 % — HIGH (ref 10.3–14.5)
RBC BLD AUTO: ABNORMAL
RBC BLD AUTO: ABNORMAL
RBC BLD AUTO: SIGNIFICANT CHANGE UP
RBC CASTS # UR COMP ASSIST: 10 /HPF — HIGH (ref 0–4)
RBC CASTS # UR COMP ASSIST: 19 /HPF — HIGH (ref 0–4)
RBC CASTS # UR COMP ASSIST: 2 /HPF — SIGNIFICANT CHANGE UP (ref 0–4)
RBC CASTS # UR COMP ASSIST: >50 /HPF — SIGNIFICANT CHANGE UP (ref 0–4)
REVIEW: SIGNIFICANT CHANGE UP
REVIEW: SIGNIFICANT CHANGE UP
RH IG SCN BLD-IMP: POSITIVE — SIGNIFICANT CHANGE UP
RSV RNA NPH QL NAA+NON-PROBE: SIGNIFICANT CHANGE UP
S AUREUS DNA NOSE QL NAA+PROBE: DETECTED
SALICYLATES SERPL-MCNC: <2 MG/DL — LOW (ref 15–30)
SAO2 % BLDV: 34.3 % — LOW (ref 67–88)
SAO2 % BLDV: 38 % — LOW (ref 67–88)
SAO2 % BLDV: 40.8 % — LOW (ref 67–88)
SAO2 % BLDV: 42.3 % — LOW (ref 67–88)
SAO2 % BLDV: 43.5 % — LOW (ref 67–88)
SAO2 % BLDV: 48 % — LOW (ref 67–88)
SAO2 % BLDV: 48.5 % — LOW (ref 67–88)
SAO2 % BLDV: 67.7 % — SIGNIFICANT CHANGE UP (ref 67–88)
SAO2 % BLDV: 75.8 % — SIGNIFICANT CHANGE UP (ref 67–88)
SAO2 % BLDV: 80.7 % — SIGNIFICANT CHANGE UP (ref 67–88)
SAO2 % BLDV: 89 % — HIGH (ref 67–88)
SAO2 % BLDV: 96.4 % — HIGH (ref 67–88)
SAO2 % BLDV: 97.5 % — HIGH (ref 67–88)
SARS-COV-2 RNA SPEC QL NAA+PROBE: SIGNIFICANT CHANGE UP
SARS-COV-2 RNA SPEC QL NAA+PROBE: SIGNIFICANT CHANGE UP
SODIUM SERPL-SCNC: 132 MMOL/L — LOW (ref 135–145)
SODIUM SERPL-SCNC: 132 MMOL/L — LOW (ref 135–145)
SODIUM SERPL-SCNC: 134 MMOL/L — LOW (ref 135–145)
SODIUM SERPL-SCNC: 135 MMOL/L — SIGNIFICANT CHANGE UP (ref 135–145)
SODIUM SERPL-SCNC: 136 MMOL/L — SIGNIFICANT CHANGE UP (ref 135–145)
SODIUM SERPL-SCNC: 137 MMOL/L — SIGNIFICANT CHANGE UP (ref 135–145)
SODIUM SERPL-SCNC: 138 MMOL/L — SIGNIFICANT CHANGE UP (ref 135–145)
SODIUM SERPL-SCNC: 139 MMOL/L — SIGNIFICANT CHANGE UP (ref 135–145)
SODIUM SERPL-SCNC: 140 MMOL/L — SIGNIFICANT CHANGE UP (ref 135–145)
SODIUM SERPL-SCNC: 141 MMOL/L — SIGNIFICANT CHANGE UP (ref 135–145)
SODIUM SERPL-SCNC: 142 MMOL/L — SIGNIFICANT CHANGE UP (ref 135–145)
SP GR SPEC: 1.01 — SIGNIFICANT CHANGE UP (ref 1–1.03)
SP GR SPEC: 1.01 — SIGNIFICANT CHANGE UP (ref 1–1.03)
SP GR SPEC: 1.02 — SIGNIFICANT CHANGE UP (ref 1–1.03)
SP GR SPEC: 1.03 — SIGNIFICANT CHANGE UP (ref 1–1.03)
SPECIMEN SOURCE: SIGNIFICANT CHANGE UP
SQUAMOUS # UR AUTO: 0 /HPF — SIGNIFICANT CHANGE UP (ref 0–5)
SQUAMOUS # UR AUTO: 1 /HPF — SIGNIFICANT CHANGE UP (ref 0–5)
SQUAMOUS # UR AUTO: 2 /HPF — SIGNIFICANT CHANGE UP (ref 0–5)
T PALLIDUM AB TITR SER: NEGATIVE — SIGNIFICANT CHANGE UP
T4 FREE SERPL-MCNC: 0.9 NG/DL — SIGNIFICANT CHANGE UP (ref 0.9–1.8)
TARGETS BLD QL SMEAR: SLIGHT — SIGNIFICANT CHANGE UP
THC UR QL: NEGATIVE NG/ML — SIGNIFICANT CHANGE UP
TSH SERPL-MCNC: 1.78 UIU/ML — SIGNIFICANT CHANGE UP (ref 0.27–4.2)
TSH SERPL-MCNC: 1.94 UIU/ML — SIGNIFICANT CHANGE UP (ref 0.27–4.2)
TSH SERPL-MCNC: 5.64 UIU/ML — HIGH (ref 0.27–4.2)
UROBILINOGEN FLD QL: 0.2 MG/DL — SIGNIFICANT CHANGE UP (ref 0.2–1)
UROBILINOGEN FLD QL: 1 MG/DL — SIGNIFICANT CHANGE UP (ref 0.2–1)
VANCOMYCIN FLD-MCNC: 14.2 UG/ML — SIGNIFICANT CHANGE UP
VIT B1 SERPL-MCNC: 176.8 NMOL/L — SIGNIFICANT CHANGE UP (ref 66.5–200)
VIT B12 SERPL-MCNC: 1172 PG/ML — SIGNIFICANT CHANGE UP (ref 232–1245)
VIT B12 SERPL-MCNC: 1557 PG/ML — HIGH (ref 232–1245)
WBC # BLD: 10.01 K/UL — SIGNIFICANT CHANGE UP (ref 3.8–10.5)
WBC # BLD: 12.04 K/UL — HIGH (ref 3.8–10.5)
WBC # BLD: 12.57 K/UL — HIGH (ref 3.8–10.5)
WBC # BLD: 15.86 K/UL — HIGH (ref 3.8–10.5)
WBC # BLD: 18.15 K/UL — HIGH (ref 3.8–10.5)
WBC # BLD: 19.02 K/UL — HIGH (ref 3.8–10.5)
WBC # BLD: 19.05 K/UL — HIGH (ref 3.8–10.5)
WBC # BLD: 22.77 K/UL — HIGH (ref 3.8–10.5)
WBC # BLD: 26.61 K/UL — HIGH (ref 3.8–10.5)
WBC # BLD: 29.88 K/UL — HIGH (ref 3.8–10.5)
WBC # BLD: 3.54 K/UL — LOW (ref 3.8–10.5)
WBC # BLD: 30.46 K/UL — HIGH (ref 3.8–10.5)
WBC # BLD: 31.87 K/UL — HIGH (ref 3.8–10.5)
WBC # BLD: 32.35 K/UL — HIGH (ref 3.8–10.5)
WBC # BLD: 33.19 K/UL — HIGH (ref 3.8–10.5)
WBC # BLD: 33.79 K/UL — HIGH (ref 3.8–10.5)
WBC # BLD: 4.28 K/UL — SIGNIFICANT CHANGE UP (ref 3.8–10.5)
WBC # BLD: 40.6 K/UL — CRITICAL HIGH (ref 3.8–10.5)
WBC # BLD: 5.02 K/UL — SIGNIFICANT CHANGE UP (ref 3.8–10.5)
WBC # BLD: 5.09 K/UL — SIGNIFICANT CHANGE UP (ref 3.8–10.5)
WBC # BLD: 5.31 K/UL — SIGNIFICANT CHANGE UP (ref 3.8–10.5)
WBC # BLD: 5.7 K/UL — SIGNIFICANT CHANGE UP (ref 3.8–10.5)
WBC # BLD: 6.15 K/UL — SIGNIFICANT CHANGE UP (ref 3.8–10.5)
WBC # BLD: 7.21 K/UL — SIGNIFICANT CHANGE UP (ref 3.8–10.5)
WBC # BLD: 7.89 K/UL — SIGNIFICANT CHANGE UP (ref 3.8–10.5)
WBC # FLD AUTO: 10.01 K/UL — SIGNIFICANT CHANGE UP (ref 3.8–10.5)
WBC # FLD AUTO: 12.04 K/UL — HIGH (ref 3.8–10.5)
WBC # FLD AUTO: 12.57 K/UL — HIGH (ref 3.8–10.5)
WBC # FLD AUTO: 15.86 K/UL — HIGH (ref 3.8–10.5)
WBC # FLD AUTO: 18.15 K/UL — HIGH (ref 3.8–10.5)
WBC # FLD AUTO: 19.02 K/UL — HIGH (ref 3.8–10.5)
WBC # FLD AUTO: 19.05 K/UL — HIGH (ref 3.8–10.5)
WBC # FLD AUTO: 22.77 K/UL — HIGH (ref 3.8–10.5)
WBC # FLD AUTO: 26.61 K/UL — HIGH (ref 3.8–10.5)
WBC # FLD AUTO: 29.88 K/UL — HIGH (ref 3.8–10.5)
WBC # FLD AUTO: 3.54 K/UL — LOW (ref 3.8–10.5)
WBC # FLD AUTO: 30.46 K/UL — HIGH (ref 3.8–10.5)
WBC # FLD AUTO: 31.87 K/UL — HIGH (ref 3.8–10.5)
WBC # FLD AUTO: 32.35 K/UL — HIGH (ref 3.8–10.5)
WBC # FLD AUTO: 33.19 K/UL — HIGH (ref 3.8–10.5)
WBC # FLD AUTO: 33.79 K/UL — HIGH (ref 3.8–10.5)
WBC # FLD AUTO: 4.28 K/UL — SIGNIFICANT CHANGE UP (ref 3.8–10.5)
WBC # FLD AUTO: 40.6 K/UL — CRITICAL HIGH (ref 3.8–10.5)
WBC # FLD AUTO: 5.02 K/UL — SIGNIFICANT CHANGE UP (ref 3.8–10.5)
WBC # FLD AUTO: 5.09 K/UL — SIGNIFICANT CHANGE UP (ref 3.8–10.5)
WBC # FLD AUTO: 5.31 K/UL — SIGNIFICANT CHANGE UP (ref 3.8–10.5)
WBC # FLD AUTO: 5.7 K/UL — SIGNIFICANT CHANGE UP (ref 3.8–10.5)
WBC # FLD AUTO: 6.15 K/UL — SIGNIFICANT CHANGE UP (ref 3.8–10.5)
WBC # FLD AUTO: 7.21 K/UL — SIGNIFICANT CHANGE UP (ref 3.8–10.5)
WBC # FLD AUTO: 7.89 K/UL — SIGNIFICANT CHANGE UP (ref 3.8–10.5)
WBC UR QL: 146 /HPF — HIGH (ref 0–5)
WBC UR QL: 69 /HPF — HIGH (ref 0–5)
WBC UR QL: 91 /HPF — HIGH (ref 0–5)
WBC UR QL: ABNORMAL /HPF (ref 0–5)
YEAST-LIKE CELLS: PRESENT

## 2024-01-01 PROCEDURE — 86923 COMPATIBILITY TEST ELECTRIC: CPT

## 2024-01-01 PROCEDURE — C1889: CPT

## 2024-01-01 PROCEDURE — 82962 GLUCOSE BLOOD TEST: CPT

## 2024-01-01 PROCEDURE — 82306 VITAMIN D 25 HYDROXY: CPT

## 2024-01-01 PROCEDURE — 99231 SBSQ HOSP IP/OBS SF/LOW 25: CPT

## 2024-01-01 PROCEDURE — 87186 SC STD MICRODIL/AGAR DIL: CPT

## 2024-01-01 PROCEDURE — 80202 ASSAY OF VANCOMYCIN: CPT

## 2024-01-01 PROCEDURE — 85014 HEMATOCRIT: CPT

## 2024-01-01 PROCEDURE — C1776: CPT

## 2024-01-01 PROCEDURE — 85610 PROTHROMBIN TIME: CPT

## 2024-01-01 PROCEDURE — 97112 NEUROMUSCULAR REEDUCATION: CPT

## 2024-01-01 PROCEDURE — 97161 PT EVAL LOW COMPLEX 20 MIN: CPT

## 2024-01-01 PROCEDURE — 86160 COMPLEMENT ANTIGEN: CPT

## 2024-01-01 PROCEDURE — 87040 BLOOD CULTURE FOR BACTERIA: CPT

## 2024-01-01 PROCEDURE — 36415 COLL VENOUS BLD VENIPUNCTURE: CPT

## 2024-01-01 PROCEDURE — 74176 CT ABD & PELVIS W/O CONTRAST: CPT | Mod: MC

## 2024-01-01 PROCEDURE — 82947 ASSAY GLUCOSE BLOOD QUANT: CPT

## 2024-01-01 PROCEDURE — 73552 X-RAY EXAM OF FEMUR 2/>: CPT | Mod: 26,RT

## 2024-01-01 PROCEDURE — 99291 CRITICAL CARE FIRST HOUR: CPT

## 2024-01-01 PROCEDURE — 87637 SARSCOV2&INF A&B&RSV AMP PRB: CPT

## 2024-01-01 PROCEDURE — 80074 ACUTE HEPATITIS PANEL: CPT

## 2024-01-01 PROCEDURE — 99223 1ST HOSP IP/OBS HIGH 75: CPT | Mod: GC

## 2024-01-01 PROCEDURE — 99233 SBSQ HOSP IP/OBS HIGH 50: CPT | Mod: GC

## 2024-01-01 PROCEDURE — 87641 MR-STAPH DNA AMP PROBE: CPT

## 2024-01-01 PROCEDURE — 82550 ASSAY OF CK (CPK): CPT

## 2024-01-01 PROCEDURE — 97166 OT EVAL MOD COMPLEX 45 MIN: CPT

## 2024-01-01 PROCEDURE — 96374 THER/PROPH/DIAG INJ IV PUSH: CPT

## 2024-01-01 PROCEDURE — 93010 ELECTROCARDIOGRAM REPORT: CPT

## 2024-01-01 PROCEDURE — 99232 SBSQ HOSP IP/OBS MODERATE 35: CPT | Mod: GC

## 2024-01-01 PROCEDURE — 82010 KETONE BODYS QUAN: CPT

## 2024-01-01 PROCEDURE — 96375 TX/PRO/DX INJ NEW DRUG ADDON: CPT

## 2024-01-01 PROCEDURE — 84446 ASSAY OF VITAMIN E: CPT

## 2024-01-01 PROCEDURE — 97165 OT EVAL LOW COMPLEX 30 MIN: CPT

## 2024-01-01 PROCEDURE — 97530 THERAPEUTIC ACTIVITIES: CPT

## 2024-01-01 PROCEDURE — 80076 HEPATIC FUNCTION PANEL: CPT

## 2024-01-01 PROCEDURE — 87150 DNA/RNA AMPLIFIED PROBE: CPT

## 2024-01-01 PROCEDURE — 80053 COMPREHEN METABOLIC PANEL: CPT

## 2024-01-01 PROCEDURE — 76000 FLUOROSCOPY <1 HR PHYS/QHP: CPT

## 2024-01-01 PROCEDURE — 99497 ADVNCD CARE PLAN 30 MIN: CPT | Mod: 25

## 2024-01-01 PROCEDURE — 82784 ASSAY IGA/IGD/IGG/IGM EACH: CPT

## 2024-01-01 PROCEDURE — 99233 SBSQ HOSP IP/OBS HIGH 50: CPT

## 2024-01-01 PROCEDURE — 97535 SELF CARE MNGMENT TRAINING: CPT

## 2024-01-01 PROCEDURE — 93005 ELECTROCARDIOGRAM TRACING: CPT

## 2024-01-01 PROCEDURE — 87070 CULTURE OTHR SPECIMN AEROBIC: CPT

## 2024-01-01 PROCEDURE — 99231 SBSQ HOSP IP/OBS SF/LOW 25: CPT | Mod: GC

## 2024-01-01 PROCEDURE — 83615 LACTATE (LD) (LDH) ENZYME: CPT

## 2024-01-01 PROCEDURE — 84443 ASSAY THYROID STIM HORMONE: CPT

## 2024-01-01 PROCEDURE — 84165 PROTEIN E-PHORESIS SERUM: CPT

## 2024-01-01 PROCEDURE — 72170 X-RAY EXAM OF PELVIS: CPT

## 2024-01-01 PROCEDURE — 83690 ASSAY OF LIPASE: CPT

## 2024-01-01 PROCEDURE — 73502 X-RAY EXAM HIP UNI 2-3 VIEWS: CPT

## 2024-01-01 PROCEDURE — 85027 COMPLETE CBC AUTOMATED: CPT

## 2024-01-01 PROCEDURE — 82746 ASSAY OF FOLIC ACID SERUM: CPT

## 2024-01-01 PROCEDURE — 84156 ASSAY OF PROTEIN URINE: CPT

## 2024-01-01 PROCEDURE — 84132 ASSAY OF SERUM POTASSIUM: CPT

## 2024-01-01 PROCEDURE — P9016: CPT

## 2024-01-01 PROCEDURE — 71045 X-RAY EXAM CHEST 1 VIEW: CPT | Mod: 26

## 2024-01-01 PROCEDURE — 99232 SBSQ HOSP IP/OBS MODERATE 35: CPT

## 2024-01-01 PROCEDURE — 84295 ASSAY OF SERUM SODIUM: CPT

## 2024-01-01 PROCEDURE — 84155 ASSAY OF PROTEIN SERUM: CPT

## 2024-01-01 PROCEDURE — 85025 COMPLETE CBC W/AUTO DIFF WBC: CPT

## 2024-01-01 PROCEDURE — 99222 1ST HOSP IP/OBS MODERATE 55: CPT | Mod: GC

## 2024-01-01 PROCEDURE — 86900 BLOOD TYPING SEROLOGIC ABO: CPT

## 2024-01-01 PROCEDURE — 99223 1ST HOSP IP/OBS HIGH 75: CPT

## 2024-01-01 PROCEDURE — 87635 SARS-COV-2 COVID-19 AMP PRB: CPT

## 2024-01-01 PROCEDURE — 99222 1ST HOSP IP/OBS MODERATE 55: CPT

## 2024-01-01 PROCEDURE — 87077 CULTURE AEROBIC IDENTIFY: CPT

## 2024-01-01 PROCEDURE — 72125 CT NECK SPINE W/O DYE: CPT | Mod: MA

## 2024-01-01 PROCEDURE — 93970 EXTREMITY STUDY: CPT

## 2024-01-01 PROCEDURE — 85730 THROMBOPLASTIN TIME PARTIAL: CPT

## 2024-01-01 PROCEDURE — 86780 TREPONEMA PALLIDUM: CPT

## 2024-01-01 PROCEDURE — 76604 US EXAM CHEST: CPT | Mod: 26,GC

## 2024-01-01 PROCEDURE — C8929: CPT

## 2024-01-01 PROCEDURE — 76770 US EXAM ABDO BACK WALL COMP: CPT | Mod: 26

## 2024-01-01 PROCEDURE — 99231 SBSQ HOSP IP/OBS SF/LOW 25: CPT | Mod: FS

## 2024-01-01 PROCEDURE — 93970 EXTREMITY STUDY: CPT | Mod: 26

## 2024-01-01 PROCEDURE — 82607 VITAMIN B-12: CPT

## 2024-01-01 PROCEDURE — P9045: CPT

## 2024-01-01 PROCEDURE — 99285 EMERGENCY DEPT VISIT HI MDM: CPT | Mod: 25

## 2024-01-01 PROCEDURE — 86225 DNA ANTIBODY NATIVE: CPT

## 2024-01-01 PROCEDURE — 71045 X-RAY EXAM CHEST 1 VIEW: CPT

## 2024-01-01 PROCEDURE — 99291 CRITICAL CARE FIRST HOUR: CPT | Mod: FT,25

## 2024-01-01 PROCEDURE — 99261: CPT

## 2024-01-01 PROCEDURE — 82803 BLOOD GASES ANY COMBINATION: CPT

## 2024-01-01 PROCEDURE — 84425 ASSAY OF VITAMIN B-1: CPT

## 2024-01-01 PROCEDURE — 76770 US EXAM ABDO BACK WALL COMP: CPT

## 2024-01-01 PROCEDURE — 70450 CT HEAD/BRAIN W/O DYE: CPT | Mod: 26

## 2024-01-01 PROCEDURE — 97162 PT EVAL MOD COMPLEX 30 MIN: CPT

## 2024-01-01 PROCEDURE — 84100 ASSAY OF PHOSPHORUS: CPT

## 2024-01-01 PROCEDURE — 83930 ASSAY OF BLOOD OSMOLALITY: CPT

## 2024-01-01 PROCEDURE — 80048 BASIC METABOLIC PNL TOTAL CA: CPT

## 2024-01-01 PROCEDURE — 83735 ASSAY OF MAGNESIUM: CPT

## 2024-01-01 PROCEDURE — 70450 CT HEAD/BRAIN W/O DYE: CPT | Mod: MC

## 2024-01-01 PROCEDURE — 82040 ASSAY OF SERUM ALBUMIN: CPT

## 2024-01-01 PROCEDURE — 99285 EMERGENCY DEPT VISIT HI MDM: CPT

## 2024-01-01 PROCEDURE — 87086 URINE CULTURE/COLONY COUNT: CPT

## 2024-01-01 PROCEDURE — 73562 X-RAY EXAM OF KNEE 3: CPT

## 2024-01-01 PROCEDURE — 86335 IMMUNFIX E-PHORSIS/URINE/CSF: CPT

## 2024-01-01 PROCEDURE — 36000 PLACE NEEDLE IN VEIN: CPT | Mod: 59

## 2024-01-01 PROCEDURE — 80307 DRUG TEST PRSMV CHEM ANLYZR: CPT

## 2024-01-01 PROCEDURE — 36430 TRANSFUSION BLD/BLD COMPNT: CPT

## 2024-01-01 PROCEDURE — 73502 X-RAY EXAM HIP UNI 2-3 VIEWS: CPT | Mod: 26,RT

## 2024-01-01 PROCEDURE — 86850 RBC ANTIBODY SCREEN: CPT

## 2024-01-01 PROCEDURE — 99291 CRITICAL CARE FIRST HOUR: CPT | Mod: 25

## 2024-01-01 PROCEDURE — 84207 ASSAY OF VITAMIN B-6: CPT

## 2024-01-01 PROCEDURE — 72125 CT NECK SPINE W/O DYE: CPT | Mod: 26,MA

## 2024-01-01 PROCEDURE — 36800 INSERTION OF CANNULA: CPT | Mod: GC

## 2024-01-01 PROCEDURE — 86901 BLOOD TYPING SEROLOGIC RH(D): CPT

## 2024-01-01 PROCEDURE — 82565 ASSAY OF CREATININE: CPT

## 2024-01-01 PROCEDURE — 93308 TTE F-UP OR LMTD: CPT | Mod: 26,GC

## 2024-01-01 PROCEDURE — 73562 X-RAY EXAM OF KNEE 3: CPT | Mod: 26,RT

## 2024-01-01 PROCEDURE — 85018 HEMOGLOBIN: CPT

## 2024-01-01 PROCEDURE — P9047: CPT

## 2024-01-01 PROCEDURE — 72170 X-RAY EXAM OF PELVIS: CPT | Mod: 26,XE

## 2024-01-01 PROCEDURE — 74176 CT ABD & PELVIS W/O CONTRAST: CPT | Mod: 26,MC

## 2024-01-01 PROCEDURE — 84439 ASSAY OF FREE THYROXINE: CPT

## 2024-01-01 PROCEDURE — 70450 CT HEAD/BRAIN W/O DYE: CPT | Mod: MA

## 2024-01-01 PROCEDURE — 83516 IMMUNOASSAY NONANTIBODY: CPT

## 2024-01-01 PROCEDURE — 83605 ASSAY OF LACTIC ACID: CPT

## 2024-01-01 PROCEDURE — 86334 IMMUNOFIX E-PHORESIS SERUM: CPT

## 2024-01-01 PROCEDURE — 87640 STAPH A DNA AMP PROBE: CPT

## 2024-01-01 PROCEDURE — 97110 THERAPEUTIC EXERCISES: CPT

## 2024-01-01 PROCEDURE — 99291 CRITICAL CARE FIRST HOUR: CPT | Mod: FS

## 2024-01-01 PROCEDURE — 70450 CT HEAD/BRAIN W/O DYE: CPT | Mod: 26,MA

## 2024-01-01 PROCEDURE — 81001 URINALYSIS AUTO W/SCOPE: CPT

## 2024-01-01 PROCEDURE — 93306 TTE W/DOPPLER COMPLETE: CPT | Mod: 26

## 2024-01-01 PROCEDURE — 82330 ASSAY OF CALCIUM: CPT

## 2024-01-01 PROCEDURE — 82435 ASSAY OF BLOOD CHLORIDE: CPT

## 2024-01-01 PROCEDURE — 86038 ANTINUCLEAR ANTIBODIES: CPT

## 2024-01-01 PROCEDURE — C1713: CPT

## 2024-01-01 PROCEDURE — 73552 X-RAY EXAM OF FEMUR 2/>: CPT

## 2024-01-01 PROCEDURE — 83521 IG LIGHT CHAINS FREE EACH: CPT

## 2024-01-01 PROCEDURE — 87389 HIV-1 AG W/HIV-1&-2 AB AG IA: CPT

## 2024-01-01 PROCEDURE — 82533 TOTAL CORTISOL: CPT

## 2024-01-01 PROCEDURE — 97116 GAIT TRAINING THERAPY: CPT

## 2024-01-01 PROCEDURE — 86036 ANCA SCREEN EACH ANTIBODY: CPT

## 2024-01-01 PROCEDURE — 82140 ASSAY OF AMMONIA: CPT

## 2024-01-01 PROCEDURE — 99233 SBSQ HOSP IP/OBS HIGH 50: CPT | Mod: FS

## 2024-01-01 PROCEDURE — P9040: CPT

## 2024-01-01 PROCEDURE — 99233 SBSQ HOSP IP/OBS HIGH 50: CPT | Mod: 25

## 2024-01-01 DEVICE — STRYKER TROCHANTERIC NAIL 11MM X 180MM 125 DEGREE: Type: IMPLANTABLE DEVICE | Site: RIGHT | Status: FUNCTIONAL

## 2024-01-01 DEVICE — K-WIRE STRYKER 3.2MM X 450MM: Type: IMPLANTABLE DEVICE | Site: RIGHT | Status: FUNCTIONAL

## 2024-01-01 DEVICE — SCREW LOKG 5X35MM STRL: Type: IMPLANTABLE DEVICE | Site: RIGHT | Status: FUNCTIONAL

## 2024-01-01 DEVICE — STRYKER TROCHANTERIC NAIL 10MM X 170MM 125 DEGREE: Type: IMPLANTABLE DEVICE | Site: RIGHT | Status: FUNCTIONAL

## 2024-01-01 DEVICE — SCREW LAG GAMMA 3 TI 10.5X95MM: Type: IMPLANTABLE DEVICE | Site: RIGHT | Status: FUNCTIONAL

## 2024-01-01 RX ORDER — LOSARTAN POTASSIUM 100 MG/1
100 TABLET, FILM COATED ORAL DAILY
Refills: 0 | Status: DISCONTINUED | OUTPATIENT
Start: 2024-01-01 | End: 2024-01-01

## 2024-01-01 RX ORDER — HYDROMORPHONE HYDROCHLORIDE 2 MG/ML
2 INJECTION INTRAMUSCULAR; INTRAVENOUS; SUBCUTANEOUS ONCE
Refills: 0 | Status: DISCONTINUED | OUTPATIENT
Start: 2024-01-01 | End: 2024-01-01

## 2024-01-01 RX ORDER — SODIUM CHLORIDE 9 MG/ML
500 INJECTION, SOLUTION INTRAVENOUS
Refills: 0 | Status: DISCONTINUED | OUTPATIENT
Start: 2024-01-01 | End: 2024-01-01

## 2024-01-01 RX ORDER — ACETAMINOPHEN 500 MG
650 TABLET ORAL EVERY 6 HOURS
Refills: 0 | Status: DISCONTINUED | OUTPATIENT
Start: 2024-01-01 | End: 2024-01-01

## 2024-01-01 RX ORDER — FLUDROCORTISONE ACETATE 0.1 MG/1
0.1 TABLET ORAL
Refills: 0 | Status: DISCONTINUED | OUTPATIENT
Start: 2024-01-01 | End: 2024-01-01

## 2024-01-01 RX ORDER — CEFAZOLIN SODIUM 1 G
2000 VIAL (EA) INJECTION EVERY 8 HOURS
Refills: 0 | Status: COMPLETED | OUTPATIENT
Start: 2024-01-01 | End: 2024-01-01

## 2024-01-01 RX ORDER — PANTOPRAZOLE SODIUM 20 MG/1
40 TABLET, DELAYED RELEASE ORAL
Refills: 0 | Status: DISCONTINUED | OUTPATIENT
Start: 2024-01-01 | End: 2024-01-01

## 2024-01-01 RX ORDER — SODIUM BICARBONATE 1 MEQ/ML
0.29 SYRINGE (ML) INTRAVENOUS
Qty: 150 | Refills: 0 | Status: DISCONTINUED | OUTPATIENT
Start: 2024-01-01 | End: 2024-01-01

## 2024-01-01 RX ORDER — POLYETHYLENE GLYCOL 3350 17 G/17G
17 POWDER, FOR SOLUTION ORAL DAILY
Refills: 0 | Status: DISCONTINUED | OUTPATIENT
Start: 2024-01-01 | End: 2024-01-01

## 2024-01-01 RX ORDER — PANTOPRAZOLE SODIUM 20 MG/1
40 TABLET, DELAYED RELEASE ORAL DAILY
Refills: 0 | Status: DISCONTINUED | OUTPATIENT
Start: 2024-01-01 | End: 2024-01-01

## 2024-01-01 RX ORDER — ALBUMIN HUMAN 25 %
100 VIAL (ML) INTRAVENOUS ONCE
Refills: 0 | Status: COMPLETED | OUTPATIENT
Start: 2024-01-01 | End: 2024-01-01

## 2024-01-01 RX ORDER — CEFTRIAXONE 500 MG/1
2000 INJECTION, POWDER, FOR SOLUTION INTRAMUSCULAR; INTRAVENOUS EVERY 24 HOURS
Refills: 0 | Status: DISCONTINUED | OUTPATIENT
Start: 2024-01-01 | End: 2024-01-01

## 2024-01-01 RX ORDER — CEFTRIAXONE 500 MG/1
1000 INJECTION, POWDER, FOR SOLUTION INTRAMUSCULAR; INTRAVENOUS EVERY 24 HOURS
Refills: 0 | Status: COMPLETED | OUTPATIENT
Start: 2024-01-01 | End: 2024-01-01

## 2024-01-01 RX ORDER — HYDROMORPHONE HYDROCHLORIDE 2 MG/ML
0.2 INJECTION INTRAMUSCULAR; INTRAVENOUS; SUBCUTANEOUS
Refills: 0 | Status: DISCONTINUED | OUTPATIENT
Start: 2024-01-01 | End: 2024-01-01

## 2024-01-01 RX ORDER — HYDROCORTISONE 20 MG
50 TABLET ORAL EVERY 12 HOURS
Refills: 0 | Status: DISCONTINUED | OUTPATIENT
Start: 2024-01-01 | End: 2024-01-01

## 2024-01-01 RX ORDER — MIDODRINE HYDROCHLORIDE 2.5 MG/1
10 TABLET ORAL ONCE
Refills: 0 | Status: COMPLETED | OUTPATIENT
Start: 2024-01-01 | End: 2024-01-01

## 2024-01-01 RX ORDER — HYDROMORPHONE HYDROCHLORIDE 2 MG/ML
0.5 INJECTION INTRAMUSCULAR; INTRAVENOUS; SUBCUTANEOUS
Refills: 0 | Status: DISCONTINUED | OUTPATIENT
Start: 2024-01-01 | End: 2024-01-01

## 2024-01-01 RX ORDER — ALBUMIN HUMAN 25 %
50 VIAL (ML) INTRAVENOUS ONCE
Refills: 0 | Status: COMPLETED | OUTPATIENT
Start: 2024-01-01 | End: 2024-01-01

## 2024-01-01 RX ORDER — NOREPINEPHRINE BITARTRATE/D5W 8 MG/250ML
0.05 PLASTIC BAG, INJECTION (ML) INTRAVENOUS
Qty: 8 | Refills: 0 | Status: DISCONTINUED | OUTPATIENT
Start: 2024-01-01 | End: 2024-01-01

## 2024-01-01 RX ORDER — OXYCODONE HYDROCHLORIDE 5 MG/1
5 TABLET ORAL EVERY 4 HOURS
Refills: 0 | Status: DISCONTINUED | OUTPATIENT
Start: 2024-01-01 | End: 2024-01-01

## 2024-01-01 RX ORDER — SODIUM CHLORIDE 9 MG/ML
1000 INJECTION, SOLUTION INTRAVENOUS
Refills: 0 | Status: DISCONTINUED | OUTPATIENT
Start: 2024-01-01 | End: 2024-01-01

## 2024-01-01 RX ORDER — HYDROCORTISONE 20 MG
50 TABLET ORAL DAILY
Refills: 0 | Status: DISCONTINUED | OUTPATIENT
Start: 2024-01-01 | End: 2024-01-01

## 2024-01-01 RX ORDER — ATENOLOL 25 MG/1
50 TABLET ORAL DAILY
Refills: 0 | Status: DISCONTINUED | OUTPATIENT
Start: 2024-01-01 | End: 2024-01-01

## 2024-01-01 RX ORDER — SODIUM CHLORIDE 9 MG/ML
1000 INJECTION, SOLUTION INTRAVENOUS ONCE
Refills: 0 | Status: COMPLETED | OUTPATIENT
Start: 2024-01-01 | End: 2024-01-01

## 2024-01-01 RX ORDER — FENTANYL CITRATE 50 UG/ML
50 INJECTION INTRAVENOUS ONCE
Refills: 0 | Status: DISCONTINUED | OUTPATIENT
Start: 2024-01-01 | End: 2024-01-01

## 2024-01-01 RX ORDER — ROBINUL 0.2 MG/ML
0.2 INJECTION INTRAMUSCULAR; INTRAVENOUS EVERY 6 HOURS
Refills: 0 | Status: DISCONTINUED | OUTPATIENT
Start: 2024-01-01 | End: 2024-01-01

## 2024-01-01 RX ORDER — POTASSIUM CHLORIDE 20 MEQ
40 PACKET (EA) ORAL ONCE
Refills: 0 | Status: COMPLETED | OUTPATIENT
Start: 2024-01-01 | End: 2024-01-01

## 2024-01-01 RX ORDER — POTASSIUM CHLORIDE 20 MEQ
20 PACKET (EA) ORAL ONCE
Refills: 0 | Status: COMPLETED | OUTPATIENT
Start: 2024-01-01 | End: 2024-01-01

## 2024-01-01 RX ORDER — HYDROCORTISONE 20 MG
100 TABLET ORAL EVERY 8 HOURS
Refills: 0 | Status: DISCONTINUED | OUTPATIENT
Start: 2024-01-01 | End: 2024-01-01

## 2024-01-01 RX ORDER — SODIUM CHLORIDE 9 MG/ML
100 INJECTION INTRAMUSCULAR; INTRAVENOUS; SUBCUTANEOUS
Refills: 0 | Status: DISCONTINUED | OUTPATIENT
Start: 2024-01-01 | End: 2024-01-01

## 2024-01-01 RX ORDER — ACETAMINOPHEN 500 MG
975 TABLET ORAL EVERY 8 HOURS
Refills: 0 | Status: DISCONTINUED | OUTPATIENT
Start: 2024-01-01 | End: 2024-01-01

## 2024-01-01 RX ORDER — SODIUM CHLORIDE 9 MG/ML
500 INJECTION INTRAMUSCULAR; INTRAVENOUS; SUBCUTANEOUS ONCE
Refills: 0 | Status: COMPLETED | OUTPATIENT
Start: 2024-01-01 | End: 2024-01-01

## 2024-01-01 RX ORDER — ROBINUL 0.2 MG/ML
0.4 INJECTION INTRAMUSCULAR; INTRAVENOUS EVERY 6 HOURS
Refills: 0 | Status: DISCONTINUED | OUTPATIENT
Start: 2024-01-01 | End: 2024-01-01

## 2024-01-01 RX ORDER — ALBUMIN HUMAN 25 %
250 VIAL (ML) INTRAVENOUS ONCE
Refills: 0 | Status: DISCONTINUED | OUTPATIENT
Start: 2024-01-01 | End: 2024-01-01

## 2024-01-01 RX ORDER — VANCOMYCIN HCL 1 G
1000 VIAL (EA) INTRAVENOUS ONCE
Refills: 0 | Status: COMPLETED | OUTPATIENT
Start: 2024-01-01 | End: 2024-01-01

## 2024-01-01 RX ORDER — SODIUM CHLORIDE 9 MG/ML
1000 INJECTION INTRAMUSCULAR; INTRAVENOUS; SUBCUTANEOUS
Refills: 0 | Status: DISCONTINUED | OUTPATIENT
Start: 2024-01-01 | End: 2024-01-01

## 2024-01-01 RX ORDER — INFLUENZA VIRUS VACCINE 15; 15; 15; 15 UG/.5ML; UG/.5ML; UG/.5ML; UG/.5ML
0.7 SUSPENSION INTRAMUSCULAR ONCE
Refills: 0 | Status: DISCONTINUED | OUTPATIENT
Start: 2024-01-01 | End: 2024-01-01

## 2024-01-01 RX ORDER — ALBUMIN HUMAN 25 %
250 VIAL (ML) INTRAVENOUS ONCE
Refills: 0 | Status: COMPLETED | OUTPATIENT
Start: 2024-01-01 | End: 2024-01-01

## 2024-01-01 RX ORDER — CALCIUM GLUCONATE 100 MG/ML
1 VIAL (ML) INTRAVENOUS ONCE
Refills: 0 | Status: COMPLETED | OUTPATIENT
Start: 2024-01-01 | End: 2024-01-01

## 2024-01-01 RX ORDER — ACETAMINOPHEN 500 MG
1000 TABLET ORAL ONCE
Refills: 0 | Status: COMPLETED | OUTPATIENT
Start: 2024-01-01 | End: 2024-01-01

## 2024-01-01 RX ORDER — LIPASE/PROTEASE/AMYLASE 16-48-48K
1 CAPSULE,DELAYED RELEASE (ENTERIC COATED) ORAL
Refills: 0 | Status: DISCONTINUED | OUTPATIENT
Start: 2024-01-01 | End: 2024-01-01

## 2024-01-01 RX ORDER — MAGNESIUM HYDROXIDE 400 MG/1
30 TABLET, CHEWABLE ORAL DAILY
Refills: 0 | Status: DISCONTINUED | OUTPATIENT
Start: 2024-01-01 | End: 2024-01-01

## 2024-01-01 RX ORDER — HYDROCORTISONE 20 MG
50 TABLET ORAL EVERY 6 HOURS
Refills: 0 | Status: DISCONTINUED | OUTPATIENT
Start: 2024-01-01 | End: 2024-01-01

## 2024-01-01 RX ORDER — ENOXAPARIN SODIUM 100 MG/ML
40 INJECTION SUBCUTANEOUS EVERY 24 HOURS
Refills: 0 | Status: DISCONTINUED | OUTPATIENT
Start: 2024-01-01 | End: 2024-01-01

## 2024-01-01 RX ORDER — POLYETHYLENE GLYCOL 3350 17 G/17G
17 POWDER, FOR SOLUTION ORAL
Qty: 0 | Refills: 0 | DISCHARGE
Start: 2024-01-01

## 2024-01-01 RX ORDER — SODIUM CHLORIDE 9 MG/ML
1000 INJECTION INTRAMUSCULAR; INTRAVENOUS; SUBCUTANEOUS ONCE
Refills: 0 | Status: COMPLETED | OUTPATIENT
Start: 2024-01-01 | End: 2024-01-01

## 2024-01-01 RX ORDER — DEXMEDETOMIDINE HYDROCHLORIDE IN 0.9% SODIUM CHLORIDE 4 UG/ML
0.2 INJECTION INTRAVENOUS
Qty: 200 | Refills: 0 | Status: DISCONTINUED | OUTPATIENT
Start: 2024-01-01 | End: 2024-01-01

## 2024-01-01 RX ORDER — ENOXAPARIN SODIUM 100 MG/ML
40 INJECTION SUBCUTANEOUS
Qty: 0 | Refills: 0 | DISCHARGE
Start: 2024-01-01

## 2024-01-01 RX ORDER — SODIUM BICARBONATE 1 MEQ/ML
50 SYRINGE (ML) INTRAVENOUS ONCE
Refills: 0 | Status: COMPLETED | OUTPATIENT
Start: 2024-01-01 | End: 2024-01-01

## 2024-01-01 RX ORDER — FENTANYL CITRATE 50 UG/ML
25 INJECTION INTRAVENOUS ONCE
Refills: 0 | Status: DISCONTINUED | OUTPATIENT
Start: 2024-01-01 | End: 2024-01-01

## 2024-01-01 RX ORDER — THIAMINE MONONITRATE (VIT B1) 100 MG
100 TABLET ORAL DAILY
Refills: 0 | Status: COMPLETED | OUTPATIENT
Start: 2024-01-01 | End: 2024-01-01

## 2024-01-01 RX ORDER — HYDROMORPHONE HYDROCHLORIDE 2 MG/ML
0.5 INJECTION INTRAMUSCULAR; INTRAVENOUS; SUBCUTANEOUS EVERY 6 HOURS
Refills: 0 | Status: DISCONTINUED | OUTPATIENT
Start: 2024-01-01 | End: 2024-01-01

## 2024-01-01 RX ORDER — HYDROCORTISONE 20 MG
50 TABLET ORAL ONCE
Refills: 0 | Status: COMPLETED | OUTPATIENT
Start: 2024-01-01 | End: 2024-01-01

## 2024-01-01 RX ORDER — IBUPROFEN 200 MG
400 TABLET ORAL ONCE
Refills: 0 | Status: COMPLETED | OUTPATIENT
Start: 2024-01-01 | End: 2024-01-01

## 2024-01-01 RX ORDER — PANTOPRAZOLE SODIUM 20 MG/1
1 TABLET, DELAYED RELEASE ORAL
Qty: 0 | Refills: 0 | DISCHARGE
Start: 2024-01-01

## 2024-01-01 RX ORDER — MIDODRINE HYDROCHLORIDE 2.5 MG/1
10 TABLET ORAL THREE TIMES A DAY
Refills: 0 | Status: DISCONTINUED | OUTPATIENT
Start: 2024-01-01 | End: 2024-01-01

## 2024-01-01 RX ORDER — HYDROCORTISONE 20 MG
25 TABLET ORAL EVERY 8 HOURS
Refills: 0 | Status: COMPLETED | OUTPATIENT
Start: 2024-01-01 | End: 2024-01-01

## 2024-01-01 RX ORDER — TRAMADOL HYDROCHLORIDE 50 MG/1
25 TABLET ORAL EVERY 6 HOURS
Refills: 0 | Status: DISCONTINUED | OUTPATIENT
Start: 2024-01-01 | End: 2024-01-01

## 2024-01-01 RX ORDER — ONDANSETRON 8 MG/1
4 TABLET, FILM COATED ORAL ONCE
Refills: 0 | Status: COMPLETED | OUTPATIENT
Start: 2024-01-01 | End: 2024-01-01

## 2024-01-01 RX ORDER — FOLIC ACID 0.8 MG
1 TABLET ORAL DAILY
Refills: 0 | Status: DISCONTINUED | OUTPATIENT
Start: 2024-01-01 | End: 2024-01-01

## 2024-01-01 RX ORDER — CALCIUM GLUCONATE 100 MG/ML
1 VIAL (ML) INTRAVENOUS ONCE
Refills: 0 | Status: DISCONTINUED | OUTPATIENT
Start: 2024-01-01 | End: 2024-01-01

## 2024-01-01 RX ORDER — LIPASE/PROTEASE/AMYLASE 16-48-48K
1 CAPSULE,DELAYED RELEASE (ENTERIC COATED) ORAL
Qty: 0 | Refills: 0 | DISCHARGE

## 2024-01-01 RX ORDER — QUETIAPINE FUMARATE 200 MG/1
12.5 TABLET, FILM COATED ORAL AT BEDTIME
Refills: 0 | Status: DISCONTINUED | OUTPATIENT
Start: 2024-01-01 | End: 2024-01-01

## 2024-01-01 RX ORDER — ACETAMINOPHEN 500 MG
700 TABLET ORAL ONCE
Refills: 0 | Status: COMPLETED | OUTPATIENT
Start: 2024-01-01 | End: 2024-01-01

## 2024-01-01 RX ORDER — THIAMINE MONONITRATE (VIT B1) 100 MG
1 TABLET ORAL
Qty: 0 | Refills: 0 | DISCHARGE
Start: 2024-01-01

## 2024-01-01 RX ORDER — HYDROCORTISONE 20 MG
100 TABLET ORAL DAILY
Refills: 0 | Status: COMPLETED | OUTPATIENT
Start: 2024-01-01 | End: 2024-01-01

## 2024-01-01 RX ORDER — HYDROCORTISONE 20 MG
25 TABLET ORAL EVERY 12 HOURS
Refills: 0 | Status: COMPLETED | OUTPATIENT
Start: 2024-01-01 | End: 2024-01-01

## 2024-01-01 RX ORDER — FOLIC ACID 0.8 MG
1 TABLET ORAL
Qty: 0 | Refills: 0 | DISCHARGE
Start: 2024-01-01

## 2024-01-01 RX ORDER — HYDROCORTISONE 20 MG
100 TABLET ORAL EVERY 12 HOURS
Refills: 0 | Status: DISCONTINUED | OUTPATIENT
Start: 2024-01-01 | End: 2024-01-01

## 2024-01-01 RX ORDER — MAGNESIUM SULFATE 500 MG/ML
2 VIAL (ML) INJECTION ONCE
Refills: 0 | Status: COMPLETED | OUTPATIENT
Start: 2024-01-01 | End: 2024-01-01

## 2024-01-01 RX ORDER — OXYBUTYNIN CHLORIDE 5 MG
5 TABLET ORAL
Refills: 0 | Status: DISCONTINUED | OUTPATIENT
Start: 2024-01-01 | End: 2024-01-01

## 2024-01-01 RX ORDER — CEFEPIME 1 G/1
1000 INJECTION, POWDER, FOR SOLUTION INTRAMUSCULAR; INTRAVENOUS ONCE
Refills: 0 | Status: COMPLETED | OUTPATIENT
Start: 2024-01-01 | End: 2024-01-01

## 2024-01-01 RX ORDER — ONDANSETRON 8 MG/1
4 TABLET, FILM COATED ORAL EVERY 6 HOURS
Refills: 0 | Status: DISCONTINUED | OUTPATIENT
Start: 2024-01-01 | End: 2024-01-01

## 2024-01-01 RX ORDER — CELECOXIB 200 MG/1
100 CAPSULE ORAL DAILY
Refills: 0 | Status: COMPLETED | OUTPATIENT
Start: 2024-01-01 | End: 2024-01-01

## 2024-01-01 RX ORDER — MUPIROCIN 20 MG/G
1 OINTMENT TOPICAL
Refills: 0 | Status: DISCONTINUED | OUTPATIENT
Start: 2024-01-01 | End: 2024-01-01

## 2024-01-01 RX ORDER — ASCORBIC ACID 60 MG
500 TABLET,CHEWABLE ORAL DAILY
Refills: 0 | Status: DISCONTINUED | OUTPATIENT
Start: 2024-01-01 | End: 2024-01-01

## 2024-01-01 RX ORDER — SENNA PLUS 8.6 MG/1
2 TABLET ORAL
Qty: 0 | Refills: 0 | DISCHARGE
Start: 2024-01-01

## 2024-01-01 RX ORDER — LANOLIN ALCOHOL/MO/W.PET/CERES
3 CREAM (GRAM) TOPICAL AT BEDTIME
Refills: 0 | Status: DISCONTINUED | OUTPATIENT
Start: 2024-01-01 | End: 2024-01-01

## 2024-01-01 RX ORDER — TROSPIUM CHLORIDE 20 MG/1
1 TABLET, FILM COATED ORAL
Qty: 0 | Refills: 0 | DISCHARGE

## 2024-01-01 RX ORDER — POTASSIUM CHLORIDE 20 MEQ
20 PACKET (EA) ORAL
Refills: 0 | Status: COMPLETED | OUTPATIENT
Start: 2024-01-01 | End: 2024-01-01

## 2024-01-01 RX ORDER — OXYCODONE HYDROCHLORIDE 5 MG/1
2.5 TABLET ORAL EVERY 4 HOURS
Refills: 0 | Status: DISCONTINUED | OUTPATIENT
Start: 2024-01-01 | End: 2024-01-01

## 2024-01-01 RX ORDER — ATENOLOL 25 MG/1
1 TABLET ORAL
Qty: 0 | Refills: 0 | DISCHARGE

## 2024-01-01 RX ORDER — MIDODRINE HYDROCHLORIDE 2.5 MG/1
20 TABLET ORAL ONCE
Refills: 0 | Status: COMPLETED | OUTPATIENT
Start: 2024-01-01 | End: 2024-01-01

## 2024-01-01 RX ORDER — HALOPERIDOL DECANOATE 100 MG/ML
0.5 INJECTION INTRAMUSCULAR EVERY 6 HOURS
Refills: 0 | Status: DISCONTINUED | OUTPATIENT
Start: 2024-01-01 | End: 2024-01-01

## 2024-01-01 RX ORDER — SODIUM CHLORIDE 9 MG/ML
500 INJECTION, SOLUTION INTRAVENOUS ONCE
Refills: 0 | Status: COMPLETED | OUTPATIENT
Start: 2024-01-01 | End: 2024-01-01

## 2024-01-01 RX ORDER — LIDOCAINE 4 G/100G
1 CREAM TOPICAL EVERY 24 HOURS
Refills: 0 | Status: DISCONTINUED | OUTPATIENT
Start: 2024-01-01 | End: 2024-01-01

## 2024-01-01 RX ORDER — CEFTRIAXONE 500 MG/1
1000 INJECTION, POWDER, FOR SOLUTION INTRAMUSCULAR; INTRAVENOUS EVERY 24 HOURS
Refills: 0 | Status: DISCONTINUED | OUTPATIENT
Start: 2024-01-01 | End: 2024-01-01

## 2024-01-01 RX ORDER — SENNA PLUS 8.6 MG/1
2 TABLET ORAL AT BEDTIME
Refills: 0 | Status: DISCONTINUED | OUTPATIENT
Start: 2024-01-01 | End: 2024-01-01

## 2024-01-01 RX ORDER — ASCORBIC ACID 60 MG
1 TABLET,CHEWABLE ORAL
Qty: 0 | Refills: 0 | DISCHARGE
Start: 2024-01-01

## 2024-01-01 RX ORDER — ASCORBIC ACID 60 MG
250 TABLET,CHEWABLE ORAL DAILY
Refills: 0 | Status: DISCONTINUED | OUTPATIENT
Start: 2024-01-01 | End: 2024-01-01

## 2024-01-01 RX ORDER — TRAMADOL HYDROCHLORIDE 50 MG/1
50 TABLET ORAL EVERY 6 HOURS
Refills: 0 | Status: DISCONTINUED | OUTPATIENT
Start: 2024-01-01 | End: 2024-01-01

## 2024-01-01 RX ORDER — VANCOMYCIN HCL 1 G
750 VIAL (EA) INTRAVENOUS ONCE
Refills: 0 | Status: COMPLETED | OUTPATIENT
Start: 2024-01-01 | End: 2024-01-01

## 2024-01-01 RX ORDER — SODIUM CHLORIDE 9 MG/ML
250 INJECTION, SOLUTION INTRAVENOUS ONCE
Refills: 0 | Status: COMPLETED | OUTPATIENT
Start: 2024-01-01 | End: 2024-01-01

## 2024-01-01 RX ORDER — ACETAMINOPHEN 500 MG
3 TABLET ORAL
Qty: 0 | Refills: 0 | DISCHARGE
Start: 2024-01-01

## 2024-01-01 RX ORDER — DESMOPRESSIN ACETATE 0.1 MG/1
15 TABLET ORAL ONCE
Refills: 0 | Status: DISCONTINUED | OUTPATIENT
Start: 2024-01-01 | End: 2024-01-01

## 2024-01-01 RX ORDER — OLMESARTAN MEDOXOMIL 5 MG/1
1 TABLET, FILM COATED ORAL
Qty: 0 | Refills: 0 | DISCHARGE

## 2024-01-01 RX ORDER — VANCOMYCIN HCL 1 G
1000 VIAL (EA) INTRAVENOUS EVERY 24 HOURS
Refills: 0 | Status: DISCONTINUED | OUTPATIENT
Start: 2024-01-01 | End: 2024-01-01

## 2024-01-01 RX ORDER — MEROPENEM 1 G/30ML
500 INJECTION INTRAVENOUS EVERY 12 HOURS
Refills: 0 | Status: COMPLETED | OUTPATIENT
Start: 2024-01-01 | End: 2024-01-01

## 2024-01-01 RX ORDER — MAGNESIUM SULFATE 500 MG/ML
1 VIAL (ML) INJECTION ONCE
Refills: 0 | Status: COMPLETED | OUTPATIENT
Start: 2024-01-01 | End: 2024-01-01

## 2024-01-01 RX ORDER — MAGNESIUM HYDROXIDE 400 MG/1
30 TABLET, CHEWABLE ORAL
Qty: 0 | Refills: 0 | DISCHARGE
Start: 2024-01-01

## 2024-01-01 RX ORDER — MIRTAZAPINE 45 MG/1
15 TABLET, ORALLY DISINTEGRATING ORAL AT BEDTIME
Refills: 0 | Status: DISCONTINUED | OUTPATIENT
Start: 2024-01-01 | End: 2024-01-01

## 2024-01-01 RX ORDER — HEPARIN SODIUM 5000 [USP'U]/ML
5000 INJECTION INTRAVENOUS; SUBCUTANEOUS EVERY 12 HOURS
Refills: 0 | Status: DISCONTINUED | OUTPATIENT
Start: 2024-01-01 | End: 2024-01-01

## 2024-01-01 RX ORDER — ACETAMINOPHEN 500 MG
470 TABLET ORAL EVERY 6 HOURS
Refills: 0 | Status: DISCONTINUED | OUTPATIENT
Start: 2024-01-01 | End: 2024-01-01

## 2024-01-01 RX ORDER — DROXIDOPA 100 MG/1
200 CAPSULE ORAL THREE TIMES A DAY
Refills: 0 | Status: DISCONTINUED | OUTPATIENT
Start: 2024-01-01 | End: 2024-01-01

## 2024-01-01 RX ORDER — MIRTAZAPINE 45 MG/1
1 TABLET, ORALLY DISINTEGRATING ORAL
Refills: 0 | DISCHARGE

## 2024-01-01 RX ORDER — PHYTONADIONE (VIT K1) 5 MG
5 TABLET ORAL ONCE
Refills: 0 | Status: COMPLETED | OUTPATIENT
Start: 2024-01-01 | End: 2024-01-01

## 2024-01-01 RX ORDER — ASCORBIC ACID 60 MG
500 TABLET,CHEWABLE ORAL
Refills: 0 | Status: DISCONTINUED | OUTPATIENT
Start: 2024-01-01 | End: 2024-01-01

## 2024-01-01 RX ORDER — CHLORHEXIDINE GLUCONATE 213 G/1000ML
1 SOLUTION TOPICAL DAILY
Refills: 0 | Status: DISCONTINUED | OUTPATIENT
Start: 2024-01-01 | End: 2024-01-01

## 2024-01-01 RX ORDER — DIAZEPAM 5 MG
5 TABLET ORAL
Refills: 0 | Status: DISCONTINUED | OUTPATIENT
Start: 2024-01-01 | End: 2024-01-01

## 2024-01-01 RX ORDER — CHLORHEXIDINE GLUCONATE 213 G/1000ML
1 SOLUTION TOPICAL
Refills: 0 | Status: DISCONTINUED | OUTPATIENT
Start: 2024-01-01 | End: 2024-01-01

## 2024-01-01 RX ORDER — HYDROCORTISONE 20 MG
50 TABLET ORAL EVERY 8 HOURS
Refills: 0 | Status: DISCONTINUED | OUTPATIENT
Start: 2024-01-01 | End: 2024-01-01

## 2024-01-01 RX ORDER — POTASSIUM CHLORIDE 20 MEQ
10 PACKET (EA) ORAL
Refills: 0 | Status: COMPLETED | OUTPATIENT
Start: 2024-01-01 | End: 2024-01-01

## 2024-01-01 RX ORDER — SODIUM CHLORIDE 9 MG/ML
10 INJECTION INTRAMUSCULAR; INTRAVENOUS; SUBCUTANEOUS
Refills: 0 | Status: DISCONTINUED | OUTPATIENT
Start: 2024-01-01 | End: 2024-01-01

## 2024-01-01 RX ADMIN — Medication 975 MILLIGRAM(S): at 13:04

## 2024-01-01 RX ADMIN — FENTANYL CITRATE 50 MICROGRAM(S): 50 INJECTION INTRAVENOUS at 00:00

## 2024-01-01 RX ADMIN — FENTANYL CITRATE 50 MICROGRAM(S): 50 INJECTION INTRAVENOUS at 05:45

## 2024-01-01 RX ADMIN — CHLORHEXIDINE GLUCONATE 1 APPLICATION(S): 213 SOLUTION TOPICAL at 05:41

## 2024-01-01 RX ADMIN — HYDROMORPHONE HYDROCHLORIDE 0.5 MILLIGRAM(S): 2 INJECTION INTRAMUSCULAR; INTRAVENOUS; SUBCUTANEOUS at 17:49

## 2024-01-01 RX ADMIN — CHLORHEXIDINE GLUCONATE 1 APPLICATION(S): 213 SOLUTION TOPICAL at 11:38

## 2024-01-01 RX ADMIN — Medication 1 MILLIGRAM(S): at 11:53

## 2024-01-01 RX ADMIN — Medication 500 MILLIGRAM(S): at 17:43

## 2024-01-01 RX ADMIN — Medication 4.39 MICROGRAM(S)/KG/MIN: at 07:48

## 2024-01-01 RX ADMIN — LIDOCAINE 1 PATCH: 4 CREAM TOPICAL at 20:08

## 2024-01-01 RX ADMIN — PANTOPRAZOLE SODIUM 40 MILLIGRAM(S): 20 TABLET, DELAYED RELEASE ORAL at 17:36

## 2024-01-01 RX ADMIN — LOSARTAN POTASSIUM 100 MILLIGRAM(S): 100 TABLET, FILM COATED ORAL at 05:16

## 2024-01-01 RX ADMIN — Medication 5 MILLIGRAM(S): at 05:29

## 2024-01-01 RX ADMIN — Medication 400 MILLIGRAM(S): at 10:59

## 2024-01-01 RX ADMIN — ONDANSETRON 4 MILLIGRAM(S): 8 TABLET, FILM COATED ORAL at 16:01

## 2024-01-01 RX ADMIN — LIDOCAINE 1 PATCH: 4 CREAM TOPICAL at 20:26

## 2024-01-01 RX ADMIN — Medication 975 MILLIGRAM(S): at 05:13

## 2024-01-01 RX ADMIN — Medication 4.39 MICROGRAM(S)/KG/MIN: at 01:24

## 2024-01-01 RX ADMIN — CHLORHEXIDINE GLUCONATE 1 APPLICATION(S): 213 SOLUTION TOPICAL at 06:07

## 2024-01-01 RX ADMIN — Medication 1 TABLET(S): at 13:24

## 2024-01-01 RX ADMIN — HYDROMORPHONE HYDROCHLORIDE 0.5 MILLIGRAM(S): 2 INJECTION INTRAMUSCULAR; INTRAVENOUS; SUBCUTANEOUS at 11:30

## 2024-01-01 RX ADMIN — CEFTRIAXONE 100 MILLIGRAM(S): 500 INJECTION, POWDER, FOR SOLUTION INTRAMUSCULAR; INTRAVENOUS at 09:56

## 2024-01-01 RX ADMIN — Medication 1 CAPSULE(S): at 11:42

## 2024-01-01 RX ADMIN — Medication 100 GRAM(S): at 01:45

## 2024-01-01 RX ADMIN — HYDROMORPHONE HYDROCHLORIDE 0.5 MILLIGRAM(S): 2 INJECTION INTRAMUSCULAR; INTRAVENOUS; SUBCUTANEOUS at 23:57

## 2024-01-01 RX ADMIN — Medication 1000 MILLIGRAM(S): at 02:27

## 2024-01-01 RX ADMIN — CHLORHEXIDINE GLUCONATE 1 APPLICATION(S): 213 SOLUTION TOPICAL at 05:20

## 2024-01-01 RX ADMIN — Medication 975 MILLIGRAM(S): at 21:08

## 2024-01-01 RX ADMIN — LIDOCAINE 1 PATCH: 4 CREAM TOPICAL at 19:21

## 2024-01-01 RX ADMIN — FENTANYL CITRATE 50 MICROGRAM(S): 50 INJECTION INTRAVENOUS at 05:15

## 2024-01-01 RX ADMIN — ENOXAPARIN SODIUM 40 MILLIGRAM(S): 100 INJECTION SUBCUTANEOUS at 05:17

## 2024-01-01 RX ADMIN — HEPARIN SODIUM 5000 UNIT(S): 5000 INJECTION INTRAVENOUS; SUBCUTANEOUS at 05:14

## 2024-01-01 RX ADMIN — MIDODRINE HYDROCHLORIDE 20 MILLIGRAM(S): 2.5 TABLET ORAL at 08:32

## 2024-01-01 RX ADMIN — CEFTRIAXONE 100 MILLIGRAM(S): 500 INJECTION, POWDER, FOR SOLUTION INTRAMUSCULAR; INTRAVENOUS at 00:23

## 2024-01-01 RX ADMIN — ENOXAPARIN SODIUM 40 MILLIGRAM(S): 100 INJECTION SUBCUTANEOUS at 06:09

## 2024-01-01 RX ADMIN — MEROPENEM 100 MILLIGRAM(S): 1 INJECTION INTRAVENOUS at 05:24

## 2024-01-01 RX ADMIN — Medication 500 MILLIGRAM(S): at 05:25

## 2024-01-01 RX ADMIN — CELECOXIB 100 MILLIGRAM(S): 200 CAPSULE ORAL at 11:43

## 2024-01-01 RX ADMIN — Medication 1 CAPSULE(S): at 12:01

## 2024-01-01 RX ADMIN — Medication 500 MILLIGRAM(S): at 05:41

## 2024-01-01 RX ADMIN — CHLORHEXIDINE GLUCONATE 1 APPLICATION(S): 213 SOLUTION TOPICAL at 12:22

## 2024-01-01 RX ADMIN — Medication 5 MILLIGRAM(S): at 13:15

## 2024-01-01 RX ADMIN — HYDROMORPHONE HYDROCHLORIDE 0.5 MILLIGRAM(S): 2 INJECTION INTRAMUSCULAR; INTRAVENOUS; SUBCUTANEOUS at 01:37

## 2024-01-01 RX ADMIN — Medication 250 MILLIGRAM(S): at 12:12

## 2024-01-01 RX ADMIN — LIDOCAINE 1 PATCH: 4 CREAM TOPICAL at 20:00

## 2024-01-01 RX ADMIN — MEROPENEM 100 MILLIGRAM(S): 1 INJECTION INTRAVENOUS at 05:38

## 2024-01-01 RX ADMIN — CHLORHEXIDINE GLUCONATE 1 APPLICATION(S): 213 SOLUTION TOPICAL at 05:53

## 2024-01-01 RX ADMIN — Medication 400 MILLIGRAM(S): at 15:58

## 2024-01-01 RX ADMIN — Medication 50 MILLIGRAM(S): at 11:37

## 2024-01-01 RX ADMIN — LIDOCAINE 1 PATCH: 4 CREAM TOPICAL at 20:35

## 2024-01-01 RX ADMIN — HYDROMORPHONE HYDROCHLORIDE 0.5 MILLIGRAM(S): 2 INJECTION INTRAMUSCULAR; INTRAVENOUS; SUBCUTANEOUS at 06:11

## 2024-01-01 RX ADMIN — Medication 100 MILLIGRAM(S): at 14:32

## 2024-01-01 RX ADMIN — Medication 975 MILLIGRAM(S): at 10:05

## 2024-01-01 RX ADMIN — HYDROMORPHONE HYDROCHLORIDE 0.5 MILLIGRAM(S): 2 INJECTION INTRAMUSCULAR; INTRAVENOUS; SUBCUTANEOUS at 18:14

## 2024-01-01 RX ADMIN — CHLORHEXIDINE GLUCONATE 1 APPLICATION(S): 213 SOLUTION TOPICAL at 05:39

## 2024-01-01 RX ADMIN — Medication 1 MILLIGRAM(S): at 11:42

## 2024-01-01 RX ADMIN — Medication 25 GRAM(S): at 05:42

## 2024-01-01 RX ADMIN — HEPARIN SODIUM 5000 UNIT(S): 5000 INJECTION INTRAVENOUS; SUBCUTANEOUS at 05:24

## 2024-01-01 RX ADMIN — Medication 20 MILLIEQUIVALENT(S): at 11:08

## 2024-01-01 RX ADMIN — Medication 5 MILLIGRAM(S): at 17:28

## 2024-01-01 RX ADMIN — Medication 1 TABLET(S): at 12:12

## 2024-01-01 RX ADMIN — Medication 1 CAPSULE(S): at 14:15

## 2024-01-01 RX ADMIN — HEPARIN SODIUM 5000 UNIT(S): 5000 INJECTION INTRAVENOUS; SUBCUTANEOUS at 17:54

## 2024-01-01 RX ADMIN — CHLORHEXIDINE GLUCONATE 1 APPLICATION(S): 213 SOLUTION TOPICAL at 10:39

## 2024-01-01 RX ADMIN — CEFTRIAXONE 100 MILLIGRAM(S): 500 INJECTION, POWDER, FOR SOLUTION INTRAMUSCULAR; INTRAVENOUS at 09:16

## 2024-01-01 RX ADMIN — Medication 5 MILLIGRAM(S): at 06:08

## 2024-01-01 RX ADMIN — Medication 100 MILLIGRAM(S): at 13:37

## 2024-01-01 RX ADMIN — PANTOPRAZOLE SODIUM 40 MILLIGRAM(S): 20 TABLET, DELAYED RELEASE ORAL at 11:41

## 2024-01-01 RX ADMIN — HYDROMORPHONE HYDROCHLORIDE 0.5 MILLIGRAM(S): 2 INJECTION INTRAMUSCULAR; INTRAVENOUS; SUBCUTANEOUS at 23:23

## 2024-01-01 RX ADMIN — SODIUM CHLORIDE 75 MILLILITER(S): 9 INJECTION INTRAMUSCULAR; INTRAVENOUS; SUBCUTANEOUS at 18:11

## 2024-01-01 RX ADMIN — Medication 100 MILLIGRAM(S): at 22:05

## 2024-01-01 RX ADMIN — Medication 100 MILLIGRAM(S): at 17:25

## 2024-01-01 RX ADMIN — Medication 1 MILLIGRAM(S): at 12:01

## 2024-01-01 RX ADMIN — Medication 1 CAPSULE(S): at 08:46

## 2024-01-01 RX ADMIN — Medication 1 CAPSULE(S): at 11:38

## 2024-01-01 RX ADMIN — TRAMADOL HYDROCHLORIDE 25 MILLIGRAM(S): 50 TABLET ORAL at 06:30

## 2024-01-01 RX ADMIN — Medication 25 MILLIGRAM(S): at 21:13

## 2024-01-01 RX ADMIN — FLUDROCORTISONE ACETATE 0.1 MILLIGRAM(S): 0.1 TABLET ORAL at 05:15

## 2024-01-01 RX ADMIN — Medication 50 MILLIGRAM(S): at 05:41

## 2024-01-01 RX ADMIN — Medication 40 MILLIEQUIVALENT(S): at 14:26

## 2024-01-01 RX ADMIN — Medication 5 MILLIGRAM(S): at 06:02

## 2024-01-01 RX ADMIN — HYDROMORPHONE HYDROCHLORIDE 0.5 MILLIGRAM(S): 2 INJECTION INTRAMUSCULAR; INTRAVENOUS; SUBCUTANEOUS at 04:35

## 2024-01-01 RX ADMIN — HYDROMORPHONE HYDROCHLORIDE 0.5 MILLIGRAM(S): 2 INJECTION INTRAMUSCULAR; INTRAVENOUS; SUBCUTANEOUS at 05:56

## 2024-01-01 RX ADMIN — SODIUM CHLORIDE 750 MILLILITER(S): 9 INJECTION, SOLUTION INTRAVENOUS at 08:30

## 2024-01-01 RX ADMIN — Medication 975 MILLIGRAM(S): at 09:17

## 2024-01-01 RX ADMIN — HYDROMORPHONE HYDROCHLORIDE 0.5 MILLIGRAM(S): 2 INJECTION INTRAMUSCULAR; INTRAVENOUS; SUBCUTANEOUS at 02:48

## 2024-01-01 RX ADMIN — HYDROMORPHONE HYDROCHLORIDE 0.5 MILLIGRAM(S): 2 INJECTION INTRAMUSCULAR; INTRAVENOUS; SUBCUTANEOUS at 22:27

## 2024-01-01 RX ADMIN — LIDOCAINE 1 PATCH: 4 CREAM TOPICAL at 06:40

## 2024-01-01 RX ADMIN — Medication 500 MILLIGRAM(S): at 06:01

## 2024-01-01 RX ADMIN — Medication 500 MILLIGRAM(S): at 05:10

## 2024-01-01 RX ADMIN — PANTOPRAZOLE SODIUM 40 MILLIGRAM(S): 20 TABLET, DELAYED RELEASE ORAL at 05:22

## 2024-01-01 RX ADMIN — CHLORHEXIDINE GLUCONATE 1 APPLICATION(S): 213 SOLUTION TOPICAL at 06:04

## 2024-01-01 RX ADMIN — Medication 1000 MILLIGRAM(S): at 03:19

## 2024-01-01 RX ADMIN — HEPARIN SODIUM 5000 UNIT(S): 5000 INJECTION INTRAVENOUS; SUBCUTANEOUS at 05:29

## 2024-01-01 RX ADMIN — Medication 975 MILLIGRAM(S): at 10:08

## 2024-01-01 RX ADMIN — Medication 125 MILLILITER(S): at 05:28

## 2024-01-01 RX ADMIN — Medication 50 MILLIGRAM(S): at 11:02

## 2024-01-01 RX ADMIN — Medication 50 MILLIGRAM(S): at 17:13

## 2024-01-01 RX ADMIN — ENOXAPARIN SODIUM 40 MILLIGRAM(S): 100 INJECTION SUBCUTANEOUS at 05:51

## 2024-01-01 RX ADMIN — Medication 5 MILLIGRAM(S): at 17:20

## 2024-01-01 RX ADMIN — PANTOPRAZOLE SODIUM 40 MILLIGRAM(S): 20 TABLET, DELAYED RELEASE ORAL at 17:06

## 2024-01-01 RX ADMIN — Medication 500 MILLIGRAM(S): at 05:33

## 2024-01-01 RX ADMIN — CEFTRIAXONE 100 MILLIGRAM(S): 500 INJECTION, POWDER, FOR SOLUTION INTRAMUSCULAR; INTRAVENOUS at 12:18

## 2024-01-01 RX ADMIN — MUPIROCIN 1 APPLICATION(S): 20 OINTMENT TOPICAL at 17:06

## 2024-01-01 RX ADMIN — Medication 20 MILLIEQUIVALENT(S): at 11:52

## 2024-01-01 RX ADMIN — Medication 100 MILLIGRAM(S): at 05:22

## 2024-01-01 RX ADMIN — PANTOPRAZOLE SODIUM 40 MILLIGRAM(S): 20 TABLET, DELAYED RELEASE ORAL at 18:25

## 2024-01-01 RX ADMIN — PANTOPRAZOLE SODIUM 40 MILLIGRAM(S): 20 TABLET, DELAYED RELEASE ORAL at 05:16

## 2024-01-01 RX ADMIN — Medication 280 MILLIGRAM(S): at 14:40

## 2024-01-01 RX ADMIN — PANTOPRAZOLE SODIUM 40 MILLIGRAM(S): 20 TABLET, DELAYED RELEASE ORAL at 17:27

## 2024-01-01 RX ADMIN — Medication 100 MILLIGRAM(S): at 14:01

## 2024-01-01 RX ADMIN — HYDROMORPHONE HYDROCHLORIDE 0.5 MILLIGRAM(S): 2 INJECTION INTRAMUSCULAR; INTRAVENOUS; SUBCUTANEOUS at 05:41

## 2024-01-01 RX ADMIN — HEPARIN SODIUM 5000 UNIT(S): 5000 INJECTION INTRAVENOUS; SUBCUTANEOUS at 05:41

## 2024-01-01 RX ADMIN — ENOXAPARIN SODIUM 40 MILLIGRAM(S): 100 INJECTION SUBCUTANEOUS at 05:33

## 2024-01-01 RX ADMIN — Medication 5 MILLIGRAM(S): at 17:16

## 2024-01-01 RX ADMIN — Medication 1 CAPSULE(S): at 09:44

## 2024-01-01 RX ADMIN — Medication 975 MILLIGRAM(S): at 05:43

## 2024-01-01 RX ADMIN — CHLORHEXIDINE GLUCONATE 1 APPLICATION(S): 213 SOLUTION TOPICAL at 11:05

## 2024-01-01 RX ADMIN — LIDOCAINE 1 PATCH: 4 CREAM TOPICAL at 08:09

## 2024-01-01 RX ADMIN — HEPARIN SODIUM 5000 UNIT(S): 5000 INJECTION INTRAVENOUS; SUBCUTANEOUS at 06:07

## 2024-01-01 RX ADMIN — Medication 125 MILLILITER(S): at 18:11

## 2024-01-01 RX ADMIN — PANTOPRAZOLE SODIUM 40 MILLIGRAM(S): 20 TABLET, DELAYED RELEASE ORAL at 06:03

## 2024-01-01 RX ADMIN — Medication 1 CAPSULE(S): at 09:34

## 2024-01-01 RX ADMIN — HYDROMORPHONE HYDROCHLORIDE 0.5 MILLIGRAM(S): 2 INJECTION INTRAMUSCULAR; INTRAVENOUS; SUBCUTANEOUS at 05:32

## 2024-01-01 RX ADMIN — FENTANYL CITRATE 50 MICROGRAM(S): 50 INJECTION INTRAVENOUS at 00:30

## 2024-01-01 RX ADMIN — Medication 40 MILLIEQUIVALENT(S): at 18:00

## 2024-01-01 RX ADMIN — Medication 250 MILLIGRAM(S): at 12:18

## 2024-01-01 RX ADMIN — Medication 4.39 MICROGRAM(S)/KG/MIN: at 20:05

## 2024-01-01 RX ADMIN — PANTOPRAZOLE SODIUM 40 MILLIGRAM(S): 20 TABLET, DELAYED RELEASE ORAL at 17:20

## 2024-01-01 RX ADMIN — Medication 1 TABLET(S): at 11:41

## 2024-01-01 RX ADMIN — Medication 100 MILLIEQUIVALENT(S): at 17:20

## 2024-01-01 RX ADMIN — Medication 1 CAPSULE(S): at 07:48

## 2024-01-01 RX ADMIN — Medication 1 TABLET(S): at 11:09

## 2024-01-01 RX ADMIN — PANTOPRAZOLE SODIUM 40 MILLIGRAM(S): 20 TABLET, DELAYED RELEASE ORAL at 17:55

## 2024-01-01 RX ADMIN — HEPARIN SODIUM 5000 UNIT(S): 5000 INJECTION INTRAVENOUS; SUBCUTANEOUS at 06:00

## 2024-01-01 RX ADMIN — SODIUM CHLORIDE 1000 MILLILITER(S): 9 INJECTION, SOLUTION INTRAVENOUS at 23:04

## 2024-01-01 RX ADMIN — MEROPENEM 100 MILLIGRAM(S): 1 INJECTION INTRAVENOUS at 17:23

## 2024-01-01 RX ADMIN — SODIUM CHLORIDE 500 MILLILITER(S): 9 INJECTION INTRAMUSCULAR; INTRAVENOUS; SUBCUTANEOUS at 14:44

## 2024-01-01 RX ADMIN — MUPIROCIN 1 APPLICATION(S): 20 OINTMENT TOPICAL at 17:35

## 2024-01-01 RX ADMIN — OXYCODONE HYDROCHLORIDE 5 MILLIGRAM(S): 5 TABLET ORAL at 20:36

## 2024-01-01 RX ADMIN — Medication 975 MILLIGRAM(S): at 17:20

## 2024-01-01 RX ADMIN — Medication 50 MILLIGRAM(S): at 00:42

## 2024-01-01 RX ADMIN — Medication 25 MILLIGRAM(S): at 17:38

## 2024-01-01 RX ADMIN — Medication 1000 MILLIGRAM(S): at 17:45

## 2024-01-01 RX ADMIN — ATENOLOL 50 MILLIGRAM(S): 25 TABLET ORAL at 06:01

## 2024-01-01 RX ADMIN — POLYETHYLENE GLYCOL 3350 17 GRAM(S): 17 POWDER, FOR SOLUTION ORAL at 13:37

## 2024-01-01 RX ADMIN — Medication 4.39 MICROGRAM(S)/KG/MIN: at 09:40

## 2024-01-01 RX ADMIN — Medication 500 MILLIGRAM(S): at 18:08

## 2024-01-01 RX ADMIN — LIDOCAINE 1 PATCH: 4 CREAM TOPICAL at 09:00

## 2024-01-01 RX ADMIN — Medication 1 MILLIGRAM(S): at 13:36

## 2024-01-01 RX ADMIN — Medication 100 MILLIGRAM(S): at 04:43

## 2024-01-01 RX ADMIN — Medication 100 MILLIGRAM(S): at 11:32

## 2024-01-01 RX ADMIN — Medication 100 MILLIGRAM(S): at 05:54

## 2024-01-01 RX ADMIN — Medication 1000 MILLIGRAM(S): at 14:10

## 2024-01-01 RX ADMIN — OXYCODONE HYDROCHLORIDE 5 MILLIGRAM(S): 5 TABLET ORAL at 02:12

## 2024-01-01 RX ADMIN — Medication 100 MILLIGRAM(S): at 05:29

## 2024-01-01 RX ADMIN — Medication 1 TABLET(S): at 11:42

## 2024-01-01 RX ADMIN — PANTOPRAZOLE SODIUM 40 MILLIGRAM(S): 20 TABLET, DELAYED RELEASE ORAL at 17:05

## 2024-01-01 RX ADMIN — LIDOCAINE 1 PATCH: 4 CREAM TOPICAL at 19:20

## 2024-01-01 RX ADMIN — OXYCODONE HYDROCHLORIDE 5 MILLIGRAM(S): 5 TABLET ORAL at 16:39

## 2024-01-01 RX ADMIN — HYDROMORPHONE HYDROCHLORIDE 0.5 MILLIGRAM(S): 2 INJECTION INTRAMUSCULAR; INTRAVENOUS; SUBCUTANEOUS at 04:20

## 2024-01-01 RX ADMIN — Medication 1 CAPSULE(S): at 17:16

## 2024-01-01 RX ADMIN — FLUDROCORTISONE ACETATE 0.1 MILLIGRAM(S): 0.1 TABLET ORAL at 19:00

## 2024-01-01 RX ADMIN — HYDROMORPHONE HYDROCHLORIDE 2 MILLIGRAM(S): 2 INJECTION INTRAMUSCULAR; INTRAVENOUS; SUBCUTANEOUS at 07:57

## 2024-01-01 RX ADMIN — PANTOPRAZOLE SODIUM 40 MILLIGRAM(S): 20 TABLET, DELAYED RELEASE ORAL at 18:14

## 2024-01-01 RX ADMIN — Medication 250 MILLIGRAM(S): at 11:38

## 2024-01-01 RX ADMIN — Medication 500 MILLIGRAM(S): at 05:50

## 2024-01-01 RX ADMIN — Medication 100 MILLIGRAM(S): at 05:38

## 2024-01-01 RX ADMIN — Medication 100 MILLIGRAM(S): at 20:06

## 2024-01-01 RX ADMIN — Medication 25 GRAM(S): at 01:45

## 2024-01-01 RX ADMIN — Medication 500 MILLIGRAM(S): at 17:16

## 2024-01-01 RX ADMIN — HEPARIN SODIUM 5000 UNIT(S): 5000 INJECTION INTRAVENOUS; SUBCUTANEOUS at 05:15

## 2024-01-01 RX ADMIN — ATENOLOL 50 MILLIGRAM(S): 25 TABLET ORAL at 05:33

## 2024-01-01 RX ADMIN — HYDROMORPHONE HYDROCHLORIDE 0.5 MILLIGRAM(S): 2 INJECTION INTRAMUSCULAR; INTRAVENOUS; SUBCUTANEOUS at 05:48

## 2024-01-01 RX ADMIN — LOSARTAN POTASSIUM 100 MILLIGRAM(S): 100 TABLET, FILM COATED ORAL at 05:50

## 2024-01-01 RX ADMIN — PANTOPRAZOLE SODIUM 40 MILLIGRAM(S): 20 TABLET, DELAYED RELEASE ORAL at 17:42

## 2024-01-01 RX ADMIN — LOSARTAN POTASSIUM 100 MILLIGRAM(S): 100 TABLET, FILM COATED ORAL at 05:32

## 2024-01-01 RX ADMIN — Medication 1 TABLET(S): at 11:53

## 2024-01-01 RX ADMIN — SODIUM CHLORIDE 1000 MILLILITER(S): 9 INJECTION, SOLUTION INTRAVENOUS at 09:56

## 2024-01-01 RX ADMIN — Medication 975 MILLIGRAM(S): at 13:36

## 2024-01-01 RX ADMIN — MEROPENEM 100 MILLIGRAM(S): 1 INJECTION INTRAVENOUS at 05:28

## 2024-01-01 RX ADMIN — Medication 700 MILLIGRAM(S): at 10:20

## 2024-01-01 RX ADMIN — HEPARIN SODIUM 5000 UNIT(S): 5000 INJECTION INTRAVENOUS; SUBCUTANEOUS at 17:32

## 2024-01-01 RX ADMIN — Medication 50 MILLILITER(S): at 09:40

## 2024-01-01 RX ADMIN — Medication 975 MILLIGRAM(S): at 13:15

## 2024-01-01 RX ADMIN — Medication 250 MILLIGRAM(S): at 13:24

## 2024-01-01 RX ADMIN — PANTOPRAZOLE SODIUM 40 MILLIGRAM(S): 20 TABLET, DELAYED RELEASE ORAL at 05:38

## 2024-01-01 RX ADMIN — LIDOCAINE 1 PATCH: 4 CREAM TOPICAL at 08:51

## 2024-01-01 RX ADMIN — LIDOCAINE 1 PATCH: 4 CREAM TOPICAL at 08:53

## 2024-01-01 RX ADMIN — Medication 4.39 MICROGRAM(S)/KG/MIN: at 21:03

## 2024-01-01 RX ADMIN — Medication 250 MILLIGRAM(S): at 17:32

## 2024-01-01 RX ADMIN — Medication 4.39 MICROGRAM(S)/KG/MIN: at 03:30

## 2024-01-01 RX ADMIN — Medication 500 MILLIGRAM(S): at 17:26

## 2024-01-01 RX ADMIN — MIDODRINE HYDROCHLORIDE 10 MILLIGRAM(S): 2.5 TABLET ORAL at 18:50

## 2024-01-01 RX ADMIN — Medication 50 MILLILITER(S): at 15:27

## 2024-01-01 RX ADMIN — HYDROMORPHONE HYDROCHLORIDE 0.5 MILLIGRAM(S): 2 INJECTION INTRAMUSCULAR; INTRAVENOUS; SUBCUTANEOUS at 12:04

## 2024-01-01 RX ADMIN — PANTOPRAZOLE SODIUM 40 MILLIGRAM(S): 20 TABLET, DELAYED RELEASE ORAL at 11:06

## 2024-01-01 RX ADMIN — SODIUM CHLORIDE 1000 MILLILITER(S): 9 INJECTION, SOLUTION INTRAVENOUS at 09:00

## 2024-01-01 RX ADMIN — OXYCODONE HYDROCHLORIDE 5 MILLIGRAM(S): 5 TABLET ORAL at 02:42

## 2024-01-01 RX ADMIN — CHLORHEXIDINE GLUCONATE 1 APPLICATION(S): 213 SOLUTION TOPICAL at 05:37

## 2024-01-01 RX ADMIN — Medication 50 MILLIGRAM(S): at 00:53

## 2024-01-01 RX ADMIN — PANTOPRAZOLE SODIUM 40 MILLIGRAM(S): 20 TABLET, DELAYED RELEASE ORAL at 17:25

## 2024-01-01 RX ADMIN — Medication 1 CAPSULE(S): at 17:27

## 2024-01-01 RX ADMIN — HYDROMORPHONE HYDROCHLORIDE 0.5 MILLIGRAM(S): 2 INJECTION INTRAMUSCULAR; INTRAVENOUS; SUBCUTANEOUS at 23:18

## 2024-01-01 RX ADMIN — Medication 5 MILLIGRAM(S): at 17:37

## 2024-01-01 RX ADMIN — HYDROMORPHONE HYDROCHLORIDE 0.5 MILLIGRAM(S): 2 INJECTION INTRAMUSCULAR; INTRAVENOUS; SUBCUTANEOUS at 17:59

## 2024-01-01 RX ADMIN — PANTOPRAZOLE SODIUM 40 MILLIGRAM(S): 20 TABLET, DELAYED RELEASE ORAL at 06:15

## 2024-01-01 RX ADMIN — HYDROMORPHONE HYDROCHLORIDE 0.5 MILLIGRAM(S): 2 INJECTION INTRAMUSCULAR; INTRAVENOUS; SUBCUTANEOUS at 16:10

## 2024-01-01 RX ADMIN — Medication 500 MILLIGRAM(S): at 17:27

## 2024-01-01 RX ADMIN — QUETIAPINE FUMARATE 12.5 MILLIGRAM(S): 200 TABLET, FILM COATED ORAL at 21:14

## 2024-01-01 RX ADMIN — CEFEPIME 100 MILLIGRAM(S): 1 INJECTION, POWDER, FOR SOLUTION INTRAMUSCULAR; INTRAVENOUS at 16:29

## 2024-01-01 RX ADMIN — Medication 1 CAPSULE(S): at 11:32

## 2024-01-01 RX ADMIN — MEROPENEM 100 MILLIGRAM(S): 1 INJECTION INTRAVENOUS at 17:36

## 2024-01-01 RX ADMIN — HYDROMORPHONE HYDROCHLORIDE 0.5 MILLIGRAM(S): 2 INJECTION INTRAMUSCULAR; INTRAVENOUS; SUBCUTANEOUS at 17:56

## 2024-01-01 RX ADMIN — CHLORHEXIDINE GLUCONATE 1 APPLICATION(S): 213 SOLUTION TOPICAL at 05:24

## 2024-01-01 RX ADMIN — Medication 400 MILLIGRAM(S): at 01:57

## 2024-01-01 RX ADMIN — Medication 975 MILLIGRAM(S): at 02:24

## 2024-01-01 RX ADMIN — MEROPENEM 100 MILLIGRAM(S): 1 INJECTION INTRAVENOUS at 17:42

## 2024-01-01 RX ADMIN — Medication 1 TABLET(S): at 12:29

## 2024-01-01 RX ADMIN — CHLORHEXIDINE GLUCONATE 1 APPLICATION(S): 213 SOLUTION TOPICAL at 11:07

## 2024-01-01 RX ADMIN — MEROPENEM 100 MILLIGRAM(S): 1 INJECTION INTRAVENOUS at 15:22

## 2024-01-01 RX ADMIN — HYDROMORPHONE HYDROCHLORIDE 0.5 MILLIGRAM(S): 2 INJECTION INTRAMUSCULAR; INTRAVENOUS; SUBCUTANEOUS at 18:04

## 2024-01-01 RX ADMIN — MEROPENEM 100 MILLIGRAM(S): 1 INJECTION INTRAVENOUS at 05:12

## 2024-01-01 RX ADMIN — Medication 700 MILLIGRAM(S): at 15:03

## 2024-01-01 RX ADMIN — Medication 100 MILLIGRAM(S): at 22:30

## 2024-01-01 RX ADMIN — CEFTRIAXONE 100 MILLIGRAM(S): 500 INJECTION, POWDER, FOR SOLUTION INTRAMUSCULAR; INTRAVENOUS at 09:11

## 2024-01-01 RX ADMIN — Medication 1 TABLET(S): at 11:38

## 2024-01-01 RX ADMIN — CHLORHEXIDINE GLUCONATE 1 APPLICATION(S): 213 SOLUTION TOPICAL at 12:29

## 2024-01-01 RX ADMIN — Medication 500 MILLIGRAM(S): at 17:21

## 2024-01-01 RX ADMIN — Medication 250 MILLIGRAM(S): at 11:53

## 2024-01-01 RX ADMIN — ATENOLOL 50 MILLIGRAM(S): 25 TABLET ORAL at 05:50

## 2024-01-01 RX ADMIN — MUPIROCIN 1 APPLICATION(S): 20 OINTMENT TOPICAL at 05:28

## 2024-01-01 RX ADMIN — Medication 975 MILLIGRAM(S): at 22:08

## 2024-01-01 RX ADMIN — Medication 975 MILLIGRAM(S): at 09:05

## 2024-01-01 RX ADMIN — Medication 400 MILLIGRAM(S): at 17:25

## 2024-01-01 RX ADMIN — Medication 50 MILLIGRAM(S): at 11:32

## 2024-01-01 RX ADMIN — PANTOPRAZOLE SODIUM 40 MILLIGRAM(S): 20 TABLET, DELAYED RELEASE ORAL at 06:00

## 2024-01-01 RX ADMIN — Medication 50 MILLIGRAM(S): at 06:00

## 2024-01-01 RX ADMIN — CELECOXIB 100 MILLIGRAM(S): 200 CAPSULE ORAL at 13:29

## 2024-01-01 RX ADMIN — HYDROMORPHONE HYDROCHLORIDE 0.5 MILLIGRAM(S): 2 INJECTION INTRAMUSCULAR; INTRAVENOUS; SUBCUTANEOUS at 05:20

## 2024-01-01 RX ADMIN — HEPARIN SODIUM 5000 UNIT(S): 5000 INJECTION INTRAVENOUS; SUBCUTANEOUS at 18:49

## 2024-01-01 RX ADMIN — HYDROMORPHONE HYDROCHLORIDE 0.5 MILLIGRAM(S): 2 INJECTION INTRAMUSCULAR; INTRAVENOUS; SUBCUTANEOUS at 12:40

## 2024-01-01 RX ADMIN — ATENOLOL 50 MILLIGRAM(S): 25 TABLET ORAL at 05:12

## 2024-01-01 RX ADMIN — FENTANYL CITRATE 50 MICROGRAM(S): 50 INJECTION INTRAVENOUS at 23:10

## 2024-01-01 RX ADMIN — CELECOXIB 100 MILLIGRAM(S): 200 CAPSULE ORAL at 12:07

## 2024-01-01 RX ADMIN — PANTOPRAZOLE SODIUM 40 MILLIGRAM(S): 20 TABLET, DELAYED RELEASE ORAL at 05:57

## 2024-01-01 RX ADMIN — FLUDROCORTISONE ACETATE 0.1 MILLIGRAM(S): 0.1 TABLET ORAL at 06:00

## 2024-01-01 RX ADMIN — CEFTRIAXONE 100 MILLIGRAM(S): 500 INJECTION, POWDER, FOR SOLUTION INTRAMUSCULAR; INTRAVENOUS at 11:05

## 2024-01-01 RX ADMIN — PANTOPRAZOLE SODIUM 40 MILLIGRAM(S): 20 TABLET, DELAYED RELEASE ORAL at 12:17

## 2024-01-01 RX ADMIN — PANTOPRAZOLE SODIUM 40 MILLIGRAM(S): 20 TABLET, DELAYED RELEASE ORAL at 05:53

## 2024-01-01 RX ADMIN — Medication 1 TABLET(S): at 12:44

## 2024-01-01 RX ADMIN — Medication 50 MILLIGRAM(S): at 13:24

## 2024-01-01 RX ADMIN — Medication 25 MILLIGRAM(S): at 05:14

## 2024-01-01 RX ADMIN — HYDROMORPHONE HYDROCHLORIDE 0.5 MILLIGRAM(S): 2 INJECTION INTRAMUSCULAR; INTRAVENOUS; SUBCUTANEOUS at 23:03

## 2024-01-01 RX ADMIN — PANTOPRAZOLE SODIUM 40 MILLIGRAM(S): 20 TABLET, DELAYED RELEASE ORAL at 05:29

## 2024-01-01 RX ADMIN — Medication 5 MILLIGRAM(S): at 17:27

## 2024-01-01 RX ADMIN — Medication 1 MILLIGRAM(S): at 14:14

## 2024-01-01 RX ADMIN — Medication 1 CAPSULE(S): at 13:24

## 2024-01-01 RX ADMIN — Medication 975 MILLIGRAM(S): at 05:09

## 2024-01-01 RX ADMIN — Medication 4.39 MICROGRAM(S)/KG/MIN: at 12:56

## 2024-01-01 RX ADMIN — LOSARTAN POTASSIUM 100 MILLIGRAM(S): 100 TABLET, FILM COATED ORAL at 05:25

## 2024-01-01 RX ADMIN — Medication 5 MILLIGRAM(S): at 05:50

## 2024-01-01 RX ADMIN — HYDROMORPHONE HYDROCHLORIDE 0.5 MILLIGRAM(S): 2 INJECTION INTRAMUSCULAR; INTRAVENOUS; SUBCUTANEOUS at 16:16

## 2024-01-01 RX ADMIN — Medication 1 CAPSULE(S): at 17:31

## 2024-01-01 RX ADMIN — Medication 25 MILLIGRAM(S): at 05:16

## 2024-01-01 RX ADMIN — Medication 100 MILLIEQUIVALENT(S): at 15:26

## 2024-01-01 RX ADMIN — Medication 975 MILLIGRAM(S): at 16:05

## 2024-01-01 RX ADMIN — Medication 1 CAPSULE(S): at 13:36

## 2024-01-01 RX ADMIN — MEROPENEM 100 MILLIGRAM(S): 1 INJECTION INTRAVENOUS at 17:20

## 2024-01-01 RX ADMIN — Medication 250 MILLIGRAM(S): at 16:40

## 2024-01-01 RX ADMIN — Medication 20 MILLIEQUIVALENT(S): at 12:06

## 2024-01-01 RX ADMIN — Medication 5 MILLIGRAM(S): at 05:41

## 2024-01-01 RX ADMIN — Medication 50 MILLIGRAM(S): at 05:16

## 2024-01-01 RX ADMIN — HYDROMORPHONE HYDROCHLORIDE 0.5 MILLIGRAM(S): 2 INJECTION INTRAMUSCULAR; INTRAVENOUS; SUBCUTANEOUS at 23:24

## 2024-01-01 RX ADMIN — Medication 400 MILLIGRAM(S): at 16:09

## 2024-01-01 RX ADMIN — PANTOPRAZOLE SODIUM 40 MILLIGRAM(S): 20 TABLET, DELAYED RELEASE ORAL at 12:12

## 2024-01-01 RX ADMIN — Medication 5 MILLIGRAM(S): at 05:25

## 2024-01-01 RX ADMIN — CEFTRIAXONE 100 MILLIGRAM(S): 500 INJECTION, POWDER, FOR SOLUTION INTRAMUSCULAR; INTRAVENOUS at 09:38

## 2024-01-01 RX ADMIN — Medication 50 MILLIEQUIVALENT(S): at 13:51

## 2024-01-01 RX ADMIN — OXYCODONE HYDROCHLORIDE 5 MILLIGRAM(S): 5 TABLET ORAL at 20:06

## 2024-01-01 RX ADMIN — MEROPENEM 100 MILLIGRAM(S): 1 INJECTION INTRAVENOUS at 05:10

## 2024-01-01 RX ADMIN — HYDROMORPHONE HYDROCHLORIDE 0.5 MILLIGRAM(S): 2 INJECTION INTRAMUSCULAR; INTRAVENOUS; SUBCUTANEOUS at 01:00

## 2024-01-01 RX ADMIN — Medication 250 MILLIGRAM(S): at 12:44

## 2024-01-01 RX ADMIN — Medication 250 MILLIGRAM(S): at 11:41

## 2024-01-01 RX ADMIN — Medication 50 MILLILITER(S): at 12:19

## 2024-01-01 RX ADMIN — HYDROMORPHONE HYDROCHLORIDE 0.5 MILLIGRAM(S): 2 INJECTION INTRAMUSCULAR; INTRAVENOUS; SUBCUTANEOUS at 04:50

## 2024-01-01 RX ADMIN — Medication 1 CAPSULE(S): at 09:10

## 2024-01-01 RX ADMIN — Medication 500 MILLIGRAM(S): at 17:20

## 2024-01-01 RX ADMIN — HEPARIN SODIUM 5000 UNIT(S): 5000 INJECTION INTRAVENOUS; SUBCUTANEOUS at 17:18

## 2024-01-01 RX ADMIN — Medication 1 TABLET(S): at 11:05

## 2024-01-01 RX ADMIN — HYDROMORPHONE HYDROCHLORIDE 2 MILLIGRAM(S): 2 INJECTION INTRAMUSCULAR; INTRAVENOUS; SUBCUTANEOUS at 07:27

## 2024-01-01 RX ADMIN — Medication 100 MILLIGRAM(S): at 06:15

## 2024-01-01 RX ADMIN — PANTOPRAZOLE SODIUM 40 MILLIGRAM(S): 20 TABLET, DELAYED RELEASE ORAL at 11:53

## 2024-01-01 RX ADMIN — Medication 100 MEQ/KG/HR: at 17:16

## 2024-01-01 RX ADMIN — CEFTRIAXONE 100 MILLIGRAM(S): 500 INJECTION, POWDER, FOR SOLUTION INTRAMUSCULAR; INTRAVENOUS at 11:02

## 2024-01-01 RX ADMIN — LIDOCAINE 1 PATCH: 4 CREAM TOPICAL at 07:11

## 2024-01-01 RX ADMIN — HYDROMORPHONE HYDROCHLORIDE 0.5 MILLIGRAM(S): 2 INJECTION INTRAMUSCULAR; INTRAVENOUS; SUBCUTANEOUS at 22:54

## 2024-01-01 RX ADMIN — HYDROMORPHONE HYDROCHLORIDE 0.5 MILLIGRAM(S): 2 INJECTION INTRAMUSCULAR; INTRAVENOUS; SUBCUTANEOUS at 01:22

## 2024-01-01 RX ADMIN — Medication 20 MILLIEQUIVALENT(S): at 17:26

## 2024-01-01 RX ADMIN — LIDOCAINE 1 PATCH: 4 CREAM TOPICAL at 07:00

## 2024-01-01 RX ADMIN — Medication 975 MILLIGRAM(S): at 14:15

## 2024-01-01 RX ADMIN — MEROPENEM 100 MILLIGRAM(S): 1 INJECTION INTRAVENOUS at 17:05

## 2024-01-01 RX ADMIN — LOSARTAN POTASSIUM 100 MILLIGRAM(S): 100 TABLET, FILM COATED ORAL at 06:09

## 2024-01-01 RX ADMIN — SODIUM CHLORIDE 75 MILLILITER(S): 9 INJECTION INTRAMUSCULAR; INTRAVENOUS; SUBCUTANEOUS at 20:08

## 2024-01-01 RX ADMIN — SODIUM CHLORIDE 75 MILLILITER(S): 9 INJECTION, SOLUTION INTRAVENOUS at 14:03

## 2024-01-01 RX ADMIN — PANTOPRAZOLE SODIUM 40 MILLIGRAM(S): 20 TABLET, DELAYED RELEASE ORAL at 05:40

## 2024-01-01 RX ADMIN — HYDROMORPHONE HYDROCHLORIDE 0.5 MILLIGRAM(S): 2 INJECTION INTRAMUSCULAR; INTRAVENOUS; SUBCUTANEOUS at 13:29

## 2024-01-01 RX ADMIN — Medication 5 MILLIGRAM(S): at 17:43

## 2024-01-01 RX ADMIN — MIDODRINE HYDROCHLORIDE 10 MILLIGRAM(S): 2.5 TABLET ORAL at 13:09

## 2024-01-01 RX ADMIN — PANTOPRAZOLE SODIUM 40 MILLIGRAM(S): 20 TABLET, DELAYED RELEASE ORAL at 17:59

## 2024-01-01 RX ADMIN — Medication 400 MILLIGRAM(S): at 02:49

## 2024-01-01 RX ADMIN — Medication 1 TABLET(S): at 11:33

## 2024-01-01 RX ADMIN — Medication 50 MILLIGRAM(S): at 05:14

## 2024-01-01 RX ADMIN — MIRTAZAPINE 15 MILLIGRAM(S): 45 TABLET, ORALLY DISINTEGRATING ORAL at 21:14

## 2024-01-01 RX ADMIN — Medication 1 CAPSULE(S): at 17:20

## 2024-01-01 RX ADMIN — ENOXAPARIN SODIUM 40 MILLIGRAM(S): 100 INJECTION SUBCUTANEOUS at 05:13

## 2024-01-01 RX ADMIN — PANTOPRAZOLE SODIUM 40 MILLIGRAM(S): 20 TABLET, DELAYED RELEASE ORAL at 08:53

## 2024-01-01 RX ADMIN — PANTOPRAZOLE SODIUM 40 MILLIGRAM(S): 20 TABLET, DELAYED RELEASE ORAL at 05:09

## 2024-01-01 RX ADMIN — MIRTAZAPINE 15 MILLIGRAM(S): 45 TABLET, ORALLY DISINTEGRATING ORAL at 21:21

## 2024-01-01 RX ADMIN — HEPARIN SODIUM 5000 UNIT(S): 5000 INJECTION INTRAVENOUS; SUBCUTANEOUS at 16:37

## 2024-01-01 RX ADMIN — CEFTRIAXONE 100 MILLIGRAM(S): 500 INJECTION, POWDER, FOR SOLUTION INTRAMUSCULAR; INTRAVENOUS at 11:37

## 2024-01-01 RX ADMIN — Medication 100 MEQ/KG/HR: at 00:07

## 2024-01-01 RX ADMIN — Medication 975 MILLIGRAM(S): at 06:34

## 2024-01-01 RX ADMIN — CELECOXIB 100 MILLIGRAM(S): 200 CAPSULE ORAL at 13:07

## 2024-01-01 RX ADMIN — CHLORHEXIDINE GLUCONATE 1 APPLICATION(S): 213 SOLUTION TOPICAL at 11:43

## 2024-01-01 RX ADMIN — Medication 50 MILLIGRAM(S): at 17:23

## 2024-01-01 RX ADMIN — Medication 1 CAPSULE(S): at 18:08

## 2024-01-01 RX ADMIN — SODIUM CHLORIDE 1000 MILLILITER(S): 9 INJECTION, SOLUTION INTRAVENOUS at 00:00

## 2024-01-01 RX ADMIN — HEPARIN SODIUM 5000 UNIT(S): 5000 INJECTION INTRAVENOUS; SUBCUTANEOUS at 17:13

## 2024-01-01 RX ADMIN — Medication 5 MILLIGRAM(S): at 18:08

## 2024-01-01 RX ADMIN — LIDOCAINE 1 PATCH: 4 CREAM TOPICAL at 21:09

## 2024-01-01 RX ADMIN — Medication 975 MILLIGRAM(S): at 05:41

## 2024-01-01 RX ADMIN — Medication 975 MILLIGRAM(S): at 05:50

## 2024-01-01 RX ADMIN — SODIUM CHLORIDE 125 MILLILITER(S): 9 INJECTION INTRAMUSCULAR; INTRAVENOUS; SUBCUTANEOUS at 23:40

## 2024-01-01 RX ADMIN — LIDOCAINE 1 PATCH: 4 CREAM TOPICAL at 21:03

## 2024-01-01 RX ADMIN — Medication 400 MILLIGRAM(S): at 11:08

## 2024-01-01 RX ADMIN — LIDOCAINE 1 PATCH: 4 CREAM TOPICAL at 07:19

## 2024-01-01 RX ADMIN — Medication 1 MILLIGRAM(S): at 11:09

## 2024-01-01 RX ADMIN — OXYCODONE HYDROCHLORIDE 5 MILLIGRAM(S): 5 TABLET ORAL at 15:39

## 2024-01-01 RX ADMIN — CEFTRIAXONE 100 MILLIGRAM(S): 500 INJECTION, POWDER, FOR SOLUTION INTRAMUSCULAR; INTRAVENOUS at 11:07

## 2024-01-01 RX ADMIN — Medication 500 MILLIGRAM(S): at 17:03

## 2024-01-01 RX ADMIN — Medication 400 MILLIGRAM(S): at 10:00

## 2024-01-01 RX ADMIN — ATENOLOL 50 MILLIGRAM(S): 25 TABLET ORAL at 05:16

## 2024-01-01 RX ADMIN — Medication 5 MILLIGRAM(S): at 05:12

## 2024-01-01 RX ADMIN — Medication 50 MILLIGRAM(S): at 17:31

## 2024-01-01 RX ADMIN — Medication 100 MILLIGRAM(S): at 05:39

## 2024-01-01 RX ADMIN — Medication 975 MILLIGRAM(S): at 18:11

## 2024-01-01 RX ADMIN — MIDODRINE HYDROCHLORIDE 10 MILLIGRAM(S): 2.5 TABLET ORAL at 18:10

## 2024-01-01 RX ADMIN — HYDROMORPHONE HYDROCHLORIDE 0.5 MILLIGRAM(S): 2 INJECTION INTRAMUSCULAR; INTRAVENOUS; SUBCUTANEOUS at 18:09

## 2024-01-01 RX ADMIN — Medication 1 CAPSULE(S): at 17:18

## 2024-01-01 RX ADMIN — LIDOCAINE 1 PATCH: 4 CREAM TOPICAL at 21:56

## 2024-01-01 RX ADMIN — FLUDROCORTISONE ACETATE 0.1 MILLIGRAM(S): 0.1 TABLET ORAL at 17:02

## 2024-01-01 RX ADMIN — Medication 25 MILLIGRAM(S): at 13:08

## 2024-01-01 RX ADMIN — HEPARIN SODIUM 5000 UNIT(S): 5000 INJECTION INTRAVENOUS; SUBCUTANEOUS at 05:16

## 2024-01-01 RX ADMIN — HEPARIN SODIUM 5000 UNIT(S): 5000 INJECTION INTRAVENOUS; SUBCUTANEOUS at 17:01

## 2024-01-01 RX ADMIN — CEFTRIAXONE 100 MILLIGRAM(S): 500 INJECTION, POWDER, FOR SOLUTION INTRAMUSCULAR; INTRAVENOUS at 13:24

## 2024-01-01 RX ADMIN — CHLORHEXIDINE GLUCONATE 1 APPLICATION(S): 213 SOLUTION TOPICAL at 14:36

## 2024-01-01 RX ADMIN — Medication 0.5 MILLIGRAM(S): at 04:10

## 2024-01-01 RX ADMIN — HEPARIN SODIUM 5000 UNIT(S): 5000 INJECTION INTRAVENOUS; SUBCUTANEOUS at 17:38

## 2024-01-01 RX ADMIN — LIDOCAINE 1 PATCH: 4 CREAM TOPICAL at 07:02

## 2024-01-01 RX ADMIN — CEFTRIAXONE 100 MILLIGRAM(S): 500 INJECTION, POWDER, FOR SOLUTION INTRAMUSCULAR; INTRAVENOUS at 12:28

## 2024-01-01 RX ADMIN — LIDOCAINE 1 PATCH: 4 CREAM TOPICAL at 21:02

## 2024-01-01 RX ADMIN — Medication 1 TABLET(S): at 12:01

## 2024-01-01 RX ADMIN — Medication 280 MILLIGRAM(S): at 20:47

## 2024-01-01 RX ADMIN — CHLORHEXIDINE GLUCONATE 1 APPLICATION(S): 213 SOLUTION TOPICAL at 17:33

## 2024-01-01 RX ADMIN — LIDOCAINE 1 PATCH: 4 CREAM TOPICAL at 20:16

## 2024-01-01 RX ADMIN — Medication 250 MILLIGRAM(S): at 11:33

## 2024-01-01 RX ADMIN — Medication 100 GRAM(S): at 00:23

## 2024-01-01 RX ADMIN — Medication 4.39 MICROGRAM(S)/KG/MIN: at 06:01

## 2024-01-01 RX ADMIN — PANTOPRAZOLE SODIUM 40 MILLIGRAM(S): 20 TABLET, DELAYED RELEASE ORAL at 12:28

## 2024-01-01 RX ADMIN — ROBINUL 0.4 MILLIGRAM(S): 0.2 INJECTION INTRAMUSCULAR; INTRAVENOUS at 12:24

## 2024-01-01 RX ADMIN — Medication 1 CAPSULE(S): at 17:22

## 2024-01-01 RX ADMIN — Medication 500 MILLIGRAM(S): at 05:17

## 2024-01-01 RX ADMIN — Medication 1 CAPSULE(S): at 08:53

## 2024-01-01 RX ADMIN — Medication 5 MILLIGRAM(S): at 05:10

## 2024-01-01 RX ADMIN — HYDROMORPHONE HYDROCHLORIDE 0.5 MILLIGRAM(S): 2 INJECTION INTRAMUSCULAR; INTRAVENOUS; SUBCUTANEOUS at 13:44

## 2024-01-01 RX ADMIN — CEFTRIAXONE 100 MILLIGRAM(S): 500 INJECTION, POWDER, FOR SOLUTION INTRAMUSCULAR; INTRAVENOUS at 11:41

## 2024-01-01 RX ADMIN — Medication 100 MILLIGRAM(S): at 13:15

## 2024-01-01 RX ADMIN — SODIUM CHLORIDE 500 MILLILITER(S): 9 INJECTION INTRAMUSCULAR; INTRAVENOUS; SUBCUTANEOUS at 14:00

## 2024-01-01 RX ADMIN — LIDOCAINE 1 PATCH: 4 CREAM TOPICAL at 20:37

## 2024-01-01 RX ADMIN — Medication 500 MILLIGRAM(S): at 11:06

## 2024-01-01 RX ADMIN — CHLORHEXIDINE GLUCONATE 1 APPLICATION(S): 213 SOLUTION TOPICAL at 06:15

## 2024-01-01 RX ADMIN — Medication 50 MILLIGRAM(S): at 11:07

## 2024-01-01 RX ADMIN — DROXIDOPA 200 MILLIGRAM(S): 100 CAPSULE ORAL at 18:28

## 2024-01-01 RX ADMIN — PANTOPRAZOLE SODIUM 40 MILLIGRAM(S): 20 TABLET, DELAYED RELEASE ORAL at 05:17

## 2024-01-01 RX ADMIN — DROXIDOPA 200 MILLIGRAM(S): 100 CAPSULE ORAL at 08:27

## 2024-01-01 RX ADMIN — LOSARTAN POTASSIUM 100 MILLIGRAM(S): 100 TABLET, FILM COATED ORAL at 05:33

## 2024-01-01 RX ADMIN — Medication 400 MILLIGRAM(S): at 02:39

## 2024-01-01 RX ADMIN — MEROPENEM 100 MILLIGRAM(S): 1 INJECTION INTRAVENOUS at 06:15

## 2024-01-01 RX ADMIN — HYDROMORPHONE HYDROCHLORIDE 0.5 MILLIGRAM(S): 2 INJECTION INTRAMUSCULAR; INTRAVENOUS; SUBCUTANEOUS at 05:17

## 2024-01-01 RX ADMIN — Medication 1000 MILLIGRAM(S): at 03:09

## 2024-01-01 RX ADMIN — Medication 975 MILLIGRAM(S): at 09:38

## 2024-01-01 RX ADMIN — HYDROMORPHONE HYDROCHLORIDE 0.5 MILLIGRAM(S): 2 INJECTION INTRAMUSCULAR; INTRAVENOUS; SUBCUTANEOUS at 05:54

## 2024-01-01 RX ADMIN — POLYETHYLENE GLYCOL 3350 17 GRAM(S): 17 POWDER, FOR SOLUTION ORAL at 11:33

## 2024-01-01 RX ADMIN — Medication 700 MILLIGRAM(S): at 14:07

## 2024-01-01 RX ADMIN — QUETIAPINE FUMARATE 12.5 MILLIGRAM(S): 200 TABLET, FILM COATED ORAL at 21:21

## 2024-01-01 RX ADMIN — Medication 50 MILLIGRAM(S): at 00:37

## 2024-01-01 RX ADMIN — MEROPENEM 100 MILLIGRAM(S): 1 INJECTION INTRAVENOUS at 05:57

## 2024-01-01 RX ADMIN — Medication 10 MILLIGRAM(S): at 05:24

## 2024-01-01 RX ADMIN — ENOXAPARIN SODIUM 40 MILLIGRAM(S): 100 INJECTION SUBCUTANEOUS at 05:09

## 2024-01-01 RX ADMIN — Medication 5 MILLIGRAM(S): at 17:24

## 2024-01-01 RX ADMIN — Medication 20 MILLIEQUIVALENT(S): at 14:32

## 2024-01-01 RX ADMIN — Medication 100 MILLIGRAM(S): at 11:53

## 2024-01-01 RX ADMIN — Medication 5 MILLIGRAM(S): at 17:21

## 2024-01-01 RX ADMIN — Medication 280 MILLIGRAM(S): at 09:40

## 2024-01-01 RX ADMIN — DEXMEDETOMIDINE HYDROCHLORIDE IN 0.9% SODIUM CHLORIDE 2.34 MICROGRAM(S)/KG/HR: 4 INJECTION INTRAVENOUS at 23:34

## 2024-01-01 RX ADMIN — PANTOPRAZOLE SODIUM 40 MILLIGRAM(S): 20 TABLET, DELAYED RELEASE ORAL at 05:39

## 2024-01-01 RX ADMIN — HEPARIN SODIUM 5000 UNIT(S): 5000 INJECTION INTRAVENOUS; SUBCUTANEOUS at 05:19

## 2024-01-01 RX ADMIN — Medication 1 TABLET(S): at 11:32

## 2024-01-01 RX ADMIN — HYDROMORPHONE HYDROCHLORIDE 0.5 MILLIGRAM(S): 2 INJECTION INTRAMUSCULAR; INTRAVENOUS; SUBCUTANEOUS at 06:18

## 2024-01-01 RX ADMIN — Medication 4.39 MICROGRAM(S)/KG/MIN: at 08:01

## 2024-01-01 RX ADMIN — PANTOPRAZOLE SODIUM 40 MILLIGRAM(S): 20 TABLET, DELAYED RELEASE ORAL at 13:25

## 2024-01-01 RX ADMIN — MEROPENEM 100 MILLIGRAM(S): 1 INJECTION INTRAVENOUS at 18:49

## 2024-01-01 RX ADMIN — HEPARIN SODIUM 5000 UNIT(S): 5000 INJECTION INTRAVENOUS; SUBCUTANEOUS at 17:21

## 2024-01-01 RX ADMIN — SODIUM CHLORIDE 1000 MILLILITER(S): 9 INJECTION INTRAMUSCULAR; INTRAVENOUS; SUBCUTANEOUS at 16:07

## 2024-01-01 RX ADMIN — PANTOPRAZOLE SODIUM 40 MILLIGRAM(S): 20 TABLET, DELAYED RELEASE ORAL at 08:25

## 2024-01-01 RX ADMIN — Medication 1 TABLET(S): at 13:25

## 2024-01-01 RX ADMIN — Medication 975 MILLIGRAM(S): at 10:17

## 2024-01-01 RX ADMIN — Medication 1 CAPSULE(S): at 09:17

## 2024-01-01 RX ADMIN — FENTANYL CITRATE 50 MICROGRAM(S): 50 INJECTION INTRAVENOUS at 23:25

## 2024-01-01 RX ADMIN — Medication 1 TABLET(S): at 12:18

## 2024-01-01 RX ADMIN — Medication 50 MILLIGRAM(S): at 00:00

## 2024-01-01 RX ADMIN — LOSARTAN POTASSIUM 100 MILLIGRAM(S): 100 TABLET, FILM COATED ORAL at 05:40

## 2024-01-01 RX ADMIN — Medication 250 MILLIGRAM(S): at 12:29

## 2024-01-01 RX ADMIN — Medication 50 MILLIGRAM(S): at 15:25

## 2024-01-01 RX ADMIN — HYDROMORPHONE HYDROCHLORIDE 0.5 MILLIGRAM(S): 2 INJECTION INTRAMUSCULAR; INTRAVENOUS; SUBCUTANEOUS at 00:12

## 2024-01-01 RX ADMIN — PANTOPRAZOLE SODIUM 40 MILLIGRAM(S): 20 TABLET, DELAYED RELEASE ORAL at 05:20

## 2024-01-01 RX ADMIN — Medication 400 MILLIGRAM(S): at 13:40

## 2024-01-01 RX ADMIN — Medication 1 MILLIGRAM(S): at 11:32

## 2024-01-01 RX ADMIN — CHLORHEXIDINE GLUCONATE 1 APPLICATION(S): 213 SOLUTION TOPICAL at 05:06

## 2024-01-01 RX ADMIN — SODIUM CHLORIDE 50 MILLILITER(S): 9 INJECTION, SOLUTION INTRAVENOUS at 20:28

## 2024-01-01 RX ADMIN — CELECOXIB 100 MILLIGRAM(S): 200 CAPSULE ORAL at 12:43

## 2024-01-01 RX ADMIN — HYDROMORPHONE HYDROCHLORIDE 0.5 MILLIGRAM(S): 2 INJECTION INTRAMUSCULAR; INTRAVENOUS; SUBCUTANEOUS at 05:53

## 2024-01-01 RX ADMIN — PANTOPRAZOLE SODIUM 40 MILLIGRAM(S): 20 TABLET, DELAYED RELEASE ORAL at 05:48

## 2024-01-01 RX ADMIN — Medication 100 MILLIEQUIVALENT(S): at 14:25

## 2024-01-01 RX ADMIN — Medication 1 CAPSULE(S): at 17:23

## 2024-01-01 RX ADMIN — SODIUM CHLORIDE 1000 MILLILITER(S): 9 INJECTION INTRAMUSCULAR; INTRAVENOUS; SUBCUTANEOUS at 18:46

## 2024-01-01 RX ADMIN — Medication 500 MILLIGRAM(S): at 17:37

## 2024-01-01 RX ADMIN — CHLORHEXIDINE GLUCONATE 1 APPLICATION(S): 213 SOLUTION TOPICAL at 05:48

## 2024-01-01 RX ADMIN — HYDROMORPHONE HYDROCHLORIDE 0.5 MILLIGRAM(S): 2 INJECTION INTRAMUSCULAR; INTRAVENOUS; SUBCUTANEOUS at 12:25

## 2024-01-01 RX ADMIN — HEPARIN SODIUM 5000 UNIT(S): 5000 INJECTION INTRAVENOUS; SUBCUTANEOUS at 05:12

## 2024-01-01 RX ADMIN — Medication 20 MILLIEQUIVALENT(S): at 10:05

## 2024-01-01 RX ADMIN — ENOXAPARIN SODIUM 40 MILLIGRAM(S): 100 INJECTION SUBCUTANEOUS at 05:41

## 2024-01-01 RX ADMIN — Medication 100 MILLIGRAM(S): at 17:43

## 2024-01-01 RX ADMIN — Medication 975 MILLIGRAM(S): at 05:19

## 2024-01-01 RX ADMIN — Medication 500 MILLIGRAM(S): at 06:08

## 2024-01-01 RX ADMIN — HYDROMORPHONE HYDROCHLORIDE 0.5 MILLIGRAM(S): 2 INJECTION INTRAMUSCULAR; INTRAVENOUS; SUBCUTANEOUS at 23:19

## 2024-01-01 RX ADMIN — CEFTRIAXONE 100 MILLIGRAM(S): 500 INJECTION, POWDER, FOR SOLUTION INTRAMUSCULAR; INTRAVENOUS at 12:11

## 2024-01-01 RX ADMIN — HEPARIN SODIUM 5000 UNIT(S): 5000 INJECTION INTRAVENOUS; SUBCUTANEOUS at 17:20

## 2024-01-01 RX ADMIN — HYDROMORPHONE HYDROCHLORIDE 0.5 MILLIGRAM(S): 2 INJECTION INTRAMUSCULAR; INTRAVENOUS; SUBCUTANEOUS at 18:23

## 2024-01-01 RX ADMIN — Medication 1000 MILLIGRAM(S): at 11:30

## 2024-01-01 RX ADMIN — Medication 1 CAPSULE(S): at 12:14

## 2024-01-01 RX ADMIN — Medication 0.5 MILLIGRAM(S): at 03:38

## 2024-01-01 RX ADMIN — LIDOCAINE 1 PATCH: 4 CREAM TOPICAL at 07:39

## 2024-01-01 RX ADMIN — Medication 500 MILLIGRAM(S): at 17:24

## 2024-01-01 RX ADMIN — SODIUM CHLORIDE 1000 MILLILITER(S): 9 INJECTION, SOLUTION INTRAVENOUS at 15:28

## 2024-01-01 RX ADMIN — CHLORHEXIDINE GLUCONATE 1 APPLICATION(S): 213 SOLUTION TOPICAL at 12:42

## 2024-01-01 RX ADMIN — FLUDROCORTISONE ACETATE 0.1 MILLIGRAM(S): 0.1 TABLET ORAL at 21:22

## 2024-01-01 RX ADMIN — Medication 975 MILLIGRAM(S): at 14:04

## 2024-01-01 RX ADMIN — CEFTRIAXONE 100 MILLIGRAM(S): 500 INJECTION, POWDER, FOR SOLUTION INTRAMUSCULAR; INTRAVENOUS at 11:53

## 2024-01-01 RX ADMIN — Medication 280 MILLIGRAM(S): at 13:52

## 2024-01-01 RX ADMIN — Medication 975 MILLIGRAM(S): at 21:45

## 2024-01-01 RX ADMIN — Medication 5 MILLIGRAM(S): at 17:03

## 2024-01-01 RX ADMIN — ENOXAPARIN SODIUM 40 MILLIGRAM(S): 100 INJECTION SUBCUTANEOUS at 05:25

## 2024-01-01 RX ADMIN — PANTOPRAZOLE SODIUM 40 MILLIGRAM(S): 20 TABLET, DELAYED RELEASE ORAL at 11:02

## 2024-01-01 RX ADMIN — Medication 1 TABLET(S): at 14:15

## 2024-01-01 RX ADMIN — Medication 5 MILLIGRAM(S): at 05:33

## 2024-01-01 RX ADMIN — HYDROMORPHONE HYDROCHLORIDE 0.5 MILLIGRAM(S): 2 INJECTION INTRAMUSCULAR; INTRAVENOUS; SUBCUTANEOUS at 23:04

## 2024-01-01 RX ADMIN — SODIUM CHLORIDE 50 MILLILITER(S): 9 INJECTION, SOLUTION INTRAVENOUS at 08:13

## 2024-01-01 RX ADMIN — Medication 1 CAPSULE(S): at 07:55

## 2024-01-01 RX ADMIN — TRAMADOL HYDROCHLORIDE 25 MILLIGRAM(S): 50 TABLET ORAL at 06:06

## 2024-01-01 RX ADMIN — Medication 1 CAPSULE(S): at 16:59

## 2024-01-01 RX ADMIN — Medication 250 MILLIGRAM(S): at 00:22

## 2024-01-01 RX ADMIN — CHLORHEXIDINE GLUCONATE 1 APPLICATION(S): 213 SOLUTION TOPICAL at 18:14

## 2024-01-01 RX ADMIN — HYDROMORPHONE HYDROCHLORIDE 0.5 MILLIGRAM(S): 2 INJECTION INTRAMUSCULAR; INTRAVENOUS; SUBCUTANEOUS at 06:24

## 2024-01-01 RX ADMIN — HYDROMORPHONE HYDROCHLORIDE 0.5 MILLIGRAM(S): 2 INJECTION INTRAMUSCULAR; INTRAVENOUS; SUBCUTANEOUS at 00:24

## 2024-01-01 RX ADMIN — LOSARTAN POTASSIUM 100 MILLIGRAM(S): 100 TABLET, FILM COATED ORAL at 05:12

## 2024-01-01 RX ADMIN — Medication 4.39 MICROGRAM(S)/KG/MIN: at 17:06

## 2024-01-01 RX ADMIN — Medication 1 CAPSULE(S): at 11:52

## 2024-01-01 RX ADMIN — Medication 250 MILLIGRAM(S): at 11:32

## 2024-01-01 RX ADMIN — HYDROMORPHONE HYDROCHLORIDE 0.5 MILLIGRAM(S): 2 INJECTION INTRAMUSCULAR; INTRAVENOUS; SUBCUTANEOUS at 03:03

## 2024-01-01 RX ADMIN — Medication 40 MILLIEQUIVALENT(S): at 01:23

## 2024-01-01 RX ADMIN — TRAMADOL HYDROCHLORIDE 50 MILLIGRAM(S): 50 TABLET ORAL at 19:35

## 2024-01-01 RX ADMIN — Medication 700 MILLIGRAM(S): at 21:17

## 2024-01-01 RX ADMIN — Medication 1 MILLIGRAM(S): at 13:25

## 2024-01-01 RX ADMIN — LIDOCAINE 1 PATCH: 4 CREAM TOPICAL at 21:15

## 2024-01-01 RX ADMIN — ATENOLOL 50 MILLIGRAM(S): 25 TABLET ORAL at 05:25

## 2024-01-01 RX ADMIN — MIRTAZAPINE 15 MILLIGRAM(S): 45 TABLET, ORALLY DISINTEGRATING ORAL at 22:59

## 2024-01-01 RX ADMIN — HYDROMORPHONE HYDROCHLORIDE 0.5 MILLIGRAM(S): 2 INJECTION INTRAMUSCULAR; INTRAVENOUS; SUBCUTANEOUS at 18:38

## 2024-01-01 RX ADMIN — Medication 1 CAPSULE(S): at 08:24

## 2024-01-01 RX ADMIN — Medication 125 MILLILITER(S): at 09:13

## 2024-01-01 RX ADMIN — Medication 1 CAPSULE(S): at 17:37

## 2024-01-01 RX ADMIN — LOSARTAN POTASSIUM 100 MILLIGRAM(S): 100 TABLET, FILM COATED ORAL at 06:01

## 2024-01-01 RX ADMIN — ATENOLOL 50 MILLIGRAM(S): 25 TABLET ORAL at 05:09

## 2024-01-01 RX ADMIN — FENTANYL CITRATE 25 MICROGRAM(S): 50 INJECTION INTRAVENOUS at 14:30

## 2024-01-01 RX ADMIN — Medication 975 MILLIGRAM(S): at 18:24

## 2024-01-01 RX ADMIN — ATENOLOL 50 MILLIGRAM(S): 25 TABLET ORAL at 06:08

## 2024-01-01 RX ADMIN — HEPARIN SODIUM 5000 UNIT(S): 5000 INJECTION INTRAVENOUS; SUBCUTANEOUS at 17:39

## 2024-01-01 RX ADMIN — Medication 400 MILLIGRAM(S): at 18:30

## 2024-01-01 RX ADMIN — TRAMADOL HYDROCHLORIDE 50 MILLIGRAM(S): 50 TABLET ORAL at 00:00

## 2024-01-01 RX ADMIN — ROBINUL 0.4 MILLIGRAM(S): 0.2 INJECTION INTRAMUSCULAR; INTRAVENOUS at 18:24

## 2024-01-01 RX ADMIN — Medication 975 MILLIGRAM(S): at 06:11

## 2024-01-01 RX ADMIN — Medication 100 MILLIGRAM(S): at 22:18

## 2024-01-01 RX ADMIN — Medication 50 MILLIGRAM(S): at 05:18

## 2024-01-01 RX ADMIN — SODIUM CHLORIDE 75 MILLILITER(S): 9 INJECTION INTRAMUSCULAR; INTRAVENOUS; SUBCUTANEOUS at 19:39

## 2024-01-01 RX ADMIN — Medication 975 MILLIGRAM(S): at 17:24

## 2024-01-01 RX ADMIN — HYDROMORPHONE HYDROCHLORIDE 0.5 MILLIGRAM(S): 2 INJECTION INTRAMUSCULAR; INTRAVENOUS; SUBCUTANEOUS at 06:03

## 2024-01-01 RX ADMIN — CHLORHEXIDINE GLUCONATE 1 APPLICATION(S): 213 SOLUTION TOPICAL at 05:28

## 2024-01-01 RX ADMIN — Medication 4.39 MICROGRAM(S)/KG/MIN: at 19:20

## 2024-01-01 RX ADMIN — HEPARIN SODIUM 5000 UNIT(S): 5000 INJECTION INTRAVENOUS; SUBCUTANEOUS at 17:23

## 2024-01-01 RX ADMIN — ATENOLOL 50 MILLIGRAM(S): 25 TABLET ORAL at 05:40

## 2024-01-01 RX ADMIN — LIDOCAINE 1 PATCH: 4 CREAM TOPICAL at 21:18

## 2024-01-01 RX ADMIN — Medication 975 MILLIGRAM(S): at 05:39

## 2024-01-01 RX ADMIN — ENOXAPARIN SODIUM 40 MILLIGRAM(S): 100 INJECTION SUBCUTANEOUS at 06:02

## 2024-01-01 RX ADMIN — Medication 500 MILLIGRAM(S): at 05:12

## 2024-01-01 RX ADMIN — Medication 5 MILLIGRAM(S): at 05:16

## 2024-01-01 RX ADMIN — Medication 1 TABLET(S): at 13:36

## 2024-01-01 RX ADMIN — PANTOPRAZOLE SODIUM 40 MILLIGRAM(S): 20 TABLET, DELAYED RELEASE ORAL at 11:32

## 2024-01-01 RX ADMIN — LIDOCAINE 1 PATCH: 4 CREAM TOPICAL at 08:00

## 2024-01-01 RX ADMIN — Medication 50 MILLIGRAM(S): at 17:02

## 2024-01-01 RX ADMIN — CELECOXIB 100 MILLIGRAM(S): 200 CAPSULE ORAL at 14:00

## 2024-01-01 RX ADMIN — Medication 1 CAPSULE(S): at 17:44

## 2024-01-20 NOTE — ED ADULT NURSE REASSESSMENT NOTE - NS ED NURSE REASSESS COMMENT FT1
Pt's family member stated yuridia Durham will come and explain procedure once  is here.  is at bedside MD was made aware and will come down when he has a chance.

## 2024-01-20 NOTE — PATIENT PROFILE ADULT - FALL HARM RISK - HARM RISK INTERVENTIONS

## 2024-01-20 NOTE — ED ADULT NURSE NOTE - NSFALLHARMRISKINTERV_ED_ALL_ED
Assistance OOB with selected safe patient handling equipment if applicable/Communicate risk of Fall with Harm to all staff, patient, and family/Provide visual cue: red socks, yellow wristband, yellow gown, etc/Reinforce activity limits and safety measures with patient and family/Bed in lowest position, wheels locked, appropriate side rails in place/Call bell, personal items and telephone in reach/Instruct patient to call for assistance before getting out of bed/chair/stretcher/Non-slip footwear applied when patient is off stretcher/[place] to call system/Physically safe environment - no spills, clutter or unnecessary equipment/Purposeful Proactive Rounding/Room/bathroom lighting operational, light cord in reach

## 2024-01-20 NOTE — H&P ADULT - HISTORY OF PRESENT ILLNESS
78y Female community ambulator without assistive device presents to the ED with right hip pain after mechanical fall. Patient fell in bedroom. Patient denies headstrike or LOC. Patient noticed immediate pain and inability to ambulate over the affected hip. Patient subsequently came to the ED for further evaluation and management. In the ED, endorses pain over the hip/groin area and pain with range of motion. Patient denies any fevers, chills, numbness, tingling, weakness, or any other orthopaedic complaint.     Physical Exam  Vital Signs Last 24 Hrs  T(C): 36.5 (01-21-24 @ 00:06), Max: 36.5 (01-21-24 @ 00:06)  T(F): 97.7 (01-21-24 @ 00:06), Max: 97.7 (01-21-24 @ 00:06)  HR: 72 (01-21-24 @ 00:06) (72 - 86)  BP: 159/85 (01-21-24 @ 00:06) (127/47 - 179/80)  BP(mean): --  RR: 19 (01-21-24 @ 00:06) (19 - 20)  SpO2: 97% (01-21-24 @ 00:06) (94% - 100%)    Gen: Resting in bed, NAD  R LE:   Skin intact. No edema, erythema, or lesions throughout the extremity. Extremity is shortened and externally rotated.   TTP over the hip/groin. Pain with AROM/PROM of the hip. NTTP throughout the rest of the extremity.   SILT L2-S1  GSC/TA/EHL/FHL intact; Q/H unable to assess 2/2 pain.   DP pulse palpable.   No calf tenderness bilaterally.   Compartments soft and compressible.     Secondary Assessment:  NC/AT, NTTP of clavicles, NTTP of C-spine,T-spine, or L-spine in the midline and paraspinal areas; NTTP of pelvis  LUE: NTTP of Shoulder, Elbow, Wrist, Hand; NT with AROM/PROM of Shoulder, Elbow, Wrist, Hand; AIN/PIN/Med/Uln/Msc/Rad/Ax intact  RUE: NTTP of Shoulder, Elbow, Wrist, Hand; NT with AROM/PROM of Shoulder, Elbow, Wrist, Hand; AIN/PIN/Med/Uln/Msc/Rad/Ax intact   LLE: Able to SLR, NT with Log Roll, NT with Heel Strike, NTTP of Hip, Knee, Ankle, Foot; NT with AROM/PROM of Hip, Knee, Ankle, Foot; Q/H/GSC/TA/EHL/FHL intact                          10.2   5.31  )-----------( 147      ( 20 Jan 2024 16:10 )             30.5     20 Jan 2024 16:10    135    |  101    |  7      ----------------------------<  104    3.6     |  24     |  0.74     Ca    8.2        20 Jan 2024 16:10    TPro  4.9    /  Alb  2.8    /  TBili  0.8    /  DBili  x      /  AST  28     /  ALT  11     /  AlkPhos  103    20 Jan 2024 16:10    PT/INR - ( 20 Jan 2024 16:10 )   PT: 10.2 sec;   INR: 0.97 ratio         PTT - ( 20 Jan 2024 16:10 )  PTT:23.9 sec    Imaging: XR  reviewed demonstrating R hip fracture    Assessment and Plan  78y Female with R hip fracture    Imaging findings reviewed and discussed with the patient. Need for operative intervention discussed.   Plan for OR for operative fixation of hip fracture - please document medical clearance.   NWB, strict bedrest  NPO, hold anticoagulation  Preoperative labs, imaging  Please document medical clearance as possible.   Case discussed with cardiology fellow regarding pacemaker interrogation; however, per cardiology fellow, cardiology service will not interrogate pacemaker given no recent cardiac events and no active cardiac issues; and did not believe patient would require pacemaker interrogation prior to procedure. Orthopaedics team to continue to follow and monitor.   Dispo: pending OR for fixation of hip fracture  Will discuss plan with Dr. Connor and will advise of any changes.

## 2024-01-20 NOTE — CONSULT NOTE ADULT - ASSESSMENT
79 yo woman with a hx of chronic pancreatitis, and hypertenion presents after a fall with a right hip fracture. pt is scheduled for an Open reduction and internal fixation of the right hip

## 2024-01-20 NOTE — ED ADULT NURSE NOTE - OBJECTIVE STATEMENT
Female 78 years old with Pacemaker on Xarelto brought in by EMS s/p fall this morning. Pt reports at 1600 this morning she was walking returning from the bathroom, lights were off. States she missed the bed and fell on her right side. Reports right hip pain, sharp 10/10 intermittent. Had received total 100 mg IVP fro EMS. Denies numbness or tingling of lower extremities. Denies head injury, chest or abdominal,pain. Safety mainatined. All side rails up. HHA at bedside.

## 2024-01-20 NOTE — ED PROVIDER NOTE - PHYSICAL EXAMINATION
PHYSICAL EXAM:    GENERAL: NAD  HEENT:  Atraumatic, EOMI, PERRL; MMM  CHEST/LUNG: Chest rise equal bilaterally  HEART: Regular rate and rhythm; no m/r/g  ABDOMEN: Soft, Nontender, Nondistended  EXTREMITIES:  Extremities warm  PSYCH: A&Ox3  SKIN: No obvious rashes or lesions  MSK: Exquisite tenderness to palpation R hip, unable to test ROM in R pelvic joint as pt reports severe pain with any R hip flexion; no hematoma or contusion noted in R hip; LLE ROM normal  NEUROLOGY: strength and sensation intact in all extremities. CN 2 - 12 intact. PHYSICAL EXAM:    GENERAL: NAD  HEENT:  Atraumatic, EOMI, PERRL; MMM  CHEST/LUNG: Chest rise equal bilaterally  HEART: Regular rate and rhythm; no m/r/g  ABDOMEN: Soft, Nontender, Nondistended  EXTREMITIES:  Extremities warm  PSYCH: A&Ox3  SKIN: No obvious rashes or lesions  MSK: Exquisite tenderness to palpation R hip, unable to test ROM in R pelvic joint as pt reports severe pain with any R hip flexion; no hematoma or contusion noted in R hip; LLE ROM normal  NEUROLOGY: strength and sensation intact in all extremities. CN 2 - 12 intact.  Attending Hue Victor: Gen: NAD, heent: atrauamtic, eomi, neck; nttp, no nuchal rigidity, chest: nttp, no crepitus, cv: rrr, no murmurs, lungs: ctab, abd: soft, nontender, nondistended, no peritoneal signs, , no guarding, ext: right hip ttp, right hip shortened and rotated skin: no rash, neuro: awake and alert, following commands, speech clear, sensation and strength intact, no focal deficits

## 2024-01-20 NOTE — ED PROVIDER NOTE - CADM POA PRESS ULCER
08/29/23 1615: Noted    Joann Hannah, RN, BSN, CCM   Occupational Health    
Patient was seen in urgent care for work injury left message for patient to call back to schedule appointment with [facility]   
No

## 2024-01-20 NOTE — ED PROVIDER NOTE - CARE PLAN
1 Principal Discharge DX:	Right hip pain   Principal Discharge DX:	Intertrochanteric fracture of right hip

## 2024-01-20 NOTE — ED PROVIDER NOTE - NSFOLLOWUPINSTRUCTIONS_ED_ALL_ED_FT
Fall Prevention    WHAT YOU NEED TO KNOW:    Fall prevention includes ways to make your home and other areas safer. It also includes ways you can move more carefully to prevent a fall. Health conditions that cause changes in your blood pressure, vision, or muscle strength and coordination may increase your risk for falls. Medicines may also increase your risk for falls if they make you dizzy, weak, or sleepy.     DISCHARGE INSTRUCTIONS:    Call 911 or have someone else call if:     You have fallen and are unconscious.      You have fallen and cannot move part of your body.    Contact your healthcare provider if:     You have fallen and have pain or a headache.      You have questions or concerns about your condition or care.    Fall prevention tips:     Stand or sit up slowly. This may help you keep your balance and prevent falls.      Use assistive devices as directed. Your healthcare provider may suggest that you use a cane or walker to help you keep your balance. You may need to have grab bars put in your bathroom near the toilet or in the shower.      Wear shoes that fit well and have soles that . Wear shoes both inside and outside. Use slippers with good . Do not wear shoes with high heels.      Wear a personal alarm. This is a device that allows you to call 911 if you fall and need help. Ask your healthcare provider for more information.      Stay active. Exercise can help strengthen your muscles and improve your balance. Your healthcare provider may recommend water aerobics or walking. He or she may also recommend physical therapy to improve your coordination. Never start an exercise program without talking to your healthcare provider first.       Manage your medical conditions. Keep all appointments with your healthcare providers. Visit your eye doctor as directed.    Home safety tips:     Add items to prevent falls in the bathroom. Put nonslip strips on your bath or shower floor to prevent you from slipping. Use a bath mat if you do not have carpet in the bathroom. This will prevent you from falling when you step out of the bath or shower. Use a shower seat so you do not need to stand while you shower. Sit on the toilet or a chair in your bathroom to dry yourself and put on clothing. This will prevent you from losing your balance from drying or dressing yourself while you are standing.       Keep paths clear. Remove books, shoes, and other objects from walkways and stairs. Place cords for telephones and lamps out of the way so that you do not need to walk over them. Tape them down if you cannot move them. Remove small rugs. If you cannot remove a rug, secure it with double-sided tape. This will prevent you from tripping.       Install bright lights in your home. Use night lights to help light paths to the bathroom or kitchen. Always turn on the light before you start walking.      Keep items you use often on shelves within reach. Do not use a step stool to help you reach an item.      Paint or place reflective tape on the edges of your stairs. This will help you see the stairs better.    Follow up with your healthcare provider as directed: Write down your questions so you remember to ask them during your visits.     The results of any blood tests and imaging studies completed during your visit today were discussed and explained to you and a copy provided with your discharge instructions. Please follow up with your primary care doctor within 48 hours.

## 2024-01-20 NOTE — CONSULT NOTE ADULT - PROBLEM SELECTOR RECOMMENDATION 9
Patient scheduled for an Open reduction and internal fixation of the right hip  No contraindication to scheduled procedure  NPO after MN  DVT and GI prophylaxis

## 2024-01-20 NOTE — ED PROVIDER NOTE - CLINICAL SUMMARY MEDICAL DECISION MAKING FREE TEXT BOX
78F with PMH HTN, chronic pancreatitis, s/p PPM and on Xarelto who presents to ED for acute R hip pain after fall this AM. HPI most c/w mechanical fall, pt fell in the setting of dark room, denies lightheadedness, dizziness/vertigo, sense of warmth prior. No other triggers for the fall, unclear if there was head contact. Last taken Xarelto yesterday AM. Treated with fentanyl 50 mcg x2 by EMS en route. VSS in ED, physical exam notable for exquisite pain in R hip to any range of joint motion, even with slight elevation of RLE. No visible hematoma or contusion in abdominal, pelvic, BLE regions. Ordered labs and CT H/N and xray of chest/pelvis/R hip/and R femur to r/o acute bleeding and/or fracture. 78F with PMH HTN, chronic pancreatitis, s/p PPM and on Xarelto who presents to ED for acute R hip pain after fall this AM. HPI most c/w mechanical fall, pt fell in the setting of dark room, denies lightheadedness, dizziness/vertigo, sense of warmth prior. No other triggers for the fall, unclear if there was head contact. Last taken Xarelto yesterday AM. Treated with fentanyl 50 mcg x2 by EMS en route. VSS in ED, physical exam notable for exquisite pain in R hip to any range of joint motion, even with slight elevation of RLE. No visible hematoma or contusion in abdominal, pelvic, BLE regions. Ordered labs and CT H/N and xray of chest/pelvis/R hip/and R femur to r/o acute bleeding and/or fracture.  Attending Hue Victor: 77 yo female ho chronic pancreatitis, prior pacemaker on xarelto presenting after fall. upon arrival gcs 15. primary survey intact. on secondary survey pt with ttp right hip with shortening. neurovascular intact. no neck pain and moving all extremities making cervical spinal fracture less likely. abdomen soft and nontender less likely solid organ injury. pt with deformit to right hip with ttp. concern for hip fracture. will obtain labs, xrays, ct head as unclear whether struck head and on blood thinners, xray hip, pain control as needed. likely admission

## 2024-01-20 NOTE — ED ADULT NURSE NOTE - NSFALLRISKFACTORS_ED_ALL_ED
No, I will not increase narcotics.  May see the pain clinic.  I still don't believe that she's taking the gabapentin as prescribed based on refill dates.      Looks like refill will be due end of next week, will refill the #120 as she has been getting.      Let me know if she wants a pain clinic referral.    Sarah Barriga M.D.     fall/Other

## 2024-01-20 NOTE — CONSULT NOTE ADULT - PROBLEM SELECTOR RECOMMENDATION 4
Patient with a hx of a PPM after a syncopal episode  consider interrogation by cardiology  Patient states she follows up with her cardiologist regularly

## 2024-01-20 NOTE — ED PROVIDER NOTE - OBJECTIVE STATEMENT
Attending Hue Victor: 79 yo female h/o chronic pancreatitis, on blood thinners presenting after fall. pt reported fell out of bed last evening and was on the ground. could not ambualte since fall. complaining of right hip pain. no numbness or tinging. unclear whether she hit her head. when ems arrived pt complaining of pain to right hip. given 50mg of fentanyl by ems x2. no chest pain. pt denies any abdominal pain.

## 2024-01-20 NOTE — CONSULT NOTE ADULT - SUBJECTIVE AND OBJECTIVE BOX
79 yo female h/o chronic pancreatitis, on blood thinners presenting after fall. pt reported fell out of bed last evening and was on the ground. could not ambualte since fall. complaining of right hip pain. no numbness or tinging. unclear whether she hit her head. when ems arrived pt complaining of pain to right hip. given 50mg of fentanyl by ems x2. no chest pain. pt denies any abdominal pain. Patient was brought to [facility] for further evaluation and treatment. In the ED she was found to have a right hip fracture and is scheduled for an Open reduction and internal fixation of the right hip.       PAST MEDICAL & SURGICAL HISTORY:  Hypertension      Anxiety and depression      GERD (gastroesophageal reflux disease)      History of pancreatitis  2018 taking creon been stable      Stress incontinence, female  S/P Sling      Heart attack      H/O left inguinal hernia repair      Hx of tonsillectomy      Hx of appendectomy      H/O dilation and curettage      Pacemaker            MEDICATIONS  (STANDING):  influenza  Vaccine (HIGH DOSE) 0.7 milliLiter(s) IntraMuscular once  senna 2 Tablet(s) Oral at bedtime  sodium chloride 0.9%. 1000 milliLiter(s) (125 mL/Hr) IV Continuous <Continuous>    MEDICATIONS  (PRN):  HYDROmorphone  Injectable 0.5 milliGRAM(s) IV Push every 6 hours PRN Severe Pain (7 - 10)  magnesium hydroxide Suspension 30 milliLiter(s) Oral daily PRN Constipation  melatonin 3 milliGRAM(s) Oral at bedtime PRN Insomnia  oxyCODONE    IR 5 milliGRAM(s) Oral every 4 hours PRN Severe Pain (7 - 10)  oxyCODONE    IR 2.5 milliGRAM(s) Oral every 4 hours PRN Moderate Pain (4 - 6)    Social Hx:  Tobacco: former smoker  ETOH: occasionally  Drugs: Negs    Family Hx:  As per my conversation with the patient, non contributory       ROS  CONSTITUTIONAL: No weakness, fevers or chills  EYES/ENT: No visual changes;  No vertigo or throat pain   NECK: No pain or stiffness  RESPIRATORY: No cough, wheezing, hemoptysis; No shortness of breath  CARDIOVASCULAR: No chest pain or palpitations  GASTROINTESTINAL: No abdominal or epigastric pain. No nausea, vomiting, or hematemesis; No diarrhea or constipation. No melena or hematochezia.  GENITOURINARY: No dysuria, frequency or hematuria  NEUROLOGICAL: No numbness or weakness  SKIN: No itching, burning, rashes, or lesions   MUSCULOSKELETAL: right hip pain    INTERVAL HPI/OVERNIGHT EVENTS:  T(C): 36.3 (01-20-24 @ 21:44), Max: 36.4 (01-20-24 @ 14:53)  HR: 78 (01-20-24 @ 21:44) (76 - 86)  BP: 164/88 (01-20-24 @ 21:44) (127/47 - 179/80)  RR: 19 (01-20-24 @ 21:44) (19 - 20)  SpO2: 94% (01-20-24 @ 21:44) (94% - 100%)  Wt(kg): --  I&O's Summary      PHYSICAL EXAM:  GENERAL: NAD, well-groomed, well-developed  HEAD:  Atraumatic, Normocephalic  EYES: EOMI, PERRLA, conjunctiva and sclera clear  ENMT: No tonsillar erythema, exudates, or enlargement; Moist mucous membranes, Good dentition, No lesions  NECK: Supple, No JVD, Normal thyroid  NERVOUS SYSTEM:  Alert & Oriented X3, Good concentration; Motor Strength 5/5 B/L upper and lower extremities; DTRs 2+ intact and symmetric  CHEST/LUNG: Clear to percussion bilaterally; No rales, rhonchi, wheezing, or rubs  HEART: Regular rate and rhythm; No murmurs, rubs, or gallops  ABDOMEN: Soft, Nontender, Nondistended; Bowel sounds present  EXTREMITIES:  2+ Peripheral Pulses, No clubbing, cyanosis, or edema  LYMPH: No lymphadenopathy noted  SKIN: No rashes or lesions        LABS:                        10.2   5.31  )-----------( 147      ( 20 Jan 2024 16:10 )             30.5     01-20    135  |  101  |  7   ----------------------------<  104<H>  3.6   |  24  |  0.74    Ca    8.2<L>      20 Jan 2024 16:10    TPro  4.9<L>  /  Alb  2.8<L>  /  TBili  0.8  /  DBili  x   /  AST  28  /  ALT  11  /  AlkPhos  103  01-20    PT/INR - ( 20 Jan 2024 16:10 )   PT: 10.2 sec;   INR: 0.97 ratio         PTT - ( 20 Jan 2024 16:10 )  PTT:23.9 sec  Urinalysis Basic - ( 20 Jan 2024 16:10 )    Color: x / Appearance: x / SG: x / pH: x  Gluc: 104 mg/dL / Ketone: x  / Bili: x / Urobili: x   Blood: x / Protein: x / Nitrite: x   Leuk Esterase: x / RBC: x / WBC x   Sq Epi: x / Non Sq Epi: x / Bacteria: x      CAPILLARY BLOOD GLUCOSE            Urinalysis Basic - ( 20 Jan 2024 16:10 )    Color: x / Appearance: x / SG: x / pH: x  Gluc: 104 mg/dL / Ketone: x  / Bili: x / Urobili: x   Blood: x / Protein: x / Nitrite: x   Leuk Esterase: x / RBC: x / WBC x   Sq Epi: x / Non Sq Epi: x / Bacteria: x    EKG: NSR LAD @ 76 BPM

## 2024-01-20 NOTE — ED PROVIDER NOTE - PROGRESS NOTE DETAILS
Gardenia: Orthopedic Surgery consulted for R hip fx on xray. Plan for possible OR tomorrow. Orthopedics will come evaluate pt. Matt, PGY3 - Ortho accepting patient under Dr. Connor.

## 2024-01-20 NOTE — ED PROVIDER NOTE - ATTENDING CONTRIBUTION TO CARE
Attending MD Hue Victor:  I personally have seen and examined this patient.  Resident note reviewed and agree on plan of care and except where noted.  See HPI, PE, and MDM for details.

## 2024-01-21 NOTE — PROCEDURE NOTE - INTERROGATION NOTE: COMMENTS
Patient with good functioning dual chamber PPM, no events seen, good battery life and leads intact. Interrogation prior to hip repair, recommend placing magnet over device during procedure. No settings changed

## 2024-01-22 NOTE — DISCHARGE NOTE PROVIDER - NSDCACTIVITY_GEN_ALL_CORE
Do not drive or operate machinery/Do not make important decisions/Stairs allowed/Walking - Indoors allowed/No heavy lifting/straining/Walking - Outdoors allowed Do not drive or operate machinery/Do not make important decisions/Stairs allowed/Walking - Indoors allowed/No heavy lifting/straining/Walking - Outdoors allowed/Follow Instructions Provided by your Surgical Team

## 2024-01-22 NOTE — PHYSICAL THERAPY INITIAL EVALUATION ADULT - ADDITIONAL COMMENTS
Pt is a poor historian, limited social history.  Pt lives in a house with .  Pt reports there are some stairs at home.

## 2024-01-22 NOTE — PROGRESS NOTE ADULT - ASSESSMENT
77 yo woman with a hx of chronic pancreatitis, and hypertension presents after a fall with a right hip fracture. Patient  is s/p an Open reduction and internal fixation of the right hip

## 2024-01-22 NOTE — PHYSICAL THERAPY INITIAL EVALUATION ADULT - PERTINENT HX OF CURRENT PROBLEM, REHAB EVAL
Pt is a 78 year old female with PMH significant for chronic pancreatitis, on blood thinners.  Pt presents s/p fall.  Pt reportedly fell out of bed PM of 1/19 and was on the ground. Pt could not ambualte since fall, complaining of right hip pain. Unclear whether she hit her head. Patient was brought to [facility] for further evaluation and treatment. In the ED she was found to have a right hip fracture and is scheduled for an Open reduction and internal fixation of the right hip. Pt POD#1 IMN R hip. Per ortho, Pt WBAT RLE. Pt is a 78 year old female with PMH significant for chronic pancreatitis, on blood thinners.  Pt presents s/p fall.  Pt reportedly fell out of bed PM of 1/19 and was on the ground. Pt could not ambualte since fall, complaining of right hip pain. Unclear whether she hit her head. Patient was brought to [facility] for further evaluation and treatment. In the ED she was found to have a right hip fracture and is scheduled for an Open reduction and internal fixation of the right hip. Pt POD#1 IMN R hip. Per ortho, Pt WBAT RLE.  CXR(1/20): Clear lungs. No acute traumatic findings.  XR Pelvis(1/20): Comminuted, overriding and impacted right femur intertrochanteric fracture with medial avulsion of the lesser trochanter. The bilateral femoral heads are well situated the acetabulum with a background of mild to moderate degenerative changes. Moderate degenerative changes of bilateral sacroiliac joints and lower lumbar spine. No knee joint effusion  CT Head(1/20): Brain CT: Age-appropriate involutional and ischemic gliotic changes. No   hemorrhage. Cervical spine CT: Generalized osteopenia. Degenerative changes. No acute   fractures or dislocations.

## 2024-01-22 NOTE — DISCHARGE NOTE PROVIDER - CARE PROVIDER_API CALL
Chadwick Connor  Orthopaedic Surgery  [address] [place] [address], [facility] [address]  [place] [address]  Phone: [phone]  Fax: [phone]  Follow Up Time:    Chadwick Connor  Orthopaedic Surgery  [address] [place] [address] [address]  [place] [address]  Phone: [phone]  Fax: [phone]  Follow Up Time: 2 weeks

## 2024-01-22 NOTE — DISCHARGE NOTE PROVIDER - NSDCFUADDINST_GEN_ALL_CORE_FT
Continue weight bearing as tolerated ambulation with rolling walker. Keep surgical incisions/dressings clean and dry. Continue weight bearing as tolerated ambulation with rolling walker. Keep surgical incisions/dressings clean and dry, may remove dressing and staples if applicable on POD #14, replace with steris to incision. May change dressings PRN prior to 2 weeks post op.   DVT ppx as per med rec - Lovenox 40mg QD x 6 weeks post op.   GI ppx  Please follow up with your primary care physician regarding your hospitalization within one month of your discharge.  Continue weight bearing as tolerated ambulation with rolling walker. Keep surgical incisions/dressings clean and dry. May change dressings PRN prior to 2 weeks post op.   DVT ppx as per med rec - Lovenox 40mg QD x 6 weeks post op.   GI ppx: Pantoprazole 40mg PO Daily before breakfast  Followup with Dr. Connor on POD#14 for wound check/staple removal.   Please follow up with your primary care physician regarding your hospitalization within one month of your discharge.

## 2024-01-22 NOTE — PHYSICAL THERAPY INITIAL EVALUATION ADULT - BALANCE TRAINING, PT EVAL
GOAL: Pt will improve static and dynamic standing balance during functional activities by at least 1 balance grade within 4 weeks.

## 2024-01-22 NOTE — DISCHARGE NOTE PROVIDER - HOSPITAL COURSE
History of Present Illness:   78y Female community ambulator without assistive device presents to the ED with right hip pain after mechanical fall. Patient fell in bedroom. Patient denies headstrike or LOC. Patient noticed immediate pain and inability to ambulate over the affected hip. Patient subsequently came to the ED for further evaluation and management. In the ED, endorses pain over the hip/groin area and pain with range of motion. Patient denies any fevers, chills, numbness, tingling, weakness, or any other orthopaedic complaint.     PAST MEDICAL, SURGICAL HISTORY:  HTN  Chronic  Pancreatitis   Syncope  PPM     HOSPITAL COURSE:  77 y/o FM underwent right hip fracture fixation with intermedullary nail on 1/21/2024 with Dr. CARLOS Connor. Patient was evaluated post operatively by physical and occupational therapists for weight bearing as tolerated ambulation and ..... Patient advised to keep surgical incisions/dressings clean and dry. and follow up with  _ in _.   History of Present Illness:   77 yo female h/o chronic pancreatitis, on blood thinners presenting after fall. pt reported fell out of bed last evening and was on the ground. could not ambualte since fall. complaining of right hip pain. no numbness or tinging. unclear whether she hit her head. when ems arrived pt complaining of pain to right hip. given 50mg of fentanyl by ems x2. no chest pain. pt denies any abdominal pain. Patient was brought to [facility] for further evaluation and treatment. In the ED she was found to have a right hip fracture and is scheduled for an Open reduction and internal fixation of the right hip.     PAST MEDICAL & SURGICAL HISTORY:  Hypertension  Anxiety and depression  GERD (gastroesophageal reflux disease)  History of pancreatitis 2018 taking creon been stable  Stress incontinence, female S/P Sling  Heart attack  H/O left inguinal hernia repair  Hx of tonsillectomy  Hx of appendectomy  H/O dilation and curettage  Pacemaker    HOSPITAL COURSE:  79 y/o FM admitted to [facility] for surgical management of a right hip fx. Following medical clearance and optimization, patient taken the OR and underwent right hip fracture fixation with intermedullary nail on 1/21/2024 with Dr. CARLOS Connor. Patient tolerated the procedure well and was transferred to the recovery room in stable condition, with a stable neuro / vascular exam of the operated extremity.    Patient was placed on Lovenox 40mg QD for DVT ppx, and was placed on Protonix for GI protection.   Patient was made weight bearing as tolerated with the operative leg.     Patient evaluated by PT/OT and recommended for disposition for     Discharge and Orthopedic Care instructions were delineated in the Discharge Plan and reviewed with the patient. All medications were delineated in the medication reconciliation tool and key points were reviewed with the patient. They were deemed stable from an Orthopedic & medical standpoint for discharge ***   History of Present Illness:   79 yo female h/o chronic pancreatitis, on blood thinners presenting after fall. pt reported fell out of bed last evening and was on the ground. could not ambualte since fall. complaining of right hip pain. no numbness or tinging. unclear whether she hit her head. when ems arrived pt complaining of pain to right hip. given 50mg of fentanyl by ems x2. no chest pain. pt denies any abdominal pain. Patient was brought to [facility] for further evaluation and treatment. In the ED she was found to have a right hip fracture and is scheduled for an Open reduction and internal fixation of the right hip.     PAST MEDICAL & SURGICAL HISTORY:  Hypertension  Anxiety and depression  GERD (gastroesophageal reflux disease)  History of pancreatitis 2018 taking creon been stable  Stress incontinence, female S/P Sling  Heart attack  H/O left inguinal hernia repair  Hx of tonsillectomy  Hx of appendectomy  H/O dilation and curettage  Pacemaker    HOSPITAL COURSE:  79 y/o FM admitted to [facility] for surgical management of a right hip fx. Following medical clearance and optimization, patient taken the OR and underwent right hip fracture fixation with intermedullary nail on 1/21/2024 with Dr. CARLOS Connor. Patient tolerated the procedure well and was transferred to the recovery room in stable condition, with a stable neuro / vascular exam of the operated extremity.    Patient was placed on Lovenox 40mg QD for DVT ppx, and was placed on Protonix for GI protection.   Patient was made weight bearing as tolerated with the operative leg.     Patient evaluated by PT/OT and recommended for disposition for subacute rehab.     Behavioral health/Psych called for consult on 1/25/24 due to patient became disoriented, having visual hallucinations of little girl in room, asking for alcohol consumption, removed surgical dressing and was tugging at IVs. No obvious physical symptoms of alcohol withdrawal. Determined alcohol withdrawal vs delirium dementia. Patient placed on CIWA and without score during admission. Patient placed on Ativan prn without use/need during stay. Patient's delirium/mentation improved.     Patient received 1U PRBC for ABLA 2/2 hip fx/surgery. Patient responded appropriately and did not require further transfusion.     BLLE Venous dopplers ordered to rule out DVT and completed on 1/29/24 - Results: XXXXXX    Discharge and Orthopedic Care instructions were delineated in the Discharge Plan and reviewed with the patient. All medications were delineated in the medication reconciliation tool and key points were reviewed with the patient. They were deemed stable from an Orthopedic & medical standpoint for discharge.    History of Present Illness:   77 yo female h/o chronic pancreatitis, on blood thinners presenting after fall. pt reported fell out of bed last evening and was on the ground. could not ambualte since fall. complaining of right hip pain. no numbness or tinging. unclear whether she hit her head. when ems arrived pt complaining of pain to right hip. given 50mg of fentanyl by ems x2. no chest pain. pt denies any abdominal pain. Patient was brought to [facility] for further evaluation and treatment. In the ED she was found to have a right hip fracture and is scheduled for an Open reduction and internal fixation of the right hip.     PAST MEDICAL & SURGICAL HISTORY:  Hypertension  Anxiety and depression  GERD (gastroesophageal reflux disease)  History of pancreatitis 2018 taking creon been stable  Stress incontinence, female S/P Sling  Heart attack  H/O left inguinal hernia repair  Hx of tonsillectomy  Hx of appendectomy  H/O dilation and curettage  Pacemaker    HOSPITAL COURSE:  79 y/o FM admitted to [facility] for surgical management of a right hip fx. Following medical clearance and optimization, patient taken the OR and underwent right hip fracture fixation with intermedullary nail on 1/21/2024 with Dr. CARLOS Connor. Patient tolerated the procedure well and was transferred to the recovery room in stable condition, with a stable neuro / vascular exam of the operated extremity.    Patient was placed on Lovenox 40mg QD for DVT ppx, and was placed on Protonix for GI protection.   Patient was made weight bearing as tolerated with the operative leg.     Patient evaluated by PT/OT and recommended for disposition for subacute rehab.     Behavioral health/Psych called for consult on 1/25/24 due to patient became disoriented, having visual hallucinations of little girl in room, asking for alcohol consumption, removed surgical dressing and was tugging at IVs. No obvious physical symptoms of alcohol withdrawal. Determined alcohol withdrawal vs delirium dementia. Patient placed on CIWA and without score during admission. Patient placed on Ativan prn without use/need during stay. Patient's delirium/mentation improved.     Patient received 1U PRBC for ABLA 2/2 hip fx/surgery. Patient responded appropriately and did not require further transfusion.     BLLE Venous dopplers ordered to rule out DVT and completed on 1/29/24 with results negative for DVT.    Discharge and Orthopedic Care instructions were delineated in the Discharge Plan and reviewed with the patient. All medications were delineated in the medication reconciliation tool and key points were reviewed with the patient. They were deemed stable from an Orthopedic & medical standpoint for discharge.

## 2024-01-22 NOTE — PHYSICAL THERAPY INITIAL EVALUATION ADULT - PHYSICAL ASSIST/NONPHYSICAL ASSIST: GAIT, REHAB EVAL
SUBJECTIVE: Doing well. Afebrile and hemodynamically stable. Pain is well controlled. BS well controlled.    PHYSICAL EXAMINATION:    Vital Signs (last 24 hrs)_____ Last Charted___________Minimum____________ Maximum____________  Temp    98.6  (MAR 12 22:11) 98.1  (MAR 12 07:34) H 99.7 (MAR 12 20:27)  Heart Rate   73  (MAR 12 20:27) 73  (MAR 12 20:27) 79  (MAR 12 07:34)  Resp Rate       20  (MAR 12 20:27) 20  (MAR 12 07:34) 20  (MAR 12 07:34)  SBP    100  (MAR 12 20:27) 95  (MAR 12 07:34) 100  (MAR 12 20:27)  DBP    L 52 (MAR 12 20:27) L 52 (MAR 12 20:27) L 57 (MAR 12 07:34)    GENERAL APPEARANCE:  This is a 60-year-old white female. Conscious, Co-operative, no acute distress    HEAD:  Normocephalic, atraumatic.     NECK:  Supple.  No JVD.     LUNGS:  Clear.  No wheezes or rhonchi.  Effort is good.     HEART:  S1 and S2.  Regular rhythm.       CHEST:  Chest wall symmetric expansion.     ABDOMEN:  Obese, soft, nontender, nondistended.  Bowel sounds positive.     EXTREMITIES:  No clubbing or cyanosis.  Trace edema bilaterally.  Left lower extremity with erythema and tenderness throughout the leg above the ankle. Area of erythema has decreased. Tenderness and warmth noted.    SKIN:  Otherwise intact.       CNS:  No deficits.    Labs (Last four charted values)  WBC                  6.5 (MAR 12) 9.3 (MAR 11) H 17.6 (MAR 10) H 18.1 (MAR 09)   Hgb                  L 10.8 (MAR 12) 11.7 (MAR 11) 11.7 (MAR 10) 11.6 (MAR 09)   Hct                  35 (MAR 12) 38 (MAR 11) 39 (MAR 10) 37 (MAR 09)   Plt                  260 (MAR 12) 242 (MAR 11) 241 (MAR 10) 292 (MAR 09)   Na                   137 (MAR 12) L 133 (MAR 11) L 132 (MAR 10) L 135 (MAR 09)   K                    3.7 (MAR 12) 3.8 (MAR 11) 3.6 (MAR 10) 3.9 (MAR 09)   CO2                  24 (MAR 12) 21 (MAR 11) 22 (MAR 10) 24 (MAR 09)   Cl                   H 108 (MAR 12) 107 (MAR 11) 105 (MAR 10) 102 (MAR 09)   Cr                   0.66 (MAR 12) 0.70 (MAR  11) 0.76 (MAR 10) 0.71 (MAR 09)   BUN                  11 (MAR 12) 11 (MAR 11) 13 (MAR 10) 11 (MAR 09)   Glucose              H 125 (MAR 12) H 173 (MAR 11) H 208 (MAR 10) H 177 (MAR 09)   Mg                   L 1.5 (MAR 09)   Ca                   L 8.1 (MAR 12) L 8.3 (MAR 11) L 8.2 (MAR 10) 9.1 (MAR 09)   Troponin             <0.01 (MAR 09)   Total CK             58 (MAR 09)     IMPRESSION:    1. Left lower extremity cellulitis.  Bone scan is nonrevealing for osteomyelitis. Dr Finley is following. Continue IV antibiotics. Plan to convert to PO abx noted as per ID recommendations.  2. Hypertension.  Optimize medical management  3. Diabetes mellitus type 2 , well controlled.  Continue with current management.  4. Leg pain secondary to underlying infection. Well controlled with Norco and tylenol PRN. Pt is on Zofran for symptomatic treatment of nausea/vomiting.  5. H/O supraventricular tachyarrythmia. Pt is on multaq.  6. Dyslipidemia. Continue statin.  7. Depression. Well controlled on current Lexapro.  8. Generalized weakness debility myopathy. PT/OT  9. Metabolic syndrome. Recommend diet, excercise and weight loss. Further recommendations as an outpatient.  10. Morbid obesity.  Dietary lifestyle modification behavior modification monitoring and medical management discussed  11. Broad-spectrum antibiotic treatment as per ID service to recommend monitoring for untoward side effects recommend monitoring a total CPK  12. Discharge planning. Anticipate DC home once cleared by ID service, 1-2 days.  13. DVT prophylaxis    Yadira Alonzo M.D.  3/12/2018         verbal cues/2 person assist

## 2024-01-22 NOTE — PROGRESS NOTE ADULT - ASSESSMENT
A/P: 78yFemale S/p R IMN POD #1. VSS. NAD  -PT/OT-WBAT RLE, OOB when tolerated  -IS encouraged  -DVT PPx with lovenox  -Followup AM labs  -Pain Control  -FU urine culture

## 2024-01-22 NOTE — DISCHARGE NOTE PROVIDER - NSDCMRMEDTOKEN_GEN_ALL_CORE_FT
atenolol 50 mg oral tablet: 1 tab(s) orally once a day  Benicar 40 mg oral tablet: 1 tab(s) orally once a day  Creon 24,000 units oral delayed release capsule: 1 cap(s) orally 3 times a day with food  trospium 60 mg oral capsule, extended release: 1 cap(s) orally once a day (in the morning)   acetaminophen 325 mg oral tablet: 3 tab(s) orally every 8 hours  ascorbic acid 500 mg oral tablet: 1 tab(s) orally 2 times a day  atenolol 50 mg oral tablet: 1 tab(s) orally once a day  Benicar 40 mg oral tablet: 1 tab(s) orally once a day Hold if SBP&lt;110, HR&lt;60  Creon 24,000 units oral delayed release capsule: 1 cap(s) orally 3 times a day with food  enoxaparin: 40 milligram(s) subcutaneous once a day x 6 weeks for DVT ppx  folic acid 1 mg oral tablet: 1 tab(s) orally once a day  magnesium hydroxide 8% oral suspension: 30 milliliter(s) orally once a day As needed Constipation  Multiple Vitamins oral tablet: 1 tab(s) orally once a day  pantoprazole 40 mg oral delayed release tablet: 1 tab(s) orally once a day (before a meal)  polyethylene glycol 3350 oral powder for reconstitution: 17 gram(s) orally once a day  senna leaf extract oral tablet: 2 tab(s) orally once a day (at bedtime)  thiamine 100 mg oral tablet: 1 tab(s) orally once a day  trospium 60 mg oral capsule, extended release: 1 cap(s) orally once a day (in the morning)

## 2024-01-22 NOTE — DISCHARGE NOTE PROVIDER - NSDCCPTREATMENT_GEN_ALL_CORE_FT
PRINCIPAL PROCEDURE  Procedure: ORIF, hip, with intramedullary nail  Findings and Treatment: right

## 2024-01-22 NOTE — DISCHARGE NOTE PROVIDER - NSDCQMSTROKE_NEU_ALL_CORE
[FreeTextEntry1] : a25 old white female came to the office for Depo-Provera injection patient had history of very heavy periods bleeding has decreased to very minimal with the injection injection given return to the office in 3 months
No
Vaginal Delivery

## 2024-01-22 NOTE — PHYSICAL THERAPY INITIAL EVALUATION ADULT - GENERAL OBSERVATIONS, REHAB EVAL
Pt rec'd semisupine in bed, +purewick, +IVL, +bed alarm, in NAD.  Pt cleared for PT session by YANDEL Groves.

## 2024-01-22 NOTE — PHYSICAL THERAPY INITIAL EVALUATION ADULT - GAIT DEVIATIONS NOTED, PT EVAL
+shuffling gait/decreased mabel/increased time in double stance/decreased step length/decreased stride length/decreased weight-shifting ability

## 2024-01-22 NOTE — OCCUPATIONAL THERAPY INITIAL EVALUATION ADULT - PERTINENT HX OF CURRENT PROBLEM, REHAB EVAL
78y Female community ambulator without assistive device presents to the ED with right hip pain after mechanical fall. Patient fell in bedroom. Patient denies headstrike or LOC. Patient noticed immediate pain and inability to ambulate over the affected hip. Patient subsequently came to the ED for further evaluation and management. In the ED, endorses pain over the hip/groin area and pain with range of motion. Pt now S/p R IMN POD #1.

## 2024-01-23 NOTE — PROGRESS NOTE ADULT - ASSESSMENT
A/P: 79 y/o F POD# 2 s/p R IM Nail    DVT ppx- Lovenox 40mg SQ Daily  RLE WBAT  PT  OT  Pain management prn  FU AM labs  [facility] planning to [facility]  Orth[facility] Surgery  [id number]   A/P: 79 y/o F POD# 2 s/p R IM Nail    DVT ppx- Lovenox 40mg SQ Daily  RLE WBAT  PT  OT  Pain management w Tylenol and Celebrex, hold narcotics 2/2 confusion  FU AM labs  Discharge planning to [facility]  Orth[facility] Surgery  [phone]

## 2024-01-23 NOTE — PROGRESS NOTE ADULT - ASSESSMENT
79 yo woman with a hx of chronic pancreatitis, and hypertension presents after a fall with a right hip fracture. Patient  is s/p an Open reduction and internal fixation of the right hip

## 2024-01-24 NOTE — PROGRESS NOTE ADULT - ASSESSMENT
Impression: Stable       Plan: Continue present treatment               Out of bed, ambulate, weight bearing as tolerated                Physical therapy follow up                Continue to monitor    René Franz PA-C  Orthopaedic Surgery  Team pager [phone]  [phone]

## 2024-01-25 NOTE — BH CONSULTATION LIAISON ASSESSMENT NOTE - SUMMARY
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 Per EMR note, she presented after fall from bed at home, unclear if HS or LOC (CT head from 1/20/24 shows age appropriate involutional changes), found to have Right IT fracture, unable to ambulate since fall. S/p Right ORIF (1/22/2024), tolerated procedure well. Psych consulted for agitation, confusion, and hallucinations. Per EMR note,  after Right hip surgery, patient became disoriented, having visual hallucinations of little girl in room, asking for alcohol consumption, removed surgical dressing and was tugging at IVs.     On exam, patient is A&O x 4, calm, cooperative, and pleasant. Not agitated. On exam, largely linear without abnormalities in speech, but at times some slurring of words. States mood is fine but states she is bored at hospital, wants to go home where it is more comfortable but understands she needs to stay for optimal recovery. She endorses good sleep and appetite. 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 She denies anxiety, denies SI, HI and visual or auditory hallucinations. Denies pphx. States she smoked in college but not in 40 years, denies drug use but admits to a glass of wine every night with dinner.   When asked how we could help her she says " a martini, 3 times a day". When asked if that's her drink of choice, she waves her hand and says no alluding to it being a joke.  Law, confirms above history. He expresses concern that patient might be a "secret alcoholic", he suspects she may be sneaking more alcohol after dinner after he goes to bed.     Mild delirium and slurred speech may be symptoms of alcohol withdrawal. No obvious physical symptoms of alcohol withdrawal. No evidence of DT or seizures.   Patient has been hemodynamically stable. No acute safety concerns.

## 2024-01-25 NOTE — PROGRESS NOTE ADULT - ASSESSMENT
Impression: disoriented this AM, plan to hold narcotics, FU AM CBC s/p transfusion 1/24, s/p Ceftriaxone for positive UA               Out of bed, ambulate, weight bearing as tolerated                Physical therapy follow up                Continue to monitor mental status

## 2024-01-25 NOTE — BH CONSULTATION LIAISON ASSESSMENT NOTE - NSBHATTESTCOMMENTATTENDFT_PSY_A_CORE
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 Per EMR note, she presented after fall from bed at home, unclear if HS or LOC (CT head from 1/20/24 shows age appropriate involutional changes), found to have Right IT fracture, unable to ambulate since fall. S/p Right ORIF (1/22/2024), tolerated procedure well. Psych consulted for agitation, confusion, and hallucinations. Per EMR note,  after Right hip surgery, patient became disoriented, having visual hallucinations of little girl in room, asking for alcohol consumption, removed surgical dressing and was tugging at IVs.     On exam, patient is A&O x 4, calm, cooperative, and pleasant. Initially responded it is the year "1995" but clarified that she is joking and knows it is 2024. 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 Endorses mood and appetite is "fine", slept 6-7 hours last night, denies anxiety, denies SI, HI and visual or auditory hallucinations. States she smoked in college but not in 40 years, denies drug use but admits to a glass of wine every night with dinner. On exam, largely linear without abnormalities in speech, but at times some slurring of words. No obvious physical symptoms of alcohol withdrawal. When asked how we could help her she says " a martini, 3 times a day". When asked if that's her drink of choice, she waves her hand and says no alluding to it being a joke. She had a book ([employer]) at bedside.    Permission gave permission to call  Law. He believes that the day patient fell she might have been a bit disoriented from taking cough syrup for flu like symptoms (running nose) or that she tripped in the dark. He confirmed that patient last worked at [place] office, states shes will practicing 3 times a week. Denies that she has pphx or dementia diagnosis. He expresses concern that patient might be a "secret alcoholic". Confirms that she has been having a glass of wine nightly with dinner but many years, he suspects she may be sneaking some more after he goes to bed. He also believes patient may be getting agitated because she prefers the comfort of her own home.   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 Per EMR note, she presented after fall from bed at home, unclear if HS or LOC (CT head from 1/20/24 shows age appropriate involutional changes), found to have Right IT fracture, unable to ambulate since fall. S/p Right ORIF (1/22/2024), tolerated procedure well. Psych consulted for agitation, confusion, and hallucinations. Per EMR note,  after Right hip surgery, patient became disoriented, having visual hallucinations of little girl in room, asking for alcohol consumption, removed surgical dressing and was tugging at IVs.     On exam, patient is A&O x 4, calm, cooperative, and pleasant. Initially responded it is the year "1995" but clarified that she is joking and knows it is 2024. 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 Endorses mood and appetite is "fine", slept 6-7 hours last night, denies anxiety, denies SI, HI and visual or auditory hallucinations. States she smoked in college but not in 40 years, denies drug use but admits to a glass of wine every night with dinner. On exam, largely linear without abnormalities in speech, but at times some slurring of words. No obvious physical symptoms of alcohol withdrawal. When asked how we could help her she says " a martini, 3 times a day". When asked if that's her drink of choice, she waves her hand and says no alluding to it being a joke. She had a book ([employer]) at bedside.    Pt gave permission to call  Law. He believes that the day patient fell she might have been a bit disoriented from taking cough syrup for flu like symptoms (running nose) or that she tripped in the dark. He confirmed that patient last worked at [place] office, states shes will practicing 3 times a week. Denies that she has pphx or dementia diagnosis. He expresses concern that patient might be a "secret alcoholic". Confirms that she has been having a glass of wine nightly with dinner but many years, he suspects she may be sneaking some more after he goes to bed. He also believes patient may be getting agitated because she prefers the comfort of her own home.   rec haldol prn agitation

## 2024-01-25 NOTE — BH CONSULTATION LIAISON ASSESSMENT NOTE - DESCRIPTION
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

## 2024-01-25 NOTE — BH CONSULTATION LIAISON ASSESSMENT NOTE - NSBHCHARTREVIEWLAB_PSY_A_CORE FT
< from: 12 Lead ECG (01.20.24 @ 23:10) >  Ventricular Rate 77 BPM  Atrial Rate 77 BPM  P-R Interval 172 ms  QRS Duration 84 ms  Q-T Interval 430 ms  QTC Calculation(Bazett) 486 ms  P Axis 79 degrees  R Axis -39 degrees  T Axis 34 degrees  Diagnosis Line NORMAL SINUS RHYTHM  LEFT AXIS DEVIATION  ABNORMAL ECG  WHEN COMPARED WITH 06-JUN-2020  NO SIGNIFICANT CHANGE WAS FOUND  Confirmed by KEVIN LYNN MD ([id number]) on 1/21/2024 6:35:33 AM  < from: CT Head No Cont (01.20.24 @ 17:41) >                < end of copied text >    < from: CT Head No Cont (01.20.24 @ 17:41) >      IMPRESSION:    Brain CT: Age-appropriate involutional and ischemic gliotic changes. No   hemorrhage.    Cervical spine CT: Generalized osteopenia. Degenerative changes. No acute   fractures or dislocations.

## 2024-01-25 NOTE — PROVIDER CONTACT NOTE (OTHER) - ASSESSMENT
Pt's baseline is A&Ox4, pt noted to become disoriented, forgetful & anxious. Pt reoriented multiple times & pt is getting forgetful & confused with situation & location. MD made aware consider changing prn pain meds?
Pt prior to admission was A&Ox4, since R hip surgery pt has been A&Ox2 disoriented to situation, location & at times to time. Pt is hallucinating and states she see a little girl in her room. Pt asking for alchocol consumption at least x2. Pt removed surgical dressing and is tugging at IVs. Pt denies headaches or any pain. Denies SOB & chest pain.

## 2024-01-25 NOTE — BH CONSULTATION LIAISON ASSESSMENT NOTE - RISK ASSESSMENT
overall low risk for suicide attempt, denies si/hi/i/p, no hx attempts, future oriented, risk factors include med issues pain

## 2024-01-25 NOTE — BH CONSULTATION LIAISON ASSESSMENT NOTE - NSBHCHARTREVIEWVS_PSY_A_CORE FT
Vital Signs Last 24 Hrs  T(C): 36.7 (25 Jan 2024 12:17), Max: 36.9 (24 Jan 2024 15:56)  T(F): 98.1 (25 Jan 2024 12:17), Max: 98.4 (24 Jan 2024 15:56)  HR: 83 (25 Jan 2024 12:17) (60 - 83)  BP: 127/79 (25 Jan 2024 12:17) (108/60 - 139/79)  BP(mean): --  RR: 18 (25 Jan 2024 12:17) (18 - 18)  SpO2: 98% (25 Jan 2024 12:17) (95% - 99%)    Parameters below as of 25 Jan 2024 12:17  Patient On (Oxygen Delivery Method): room air

## 2024-01-25 NOTE — BH CONSULTATION LIAISON ASSESSMENT NOTE - HPI (INCLUDE ILLNESS QUALITY, SEVERITY, DURATION, TIMING, CONTEXT, MODIFYING FACTORS, ASSOCIATED SIGNS AND SYMPTOMS)
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 Per EMR note, she presented after fall from bed at home, unclear if HS or LOC (CT head from 1/20/24 shows age appropriate involutional changes), found to have Right IT fracture, unable to ambulate since fall. S/p Right ORIF (1/22/2024), tolerated procedure well. Psych consulted for agitation, confusion, and hallucinations. Per EMR note,  after Right hip surgery, patient became disoriented, having visual hallucinations of little girl in room, asking for alcohol consumption, removed surgical dressing and was tugging at IVs.     On exam, patient is A&O x 4, calm, cooperative, and pleasant. Initially responded it is the year "1995" but clarified that she is joking and knows it is 2024. 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 Endorses mood and appetite is "fine", slept 6-7 hours last night, denies anxiety, denies SI, HI and visual or auditory hallucinations. States she smoked in college but not in 40 years, denies drug use but admits to a glass of wine every night with dinner. On exam, largely linear without abnormalities in speech, but at times some slurring of words. No obvious physical symptoms of alcohol withdrawal. When asked how we could help her she says " a martini, 3 times a day". When asked if that's her drink of choice, she waves her hand and says no alluding to it being a joke. She had a book ([employer]) at bedside.    Permission gave permission to call  Law. He believes that the day patient fell she might have been a bit disoriented from taking cough syrup for flu like symptoms (running nose) or that she tripped in the dark. He confirmed that patient last worked at [place] office, states shes will practicing 3 times a week. Denies that she has pphx or dementia diagnosis. He expresses concern that patient might be a "secret alcoholic". Confirms that she has been having a glass of wine nightly with dinner but many years, he suspects she may be sneaking some more after he goes to bed. He also believes patient may be getting agitated because she prefers the comfort of her own home.  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 Per EMR note, she presented after fall from bed at home, unclear if HS or LOC (CT head from 1/20/24 shows age appropriate involutional changes), found to have Right IT fracture, unable to ambulate since fall. S/p Right ORIF (1/22/2024), tolerated procedure well. Psych consulted for agitation, confusion, and hallucinations. Per EMR note,  after Right hip surgery, patient became disoriented, having visual hallucinations of little girl in room, asking for alcohol consumption, removed surgical dressing and was tugging at IVs.     On exam, patient is A&O x 4, calm, cooperative, and pleasant. Initially responded it is the year "1995" but clarified that she is joking and knows it is 2024. 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 Endorses mood and appetite is "fine", slept 6-7 hours last night, denies anxiety, denies SI, HI and visual or auditory hallucinations. States she smoked in college but not in 40 years, denies drug use but admits to a glass of wine every night with dinner. On exam, largely linear without abnormalities in speech, but at times some slurring of words. No obvious physical symptoms of alcohol withdrawal. When asked how we could help her she says " a martini, 3 times a day". When asked if that's her drink of choice, she waves her hand and says no alluding to it being a joke. She had a book ([employer]) at bedside.    Pt gave permission to call  Law. He believes that the day patient fell she might have been a bit disoriented from taking cough syrup for flu like symptoms (running nose) or that she tripped in the dark. He confirmed that patient last worked at [place] office, states shes will practicing 3 times a week. Denies that she has pphx or dementia diagnosis. He expresses concern that patient might be a "secret alcoholic". Confirms that she has been having a glass of wine nightly with dinner but many years, he suspects she may be sneaking some more after he goes to bed. He also believes patient may be getting agitated because she prefers the comfort of her own home.

## 2024-01-25 NOTE — BH CONSULTATION LIAISON ASSESSMENT NOTE - NSBHCONSULTRECOMMENDOTHER_PSY_A_CORE FT
Recs: INCOMPLETE NOTE - NOT YET REVIEWED BY ATTENDING     1. benzos not recommended as it could worsen delirium. Start quetiapine 12.5 mg for mild delirium   2. discuss cutting back on alcohol use    haldol 0.5mg po/iv q6hr prn severe agitation.

## 2024-01-25 NOTE — BH CONSULTATION LIAISON ASSESSMENT NOTE - CURRENT MEDICATION
MEDICATIONS  (STANDING):  acetaminophen     Tablet .. 975 milliGRAM(s) Oral every 8 hours  ascorbic acid 500 milliGRAM(s) Oral two times a day  atenolol  Tablet 50 milliGRAM(s) Oral daily  enoxaparin Injectable 40 milliGRAM(s) SubCutaneous every 24 hours  folic acid 1 milliGRAM(s) Oral daily  influenza  Vaccine (HIGH DOSE) 0.7 milliLiter(s) IntraMuscular once  losartan 100 milliGRAM(s) Oral daily  multivitamin 1 Tablet(s) Oral daily  oxybutynin 5 milliGRAM(s) Oral two times a day  pancrelipase  (CREON 24,000 Lipase Units) 1 Capsule(s) Oral three times a day with meals  sodium chloride 0.9%. 1000 milliLiter(s) (75 mL/Hr) IV Continuous <Continuous>    MEDICATIONS  (PRN):  bisacodyl Suppository 10 milliGRAM(s) Rectal daily PRN If no bowel movement  magnesium hydroxide Suspension 30 milliLiter(s) Oral daily PRN Constipation  ondansetron Injectable 4 milliGRAM(s) IV Push every 6 hours PRN Nausea and/or Vomiting

## 2024-01-25 NOTE — BH CONSULTATION LIAISON ASSESSMENT NOTE - NSICDXBHSECONDARYDX_PSY_ALL_CORE
Hip fracture, right   S72.001A  Chronic pancreatitis   K86.1  HTN (hypertension)   I10  Pacemaker   Z95.0  Pain   R52  Confusion   R41.0

## 2024-01-25 NOTE — BH CONSULTATION LIAISON ASSESSMENT NOTE - OTHER
largely normal, some mild slurred speech  per , patient was packing, wanting to leave hospital because home is more comfortable  per EMR, removing dressing and tugging at IVs, chronic alcohol use  per EMR, removing dressing and tugging at IVs, but pt was calm and cooperative when I spoke with her  current dentist

## 2024-01-26 NOTE — BH CONSULTATION LIAISON PROGRESS NOTE - CURRENT MEDICATION
MEDICATIONS  (STANDING):  acetaminophen     Tablet .. 975 milliGRAM(s) Oral every 8 hours  ascorbic acid 500 milliGRAM(s) Oral two times a day  atenolol  Tablet 50 milliGRAM(s) Oral daily  enoxaparin Injectable 40 milliGRAM(s) SubCutaneous every 24 hours  folic acid 1 milliGRAM(s) Oral daily  influenza  Vaccine (HIGH DOSE) 0.7 milliLiter(s) IntraMuscular once  losartan 100 milliGRAM(s) Oral daily  multivitamin 1 Tablet(s) Oral daily  oxybutynin 5 milliGRAM(s) Oral two times a day  pancrelipase  (CREON 24,000 Lipase Units) 1 Capsule(s) Oral three times a day with meals  sodium chloride 0.9%. 1000 milliLiter(s) (75 mL/Hr) IV Continuous <Continuous>    MEDICATIONS  (PRN):  bisacodyl Suppository 10 milliGRAM(s) Rectal daily PRN If no bowel movement  haloperidol     Tablet 0.5 milliGRAM(s) Oral every 6 hours PRN agitation  haloperidol    Injectable 0.5 milliGRAM(s) IV Push every 6 hours PRN agitation  magnesium hydroxide Suspension 30 milliLiter(s) Oral daily PRN Constipation  ondansetron Injectable 4 milliGRAM(s) IV Push every 6 hours PRN Nausea and/or Vomiting

## 2024-01-26 NOTE — BH CONSULTATION LIAISON PROGRESS NOTE - NSBHCHARTREVIEWLAB_PSY_A_CORE FT
< from: 12 Lead ECG (01.20.24 @ 23:10) >  Ventricular Rate 77 BPM  Atrial Rate 77 BPM  P-R Interval 172 ms  QRS Duration 84 ms  Q-T Interval 430 ms  QTC Calculation(Bazett) 486 ms  P Axis 79 degrees  R Axis -39 degrees  T Axis 34 degrees  Diagnosis Line NORMAL SINUS RHYTHM  LEFT AXIS DEVIATION  ABNORMAL ECG  WHEN COMPARED WITH 06-JUN-2020  NO SIGNIFICANT CHANGE WAS FOUND  Confirmed by KEVIN LYNN MD ([id number]) on 1/21/2024 6:35:33 AM  < from: CT Head No Cont (01.20.24 @ 17:41) >                < end of copied text >    < from: CT Head No Cont (01.20.24 @ 17:41) >      IMPRESSION:    Brain CT: Age-appropriate involutional and ischemic gliotic changes. No   hemorrhage.    Cervical spine CT: Generalized osteopenia. Degenerative changes. No acute   fractures or dislocations.

## 2024-01-26 NOTE — BH CONSULTATION LIAISON PROGRESS NOTE - NSBHCHARTREVIEWVS_PSY_A_CORE FT
Vital Signs Last 24 Hrs  T(C): 36.7 (26 Jan 2024 08:14), Max: 36.7 (25 Jan 2024 12:17)  T(F): 98.1 (26 Jan 2024 08:14), Max: 98.1 (25 Jan 2024 12:17)  HR: 74 (26 Jan 2024 08:14) (62 - 90)  BP: 136/83 (26 Jan 2024 08:14) (122/73 - 161/91)  BP(mean): --  RR: 18 (26 Jan 2024 08:14) (18 - 18)  SpO2: 98% (26 Jan 2024 08:14) (97% - 99%)    Parameters below as of 26 Jan 2024 08:14  Patient On (Oxygen Delivery Method): room air

## 2024-01-26 NOTE — BH CONSULTATION LIAISON PROGRESS NOTE - NSBHASSESSMENTFT_PSY_ALL_CORE
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 Per EMR note, she presented after fall from bed at home, unclear if HS or LOC (CT head from 1/20/24 shows age appropriate involutional changes), found to have Right IT fracture, unable to ambulate since fall. S/p Right ORIF (1/22/2024), tolerated procedure well. Psych consulted for agitation, confusion, and hallucinations. Per EMR note,  after Right hip surgery, patient became disoriented, having visual hallucinations of little girl in room, asking for alcohol consumption, removed surgical dressing and was tugging at IVs.    On exam, patient is A&O x 2, unable to say the date, does not remember why she was admitted. She is lying supine, is lethargic but arousable to verbal stimuli, calm,  pleasant, and cooperative to answering questions. Endorses good mood and appetite, but states did not sleep well because she thinks  "there was a party going on, people were dancing and walking around" but denies clearly seeing these hallucinations. States she can not think straight. Admits she wanted a glass of wine to help her sleep. Admits to having a glass of wine with dinner every night, denies having more alcohol after dinner. No known pphx or dementia diagnosis. No known SI/HI/i/p. RN notes patient's mental status can fluctuate, at times able to answer all questions, then returns to current presentation. States pt has been asking for alcohol, however not agitated and has not needed or been given any PRN haldol .

## 2024-01-26 NOTE — BH CONSULTATION LIAISON PROGRESS NOTE - NS ED BHA REVIEW OF ED CHART VITAL SIGNS REVIEWED
No. JAIME screening performed.  STOP BANG Legend: 0-2 = LOW Risk; 3-4 = INTERMEDIATE Risk; 5-8 = HIGH Risk Yes

## 2024-01-26 NOTE — BH CONSULTATION LIAISON PROGRESS NOTE - NSBHFUPINTERVALHXFT_PSY_A_CORE
This is a 78 year old  female, no known pphx, presented  after fall from home, s/p Right IT fracture, s/p ORIF 1/22/2024, tolerated procedure well. Has not been able to ambulate. Psych follow up for agitation, confusion, and hallucinations that began after hip surgery.     On exam, patient is A&O x 2, unable to state date, does not remember why she was admitted. She is lying supine, is lethargic, arousable to verbal stimuli, calm,  pleasant, and cooperative to answering questions. States did not sleep well because she thinks there were "there was a party going on, people were dancing and walking around" but denies clearly seeing these hallucinations. States she can not think straight. Patient admits she wanted a glass of wine to help her sleep. Admits to nightly glass of wine with dinner, denies having more alcohol after dinner.  RN notes patient's mental status can fluctuate, at times able to answer all questions, has been asking for alcohol, however not agitated and has not needed or been given any PRN haldol .

## 2024-01-28 NOTE — PROGRESS NOTE ADULT - ASSESSMENT
A/p: 79 y/o Female POD#7 s/p R hip IM nail    DVT ppx- Lovenox  RLE WBAT  PT  OT  Gi ppx  CIWA  Dis[facility] planning to [facility]  Orthopedic Surgery  [phone]

## 2024-01-29 NOTE — CHART NOTE - NSCHARTNOTEFT_GEN_A_CORE
Spoke with Vascular Fellow on call regarding VA Duplex Venous scan performed today, POD8, for c/o of R calf pain. Report indicated no evidence of DVT on BLE and questionable decreased perfusion to the left proximal femoral artery for which Vascular was consulted. Vascular recommended VA duplex left lower extremity arterial scan but with very low clinical suspicion for decreased perfusion to LLE as reported. Clinically, patient has palpable DP/PT pulses and adequate capillary refill < 3 seconds on her LLE and there is very low suspicion of hypoperfusion as reported from the VA venous duplex of BLE. An addendum was made to the original VA duplex report that the initial impression was based upon technical factors and that there is no evidence of femoral artery nonperfusion. Therefore, the exam was cancelled and family at bedside was made aware that the exam would not be necessary.     Anirudh Roblero PA-C  Orthopedic Surgery  Pager #[phone]
Nutrition Services    Consult received for "MST Score >2." Upon chart review, patient with stable weight and does not currently meet criteria for Protein-Calorie Malnutrition risk per MST. RD remains available for assessment per protocol or as needed.     Magalie Isaacs RDN, CDN / TEAMS
Patient seen on floor resting without complaints.  No Chest Pain, SOB, N/V.    T(C): 36.4 (01-21-24 @ 15:15), Max: 36.5 (01-21-24 @ 00:06)  HR: 103 (01-21-24 @ 15:15) (71 - 103)  BP: 120/82 (01-21-24 @ 15:15) (120/82 - 182/96)  RR: 18 (01-21-24 @ 15:15) (15 - 20)  SpO2: 99% (01-21-24 @ 15:15) (94% - 100%)  Wt(kg): --    Exam:  Alert and Oriented, No Acute Distress  Laterality: R hip dressings in place C/D/I  Calves soft, non-tender bilaterally  +PF/DF/EHL/FHL  SILT  + DP pulse    Xray:----                          8.9    5.70  )-----------( 128      ( 21 Jan 2024 13:32 )             27.2    01-21    138  |  104  |  10  ----------------------------<  123<H>  3.4<L>   |  24  |  0.66    Ca    7.4<L>      21 Jan 2024 13:32    TPro  x   /  Alb  2.8<L>  /  TBili  x   /  DBili  x   /  AST  x   /  ALT  x   /  AlkPhos  x   01-21      A/P: 78yFemale S/p R IMN  . VSS. NAD  -PT/OT-WBAT RLE, OOB when tolerated  -IS encouraged  -DVT PPx with lovenox  -Followup AM labs  -Pain Control  -FU urine culture    Hue Thomas PA-C  Team Pager #[phone]

## 2024-01-29 NOTE — PROGRESS NOTE ADULT - ASSESSMENT
A/p: 78y Female POD#8 s/p R Femur Intertrochanteric Femur Fx IM Nail ORIF.  VSS. NAD.    PT/OT-WBAT RLE  Change proximal hip dressing  Follow up venous dopplers to r/o DVT  IS  DVT PPx: Lovenox and SCDs  Pain Control  Continue Current Tx.  Dispo planning to Sub Acute Rehab    Toi Samuels PA-C  Orthopedic Surgery Team  Team Pager: [phone]

## 2024-01-29 NOTE — PROGRESS NOTE ADULT - REASON FOR ADMISSION
R Hip fracture
hip fx
Right hip fracture/ IM Nail
R Intertrochanteric Femur Fx
Right hip fracture/ IM Nail

## 2024-01-30 NOTE — PROGRESS NOTE ADULT - TIME BILLING
Discussed treatment plan with patient at bedside.
Discussed treatment plan with patient at bedside.
DC to rehab  continue current meds  PO as tolerated  activity as tolerated  follow up with ortho  Patient and family aware of plan
Discussed treatment plan with patient at bedside.

## 2024-01-30 NOTE — PROGRESS NOTE ADULT - PROVIDER SPECIALTY LIST ADULT
Orthopedics
Orthopedics
Internal Medicine
Orthopedics
Internal Medicine
Orthopedics
Internal Medicine
Orthopedics
Internal Medicine

## 2024-01-30 NOTE — PROGRESS NOTE ADULT - PROBLEM SELECTOR PLAN 6
Patient with continue confusion   will need to discuss the Patients baseline with her   Patient may be suffering from ETOH withdrawal would use ativan as needed
Patient with continue confusion   will need to discuss the Patients baseline with her   would consider a psych eval
Patient with continue confusion   will need to discuss the Patients baseline with her   Patient may be suffering from ETOH withdrawal would use ativan as needed

## 2024-01-30 NOTE — PROGRESS NOTE ADULT - PROBLEM/PLAN-1
DISPLAY PLAN FREE TEXT
Cigarettes

## 2024-01-30 NOTE — PROGRESS NOTE ADULT - PROBLEM SELECTOR PLAN 2
continue creon   continue to monitor symptoms  continue IV hydration

## 2024-01-30 NOTE — PROGRESS NOTE ADULT - PROBLEM SELECTOR PLAN 5
Pain meds as needed  follow for oversedation  GI regime to prevent constipation.
Pain meds as needed  follow for oversedation  GI regime to prevent constipation.  Patient has been lethargic and confused at times  would avoid narcotics if possible
Pain meds as needed  follow for oversedation  GI regime to prevent constipation.  Patient has been lethargic and confused at times  would avoid narcotics if possible
Pain meds as needed  follow for oversedation  GI regime to prevent constipation.
Pain meds as needed  follow for oversedation  GI regime to prevent constipation.
Pain meds as needed  follow for oversedation  GI regime to prevent constipation.  Patient has been lethargic and confused at times  would avoid narcotics if possible
Pain meds as needed  follow for oversedation  GI regime to prevent constipation.

## 2024-01-30 NOTE — PROGRESS NOTE ADULT - SUBJECTIVE AND OBJECTIVE BOX
No events overnight as per overnight RN; patient calm and slept.      T(C): 36.9 (01-28-24 @ 08:00), Max: 36.9 (01-28-24 @ 08:00)  HR: 57 (01-28-24 @ 08:00) (57 - 68)  BP: 115/66 (01-28-24 @ 08:00) (108/73 - 139/83)  RR: 18 (01-28-24 @ 08:00) (18 - 18)  SpO2: 95% (01-28-24 @ 08:00) (95% - 99%)      Exam:  Gen: Sleeping comfortably, easily arousable,  R hip dsgs C/D/I, compartments and compressible  +PF/DF/EHL/FHL  SILT  +DP Pulse  calves soft, NT        01-27    137  |  105  |  10  ----------------------------<  95  3.3<L>   |  23  |  0.61    Ca    8.1<L>      27 Jan 2024 05:22  Phos  2.9     01-27  Mg     1.7     01-27    TPro  4.2<L>  /  Alb  2.2<L>  /  TBili  0.7  /  DBili  0.3  /  AST  28  /  ALT  9<L>  /  AlkPhos  101  01-26  
Orthopaedic Surgery Progress Note    Subjective:   Patient seen and examined. No acute events overnight. States pain is controlled. Denies fever/chills/chest pain/shortness of breath/numbness/tingling.    Objective:  T(C): 36.6 (01-22-24 @ 04:15), Max: 36.6 (01-22-24 @ 04:15)  HR: 75 (01-22-24 @ 04:15) (75 - 103)  BP: 122/58 (01-22-24 @ 04:15) (110/73 - 182/96)  RR: 18 (01-22-24 @ 04:15) (15 - 18)  SpO2: 94% (01-22-24 @ 04:15) (94% - 100%)  Wt(kg): --    01-20 @ 07:01  -  01-21 @ 07:00  --------------------------------------------------------  IN: 975 mL / OUT: 50 mL / NET: 925 mL    01-21 @ 07:01  -  01-22 @ 06:25  --------------------------------------------------------  IN: 495 mL / OUT: 400 mL / NET: 95 mL      Exam:  Alert and Oriented, No Acute Distress  Laterality: R hip dressings in place C/D/I  Calves soft, non-tender bilaterally  +PF/DF/EHL/FHL  SILT  + DP pulse                          8.9    5.70  )-----------( 128      ( 21 Jan 2024 13:32 )             27.2     01-21    138  |  104  |  10  ----------------------------<  123<H>  3.4<L>   |  24  |  0.66    Ca    7.4<L>      21 Jan 2024 13:32    TPro  x   /  Alb  2.8<L>  /  TBili  x   /  DBili  x   /  AST  x   /  ALT  x   /  AlkPhos  x   01-21    PT/INR - ( 21 Jan 2024 04:25 )   PT: 10.9 sec;   INR: 1.04 ratio         PTT - ( 21 Jan 2024 04:25 )  PTT:26.6 sec  Urinalysis Basic - ( 21 Jan 2024 13:32 )    Color: x / Appearance: x / SG: x / pH: x  Gluc: 123 mg/dL / Ketone: x  / Bili: x / Urobili: x   Blood: x / Protein: x / Nitrite: x   Leuk Esterase: x / RBC: x / WBC x   Sq Epi: x / Non Sq Epi: x / Bacteria: x
Patient seen and examined at bedside. Patient is currently A/O x1. Per nursing report, has been complaining of headaches, chills, and urinated into a drink cup last night.     Exam:   T(C): 36.7 (01-26-24 @ 05:08), Max: 36.7 (01-25-24 @ 12:17)  T(F): 98 (01-26-24 @ 05:08), Max: 98.1 (01-25-24 @ 12:17)  HR: 90 (01-26-24 @ 05:08) (62 - 90)  BP: 161/91 (01-26-24 @ 05:08) (122/73 - 161/91)  RR: 18 (01-26-24 @ 05:08) (18 - 18)  SpO2: 98% (01-26-24 @ 05:08) (97% - 99%)    Gen: resting in bed  RLE:  Dressing over R hip with strikethrough.   Meryl-incisional TTP; otherwise, NTTP throughout the rest of the extremity.  SILT L2-S1  GSC/TA/EHL/FHL intact  Warm and well perfused   No calf tenderness bilaterally.  Compartments soft and compressible.     Laboratory Results:   CBC, 01-25-24 @ 10:01    WBC: 3.54  Hgb: 9.4  Hct: 28.6  Plt: 218      Chemistry, 01-25-24 @ 10:01    Na: 134     AST: --  K: 3.6       ALT: --  Cl: 103      TProt: --  CO2: 22     Alb: --  BUN: 12     TBili: --  Cr: 0.65      AP: --  Glu: 112       Mg: --  P: --    eGFR: --  eGFR, AA: --    78F s/p R hip IMN 1/21    Plan:   WBAT/PT/OT  Pain management PRN  DVT prophylaxis: lovenox  Incentive spirometry  Dispo: [facility]  Will discuss with Dr. Connor and will advise for any changes to the plan.   
Post op Day [8]    Patient resting without complaints.  No chest pain, SOB, N/V.    T(C): 36.9 (01-29-24 @ 04:19), Max: 37.1 (01-28-24 @ 16:08)  HR: 60 (01-29-24 @ 04:19) (57 - 71)  BP: 158/84 (01-29-24 @ 04:19) (111/63 - 158/84)  RR: 18 (01-29-24 @ 04:19) (18 - 18)  SpO2: 98% (01-29-24 @ 04:19) (94% - 98%)      Exam:  Alert and Oriented x4 (Person, Place, Time & situation), No Acute Distress  Cardiac: Normal S1 & S2, RRR, No murmurs, rubs or gallops appreciated.  Pulmonary: 18bpm, normal breathing effort, no retractions, diminished lung sounds appreciated.  Bronchial/Vesicular lungs sounds appreciated throughout all lung lobes.  Lower Extremities: R Hip  Dressing: Gauze and surgical film x 2; Proximal dressing moderately saturated, distal dressing c/d/i  R Calf tenderness (Venous dopplers ordered)  +PF/DF/EHL/FHL  SILT  +DP Pulse                            10.3   5.09  )-----------( 368      ( 28 Jan 2024 12:37 )             31.1    01-28    134<L>  |  103  |  11  ----------------------------<  81  4.1   |  24  |  0.61    Ca    8.3<L>      28 Jan 2024 12:37        
Patient resting without complaints.  No chest pain, SOB, N/V.    T(C): 36.4 (01-21-24 @ 04:18), Max: 36.5 (01-21-24 @ 00:06)  HR: 71 (01-21-24 @ 04:18) (71 - 86)  BP: 148/84 (01-21-24 @ 04:18) (127/47 - 179/80)  RR: 19 (01-21-24 @ 04:18) (19 - 20)  SpO2: 97% (01-21-24 @ 04:18) (94% - 100%)  Wt(kg): --    Exam:  Alert and Oriented, No Acute Distress  Lower Ext: RLE externally rotated, skin intact  Calves Soft, Non-tender bilaterally  +PF/DF/EHL/FHL  +DP Pulse  SILT                            9.7    5.02  )-----------( 144      ( 21 Jan 2024 04:25 )             29.0    01-21    135  |  100  |  11  ----------------------------<  109<H>  3.6   |  26  |  0.75    Ca    7.9<L>      21 Jan 2024 04:25    TPro  x   /  Alb  2.8<L>  /  TBili  x   /  DBili  x   /  AST  x   /  ALT  x   /  AlkPhos  x   01-21      A/P: 78y Female s/p R IT fracture.  VSS. NAD.    PT/OT- bedrest  DVT PPx held for OR  Followup preop labs  Pain Control  OR today with Dr. Connor for R IMREUBEN Thomas PA-C  Team Pager: #[phone]
  Patient comfortable  No complaints    T(C): 36.8 (01-30-24 @ 04:42), Max: 37 (01-29-24 @ 13:20)  HR: 64 (01-30-24 @ 04:42) (58 - 81)  BP: 122/72 (01-30-24 @ 04:42) (104/60 - 132/86)  RR: 18 (01-30-24 @ 04:42) (18 - 18)  SpO2: 94% (01-30-24 @ 04:42) (94% - 97%)      PHYSICAL EXAM:  Gen: Awake, alert and oriented to self, place and time. Does not recall having surgery.  Right Hip: Tegaderm Dressings C/D/I; dull sensation grossly intact to light touch; (+) EHL/FHL/DF/PF; (+) Distal Pulses; No Calf tenderness B/L, PAS     LABS:                        10.3   5.09  )-----------( 368      ( 28 Jan 2024 12:37 )             31.1     01-28    134<L>  |  103  |  11  ----------------------------<  81  4.1   |  24  |  0.61    Ca    8.3<L>      28 Jan 2024 12:37              
  Patient comfortable  No complaints    T(C): 36.9 (01-23-24 @ 04:19), Max: 36.9 (01-23-24 @ 04:19)  HR: 89 (01-23-24 @ 04:19) (63 - 99)  BP: 147/85 (01-23-24 @ 04:19) (96/57 - 147/85)  RR: 18 (01-23-24 @ 04:19) (18 - 18)  SpO2: 96% (01-23-24 @ 04:19) (96% - 98%)      PHYSICAL EXAM:  NAD, Awake, confused, follows some commands  Right Hip: 4x4/Tegaderm Dressing C/D/I; dull sensation grossly intact to light touch; (+) DF/PF; (+) Distal Pulses; No Calf tenderness B/L, PAS     LABS:                        8.5    7.21  )-----------( 148      ( 22 Jan 2024 07:26 )             25.5     01-22    134<L>  |  97  |  12  ----------------------------<  176<H>  3.4<L>   |  20<L>  |  0.68    Ca    8.2<L>      22 Jan 2024 07:27              
 ORTHO  Patient is a 78y old  Female who presents with a chief complaint of Right IT hip fracture (22 Jan 2024 07:10)    Vital Signs Last 24 Hrs  T(C): 36.8 (25 Jan 2024 04:13), Max: 36.9 (24 Jan 2024 15:56)  T(F): 98.2 (25 Jan 2024 04:13), Max: 98.4 (24 Jan 2024 15:56)  HR: 71 (25 Jan 2024 04:13) (60 - 76)  BP: 139/79 (25 Jan 2024 04:13) (108/60 - 139/79)  BP(mean): --  RR: 18 (25 Jan 2024 04:13) (18 - 18)  SpO2: 98% (25 Jan 2024 04:13) (95% - 99%)    Parameters below as of 25 Jan 2024 04:13  Patient On (Oxygen Delivery Method): room air        Pt. resting, AOx2       M.S. Alert  Extremity- Right LE- dressings C/D/I  Neuro-              Motor- (+)ankle- DF/PF              Sensation- grossly intact to light touch              Calves- soft, nontender- PAS                                                        
77 yo female h/o chronic pancreatitis, on blood thinners presenting after fall. pt reported fell out of bed last evening and was on the ground. could not ambualte since fall. complaining of right hip pain. no numbness or tinging. unclear whether she hit her head. when ems arrived pt complaining of pain to right hip. given 50mg of fentanyl by ems x2. no chest pain. pt denies any abdominal pain. Patient was brought to [facility] for further evaluation and treatment. In the ED she was found to have a right hip fracture and is s/p  an Open reduction and internal fixation of the right hip. Patient tolerated the procedure well. complaint of pain post op      MEDICATIONS  (STANDING):  ascorbic acid 500 milliGRAM(s) Oral two times a day  atenolol  Tablet 50 milliGRAM(s) Oral daily  ceFAZolin   IVPB 2000 milliGRAM(s) IV Intermittent every 8 hours  folic acid 1 milliGRAM(s) Oral daily  influenza  Vaccine (HIGH DOSE) 0.7 milliLiter(s) IntraMuscular once  lactated ringers. 1000 milliLiter(s) (75 mL/Hr) IV Continuous <Continuous>  losartan 100 milliGRAM(s) Oral daily  multivitamin 1 Tablet(s) Oral daily  oxybutynin 5 milliGRAM(s) Oral two times a day  pancrelipase  (CREON 24,000 Lipase Units) 1 Capsule(s) Oral three times a day with meals    MEDICATIONS  (PRN):  acetaminophen     Tablet .. 650 milliGRAM(s) Oral every 6 hours PRN Mild Pain (1 - 3)  magnesium hydroxide Suspension 30 milliLiter(s) Oral daily PRN Constipation  ondansetron Injectable 4 milliGRAM(s) IV Push every 6 hours PRN Nausea and/or Vomiting  oxyCODONE    IR 5 milliGRAM(s) Oral every 4 hours PRN Severe Pain (7 - 10)  oxyCODONE    IR 2.5 milliGRAM(s) Oral every 4 hours PRN Moderate Pain (4 - 6)          VITALS:   T(C): 36.4 (01-21-24 @ 15:15), Max: 36.5 (01-21-24 @ 00:06)  HR: 103 (01-21-24 @ 15:15) (71 - 103)  BP: 120/82 (01-21-24 @ 15:15) (120/82 - 182/96)  RR: 18 (01-21-24 @ 15:15) (15 - 20)  SpO2: 99% (01-21-24 @ 15:15) (94% - 100%)  Wt(kg): --      PHYSICAL EXAM:  GENERAL: NAD, well-groomed, well-developed  HEAD:  Atraumatic, Normocephalic  EYES: EOMI, PERRLA, conjunctiva and sclera clear  ENMT: No tonsillar erythema, exudates, or enlargement; Moist mucous membranes, Good dentition, No lesions  NECK: Supple, No JVD, Normal thyroid  NERVOUS SYSTEM:  Alert & Oriented X3, Good concentration; Motor Strength 5/5 B/L upper and lower extremities; DTRs 2+ intact and symmetric  CHEST/LUNG: Clear to percussion bilaterally; No rales, rhonchi, wheezing, or rubs  HEART: Regular rate and rhythm; No murmurs, rubs, or gallops  ABDOMEN: Soft, Nontender, Nondistended; Bowel sounds present  EXTREMITIES:  2+ Peripheral Pulses, No clubbing, cyanosis, or edema  LYMPH: No lymphadenopathy noted  SKIN: No rashes or lesions    LABS:    CARDIAC MARKERS ( 20 Jan 2024 16:10 )  x     / x     / 81 U/L / x     / x          CBC Full  -  ( 21 Jan 2024 13:32 )  WBC Count : 5.70 K/uL  RBC Count : 2.60 M/uL  Hemoglobin : 8.9 g/dL  Hematocrit : 27.2 %  Platelet Count - Automated : 128 K/uL  Mean Cell Volume : 104.6 fl  Mean Cell Hemoglobin : 34.2 pg  Mean Cell Hemoglobin Concentration : 32.7 gm/dL  Auto Neutrophil # : 5.02 K/uL  Auto Lymphocyte # : 0.32 K/uL  Auto Monocyte # : 0.28 K/uL  Auto Eosinophil # : 0.01 K/uL  Auto Basophil # : 0.05 K/uL  Auto Neutrophil % : 88.0 %  Auto Lymphocyte % : 5.6 %  Auto Monocyte % : 4.9 %  Auto Eosinophil % : 0.2 %  Auto Basophil % : 0.9 %    01-21    138  |  104  |  10  ----------------------------<  123<H>  3.4<L>   |  24  |  0.66    Ca    7.4<L>      21 Jan 2024 13:32    TPro  x   /  Alb  2.8<L>  /  TBili  x   /  DBili  x   /  AST  x   /  ALT  x   /  AlkPhos  x   01-21    LIVER FUNCTIONS - ( 21 Jan 2024 04:25 )  Alb: 2.8 g/dL / Pro: x     / ALK PHOS: x     / ALT: x     / AST: x     / GGT: x           PT/INR - ( 21 Jan 2024 04:25 )   PT: 10.9 sec;   INR: 1.04 ratio         PTT - ( 21 Jan 2024 04:25 )  PTT:26.6 sec  Urinalysis Basic - ( 21 Jan 2024 13:32 )    Color: x / Appearance: x / SG: x / pH: x  Gluc: 123 mg/dL / Ketone: x  / Bili: x / Urobili: x   Blood: x / Protein: x / Nitrite: x   Leuk Esterase: x / RBC: x / WBC x   Sq Epi: x / Non Sq Epi: x / Bacteria: x      CAPILLARY BLOOD GLUCOSE          RADIOLOGY & ADDITIONAL TESTS:      
 ORTHO  Patient is a 78y old  Female who presents with a chief complaint of Right IT hip fracture (22 Jan 2024 07:10)    Pt. resting without complaint    VS-  T(C): 36.7 (01-24-24 @ 04:22), Max: 36.7 (01-24-24 @ 04:22)  HR: 66 (01-24-24 @ 04:22) (63 - 78)  BP: 128/74 (01-24-24 @ 04:22) (110/70 - 130/83)  RR: 18 (01-24-24 @ 04:22) (18 - 18)  SpO2: 99% (01-24-24 @ 04:22) (95% - 99%)  Wt(kg): --    M.S. Alert  Extremity- Right LE- dressings C/D/I  Neuro-              Motor- (+)ankle- DF/PF              Sensation- grossly intact to light touch              Calves- soft, nontender- PAS                               8.1    6.15  )-----------( 174      ( 23 Jan 2024 06:22 )             24.6     01-23    132<L>  |  100  |  15  ----------------------------<  96  3.8   |  20<L>  |  0.76    Ca    8.2<L>      23 Jan 2024 06:21                           
Post op Day [6 ]    Patient resting without complaints.  No chest pain, SOB, N/V.  Patient has been agitated and confused but is pleasant today.    T(C): 36.6 (01-27-24 @ 04:39), Max: 36.7 (01-26-24 @ 13:35)  HR: 63 (01-27-24 @ 04:39) (60 - 69)  BP: 123/72 (01-27-24 @ 04:39) (113/62 - 137/69)  RR: 16 (01-27-24 @ 04:39) (16 - 18)  SpO2: 98% (01-27-24 @ 04:39) (97% - 98%)  Wt(kg): --    Exam:  Alert and Oriented, No Acute Distress  R hip dsgs changed, sites intact with staples  Calves Soft, Non-tender bilaterally  +PF/DF/EHL/FHL  SILT  +DP Pulse                        9.4    3.54  )-----------( 218      ( 25 Jan 2024 10:01 )             28.6    01-27    137  |  105  |  10  ----------------------------<  95  3.3<L>   |  23  |  0.61    Ca    8.1<L>      27 Jan 2024 05:22  Phos  2.9     01-27  Mg     1.7     01-27    TPro  4.2<L>  /  Alb  2.2<L>  /  TBili  0.7  /  DBili  0.3  /  AST  28  /  ALT  9<L>  /  AlkPhos  101  01-26      
79 yo female h/o chronic pancreatitis, on blood thinners presenting after fall. pt reported fell out of bed last evening and was on the ground. could not ambualte since fall. complaining of right hip pain. no numbness or tinging. unclear whether she hit her head. when ems arrived pt complaining of pain to right hip. given 50mg of fentanyl by ems x2. no chest pain. pt denies any abdominal pain. Patient was brought to [facility] for further evaluation and treatment. In the ED she was found to have a right hip fracture and is s/p  an Open reduction and internal fixation of the right hip. Patient tolerated the procedure well. Patient was lethargic probably due to oversedation. Patient more awake and alert but still somewhat confused. Patient states pain has been better controlled.       MEDICATIONS  (STANDING):  acetaminophen     Tablet .. 975 milliGRAM(s) Oral every 8 hours  ascorbic acid 500 milliGRAM(s) Oral two times a day  atenolol  Tablet 50 milliGRAM(s) Oral daily  enoxaparin Injectable 40 milliGRAM(s) SubCutaneous every 24 hours  folic acid 1 milliGRAM(s) Oral daily  influenza  Vaccine (HIGH DOSE) 0.7 milliLiter(s) IntraMuscular once  losartan 100 milliGRAM(s) Oral daily  multivitamin 1 Tablet(s) Oral daily  oxybutynin 5 milliGRAM(s) Oral two times a day  pancrelipase  (CREON 24,000 Lipase Units) 1 Capsule(s) Oral three times a day with meals  pantoprazole    Tablet 40 milliGRAM(s) Oral before breakfast  polyethylene glycol 3350 17 Gram(s) Oral daily  senna 2 Tablet(s) Oral at bedtime  sodium chloride 0.9%. 1000 milliLiter(s) (75 mL/Hr) IV Continuous <Continuous>    MEDICATIONS  (PRN):  bisacodyl Suppository 10 milliGRAM(s) Rectal daily PRN If no bowel movement  LORazepam     Tablet 1 milliGRAM(s) Oral every 2 hours PRN CIWA-Ar score increase by 2 points and a total score of 7 or less  LORazepam   Injectable 1 milliGRAM(s) IV Push every 2 hours PRN CIWA-Ar score increase by 2 points and a total score of 7 or less  magnesium hydroxide Suspension 30 milliLiter(s) Oral daily PRN Constipation  ondansetron Injectable 4 milliGRAM(s) IV Push every 6 hours PRN Nausea and/or Vomiting          VITALS:   T(C): 37 (01-29-24 @ 13:20), Max: 37.1 (01-28-24 @ 16:08)  HR: 81 (01-29-24 @ 13:20) (59 - 81)  BP: 132/86 (01-29-24 @ 13:20) (111/63 - 158/84)  RR: 18 (01-29-24 @ 13:20) (18 - 18)  SpO2: 96% (01-29-24 @ 13:20) (94% - 98%)  Wt(kg): --    PHYSICAL EXAM:  GENERAL: NAD, well-groomed, well-developed  HEAD:  Atraumatic, Normocephalic  EYES: EOMI, PERRLA, conjunctiva and sclera clear  ENMT: No tonsillar erythema, exudates, or enlargement; Moist mucous membranes, Good dentition, No lesions  NECK: Supple, No JVD, Normal thyroid  NERVOUS SYSTEM:  Alert & Oriented X3, Good concentration; Motor Strength 5/5 B/L upper and lower extremities; DTRs 2+ intact and symmetric  CHEST/LUNG: Clear to percussion bilaterally; No rales, rhonchi, wheezing, or rubs  HEART: Regular rate and rhythm; No murmurs, rubs, or gallops  ABDOMEN: Soft, Nontender, Nondistended; Bowel sounds present  EXTREMITIES:  2+ Peripheral Pulses, No clubbing, cyanosis, or edema  LYMPH: No lymphadenopathy noted  SKIN: No rashes or lesions    LABS:        CBC Full  -  ( 28 Jan 2024 12:37 )  WBC Count : 5.09 K/uL  RBC Count : 3.05 M/uL  Hemoglobin : 10.3 g/dL  Hematocrit : 31.1 %  Platelet Count - Automated : 368 K/uL  Mean Cell Volume : 102.0 fl  Mean Cell Hemoglobin : 33.8 pg  Mean Cell Hemoglobin Concentration : 33.1 gm/dL  Auto Neutrophil # : x  Auto Lymphocyte # : x  Auto Monocyte # : x  Auto Eosinophil # : x  Auto Basophil # : x  Auto Neutrophil % : x  Auto Lymphocyte % : x  Auto Monocyte % : x  Auto Eosinophil % : x  Auto Basophil % : x    01-28    134<L>  |  103  |  11  ----------------------------<  81  4.1   |  24  |  0.61    Ca    8.3<L>      28 Jan 2024 12:37          Urinalysis Basic - ( 28 Jan 2024 12:37 )    Color: x / Appearance: x / SG: x / pH: x  Gluc: 81 mg/dL / Ketone: x  / Bili: x / Urobili: x   Blood: x / Protein: x / Nitrite: x   Leuk Esterase: x / RBC: x / WBC x   Sq Epi: x / Non Sq Epi: x / Bacteria: x            
79 yo female h/o chronic pancreatitis, on blood thinners presenting after fall. pt reported fell out of bed last evening and was on the ground. could not ambualte since fall. complaining of right hip pain. no numbness or tinging. unclear whether she hit her head. when ems arrived pt complaining of pain to right hip. given 50mg of fentanyl by ems x2. no chest pain. pt denies any abdominal pain. Patient was [facility] to [facility] for further evaluation and treatment. In the ED she was found to have a right hip fracture and is s/p  an Open reduction and internal fixation of the right hip. Patient tolerated the procedure well. Patient was lethargic probably due to oversedation. Patient more awake and alert but still somewhat confused. Patient states pain has been better controlled.       MEDICATIONS  (STANDING):  acetaminophen     Tablet .. 975 milliGRAM(s) Oral every 8 hours  ascorbic acid 500 milliGRAM(s) Oral two times a day  atenolol  Tablet 50 milliGRAM(s) Oral daily  enoxaparin Injectable 40 milliGRAM(s) SubCutaneous every 24 hours  folic acid 1 milliGRAM(s) Oral daily  influenza  Vaccine (HIGH DOSE) 0.7 milliLiter(s) IntraMuscular once  losartan 100 milliGRAM(s) Oral daily  multivitamin 1 Tablet(s) Oral daily  oxybutynin 5 milliGRAM(s) Oral two times a day  pancrelipase  (CREON 24,000 Lipase Units) 1 Capsule(s) Oral three times a day with meals  sodium chloride 0.9%. 1000 milliLiter(s) (75 mL/Hr) IV Continuous <Continuous>    MEDICATIONS  (PRN):  bisacodyl Suppository 10 milliGRAM(s) Rectal daily PRN If no bowel movement  magnesium hydroxide Suspension 30 milliLiter(s) Oral daily PRN Constipation  ondansetron Injectable 4 milliGRAM(s) IV Push every 6 hours PRN Nausea and/or Vomiting          VITALS:   T(C): 36.7 (01-26-24 @ 08:14), Max: 36.7 (01-25-24 @ 20:07)  HR: 74 (01-26-24 @ 08:14) (62 - 90)  BP: 136/83 (01-26-24 @ 08:14) (122/73 - 161/91)  RR: 18 (01-26-24 @ 08:14) (18 - 18)  SpO2: 98% (01-26-24 @ 08:14) (97% - 99%)  Wt(kg): --    PHYSICAL EXAM:  GENERAL: NAD, well-groomed, well-developed  HEAD:  Atraumatic, Normocephalic  EYES: EOMI, PERRLA, conjunctiva and sclera clear  ENMT: No tonsillar erythema, exudates, or enlargement; Moist mucous membranes, Good dentition, No lesions  NECK: Supple, No JVD, Normal thyroid  NERVOUS SYSTEM:  Alert & Oriented X3, Good concentration; Motor Strength 5/5 B/L upper and lower extremities; DTRs 2+ intact and symmetric  CHEST/LUNG: Clear to percussion bilaterally; No rales, rhonchi, wheezing, or rubs  HEART: Regular rate and rhythm; No murmurs, rubs, or gallops  ABDOMEN: Soft, Nontender, Nondistended; Bowel sounds present  EXTREMITIES:  2+ Peripheral Pulses, No clubbing, cyanosis, or edema  LYMPH: No lymphadenopathy noted  SKIN: No rashes or lesions    LABS:        CBC Full  -  ( 25 Jan 2024 10:01 )  WBC Count : 3.54 K/uL  RBC Count : 2.90 M/uL  Hemoglobin : 9.4 g/dL  Hematocrit : 28.6 %  Platelet Count - Automated : 218 K/uL  Mean Cell Volume : 98.6 fl  Mean Cell Hemoglobin : 32.4 pg  Mean Cell Hemoglobin Concentration : 32.9 gm/dL  Auto Neutrophil # : x  Auto Lymphocyte # : x  Auto Monocyte # : x  Auto Eosinophil # : x  Auto Basophil # : x  Auto Neutrophil % : x  Auto Lymphocyte % : x  Auto Monocyte % : x  Auto Eosinophil % : x  Auto Basophil % : x    01-25    134<L>  |  103  |  12  ----------------------------<  112<H>  3.6   |  22  |  0.65    Ca    7.9<L>      25 Jan 2024 10:01  Mg     1.8     01-26    TPro  5.3<L>  /  Alb  2.8<L>  /  TBili  0.9  /  DBili  0.4<H>  /  AST  36  /  ALT  9<L>  /  AlkPhos  128<H>  01-26    LIVER FUNCTIONS - ( 26 Jan 2024 06:33 )  Alb: 2.8 g/dL / Pro: 5.3 g/dL / ALK PHOS: 128 U/L / ALT: 9 U/L / AST: 36 U/L / GGT: x             Urinalysis Basic - ( 25 Jan 2024 10:01 )    Color: x / Appearance: x / SG: x / pH: x  Gluc: 112 mg/dL / Ketone: x  / Bili: x / Urobili: x   Blood: x / Protein: x / Nitrite: x   Leuk Esterase: x / RBC: x / WBC x   Sq Epi: x / Non Sq Epi: x / Bacteria: x      CAPILLARY BLOOD GLUCOSE          RADIOLOGY & ADDITIONAL TESTS:      
77 yo female h/o chronic pancreatitis, on blood thinners presenting after fall. pt reported fell out of bed last evening and was on the ground. could not ambualte since fall. complaining of right hip pain. no numbness or tinging. unclear whether she hit her head. when ems arrived pt complaining of pain to right hip. given 50mg of fentanyl by ems x2. no chest pain. pt denies any abdominal pain. Patient was brought to [facility] for further evaluation and treatment. In the ED she was found to have a right hip fracture and is s/p  an Open reduction and internal fixation of the right hip. Patient tolerated the procedure well. Patient was lethargic probably due to oversedation. Patient more awake and alert but still somewhat confused. Patient states pain has been better controlled.     MEDICATIONS  (STANDING):  acetaminophen     Tablet .. 975 milliGRAM(s) Oral every 8 hours  ascorbic acid 500 milliGRAM(s) Oral two times a day  atenolol  Tablet 50 milliGRAM(s) Oral daily  celecoxib 100 milliGRAM(s) Oral daily  enoxaparin Injectable 40 milliGRAM(s) SubCutaneous every 24 hours  folic acid 1 milliGRAM(s) Oral daily  influenza  Vaccine (HIGH DOSE) 0.7 milliLiter(s) IntraMuscular once  losartan 100 milliGRAM(s) Oral daily  multivitamin 1 Tablet(s) Oral daily  oxybutynin 5 milliGRAM(s) Oral two times a day  pancrelipase  (CREON 24,000 Lipase Units) 1 Capsule(s) Oral three times a day with meals  sodium chloride 0.9%. 1000 milliLiter(s) (75 mL/Hr) IV Continuous <Continuous>    MEDICATIONS  (PRN):  bisacodyl Suppository 10 milliGRAM(s) Rectal daily PRN If no bowel movement  magnesium hydroxide Suspension 30 milliLiter(s) Oral daily PRN Constipation  ondansetron Injectable 4 milliGRAM(s) IV Push every 6 hours PRN Nausea and/or Vomiting          VITALS:   T(C): 36.8 (01-24-24 @ 20:29), Max: 36.9 (01-24-24 @ 15:56)  HR: 60 (01-24-24 @ 20:29) (60 - 76)  BP: 108/60 (01-24-24 @ 20:29) (108/60 - 128/74)  RR: 18 (01-24-24 @ 20:29) (18 - 18)  SpO2: 99% (01-24-24 @ 20:29) (95% - 99%)  Wt(kg): --      PHYSICAL EXAM:  GENERAL: NAD, well-groomed, well-developed  HEAD:  Atraumatic, Normocephalic  EYES: EOMI, PERRLA, conjunctiva and sclera clear  ENMT: No tonsillar erythema, exudates, or enlargement; Moist mucous membranes, Good dentition, No lesions  NECK: Supple, No JVD, Normal thyroid  NERVOUS SYSTEM:  Alert & Oriented X3, Good concentration; Motor Strength 5/5 B/L upper and lower extremities; DTRs 2+ intact and symmetric  CHEST/LUNG: Clear to percussion bilaterally; No rales, rhonchi, wheezing, or rubs  HEART: Regular rate and rhythm; No murmurs, rubs, or gallops  ABDOMEN: Soft, Nontender, Nondistended; Bowel sounds present  EXTREMITIES:  2+ Peripheral Pulses, No clubbing, cyanosis, or edema  LYMPH: No lymphadenopathy noted  SKIN: No rashes or lesions  LABS:        CBC Full  -  ( 24 Jan 2024 07:00 )  WBC Count : 4.28 K/uL  RBC Count : 2.09 M/uL  Hemoglobin : 7.4 g/dL  Hematocrit : 22.3 %  Platelet Count - Automated : 202 K/uL  Mean Cell Volume : 106.7 fl  Mean Cell Hemoglobin : 35.4 pg  Mean Cell Hemoglobin Concentration : 33.2 gm/dL  Auto Neutrophil # : x  Auto Lymphocyte # : x  Auto Monocyte # : x  Auto Eosinophil # : x  Auto Basophil # : x  Auto Neutrophil % : x  Auto Lymphocyte % : x  Auto Monocyte % : x  Auto Eosinophil % : x  Auto Basophil % : x    01-24    132<L>  |  101  |  16  ----------------------------<  102<H>  4.2   |  22  |  0.83    Ca    8.2<L>      24 Jan 2024 07:00          Urinalysis Basic - ( 24 Jan 2024 07:00 )    Color: x / Appearance: x / SG: x / pH: x  Gluc: 102 mg/dL / Ketone: x  / Bili: x / Urobili: x   Blood: x / Protein: x / Nitrite: x   Leuk Esterase: x / RBC: x / WBC x   Sq Epi: x / Non Sq Epi: x / Bacteria: x      CAPILLARY BLOOD GLUCOSE          RADIOLOGY & ADDITIONAL TESTS:      
79 yo female h/o chronic pancreatitis, on blood thinners presenting after fall. pt reported fell out of bed last evening and was on the ground. could not ambualte since fall. complaining of right hip pain. no numbness or tinging. unclear whether she hit her head. when ems arrived pt complaining of pain to right hip. given 50mg of fentanyl by ems x2. no chest pain. pt denies any abdominal pain. Patient was brought to [facility] for further evaluation and treatment. In the ED she was found to have a right hip fracture and is s/p  an Open reduction and internal fixation of the right hip. Patient tolerated the procedure well. Patient was lethargic probably due to oversedation. Patient more awake and alert but still somewhat confused. Patient seen OOB sitting in a chair.       MEDICATIONS  (STANDING):  acetaminophen   IVPB .. 1000 milliGRAM(s) IV Intermittent once  ascorbic acid 500 milliGRAM(s) Oral two times a day  atenolol  Tablet 50 milliGRAM(s) Oral daily  cefTRIAXone   IVPB 1000 milliGRAM(s) IV Intermittent every 24 hours  enoxaparin Injectable 40 milliGRAM(s) SubCutaneous every 24 hours  folic acid 1 milliGRAM(s) Oral daily  influenza  Vaccine (HIGH DOSE) 0.7 milliLiter(s) IntraMuscular once  losartan 100 milliGRAM(s) Oral daily  multivitamin 1 Tablet(s) Oral daily  oxybutynin 5 milliGRAM(s) Oral two times a day  pancrelipase  (CREON 24,000 Lipase Units) 1 Capsule(s) Oral three times a day with meals  potassium chloride    Tablet ER 20 milliEquivalent(s) Oral every 2 hours    MEDICATIONS  (PRN):  magnesium hydroxide Suspension 30 milliLiter(s) Oral daily PRN Constipation  ondansetron Injectable 4 milliGRAM(s) IV Push every 6 hours PRN Nausea and/or Vomiting  traMADol 50 milliGRAM(s) Oral every 6 hours PRN breakthrough pain          VITALS:   T(C): 36.4 (01-22-24 @ 13:50), Max: 36.7 (01-22-24 @ 08:50)  HR: 64 (01-22-24 @ 13:50) (64 - 99)  BP: 112/57 (01-22-24 @ 13:50) (96/57 - 135/81)  RR: 18 (01-22-24 @ 13:50) (18 - 18)  SpO2: 96% (01-22-24 @ 13:50) (94% - 99%)  Wt(kg): --      PHYSICAL EXAM:  GENERAL: NAD, well-groomed, well-developed  HEAD:  Atraumatic, Normocephalic  EYES: EOMI, PERRLA, conjunctiva and sclera clear  ENMT: No tonsillar erythema, exudates, or enlargement; Moist mucous membranes, Good dentition, No lesions  NECK: Supple, No JVD, Normal thyroid  NERVOUS SYSTEM:  Alert & Oriented X3, Good concentration; Motor Strength 5/5 B/L upper and lower extremities; DTRs 2+ intact and symmetric  CHEST/LUNG: Clear to percussion bilaterally; No rales, rhonchi, wheezing, or rubs  HEART: Regular rate and rhythm; No murmurs, rubs, or gallops  ABDOMEN: Soft, Nontender, Nondistended; Bowel sounds present  EXTREMITIES:  2+ Peripheral Pulses, No clubbing, cyanosis, or edema  LYMPH: No lymphadenopathy noted  SKIN: No rashes or lesions    LABS:    CARDIAC MARKERS ( 20 Jan 2024 16:10 )  x     / x     / 81 U/L / x     / x          CBC Full  -  ( 22 Jan 2024 07:26 )  WBC Count : 7.21 K/uL  RBC Count : 2.41 M/uL  Hemoglobin : 8.5 g/dL  Hematocrit : 25.5 %  Platelet Count - Automated : 148 K/uL  Mean Cell Volume : 105.8 fl  Mean Cell Hemoglobin : 35.3 pg  Mean Cell Hemoglobin Concentration : 33.3 gm/dL  Auto Neutrophil # : x  Auto Lymphocyte # : x  Auto Monocyte # : x  Auto Eosinophil # : x  Auto Basophil # : x  Auto Neutrophil % : x  Auto Lymphocyte % : x  Auto Monocyte % : x  Auto Eosinophil % : x  Auto Basophil % : x    01-22    134<L>  |  97  |  12  ----------------------------<  176<H>  3.4<L>   |  20<L>  |  0.68    Ca    8.2<L>      22 Jan 2024 07:27    TPro  x   /  Alb  2.8<L>  /  TBili  x   /  DBili  x   /  AST  x   /  ALT  x   /  AlkPhos  x   01-21    LIVER FUNCTIONS - ( 21 Jan 2024 04:25 )  Alb: 2.8 g/dL / Pro: x     / ALK PHOS: x     / ALT: x     / AST: x     / GGT: x           PT/INR - ( 21 Jan 2024 04:25 )   PT: 10.9 sec;   INR: 1.04 ratio         PTT - ( 21 Jan 2024 04:25 )  PTT:26.6 sec  Urinalysis Basic - ( 22 Jan 2024 07:27 )    Color: x / Appearance: x / SG: x / pH: x  Gluc: 176 mg/dL / Ketone: x  / Bili: x / Urobili: x   Blood: x / Protein: x / Nitrite: x   Leuk Esterase: x / RBC: x / WBC x   Sq Epi: x / Non Sq Epi: x / Bacteria: x      CAPILLARY BLOOD GLUCOSE          RADIOLOGY & ADDITIONAL TESTS:      
79 yo female h/o chronic pancreatitis, on blood thinners presenting after fall. pt reported fell out of bed last evening and was on the ground. could not ambualte since fall. complaining of right hip pain. no numbness or tinging. unclear whether she hit her head. when ems arrived pt complaining of pain to right hip. given 50mg of fentanyl by ems x2. no chest pain. pt denies any abdominal pain. Patient was brought to [facility] for further evaluation and treatment. In the ED she was found to have a right hip fracture and is s/p  an Open reduction and internal fixation of the right hip. Patient tolerated the procedure well. Patient was lethargic probably due to oversedation. Patient more awake and alert but still somewhat confused. Patient states pain has been better controlled.       MEDICATIONS  (STANDING):  acetaminophen     Tablet .. 975 milliGRAM(s) Oral every 8 hours  ascorbic acid 500 milliGRAM(s) Oral two times a day  atenolol  Tablet 50 milliGRAM(s) Oral daily  enoxaparin Injectable 40 milliGRAM(s) SubCutaneous every 24 hours  folic acid 1 milliGRAM(s) Oral daily  influenza  Vaccine (HIGH DOSE) 0.7 milliLiter(s) IntraMuscular once  losartan 100 milliGRAM(s) Oral daily  multivitamin 1 Tablet(s) Oral daily  oxybutynin 5 milliGRAM(s) Oral two times a day  pancrelipase  (CREON 24,000 Lipase Units) 1 Capsule(s) Oral three times a day with meals  sodium chloride 0.9%. 1000 milliLiter(s) (75 mL/Hr) IV Continuous <Continuous>    MEDICATIONS  (PRN):  bisacodyl Suppository 10 milliGRAM(s) Rectal daily PRN If no bowel movement  magnesium hydroxide Suspension 30 milliLiter(s) Oral daily PRN Constipation  ondansetron Injectable 4 milliGRAM(s) IV Push every 6 hours PRN Nausea and/or Vomiting          VITALS:   T(C): 36.7 (01-25-24 @ 12:17), Max: 36.9 (01-24-24 @ 15:56)  HR: 83 (01-25-24 @ 12:17) (60 - 83)  BP: 127/79 (01-25-24 @ 12:17) (108/60 - 139/79)  RR: 18 (01-25-24 @ 12:17) (18 - 18)  SpO2: 98% (01-25-24 @ 12:17) (95% - 99%)  Wt(kg): --      PHYSICAL EXAM:  GENERAL: NAD, well-groomed, well-developed  HEAD:  Atraumatic, Normocephalic  EYES: EOMI, PERRLA, conjunctiva and sclera clear  ENMT: No tonsillar erythema, exudates, or enlargement; Moist mucous membranes, Good dentition, No lesions  NECK: Supple, No JVD, Normal thyroid  NERVOUS SYSTEM:  Alert & Oriented X3, Good concentration; Motor Strength 5/5 B/L upper and lower extremities; DTRs 2+ intact and symmetric  CHEST/LUNG: Clear to percussion bilaterally; No rales, rhonchi, wheezing, or rubs  HEART: Regular rate and rhythm; No murmurs, rubs, or gallops  ABDOMEN: Soft, Nontender, Nondistended; Bowel sounds present  EXTREMITIES:  2+ Peripheral Pulses, No clubbing, cyanosis, or edema  LYMPH: No lymphadenopathy noted  SKIN: No rashes or lesions  LABS:        CBC Full  -  ( 25 Jan 2024 10:01 )  WBC Count : 3.54 K/uL  RBC Count : 2.90 M/uL  Hemoglobin : 9.4 g/dL  Hematocrit : 28.6 %  Platelet Count - Automated : 218 K/uL  Mean Cell Volume : 98.6 fl  Mean Cell Hemoglobin : 32.4 pg  Mean Cell Hemoglobin Concentration : 32.9 gm/dL  Auto Neutrophil # : x  Auto Lymphocyte # : x  Auto Monocyte # : x  Auto Eosinophil # : x  Auto Basophil # : x  Auto Neutrophil % : x  Auto Lymphocyte % : x  Auto Monocyte % : x  Auto Eosinophil % : x  Auto Basophil % : x    01-25    134<L>  |  103  |  12  ----------------------------<  112<H>  3.6   |  22  |  0.65    Ca    7.9<L>      25 Jan 2024 10:01          Urinalysis Basic - ( 25 Jan 2024 10:01 )    Color: x / Appearance: x / SG: x / pH: x  Gluc: 112 mg/dL / Ketone: x  / Bili: x / Urobili: x   Blood: x / Protein: x / Nitrite: x   Leuk Esterase: x / RBC: x / WBC x   Sq Epi: x / Non Sq Epi: x / Bacteria: x      CAPILLARY BLOOD GLUCOSE          RADIOLOGY & ADDITIONAL TESTS:      
covering for dr qureshi      77 yo female h/o chronic pancreatitis, on blood thinners presenting after fall. pt reported fell out of bed last evening and was on the ground. could not ambualte since fall. complaining of right hip pain. no numbness or tinging. unclear whether she hit her head. when ems arrived pt complaining of pain to right hip. given 50mg of fentanyl by ems x2. no chest pain. pt denies any abdominal pain. Patient was brought to [facility] for further evaluation and treatment. In the ED she was found to have a right hip fracture and is s/p  an Open reduction and internal fixation of the right hip. Patient tolerated the procedure well. Patient was lethargic probably due to oversedation. Patient more awake and alert but still somewhat confused. Patient states pain has been better controlled.        MEDICATIONS  (STANDING):  acetaminophen     Tablet .. 975 milliGRAM(s) Oral every 8 hours  ascorbic acid 500 milliGRAM(s) Oral two times a day  atenolol  Tablet 50 milliGRAM(s) Oral daily  enoxaparin Injectable 40 milliGRAM(s) SubCutaneous every 24 hours  folic acid 1 milliGRAM(s) Oral daily  influenza  Vaccine (HIGH DOSE) 0.7 milliLiter(s) IntraMuscular once  losartan 100 milliGRAM(s) Oral daily  multivitamin 1 Tablet(s) Oral daily  oxybutynin 5 milliGRAM(s) Oral two times a day  pancrelipase  (CREON 24,000 Lipase Units) 1 Capsule(s) Oral three times a day with meals  sodium chloride 0.9%. 1000 milliLiter(s) (75 mL/Hr) IV Continuous <Continuous>  thiamine 100 milliGRAM(s) Oral daily    MEDICATIONS  (PRN):  bisacodyl Suppository 10 milliGRAM(s) Rectal daily PRN If no bowel movement  LORazepam     Tablet 1 milliGRAM(s) Oral every 2 hours PRN CIWA-Ar score increase by 2 points and a total score of 7 or less  LORazepam   Injectable 1 milliGRAM(s) IV Push every 2 hours PRN CIWA-Ar score increase by 2 points and a total score of 7 or less  magnesium hydroxide Suspension 30 milliLiter(s) Oral daily PRN Constipation  ondansetron Injectable 4 milliGRAM(s) IV Push every 6 hours PRN Nausea and/or Vomiting     Vital Signs Last 24 Hrs  T(C): 36.9 (28 Jan 2024 08:00), Max: 36.9 (28 Jan 2024 08:00)  T(F): 98.5 (28 Jan 2024 08:00), Max: 98.5 (28 Jan 2024 08:00)  HR: 57 (28 Jan 2024 08:00) (57 - 68)  BP: 115/66 (28 Jan 2024 08:00) (108/73 - 139/83)  BP(mean): --  RR: 18 (28 Jan 2024 08:00) (18 - 18)  SpO2: 95% (28 Jan 2024 08:00) (95% - 99%)    Parameters below as of 28 Jan 2024 08:00  Patient On (Oxygen Delivery Method): room air          PHYSICAL EXAM:  GENERAL: NAD, well-groomed, well-developed  HEAD:  Atraumatic, Normocephalic  EYES: EOMI, PERRLA, conjunctiva and sclera clear  ENMT: No tonsillar erythema, exudates, or enlargement; Moist mucous membranes, Good dentition, No lesions  NECK: Supple, No JVD, Normal thyroid  NERVOUS SYSTEM:  Alert & Oriented X3, Good concentration; Motor Strength 5/5 B/L upper and lower extremities; DTRs 2+ intact and symmetric  CHEST/LUNG: Clear to percussion bilaterally; No rales, rhonchi, wheezing, or rubs  HEART: Regular rate and rhythm; No murmurs, rubs, or gallops  ABDOMEN: Soft, Nontender, Nondistended; Bowel sounds present  EXTREMITIES:  2+ Peripheral Pulses, No clubbing, cyanosis, or edema  LYMPH: No lymphadenopathy noted  SKIN: No rashes or lesions    LABS:                         10.3   5.09  )-----------( 368      ( 28 Jan 2024 12:37 )             31.1   01-27    137  |  105  |  10  ----------------------------<  95  3.3<L>   |  23  |  0.61    Ca    8.1<L>      27 Jan 2024 05:22  Phos  2.9     01-27  Mg     1.7     01-27    TPro  4.2<L>  /  Alb  2.2<L>  /  TBili  0.7  /  DBili  0.3  /  AST  28  /  ALT  9<L>  /  AlkPhos  101  01-26  
covering for dr qureshi      79 yo female h/o chronic pancreatitis, on blood thinners presenting after fall. pt reported fell out of bed last evening and was on the ground. could not ambualte since fall. complaining of right hip pain. no numbness or tinging. unclear whether she hit her head. when ems arrived pt complaining of pain to right hip. given 50mg of fentanyl by ems x2. no chest pain. pt denies any abdominal pain. Patient was brought to [facility] for further evaluation and treatment. In the ED she was found to have a right hip fracture and is s/p  an Open reduction and internal fixation of the right hip. Patient tolerated the procedure well. Patient was lethargic probably due to oversedation. Patient more awake and alert but still somewhat confused. Patient states pain has been better controlled.        MEDICATIONS  (STANDING):  acetaminophen     Tablet .. 975 milliGRAM(s) Oral every 8 hours  ascorbic acid 500 milliGRAM(s) Oral two times a day  atenolol  Tablet 50 milliGRAM(s) Oral daily  enoxaparin Injectable 40 milliGRAM(s) SubCutaneous every 24 hours  folic acid 1 milliGRAM(s) Oral daily  influenza  Vaccine (HIGH DOSE) 0.7 milliLiter(s) IntraMuscular once  losartan 100 milliGRAM(s) Oral daily  multivitamin 1 Tablet(s) Oral daily  oxybutynin 5 milliGRAM(s) Oral two times a day  pancrelipase  (CREON 24,000 Lipase Units) 1 Capsule(s) Oral three times a day with meals  sodium chloride 0.9%. 1000 milliLiter(s) (75 mL/Hr) IV Continuous <Continuous>  thiamine 100 milliGRAM(s) Oral daily    MEDICATIONS  (PRN):  bisacodyl Suppository 10 milliGRAM(s) Rectal daily PRN If no bowel movement  LORazepam     Tablet 1 milliGRAM(s) Oral every 2 hours PRN CIWA-Ar score increase by 2 points and a total score of 7 or less  LORazepam   Injectable 1 milliGRAM(s) IV Push every 2 hours PRN CIWA-Ar score increase by 2 points and a total score of 7 or less  magnesium hydroxide Suspension 30 milliLiter(s) Oral daily PRN Constipation  ondansetron Injectable 4 milliGRAM(s) IV Push every 6 hours PRN Nausea and/or Vomiting     Vital Signs Last 24 Hrs  T(C): 36.7 (27 Jan 2024 20:15), Max: 36.7 (27 Jan 2024 08:29)  T(F): 98.1 (27 Jan 2024 20:15), Max: 98.1 (27 Jan 2024 08:29)  HR: 63 (27 Jan 2024 20:15) (60 - 68)  BP: 139/83 (27 Jan 2024 20:15) (108/73 - 139/83)  BP(mean): --  RR: 18 (27 Jan 2024 20:15) (16 - 18)  SpO2: 95% (27 Jan 2024 20:15) (95% - 98%)    Parameters below as of 27 Jan 2024 20:15  Patient On (Oxygen Delivery Method): room air        PHYSICAL EXAM:  GENERAL: NAD, well-groomed, well-developed  HEAD:  Atraumatic, Normocephalic  EYES: EOMI, PERRLA, conjunctiva and sclera clear  ENMT: No tonsillar erythema, exudates, or enlargement; Moist mucous membranes, Good dentition, No lesions  NECK: Supple, No JVD, Normal thyroid  NERVOUS SYSTEM:  Alert & Oriented X3, Good concentration; Motor Strength 5/5 B/L upper and lower extremities; DTRs 2+ intact and symmetric  CHEST/LUNG: Clear to percussion bilaterally; No rales, rhonchi, wheezing, or rubs  HEART: Regular rate and rhythm; No murmurs, rubs, or gallops  ABDOMEN: Soft, Nontender, Nondistended; Bowel sounds present  EXTREMITIES:  2+ Peripheral Pulses, No clubbing, cyanosis, or edema  LYMPH: No lymphadenopathy noted  SKIN: No rashes or lesions    LABS:     01-27    137  |  105  |  10  ----------------------------<  95  3.3<L>   |  23  |  0.61    Ca    8.1<L>      27 Jan 2024 05:22  Phos  2.9     01-27  Mg     1.7     01-27    TPro  4.2<L>  /  Alb  2.2<L>  /  TBili  0.7  /  DBili  0.3  /  AST  28  /  ALT  9<L>  /  AlkPhos  101  01-26    
79 yo female h/o chronic pancreatitis, on blood thinners presenting after fall. pt reported fell out of bed last evening and was on the ground. could not ambualte since fall. complaining of right hip pain. no numbness or tinging. unclear whether she hit her head. when ems arrived pt complaining of pain to right hip. given 50mg of fentanyl by ems x2. no chest pain. pt denies any abdominal pain. Patient was brought to [facility] for further evaluation and treatment. In the ED she was found to have a right hip fracture and is s/p  an Open reduction and internal fixation of the right hip. Patient tolerated the procedure well. Patient was lethargic probably due to oversedation. Patient more awake and alert but still somewhat confused. Patient states pain has been better controlled.       MEDICATIONS  (STANDING):  acetaminophen     Tablet .. 975 milliGRAM(s) Oral every 8 hours  ascorbic acid 500 milliGRAM(s) Oral two times a day  atenolol  Tablet 50 milliGRAM(s) Oral daily  cefTRIAXone   IVPB 1000 milliGRAM(s) IV Intermittent every 24 hours  celecoxib 100 milliGRAM(s) Oral daily  enoxaparin Injectable 40 milliGRAM(s) SubCutaneous every 24 hours  folic acid 1 milliGRAM(s) Oral daily  influenza  Vaccine (HIGH DOSE) 0.7 milliLiter(s) IntraMuscular once  losartan 100 milliGRAM(s) Oral daily  multivitamin 1 Tablet(s) Oral daily  oxybutynin 5 milliGRAM(s) Oral two times a day  pancrelipase  (CREON 24,000 Lipase Units) 1 Capsule(s) Oral three times a day with meals  sodium chloride 0.9%. 1000 milliLiter(s) (75 mL/Hr) IV Continuous <Continuous>    MEDICATIONS  (PRN):  bisacodyl Suppository 10 milliGRAM(s) Rectal daily PRN If no bowel movement  magnesium hydroxide Suspension 30 milliLiter(s) Oral daily PRN Constipation  ondansetron Injectable 4 milliGRAM(s) IV Push every 6 hours PRN Nausea and/or Vomiting          VITALS:   T(C): 36.3 (01-23-24 @ 08:40), Max: 36.9 (01-23-24 @ 04:19)  HR: 66 (01-23-24 @ 08:40) (63 - 99)  BP: 128/65 (01-23-24 @ 08:40) (97/58 - 147/85)  RR: 18 (01-23-24 @ 08:40) (18 - 18)  SpO2: 95% (01-23-24 @ 08:40) (95% - 98%)  Wt(kg): --    PHYSICAL EXAM:  GENERAL: NAD, well-groomed, well-developed  HEAD:  Atraumatic, Normocephalic  EYES: EOMI, PERRLA, conjunctiva and sclera clear  ENMT: No tonsillar erythema, exudates, or enlargement; Moist mucous membranes, Good dentition, No lesions  NECK: Supple, No JVD, Normal thyroid  NERVOUS SYSTEM:  Alert & Oriented X3, Good concentration; Motor Strength 5/5 B/L upper and lower extremities; DTRs 2+ intact and symmetric  CHEST/LUNG: Clear to percussion bilaterally; No rales, rhonchi, wheezing, or rubs  HEART: Regular rate and rhythm; No murmurs, rubs, or gallops  ABDOMEN: Soft, Nontender, Nondistended; Bowel sounds present  EXTREMITIES:  2+ Peripheral Pulses, No clubbing, cyanosis, or edema  LYMPH: No lymphadenopathy noted  SKIN: No rashes or lesions    LABS:        CBC Full  -  ( 23 Jan 2024 06:22 )  WBC Count : 6.15 K/uL  RBC Count : 2.31 M/uL  Hemoglobin : 8.1 g/dL  Hematocrit : 24.6 %  Platelet Count - Automated : 174 K/uL  Mean Cell Volume : 106.5 fl  Mean Cell Hemoglobin : 35.1 pg  Mean Cell Hemoglobin Concentration : 32.9 gm/dL  Auto Neutrophil # : x  Auto Lymphocyte # : x  Auto Monocyte # : x  Auto Eosinophil # : x  Auto Basophil # : x  Auto Neutrophil % : x  Auto Lymphocyte % : x  Auto Monocyte % : x  Auto Eosinophil % : x  Auto Basophil % : x    01-23    132<L>  |  100  |  15  ----------------------------<  96  3.8   |  20<L>  |  0.76    Ca    8.2<L>      23 Jan 2024 06:21          Urinalysis Basic - ( 23 Jan 2024 06:21 )    Color: x / Appearance: x / SG: x / pH: x  Gluc: 96 mg/dL / Ketone: x  / Bili: x / Urobili: x   Blood: x / Protein: x / Nitrite: x   Leuk Esterase: x / RBC: x / WBC x   Sq Epi: x / Non Sq Epi: x / Bacteria: x      CAPILLARY BLOOD GLUCOSE          RADIOLOGY & ADDITIONAL TESTS:      
79 yo female h/o chronic pancreatitis, on blood thinners presenting after fall. pt reported fell out of bed last evening and was on the ground. could not ambualte since fall. complaining of right hip pain. no numbness or tinging. unclear whether she hit her head. when ems arrived pt complaining of pain to right hip. given 50mg of fentanyl by ems x2. no chest pain. pt denies any abdominal pain. Patient was brought to [facility] for further evaluation and treatment. In the ED she was found to have a right hip fracture and is s/p  an Open reduction and internal fixation of the right hip. Patient tolerated the procedure well. Patient was lethargic probably due to oversedation. Patient more awake and alert but still somewhat confused. Patient states pain has been better controlled. She is scheduled for DC to rehab      MEDICATIONS  (STANDING):  acetaminophen     Tablet .. 975 milliGRAM(s) Oral every 8 hours  ascorbic acid 500 milliGRAM(s) Oral two times a day  atenolol  Tablet 50 milliGRAM(s) Oral daily  enoxaparin Injectable 40 milliGRAM(s) SubCutaneous every 24 hours  folic acid 1 milliGRAM(s) Oral daily  influenza  Vaccine (HIGH DOSE) 0.7 milliLiter(s) IntraMuscular once  losartan 100 milliGRAM(s) Oral daily  multivitamin 1 Tablet(s) Oral daily  oxybutynin 5 milliGRAM(s) Oral two times a day  pancrelipase  (CREON 24,000 Lipase Units) 1 Capsule(s) Oral three times a day with meals  pantoprazole    Tablet 40 milliGRAM(s) Oral before breakfast  polyethylene glycol 3350 17 Gram(s) Oral daily  senna 2 Tablet(s) Oral at bedtime  sodium chloride 0.9%. 1000 milliLiter(s) (75 mL/Hr) IV Continuous <Continuous>    MEDICATIONS  (PRN):  bisacodyl Suppository 10 milliGRAM(s) Rectal daily PRN If no bowel movement  LORazepam     Tablet 1 milliGRAM(s) Oral every 2 hours PRN CIWA-Ar score increase by 2 points and a total score of 7 or less  LORazepam   Injectable 1 milliGRAM(s) IV Push every 2 hours PRN CIWA-Ar score increase by 2 points and a total score of 7 or less  magnesium hydroxide Suspension 30 milliLiter(s) Oral daily PRN Constipation  ondansetron Injectable 4 milliGRAM(s) IV Push every 6 hours PRN Nausea and/or Vomiting          VITALS:   T(C): 36.9 (01-30-24 @ 16:57), Max: 36.9 (01-29-24 @ 20:15)  HR: 66 (01-30-24 @ 16:57) (58 - 68)  BP: 105/67 (01-30-24 @ 16:57) (100/65 - 125/78)  RR: 18 (01-30-24 @ 16:57) (18 - 18)  SpO2: 98% (01-30-24 @ 16:57) (94% - 98%)  Wt(kg): --      PHYSICAL EXAM:  GENERAL: NAD, well-groomed, well-developed  HEAD:  Atraumatic, Normocephalic  EYES: EOMI, PERRLA, conjunctiva and sclera clear  ENMT: No tonsillar erythema, exudates, or enlargement; Moist mucous membranes, Good dentition, No lesions  NECK: Supple, No JVD, Normal thyroid  NERVOUS SYSTEM:  Alert & Oriented X3, Good concentration; Motor Strength 5/5 B/L upper and lower extremities; DTRs 2+ intact and symmetric  CHEST/LUNG: Clear to percussion bilaterally; No rales, rhonchi, wheezing, or rubs  HEART: Regular rate and rhythm; No murmurs, rubs, or gallops  ABDOMEN: Soft, Nontender, Nondistended; Bowel sounds present  EXTREMITIES:  2+ Peripheral Pulses, No clubbing, cyanosis, or edema  LYMPH: No lymphadenopathy noted  SKIN: No rashes or lesions  LABS:                      CAPILLARY BLOOD GLUCOSE          RADIOLOGY & ADDITIONAL TESTS:

## 2024-01-30 NOTE — PROGRESS NOTE ADULT - PROBLEM SELECTOR PLAN 3
continue atenolol  monitor BP  will adjust meds as needed  Low sodium diet.

## 2024-01-30 NOTE — PROGRESS NOTE ADULT - PROBLEM SELECTOR PROBLEM 1
Hip fracture, right

## 2024-01-30 NOTE — PROGRESS NOTE ADULT - ASSESSMENT
A/P: 79 y/o F POD#9 s/p R IM Nail    DVT ppx-Lovenox 40mg SQ Daily  RLE WBAT  PT  OT  Gi ppx  Home meds  Discharge to [facility] when bed available      DELLA Frank  Orthopedic Surgery  [id number]

## 2024-01-30 NOTE — PROGRESS NOTE ADULT - PROBLEM SELECTOR PLAN 1
Patient s/p an Open reduction and internal fixation of the right hip  tolerated the procedure well  PO as tolerated  Patient to start physical therapy   DVT and GI prophylaxis.
PT/OT-WBAT  CIWA  IS  DVT PPx  Pain Control  Continue Current Tx.  Discharge plan NATHANIEL  Potassium repleted    Van Collier PA-C  Team Pager: #[phone]
Patient s/p an Open reduction and internal fixation of the right hip  tolerated the procedure well  PO as tolerated  continue physical therapy   DVT and GI prophylaxis.
Patient s/p an Open reduction and internal fixation of the right hip  tolerated the procedure well  PO as tolerated  continue physical therapy   DVT and GI prophylaxis.
Patient s/p an Open reduction and internal fixation of the right hip  tolerated the procedure well  PO as tolerated  continue physical therapy   DVT and GI prophylaxis.  LE dopplers were negative for DVT but showed nonperfusion proximal left femoral artery  would ask vascular surgery to comment
Patient s/p an Open reduction and internal fixation of the right hip  tolerated the procedure well  PO as tolerated  continue physical therapy   eventual transfer to rehab  DVT and GI prophylaxis.
Patient s/p an Open reduction and internal fixation of the right hip  tolerated the procedure well  PO as tolerated  Patient to start physical therapy   DVT and GI prophylaxis.
Patient s/p an Open reduction and internal fixation of the right hip  tolerated the procedure well  PO as tolerated  continue physical therapy   eventual transfer to rehab  DVT and GI prophylaxis.
Patient s/p an Open reduction and internal fixation of the right hip  tolerated the procedure well  PO as tolerated  continue physical therapy   DVT and GI prophylaxis.
Patient s/p an Open reduction and internal fixation of the right hip  tolerated the procedure well  PO as tolerated  continue physical therapy   DVT and GI prophylaxis.  LE dopplers were negative for DVT but showed nonperfusion proximal left femoral artery  would ask vascular surgery to comment
Patient s/p an Open reduction and internal fixation of the right hip  tolerated the procedure well  PO as tolerated  continue physical therapy   eventual transfer to rehab  DVT and GI prophylaxis.

## 2024-01-30 NOTE — PROGRESS NOTE ADULT - PROBLEM SELECTOR PLAN 4
Patient with a hx of a PPM after a syncopal episode  consider interrogation by cardiology  Patient states she follows up with her cardiologist regularly.

## 2024-01-30 NOTE — PROGRESS NOTE ADULT - PROBLEM SELECTOR PROBLEM 2
Chronic pancreatitis

## 2024-01-30 NOTE — DISCHARGE NOTE NURSING/CASE MANAGEMENT/SOCIAL WORK - PATIENT PORTAL LINK FT
You can access the FollowMyHealth Patient Portal offered by [facility] by registering at the following website: [email or web address]. By joining [facility]’s FollowMyHealth portal, you will also be able to view your health information using other applications (apps) compatible with our system.

## 2024-01-30 NOTE — DISCHARGE NOTE NURSING/CASE MANAGEMENT/SOCIAL WORK - NSDCPELOVENOXFU_GEN_ALL_CORE
Belsomra Pending    Insurance response  Prescription Drug Insurance: [employer]  Notes: Prior authorization submitted - will update provider when decision has been made by insurance.        Go for blood tests as directed. Your doctor will do lab tests at regular visits to monitor the effects of this medicine. Please follow up with your doctor and keep your health care provider appointments

## 2024-04-24 NOTE — ED PROVIDER NOTE - WR ORDER NAME 1
Anesthesia Post Evaluation    Patient: Mandi Sierra    Procedure(s) Performed: Procedure(s) (LRB):  CLOSED REDUCTION, RADIUS (Right)  APPLICATION, CAST (Right)    Final Anesthesia Type: general      Patient location during evaluation: PACU  Patient participation: Yes- Able to Participate  Level of consciousness: awake and alert  Post-procedure vital signs: reviewed and stable  Pain management: adequate  Airway patency: patent    PONV status at discharge: No PONV  Anesthetic complications: no      Cardiovascular status: blood pressure returned to baseline  Respiratory status: unassisted  Hydration status: euvolemic  Follow-up not needed.              Vitals Value Taken Time   /58 04/24/24 0858   Temp 36.6 °C (97.9 °F) 04/24/24 1000   Pulse 92 04/24/24 1006   Resp 20 04/24/24 1000   SpO2 90 % 04/24/24 1006   Vitals shown include unfiled device data.      No case tracking events are documented in the log.      Pain/Osmel Score: Presence of Pain: non-verbal indicators absent (4/24/2024 10:06 AM)  Pain Rating Prior to Med Admin: 8 (4/24/2024  9:28 AM)  Osmel Score: 9 (4/24/2024  9:30 AM)          
Xray Chest 1 View AP/PA

## 2024-05-19 NOTE — ED PROVIDER NOTE - URETHRAL CATH EVALUATION
Summary: Resp Failure- resolved; Acute systolic CHF   DATE & TIME: 2/21/24 7970-7235  Cognitive Concerns/ Orientation : A&Ox4. Deaf in L ear, very [place] in R Ear with impaired hearing aid. Calm,cooperative, and pleasant.   BEHAVIOR & AGGRESSION TOOL COLOR: Green   ABNL VS/O2: VSS on RA saturating 93-94%, LS clear/dim. Encouraged frequent IS.   MOBILITY: SBA with gait belt,   PAIN MANAGMENT: PRN Tylenol given x1 for L wrist pain.  DIET: Reg diet, fair appetite   BOWEL/BLADDER: Cont B/B. Using BR  ABNL LAB/BG: None new  DRAIN/DEVICES: R PIV SL, Strict I/Os  TELEMETRY RHYTHM: SR with BBB  SKIN: WDL, pale, mild redness and swelling of L wrist, per daughter, pt had a fall at home on 1/26/24.  TESTS/PROCEDURES: None  D/C DAY/GOALS/PLACE: Pending Cardiology clearance.   OTHER IMPORTANT INFO: Angio site on Rt inner thigh-Old dried blood otherwise intact. VALDIVIA. Cardiology ordered one time dose of IV lasix 40 mg, adequate UOP. Need Orthopedic trauma surgery ordered.                        urine sent to lab for C/S

## 2024-05-19 NOTE — CONSULT NOTE ADULT - SUBJECTIVE AND OBJECTIVE BOX
[facility] DIVISION OF KIDNEY DISEASES AND HYPERTENSION -- [phone]  -- INITIAL CONSULT NOTE  --------------------------------------------------------------------------------  HPI: 80 yo F with a PMH of HTN, MI, Pancreatitis (on Creon) who presented to [facility] due to generalized weakness. SCr on admission of 5.56. Nephrology consulted for ROBERT.    Per [facility] review, pt has had increasing SCr since 3/14/24 with a SCr of 1.8, up to 1.96 on 3/23/24, then 2.19 on 4/30/24 (most recent). Prior to March 2024, pt had normal kidney function dating back to October 2017.    Pt seen and examined in the ED. Pt is ill appearing but awake. Nursing staff attempting to place Carrillo catheter. Pt admits to "not feeling good". Pt unable to provide further hx and advised I call her  Elfego. Spoke to her , he states she has "gone downhill" when she broke her hip in January 2024. Pt was admitted to [facility] on 1/20/24, ORIF of R hip on 1/21/24 and discharged to [facility] on 1/30/24. Pt returned home on 5/8/24. While at [facility] she was treated for a UTI (?twice). Since being home, pt has had pretty fair appetite and had been doing "okay". However, over the past 2 days pt's had had decreased PO intake and lethargy. Pt has lost "a significant amount of weight" in the past few months. 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 Her  stated she has gotten significantly depressed since she has not been able to work. Pt follows with Dr. Esvin Benoit (Internist). Her  ask I call Dr. Benoit to discuss her care (no answer, will call again 5/20/24).     PAST HISTORY  --------------------------------------------------------------------------------  PAST MEDICAL & SURGICAL HISTORY:  Hypertension  Anxiety and depression  GERD (gastroesophageal reflux disease)  History of pancreatitis  2018 taking creon been stable  Stress incontinence, female  S/P Sling  Heart attack  H/O left inguinal hernia repair  Hx of tonsillectomy  Hx of appendectomy  H/O dilation and curettage  Pacemaker    FAMILY HISTORY: None    PAST SOCIAL HISTORY: No illicit drugs. 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     ALLERGIES & MEDICATIONS  --------------------------------------------------------------------------------  Allergies  penicillin (Swelling)    Intolerances  epinephrine (Other)    Standing Inpatient Medications  sodium bicarbonate  Infusion 0.287 mEq/kG/Hr IV Continuous <Continuous>  sodium chloride 0.9% Bolus 1000 milliLiter(s) IV Bolus once    PRN Inpatient Medications    REVIEW OF SYSTEMS  --------------------------------------------------------------------------------  Gen: No fevers/chills. +Fatigue, decreased PO intake  Head/Eyes/Ears: No HA  Respiratory: No dyspnea, cough  CV: No chest pain  GI: No abdominal pain, diarrhea  : No dysuria, hematuria  MSK: No edema  Skin: No rashes  Heme: No easy bruising or bleeding    All other systems were reviewed and are negative, except as noted.    VITALS/PHYSICAL EXAM  --------------------------------------------------------------------------------  T(C): 35.9 (05-19-24 @ 16:32), Max: 35.9 (05-19-24 @ 16:32)  HR: 72 (05-19-24 @ 17:45) (72 - 89)  BP: 123/66 (05-19-24 @ 17:45) (101/69 - 123/66)  RR: 18 (05-19-24 @ 17:45) (16 - 18)  SpO2: 96% (05-19-24 @ 17:45) (93% - 96%)  Wt(kg): --  Height (cm): 154.9 (05-19-24 @ 14:27)  Weight (kg): 52.2 (05-19-24 @ 14:27)  BMI (kg/m2): 21.8 (05-19-24 @ 14:27)  BSA (m2): 1.49 (05-19-24 @ 14:27)    Physical Exam:  	Gen: Ill appearing  	HEENT: Dry MM. Anicteric  	Pulm: CTA B/L  	CV: S1S2+  	Abd: Soft, +BS    	Ext: No LE edema B/L  	Neuro: Awake  	Skin: Warm and dry    LABS/STUDIES  --------------------------------------------------------------------------------              10.6   40.60 >-----------<  597      [05-19-24 @ 15:39]              33.7     139  |  105  |  103  ----------------------------<  158      [05-19-24 @ 15:39]  4.8   |  <10  |  5.56        Ca     8.1     [05-19-24 @ 15:39]      Mg     1.9     [05-19-24 @ 15:39]    TPro  6.4  /  Alb  2.5  /  TBili  0.3  /  DBili  x   /  AST  11  /  ALT  8   /  AlkPhos  174  [05-19-24 @ 15:39]    PT/INR: PT 13.9 , INR 1.27       [05-19-24 @ 16:31]  PTT: 32.9       [05-19-24 @ 16:31]    Creatinine Trend:  SCr 5.56 [05-19 @ 15:39]    Urinalysis - [05-19-24 @ 16:22]      Color Yellow / Appearance Turbid / SG 1.010 / pH 5.5      Gluc Negative / Ketone Negative  / Bili Negative / Urobili 0.2       Blood Large / Protein 300 / Leuk Est Large / Nitrite Negative      RBC >50 / WBC Too Numerous to count / Hyaline Present / Gran  / Sq Epi  / Non Sq Epi  / Bacteria Few [facility] DIVISION OF KIDNEY DISEASES AND HYPERTENSION -- [phone]  -- INITIAL CONSULT NOTE  --------------------------------------------------------------------------------  HPI: 80 yo F with a PMH of HTN, MI, Pancreatitis (on Creon) who presented to [facility] due to generalized weakness. SCr on admission of 5.56. Nephrology consulted for ROBERT.    Per [facility] review, pt has had increasing SCr since 3/14/24 with a SCr of 1.8, up to 1.96 on 3/23/24, then 2.19 on 4/30/24 (most recent). Prior to March 2024, pt had normal kidney function dating back to October 2017.    Pt seen and examined in the ED. Pt is ill appearing but awake. Nursing staff attempting to place Carrillo catheter. Pt admits to "not feeling good". Pt unable to provide further hx and advised I call her  Elfego. Spoke to her , he states she has "gone downhill" when she broke her hip in January 2024. Pt was admitted to [facility] on 1/20/24, ORIF of R hip on 1/21/24 and discharged to [facility] on 1/30/24. Pt returned home on 5/8/24. While at [facility] she was treated for a UTI (?twice). Since being home, pt has had pretty fair appetite and had been doing "okay". However, over the past 2 days pt's had had decreased PO intake and lethargy. Pt has lost "a significant amount of weight" in the past few months. Pt has had chronic diarrhea thought to be due to pancreatic insufficiency as she is on creon. 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 Her  stated she has gotten significantly depressed since she has not been able to work. Pt follows with Dr. Esvin Benoit (Internist). Her  ask I call Dr. Benoit to discuss her care (no answer, will call again 5/20/24).     PAST HISTORY  --------------------------------------------------------------------------------  PAST MEDICAL & SURGICAL HISTORY:  Hypertension  Anxiety and depression  GERD (gastroesophageal reflux disease)  History of pancreatitis  2018 taking creon been stable  Stress incontinence, female  S/P Sling  Heart attack  H/O left inguinal hernia repair  Hx of tonsillectomy  Hx of appendectomy  H/O dilation and curettage  Pacemaker    FAMILY HISTORY: None    PAST SOCIAL HISTORY: No illicit drugs. 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     ALLERGIES & MEDICATIONS  --------------------------------------------------------------------------------  Allergies  penicillin (Swelling)    Intolerances  epinephrine (Other)    Standing Inpatient Medications  sodium bicarbonate  Infusion 0.287 mEq/kG/Hr IV Continuous <Continuous>  sodium chloride 0.9% Bolus 1000 milliLiter(s) IV Bolus once    PRN Inpatient Medications    REVIEW OF SYSTEMS  --------------------------------------------------------------------------------  Gen: No fevers/chills. +Fatigue, decreased PO intake  Head/Eyes/Ears: No HA  Respiratory: No dyspnea, cough  CV: No chest pain  GI: No abdominal pain, diarrhea  : No dysuria, hematuria  MSK: No edema  Skin: No rashes  Heme: No easy bruising or bleeding    All other systems were reviewed and are negative, except as noted.    VITALS/PHYSICAL EXAM  --------------------------------------------------------------------------------  T(C): 35.9 (05-19-24 @ 16:32), Max: 35.9 (05-19-24 @ 16:32)  HR: 72 (05-19-24 @ 17:45) (72 - 89)  BP: 123/66 (05-19-24 @ 17:45) (101/69 - 123/66)  RR: 18 (05-19-24 @ 17:45) (16 - 18)  SpO2: 96% (05-19-24 @ 17:45) (93% - 96%)  Wt(kg): --  Height (cm): 154.9 (05-19-24 @ 14:27)  Weight (kg): 52.2 (05-19-24 @ 14:27)  BMI (kg/m2): 21.8 (05-19-24 @ 14:27)  BSA (m2): 1.49 (05-19-24 @ 14:27)    Physical Exam:  	Gen: Ill appearing  	HEENT: Dry MM. Anicteric  	Pulm: CTA B/L  	CV: S1S2+  	Abd: Soft, +BS    	Ext: No LE edema B/L  	Neuro: Awake  	Skin: Warm and dry    LABS/STUDIES  --------------------------------------------------------------------------------              10.6   40.60 >-----------<  597      [05-19-24 @ 15:39]              33.7     139  |  105  |  103  ----------------------------<  158      [05-19-24 @ 15:39]  4.8   |  <10  |  5.56        Ca     8.1     [05-19-24 @ 15:39]      Mg     1.9     [05-19-24 @ 15:39]    TPro  6.4  /  Alb  2.5  /  TBili  0.3  /  DBili  x   /  AST  11  /  ALT  8   /  AlkPhos  174  [05-19-24 @ 15:39]    PT/INR: PT 13.9 , INR 1.27       [05-19-24 @ 16:31]  PTT: 32.9       [05-19-24 @ 16:31]    Creatinine Trend:  SCr 5.56 [05-19 @ 15:39]    Urinalysis - [05-19-24 @ 16:22]      Color Yellow / Appearance Turbid / SG 1.010 / pH 5.5      Gluc Negative / Ketone Negative  / Bili Negative / Urobili 0.2       Blood Large / Protein 300 / Leuk Est Large / Nitrite Negative      RBC >50 / WBC Too Numerous to count / Hyaline Present / Gran  / Sq Epi  / Non Sq Epi  / Bacteria Few

## 2024-05-19 NOTE — CHART NOTE - NSCHARTNOTEFT_GEN_A_CORE
: Hoda Suggs    INDICATION: Acute Renal Failure    PROCEDURE:  [x] LIMITED ECHO  [x] LIMITED CHEST  [ ] LIMITED RETROPERITONEAL  [ ] LIMITED ABDOMINAL  [ ] LIMITED DVT  [ ] NEEDLE GUIDANCE VASCULAR  [ ] NEEDLE GUIDANCE THORACENTESIS  [ ] NEEDLE GUIDANCE PARACENTESIS  [ ] NEEDLE GUIDANCE PERICARDIOCENTESIS  [ ] OTHER    FINDINGS: A line predominance.                    No pleural effusions.                  Hyperdynamic cardiac function                  LV > RV                  IVC ~ 0.5cm with > 50% respiratory variation      INTERPRETATION:  Would benefit from further volume resuscitation : Hoda Suggs    INDICATION: Acute Renal Failure    PROCEDURE:  [x] LIMITED ECHO  [x] LIMITED CHEST  [ ] LIMITED RETROPERITONEAL  [ ] LIMITED ABDOMINAL  [ ] LIMITED DVT  [ ] NEEDLE GUIDANCE VASCULAR  [ ] NEEDLE GUIDANCE THORACENTESIS  [ ] NEEDLE GUIDANCE PARACENTESIS  [ ] NEEDLE GUIDANCE PERICARDIOCENTESIS  [ ] OTHER    FINDINGS: A line predominance.                    No pleural effusions.                  Hyperdynamic cardiac function                  LV > RV                  IVC ~ 0.5cm with > 50% respiratory variation      INTERPRETATION:  Would benefit from further volume resuscitation         Attending Attestation:  I was present during the key portions of the procedure and immediately available during the entire procedure.     Judah Portillo MD   Critical Care Attending

## 2024-05-19 NOTE — H&P ADULT - ASSESSMENT
79-year-old female with past medical history of GERD, MI, chronic pancreatitis, HTN, incontinence, recent hip surgery and was recently discharged from outpatient rehab to home presented to the emergency department with increased lethargy, initially labs concerning for sepsis with metabolic acidemia. Nephrology recommending urgent HD, MICU consulted and accepted for [facility] and Urgent HD.    PLAN  =====Neurologic=====  #Toxic metabolic encephalopathy   Patient is baseline is AOx4, though noted to be AOx2 at admission  Likely secondary to sepsis w metabolic acidemia  Treat underlying infection and acidemia    =====Pulmonary=====  Patient breathing comfortably on room air    =====Cardiovascular=====  #H/o HTN  Currently normotensive  Unclear medications per   Will hold home meds pending further collateral in AM    =====GI=====  #H/o Chronic pancreatitis  No concern for active flare   Continue home meds: Creon TID premeal    #H/o GERD  -Continue home med: Protonix 40mg PO QD    #Diet  - Renal restricted diet    =====Renal/=====  #ROBERT  #Metabolic Acidosis  -Concern for urosepsis iso hydronephrosis on CT abd  -s/p Bicarb dip  Plan:  -[facility] placed 5/19, plan for urgent iHD  -Nephrology following appreciate recs  -Obtain repeat labs after iHD    #UTI  UA dirty with leukocyte + few bacteria  management as below in urosepsis     #Electrolytes  - Maintain K>4, Phos>3, Mag>2, iCal>1    =====Endocrine=====  No active issues    ===MSK====  # Femur facture Jan 2024  Bed bound since discharge from rehab requiring full adl support  Activity as tolerated   iSTOP in chart  Pain management with Tylenol PRN, Lidocaine patches PRN, Ice/Hot Compresses PRN    =====Infectious Disease=====  #Urosepsis  Leukocytosis, Pus on UA, UA with luek+, few bacteria  S/p vanc/cefepime in ED  S/p 4L fluid resuscitation   H/o of Ecoli pan sensitive except resistance to ciprofloxacin/Levaquin  Plan:  F/u blood and urine cultures from admission  C/w Ceftriaxone and Vanc  Trend  Vanc levels    =====Heme/Onc=====  #DVT Ppx  - Hep subq    =====Ethics=====  FULL CODE

## 2024-05-19 NOTE — PATIENT PROFILE ADULT - HAVE YOU BEEN EATING POORLY BECAUSE OF A DECREASED APPETITE?
INDICATION: Peripheral artery disease.



FINDINGS: Ankle brachial indices are approximately 1.1 on the

right and 1.2 on the left.



IMPRESSION: Normal bilateral ankle brachial indices. 



Dictated by: 



  Dictated on workstation # [id number] No (0)

## 2024-05-19 NOTE — H&P ADULT - NSHPSOCIALHISTORY_GEN_ALL_CORE
ETOH Wine for Dinner daily last drink 4 months ago before hip fracture  Never Smoking  Denies drugs of abuse  Lives at home with   No pets at home  Uses wheelchair since discharge from rehab, has home attendant for assistance in ADLS, Bedbound since hip fracture in Jan 2024

## 2024-05-19 NOTE — ED ADULT NURSE REASSESSMENT NOTE - NS ED NURSE REASSESS COMMENT FT1
pt updated re: bp readings, total of 10 cc cloudy urine in pimentel cath not enough for urine tests order; Neph team member at bedside.

## 2024-05-19 NOTE — CONSULT NOTE ADULT - NS ATTEND AMEND GEN_ALL_CORE FT
Sepsis and ROBERT 2/2 UTI/pyelonephritis  CT scan reviewed - there is R hydro and fullness/enlargement of R kidney - c/w R pyelo.  No obvious obstructing stone visible  Pt on HD this AM, remains critically ill  At this time would observe pt, if pt does not respond to abx/resuscitation, would consider need for R ureteral stent, however uncertain benefit if hydro related to R pyelo/ascending infection and not true obstruction

## 2024-05-19 NOTE — CONSULT NOTE ADULT - PROBLEM SELECTOR RECOMMENDATION 2
Pt with severe metabolic acidosis in setting of ROBERT, infectious process and hypotension. SCO2 <10, pH of 7.13. Lactate 1.9. Pt placed on IV bicarb infusion. MICU consulted, note reviewed. Continue IVFs, repeat labs. Monitor SCO2 and pH.    Assessment and plan discussed with attending on call (Dr. Gregory). Pt with severe metabolic acidosis in setting of ROBERT, infectious process and hypotension. SCO2 <10, pH of 7.13. Lactate 1.9. Pt placed on IV bicarb infusion. MICU consulted, note reviewed. Continue IVFs, repeat labs. Monitor SCO2 and pH.    Addendum: CT abd/pel with R hydronephrosis. Recommend urology consult stat. Recommend repeat labs stat.    Assessment and plan discussed with attending on call (Dr. Gregory). Pt with severe high anion gap metabolic acidosis in setting of ROBERT, infectious process and hypotension. SCO2 <10, pH of 7.13. Lactate 1.9. Pt placed on IV bicarb infusion. MICU consulted, note reviewed. Continue IVFs, repeat labs including serum osm, uosm, osmolar gap and further serologies as above. Monitor SCO2 and pH.    Addendum: CT abd/pel with R hydronephrosis. Recommend urology consult stat. Recommend repeat labs stat.    Assessment and plan discussed with attending on call (Dr. Gregory).

## 2024-05-19 NOTE — ED CLERICAL - NS ED CLERK NOTE PRE-ARRIVAL INFORMATION; ADDITIONAL PRE-ARRIVAL INFORMATION
CC/Reason For referral: LETHARGIC; HYPOTENSION; LOW BLOOD PRESSURE   Preferred Consultant(if applicable):  Who admits for you (if needed):  Do you have documents you would like to fax over?  Would you still like to speak to an ED attending? Y

## 2024-05-19 NOTE — ED PROVIDER NOTE - CLINICAL SUMMARY MEDICAL DECISION MAKING FREE TEXT BOX
79-year-old female with PMH of GERD, MI, chronic pancreatitis, HTN, stress incontinence, recent hip surgery and recently discharged from outpatient rehab presenting to the emergency department after home health aide weakness failure to thrive, decreased p.o. intake, increased lethargy.  Spoke with PCP Dr. Benoit reports he was told patient is hypotensive and lethargic from her baseline.  Patient with suprapubic pain on exam and uses diaper for urination.  Currently afebrile but questionable ability to mount febrile response to infection.  Hemodynamically stable currently.  Exam with epigastric and suprapubic tenderness to palpation.  Differential diagnosis includes but is not limited to sepsis secondary to neurological etiology, low suspicion for pneumonia, acute on chronic pancreatitis.  Will get screening labs, lipase, electrolytes, VBG, blood cultures and urine analysis/urine culture, CT abdomen pelvis.  Dispo pending labs and imaging and reassessment but anticipate admission for failure to thrive. 79-year-old female with PMH of GERD, MI, chronic pancreatitis, HTN, stress incontinence, recent hip surgery and recently discharged from outpatient rehab presenting to the emergency department after home health aide weakness failure to thrive, decreased p.o. intake, increased lethargy.  Spoke with PCP Dr. Benoit reports he was told patient is hypotensive and lethargic from her baseline.  Patient with suprapubic pain on exam and uses diaper for urination.  Currently afebrile but questionable ability to mount febrile response to infection.  Hemodynamically stable currently.  Exam with epigastric and suprapubic tenderness to palpation.  Differential diagnosis includes but is not limited to sepsis secondary to urological etiology, low suspicion for pneumonia, consider acute on chronic pancreatitis.  Will get screening labs, lipase, electrolytes, VBG, blood cultures and urine analysis/urine culture, CT abdomen pelvis.  Dispo pending labs and imaging and reassessment but anticipate admission for failure to thrive.

## 2024-05-19 NOTE — CONSULT NOTE ADULT - ASSESSMENT
Pt with ROBERT  Pt with ROBERT   Pt with ROBERT in the setting of XXX. Exact duration of ROBERT however unknown. Send UA, urine electrolytes, spot urine TP/CR. Send serological work-up for secondary causes of renal failure including KIKE, P-ANCA, C-ANCA, Anti-GBM ab, HBsAg, Hep C antibody, HIV, Parvovirus, C3, C4, serum and urine immunofixation. Recommend Carrillo catheter placement. Agree with IVF NS for now.  Will need to consider HD if renal failure continues to worsen. Monitor labs and urine output. Avoid NSAIDs, ACEI/ARBS, RCA and nephrotoxins. Dose medications as per eGFR.     Pt with ROBERT

## 2024-05-19 NOTE — H&P ADULT - NSHPPHYSICALEXAM_GEN_ALL_CORE
T(C): 36.9 (05-19-24 @ 22:30), Max: 36.9 (05-19-24 @ 19:49)  HR: 88 (05-19-24 @ 23:00) (68 - 89)  BP: 105/53 (05-19-24 @ 23:00) (100/62 - 123/66)  RR: 24 (05-19-24 @ 23:00) (16 - 24)  SpO2: 100% (05-19-24 @ 22:30) (93% - 100%)    CONSTITUTIONAL: Well groomed, no apparent distress  EYES: Pupils equal and reactive, maintaining eye contact and tracking examiner intermittently.   ENMT: Oral mucosa dry.  Normal dentition; no pharyngeal injection or exudates  RESP: No respiratory distress, no use of accessory muscles; CTA b/l, no WRR  CV: RRR, no murmurs appreciated, no peripheral edema  GI: Soft, NT, ND, no rebound, no guarding;   MSK: Moving extremities spontaneously, Limited movement of R leg.   SKIN: No rashes or ulcers noted;   NEURO: Non focal. Moving some extremities as above.   PSYCH: Pleasant affect, most of recent and remote memory intact AOx2 (hospital and name)

## 2024-05-19 NOTE — CONSULT NOTE ADULT - PROBLEM SELECTOR RECOMMENDATION 9
Pt with ROBERT, likely due to infection, hypotension, ?medication use (Benicar). Per Kath/THALIA review, pt has had increasing SCr since 3/14/24 with a SCr of 1.8, trended up to 1.96 on 3/23/24, then 2.19 on 4/30/24 (most recent). Prior to March 2024, pt had normal kidney function dating back to October 2017. SCr on admission of 5.56. UA reviewed as above. Pt initiated on IV abx. Recommend UPCR. Pt placed on IV bicarb infusion. Recommend Carrillo catheter placement. CT abd/pel wo contrast pending. Will need to consider secondary work up if SCr does not improve. Will need to consider HD if renal failure continues to worsen. This was discussed at length with her  Elfego who stated that since her hip fx in January, pt has declined significantly. He believes since she has not been able to work, the pt has lost motivation to live and he is unsure if she would be agreeable to HD/RRT at this time. He asked I reach out to her PMD. Send serological work-up for secondary causes of renal failure including KIKE, P-ANCA, C-ANCA, Anti-GBM ab, HBsAg, Hep C antibody, HIV, Parvovirus, C3, C4, serum and urine immunofixation. Monitor labs and urine output. Avoid NSAIDs, ACEI/ARBS, RCA and nephrotoxins. Dose medications as per eGFR. Pt with ROBERT, likely due to infection, hypotension, ?medication use (Víctoricar). Per Kath/THALIA review, pt has had increasing SCr since 3/14/24 with a SCr of 1.8, trended up to 1.96 on 3/23/24, then 2.19 on 4/30/24 (most recent). Prior to March 2024, pt had normal kidney function dating back to October 2017. SCr on admission of 5.56. UA reviewed as above. Pt initiated on IV abx. Recommend UPCR. Recommend Carrillo catheter placement. CT abd/pel wo contrast pending. Pt placed on IV bicarb infusion. Will need to consider secondary work up if SCr does not improve. Will need to consider HD if renal failure continues to worsen. This was discussed at length with her  Elfego who stated that since her hip fx in January, pt has declined significantly. He believes since she has not been able to work, the pt has lost motivation to live and he is unsure if she would be agreeable to HD/RRT at this time. He asked I reach out to her PMD (called no answer). Monitor labs and urine output. Avoid NSAIDs, ACEI/ARBS, RCA and nephrotoxins. Dose medications as per eGFR. Pt with ROBERT, likely due to infection, hypotension, ?medication use (Benicar), diarrhea. Per Kath/THALIA review, pt has had increasing SCr since 3/14/24 with a SCr of 1.8, trended up to 1.96 on 3/23/24, then 2.19 on 4/30/24 (most recent). Prior to March 2024, pt had normal kidney function dating back to October 2017. SCr on admission of 5.56. UA reviewed as above. Pt initiated on IV abx. Recommend UPCR. Recommend UOsm. Recommend Carrillo catheter placement. CT abd/pel wo contrast pending. Pt placed on IV bicarb infusion.     Send serological work-up for secondary causes of renal failure including KIKE, P-ANCA, C-ANCA, Anti-GBM ab, HBsAg, Hep C antibody, HIV, Parvovirus, C3, C4, serum and urine immunofixation. Recommend tox screen, salicylate, acetaminophen, etoh level.     Will need to consider HD if renal failure continues to worsen. This was discussed at length with her  Elfego who stated that since her hip fx in January, pt has declined significantly. He believes since she has not been able to work, the pt has lost motivation to live and he is unsure if she would be agreeable to HD/RRT at this time. He asked I reach out to her PMD (called no answer). Monitor labs and urine output. Avoid NSAIDs, ACEI/ARBS, RCA and nephrotoxins. Dose medications as per eGFR. Pt with ROBERT, likely due to infection, hypotension, ?medication use (Benicar), diarrhea. Per Kath/THALIA review, pt has had increasing SCr since 3/14/24 with a SCr of 1.8, trended up to 1.96 on 3/23/24, then 2.19 on 4/30/24 (most recent). Prior to March 2024, pt had normal kidney function dating back to October 2017. SCr on admission of 5.56. UA reviewed as above. Pt initiated on IV abx. Recommend UPCR. Recommend UOsm. Recommend Carrillo catheter placement. CT abd/pel wo contrast pending. Pt placed on IV bicarb infusion.     Send serological work-up for secondary causes of renal failure including KIKE, P-ANCA, C-ANCA, Anti-GBM ab, HBsAg, Hep C antibody, HIV, Parvovirus, C3, C4, PLA2R, serum and urine immunofixation. Recommend tox screen, salicylate, acetaminophen, etoh level.     Will need to consider HD if renal failure continues to worsen. This was discussed at length with her  Elfego who stated that since her hip fx in January, pt has declined significantly. He believes since she has not been able to work, the pt has lost motivation to live and he is unsure if she would be agreeable to HD/RRT at this time. He asked I reach out to her PMD (called no answer). Monitor labs and urine output. Avoid NSAIDs, ACEI/ARBS, RCA and nephrotoxins. Dose medications as per eGFR. Pt with ROBERT, likely due to infection, hypotension, ?medication use (Benicar), diarrhea. Per Kath/THALIA review, pt has had increasing SCr since 3/14/24 with a SCr of 1.8, trended up to 1.96 on 3/23/24, then 2.19 on 4/30/24 (most recent). Prior to March 2024, pt had normal kidney function dating back to October 2017. SCr on admission of 5.56. UA reviewed as above. Pt initiated on IV abx. Recommend UPCR. Recommend UOsm. Recommend Carrillo catheter placement. CT abd/pel wo contrast pending. Pt placed on IV bicarb infusion.     Send serological work-up for secondary causes of renal failure including KIKE, P-ANCA, C-ANCA, Anti-GBM ab, HBsAg, Hep C antibody, HIV, Parvovirus, C3, C4, PLA2R, FLC, serum and urine immunofixation, RPR. Recommend tox screen, salicylate, acetaminophen, etoh level.     Will need to consider HD if renal failure continues to worsen. This was discussed at length with her  Elfego who stated that since her hip fx in January, pt has declined significantly. He believes since she has not been able to work, the pt has lost motivation to live and he is unsure if she would be agreeable to HD/RRT at this time. He asked I reach out to her PMD (called no answer). Monitor labs and urine output. Avoid NSAIDs, ACEI/ARBS, RCA and nephrotoxins. Dose medications as per eGFR.

## 2024-05-19 NOTE — PATIENT PROFILE ADULT - FALL HARM RISK - HARM RISK INTERVENTIONS

## 2024-05-19 NOTE — ED ADULT NURSE REASSESSMENT NOTE - NS ED NURSE REASSESS COMMENT FT1
Straight cath patient. Sterile technique used. 2 RNs at bedside. 10 mL drained. Urine is cloudy and foul smelling. Pt tolerated procedure well. Urine samples sent.

## 2024-05-19 NOTE — ED ADULT NURSE NOTE - OBJECTIVE STATEMENT
Patient is a 79y female presenting to the ED via EMS with c/o weakness. Patient is a 79y female presenting to the ED via EMS with c/o weakness. Patient A&Ox4 currently, speaking coherently. According to EMS the patient is c/o generalized weakness, fatigue, decreased appetite and decreased PO intake x 2-3 days. Home health aide is at bedside. Upon arrival to [facility] ED, patient's skin appears dry, has wide spread ecchymosis on the arms. Bilateral hands and feet are both red-purple showing signs of poor perfusion. Respirations spontaneous, even, and non-labored. Abdomen soft, non-distended and diffusely tender to palpation. Patient is a 79y female presenting to the ED via EMS with c/o weakness. Patient A&Ox4 currently, speaking coherently. PMH of HTN, GERD, PPM, pancreatitis. According to EMS the patient is c/o generalized weakness, fatigue, decreased appetite and decreased PO intake x 2-3 days. Home health aide is at bedside. Upon arrival to [facility] ED, patient's skin appears dry, has wide spread ecchymosis on the arms. Bilateral hands and feet are both red-purple showing signs of poor perfusion. Patient states she hasn't eaten or drank anything in past 3 days. Respirations spontaneous, even, and non-labored. Abdomen soft, non-distended and diffusely tender to palpation. Denies chest pain, SOB, headache, vomiting, diarrhea, fever/chills, burning upon urination or difficulty urinating, hematuria.

## 2024-05-19 NOTE — ED ADULT NURSE NOTE - NS ED NURSE REPORT GIVEN TO FT
YANDEL Castro in MICU, Gloria will clarify order for IV calcium and LR bolus and hang in MICU if necessary; pt to receive emergency dialysis.

## 2024-05-19 NOTE — H&P ADULT - NSHPLABSRESULTS_GEN_ALL_CORE
.  LABS:                         10.6   40.60 )-----------( 597      ( 19 May 2024 15:39 )             33.7     05-19    137  |  107  |  91<H>  ----------------------------<  297<H>  3.4<L>   |  <10<LL>  |  4.85<H>    Ca    6.5<LL>      19 May 2024 21:31  Mg     1.9     05-19    TPro  6.4  /  Alb  2.5<L>  /  TBili  0.3  /  DBili  x   /  AST  11  /  ALT  8<L>  /  AlkPhos  174<H>  05-19    PT/INR - ( 19 May 2024 16:31 )   PT: 13.9 sec;   INR: 1.27 ratio         PTT - ( 19 May 2024 16:31 )  PTT:32.9 sec  Urinalysis Basic - ( 19 May 2024 21:31 )    Color: x / Appearance: x / SG: x / pH: x  Gluc: 297 mg/dL / Ketone: x  / Bili: x / Urobili: x   Blood: x / Protein: x / Nitrite: x   Leuk Esterase: x / RBC: x / WBC x   Sq Epi: x / Non Sq Epi: x / Bacteria: x        Lactate, Blood: 1.9 mmol/L (05-19 @ 16:31)      RADIOLOGY, EKG & ADDITIONAL TESTS: Reviewed.     < from: CT Abdomen and Pelvis No Cont (05.19.24 @ 19:44) >    CONTRAST/COMPLICATIONS:  IV Contrast: NONE  Oral Contrast: NONE  Complications: None reported at time of study completion    PROCEDURE:  CT of the Abdomen and Pelvis was performed.  Sagittal and coronal reformats were performed.    FINDINGS:  LOWER CHEST: Partially visualized cardiac leads in place.    LIVER: Within normal limits.  BILE DUCTS: Normal caliber.  GALLBLADDER: Within normal limits.  SPLEEN: Within normal limits.  PANCREAS: Atrophic with coarse calcifications.  ADRENALS: Within normal limits.  KIDNEYS/URETERS: Mild to moderate right hydronephrosis and hydroureter   with cirrhosis. No obstructing stone is seen.  Unremarkable left kidney.    BLADDER: Urinary bladder collapsed around a Carrillo catheter limiting   evaluation.  REPRODUCTIVE ORGANS: Uterus and adnexa within normal limits.    BOWEL: Rectum is mildly distended with stool and there is rectal wall   thickening. Question stercoral colitis. No bowel obstruction. Appendix is   not visualized. No evidence of inflammation in the pericecal region.   Diverticulosis without evidence of acute diverticulitis.  PERITONEUM: No ascites.  VESSELS: Atherosclerotic changes.  RETROPERITONEUM/LYMPH NODES: No lymphadenopathy.  ABDOMINAL WALL: Within normal limits.  BONES: Orthopedic fixation of the right femur. Chronic rib fracture   deformities. Degenerative changes.    IMPRESSION:    Mild to moderate right hydronephrosis and hydroureter with cirrhosis. No   obstructing stone is seen.    Rectum is mildly distended with stool and there is rectal wall   thickening. Question stercoral colitis.        --- End of Report ---    < end of copied text >

## 2024-05-19 NOTE — CHART NOTE - NSCHARTNOTEFT_GEN_A_CORE
Pt re-examined in the ED. Pt remains on IV bicarbonate infusion. pH has improved to 7.28 but SCO2 remains <10 and patient has had only 10cc of UOP despite multiple hours of continuous IV bicarbonate infusion. Called her  Elfego Yee to provide a clinical update. Current recommendation would be to initiate urgent hemodialysis given persistent acidemia and oliguria/anuria. Discussed with the patient's  that she will require RRT/HD, risks and benefits associated with RRT/HD explained at length. HD consent obtained and kept in patients chart. Discussed with ED staff, MICU re-consulted. Plan for admission to MICU with urgent HD. HD nurse on call to be contacted once admitted to MICU.    If you have any questions, please feel free to contact me  Luzma Mcclellan  Nephrology Fellow  Pager # [phone]  [employer] Teams  (After 4pm or on weekends please page the on-call fellow) Pt re-examined in the ED. Pt remains on IV bicarbonate infusion. pH has improved to 7.28 but SCO2 remains <10 and patient has had only 10cc of UOP despite multiple hours of continuous IV bicarbonate infusion. Called her  Elfego Yee to provide a clinical update. Current recommendation would be to initiate urgent hemodialysis given persistent acidemia and oliguria/anuria. Discussed with the patient's  that she will require RRT/HD, risks and benefits associated with RRT/HD explained at length. HD consent obtained and kept in patients chart. Discussed with ED staff, MICU re-consulted. Plan for admission to MICU with urgent HD. HD orders have been placed. Plan to initiate HD once HD catheter is placed. HD nurse on call to be contacted once admitted to MICU.    If you have any questions, please feel free to contact me  Luzma Mcclellan  Nephrology Fellow  Pager # [phone]  [employer] Teams  (After 4pm or on weekends please page the on-call fellow)

## 2024-05-19 NOTE — ED ADULT NURSE REASSESSMENT NOTE - NS ED NURSE REASSESS COMMENT FT1
Carrillo catheter inserted using sterile technique. Second RN present to confirm sterility. Bedside drainage to gravity. Stat lock in place. 10mL of cloudy, grey-yellow urine drained. Patient was previously straight cathed for urine.

## 2024-05-19 NOTE — ED PROVIDER NOTE - PHYSICAL EXAMINATION
GEN: Patient awake and alert. No acute distress, non-toxic. Cachectic, frail.   Head: Normocephalic, atraumatic.  Neck: Nontender, full ROM.   Eyes: PERRLA b/l. EOMI, no scleral icterus, no conjunctival injection. Dry mucous membranes..   CARDIAC: RRR. Normal S1, S2. No murmur, rubs, or gallops. No peripheral edema noted.  PULM: Speaking in full sentences. CTA B/L no wheeze, rales or rhonchi. No signs of respiratory distress, no accessory muscle usage or nasal flaring.  ABD: Soft, TTP in epigastric region, nondistended. No rebound, no involuntary guarding.   : No CVA tenderness, + suprapubic tenderness.  MSK: Moving all extremities spontaneously. Full ROM in extremities. No obvious deformity.  NEURO: A&Ox3, no acute apparent focal neurological deficits. Following simple commands.   SKIN: Scattered ecchymosis. GEN: Patient awake and alert. No acute distress, non-toxic. Cachectic, frail.   Head: Normocephalic, atraumatic.  Neck: Nontender, full ROM.   Eyes: PERRLA b/l. EOMI, no scleral icterus, no conjunctival injection. Dry mucous membranes..   CARDIAC: RRR. Normal S1, S2. No murmur, rubs, or gallops. No peripheral edema noted.  PULM: Speaking in full sentences. CTA B/L no wheeze, rales or rhonchi. No signs of respiratory distress, no accessory muscle usage or nasal flaring.  ABD: Soft, TTP in epigastric region, nondistended. No rebound, no involuntary guarding.   : No CVA tenderness, + suprapubic tenderness.  MSK: Moving all extremities spontaneously. Full ROM in extremities. No obvious deformity.  NEURO: A&Ox3, no acute apparent focal neurological deficits. Following simple commands.   SKIN: Scattered ecchymosis.  add:  Very dry mucosal membranes

## 2024-05-19 NOTE — CONSULT NOTE ADULT - ASSESSMENT
A/P: 79y Female with PMHx HTN, MI, anxiety and depression, GERD, pancreatitis, presents to ED with generalized weakness. Urology consulted for right hydronephrosis. Had Cr 5.56 in ED initially, and repeated Cr 4.85 (baseline 0.6-0.8 on Jan, up to approximal 2 on March). WBC 40. CT showed Mild to moderate right hydronephrosis and hydroureter with cirrhosis. UA showed large LE, few bacteria, negative nitrite.     - Keep NPO  - Continue to trend Cr and WBC  -  A/P: 79y Female with PMHx HTN, MI, anxiety and depression, GERD, pancreatitis, presents to ED with generalized weakness. Urology consulted for right hydronephrosis. Had Cr 5.56 in ED initially, and repeated Cr 4.85 (baseline 0.6-0.8 on Jan, up to approximal 2 on March). WBC 40. CT showed Mild to moderate right hydronephrosis and hydroureter with cirrhosis. UA showed large LE, few bacteria, negative nitrite.     - Keep NPO  - Continue to trend Cr and WBC  - Will consider stent if Cr fails to continue to improve in the AM  - Need nephrology to help with the acidosis in case she needs dialysis to undergo general anesthesia  - Will obtain consent form if needs OR  - Case discussed with Dr. Nowak and Dr. Thurman     A/P: 79y Female with PMHx HTN, MI, anxiety and depression, GERD, pancreatitis, presents to ED with generalized weakness. Urology consulted for right hydronephrosis. Had Cr 5.56 in ED initially, and repeated Cr 4.85 (baseline 0.6-0.8 on Jan, up to approximal 2 on March). WBC 40. CT showed Mild to moderate right hydronephrosis and hydroureter with cirrhosis. UA showed large LE, few bacteria, negative nitrite. CT images reviewed, no obvious transition point or clear cause of obstruction, possible reactive.    - Keep NPO  - Continue to trend Cr and WBC  - Will consider stent if Cr fails to continue to improve in the AM  - Need nephrology to help with the acidosis in case she needs dialysis to undergo general anesthesia  - Will obtain consent form if needs OR  - Case discussed with Dr. Nowak and Dr. Thurman     A/P: 79y Female with PMHx HTN, MI, anxiety and depression, GERD, pancreatitis, presents to ED with generalized weakness. Urology consulted for right hydronephrosis. Had Cr 5.56 in ED initially, and repeated Cr 4.85 (baseline 0.6-0.8 on Jan, up to approximal 2 on March). WBC 40. CT showed Mild to moderate right hydronephrosis and hydroureter with cirrhosis. UA showed large LE, few bacteria, negative nitrite. CT images reviewed, no obvious transition point or clear cause of obstruction, possible reactive.    - Continue to trend Cr and WBC  - Will consider stent if Cr fails to continue to improve in the AM  - Need nephrology to help with the acidosis in case she needs dialysis to undergo general anesthesia  - Will obtain consent form if needs OR  - Case discussed with Dr. Nowak and Dr. Thurman

## 2024-05-19 NOTE — CHART NOTE - NSCHARTNOTEFT_GEN_A_CORE
Chart reviewed  d/w fellow on call  pt a/w ROBERT/leukocytosis  severe metabolic acidosis  depressed recently    #robert  prerenal in setting of infection vs atn  R hydro on ct scan- please call urology   IVF bicarb based for now  place pimentel  #met acidosis  high AG ?etiology    check ETOH level, salicylate, acetominophen level  check serum osm for osmolar gap  started bicarb drip  not much improvement in acidosis but cr downtrended  repeat stat labs now and if acidosis severe and refractory we may need to dialyze   #cirrhosis on ct scan  # leukocytosis  check u cx, c dif, pan cx  #proteinuria/hematuria- needs Send GN serology: KIKE, c3,c4, dsdna, HIV, HCV, HBV, Hbsag, PLA2r Ab, SPEP, Free Light Chains, Immunofixation, RPR Chart reviewed  d/w fellow on call  pt a/w ROBERT/leukocytosis  severe metabolic acidosis  depressed recently    #robert  prerenal in setting of infection vs atn  R hydro on ct scan- please call urology   IVF bicarb based for now  place pimentel  #met acidosis  high AG ?etiology    check ETOH level, salicylate, acetominophen level  check serum osm for osmolar gap  started bicarb drip  not much improvement in acidosis but cr downtrended  repeat stat labs now and if acidosis severe and refractory we may need to dialyze   #cirrhosis on ct scan  # leukocytosis  check u cx, c dif, pan cx  #proteinuria/hematuria- needs Send GN serology: KIKE, c3,c4, dsdna, HIV, HCV, HBV, Hbsag, PLA2r Ab, SPEP, Free Light Chains, Immunofixation, RPR  #MICU evaluation; may need micu if no improvement Chart reviewed  d/w fellow on call  pt a/w ROBERT/leukocytosis  severe metabolic acidosis  depressed recently    #robert  prerenal in setting of infection vs atn (hypotension)  R hydro on ct scan- please call urology   IVF bicarb based for now  place pimentel  monitor I/os  #met acidosis  high AG ?etiology  RF +?other  check ETOH level, salicylate, Acetominophen level  check serum osm for osmolar gap  started bicarb drip  not much improvement in acidosis thus far but cr downtrending  #repeat stat labs now- BMP, Blood gas, and tox levels above and if acidosis is still severe and refractory we may need to dialyze   #cirrhosis on ct scan  # leukocytosis  check u cx, c dif, pan cx  r/o urosepsis- hydro on ct scan  #proteinuria/hematuria-?uti but  needs GN serology: KIKE, c3,c4, dsdna, HIV, HCV, HBV, Hbsag, PLA2r Ab, SPEP, Free Light Chains, Immunofixation, RPR  #MICU evaluation; may need micu if no improvement Chart reviewed  d/w fellow on call  pt a/w ROBERT/leukocytosis  severe metabolic acidosis  depressed recently  h/o UTIs    #robert  prerenal in setting of infection vs atn (hypotension)  R hydro on ct scan- please call urology   IVF bicarb based for now  place pimentel  monitor I/os  #met acidosis  high AG ?etiology  RF +?other  check ETOH level, salicylate, Acetominophen level  check serum osm for osmolar gap  started bicarb drip  not much improvement in acidosis thus far but cr downtrending  #repeat stat labs now- BMP, Blood gas, and tox levels above and if acidosis is still severe and refractory we may need to dialyze   #cirrhosis on ct scan  # leukocytosis  check u cx, c dif, pan cx  r/o urosepsis- hydro on ct scan  #proteinuria/hematuria-?uti but  needs GN serology: KIKE, c3,c4, dsdna, HIV, HCV, HBV, Hbsag, PLA2r Ab, SPEP, Free Light Chains, Immunofixation, RPR  #MICU evaluation; may need micu if no improvement Chart reviewed  d/w fellow on call  pt a/w ROBERT/leukocytosis  severe metabolic acidosis  depressed recently  h/o UTIs    #robert  prerenal in setting of infection vs atn (hypotension)  R hydro on ct scan- please call urology   IVF bicarb based for now  place pimentel  monitor I/os  #met acidosis  high AG ?etiology  RF +?other  check ETOH level, salicylate, Acetominophen level  check serum osm for osmolar gap  started bicarb drip  **not much improvement in acidosis thus far but cr downtrending  #repeat stat labs now- BMP, Blood gas, and tox levels above and if acidosis is still severe and refractory we may need to dialyze   #cirrhosis on ct scan  # leukocytosis  check u cx, c dif, pan cx  r/o urosepsis- hydro on ct scan  #proteinuria/hematuria-?uti but  needs GN serology: KIKE, c3,c4, dsdna, HIV, HCV, HBV, Hbsag, PLA2r Ab, SPEP, Free Light Chains, Immunofixation, RPR  #MICU evaluation; may need micu if no improvement for HD  if no improvement in bicarb despite bicarb drip she may need dialysis for refractory acidosis Chart reviewed  d/w fellow on call  pt a/w ROBERT/leukocytosis  severe metabolic acidosis  depressed recently  h/o UTIs    #robert  prerenal in setting of infection vs atn (hypotension)  R hydro on ct scan- please call urology   IVF bicarb based for now  place pimentel-  monitor I/os  #met acidosis  high AG ?etiology  RF +?other +diarrhea?  check ETOH level, salicylate, Acetominophen level  check serum osm for osmolar gap  started bicarb drip  **not much improvement in acidosis thus far but cr downtrending  #repeat stat labs now- BMP, Blood gas, and tox levels above and if acidosis is still severe and refractory we may need to dialyze   #cirrhosis on ct scan  # leukocytosis  check u cx, c dif, pan cx  r/o urosepsis- hydro on ct scan  #proteinuria/hematuria-?uti but  needs GN serology: KIKE, c3,c4, dsdna, HIV, HCV, HBV, Hbsag, PLA2r Ab, SPEP, Free Light Chains, Immunofixation, RPR  #MICU evaluation; may need micu if no improvement for HD  if no improvement in bicarb despite bicarb drip she may need dialysis for refractory acidosis

## 2024-05-19 NOTE — ED PROVIDER NOTE - ATTENDING CONTRIBUTION TO CARE
I performed a history and physical exam of the patient and discussed their management with the resident. I reviewed the resident's note and agree with the documented findings and plan of care.  Sun Louis MD

## 2024-05-19 NOTE — CONSULT NOTE ADULT - ATTENDING COMMENTS
79F Hx HTN, Chronic Pancreatitis, Rt Hip ORIF 1/2024 p/w ED Generalized Weakness, Vague Abdominal Discomfort found smoky UA positive UTI, Severe HAG-MA, HCO3= <10, vpH= 7.13, Azotemia BUN/Wz=942/5056.  - A&O x 2 baseline unchanged answering questions appropriately   - Hemodynamically stable on RAO2   - Renal Consult already involved without Urgent Hemodialysis plan   - Agreeable to start empiric ABx Coverage pending CT Abdo/Pelvis  - Repeat vpH and CMP after IV HCO3 Gtt   - No ICU Intervention/Monitoring indicated at this time 79F Hx HTN, Chronic Pancreatitis, Rt Hip ORIF 1/2024 p/w ED Generalized Weakness, Vague Abdominal Discomfort found smoky UA positive UTI, Severe HAG-MA, HCO3= <10, vpH= 7.13, Azotemia BUN/Xd=039/5056.  - A&O x 2 baseline unchanged answering questions appropriately   - Hemodynamically stable on RAO2   - Renal Consult already involved without Urgent Hemodialysis plan   - Agreeable to start empiric ABx Coverage pending CT Abdo/Pelvis  - Repeat vpH and CMP after IV HCO3 Gtt   - No ICU Intervention/Monitoring indicated at this time     Patient seen and examined with ICU Resident/Fellow at bedside after lab data, medical records and radiology reports reviewed. I have read and agreeable in general with resident's Documented Note, Assessment and Management Plan which reflected my opinions from bedside round and discussion.

## 2024-05-19 NOTE — ED ADULT NURSE REASSESSMENT NOTE - NS ED NURSE REASSESS COMMENT FT1
resting in bed; awake, oriented to name and place; on bedside cardiac monitor; bicarb gtt infusing via pump; pimentel in place with minimal cloudy urine; Dr Plasencia made aware of low temp and low uo; placed bear hugger and connected pimentel temp probe to bear hugger for monitoring.

## 2024-05-19 NOTE — CONSULT NOTE ADULT - ATTENDING COMMENTS
79-year-old female with past medical history of GERD, MI, chronic pancreatitis, HTN, incontinence, recent hip surgery and was recently discharged from outpatient rehab to home presented to the emergency department with increased lethargy, initially labs concerning for severe ROBERT,  sepsis with metabolic acidemia.     ROBERT on CKD with Right Hydronephrosis/Pyelonephritis. G-ve bacteremia present.  Pt has had increasing SCr since 3/14/24 with a SCr of 1.8, trended up to 1.96 on 3/23/24, then 2.19 on 4/30/24 (most recent). Prior to March 2024, pt had normal kidney function dating back to October 2017.    Underwent urgent HD overnight for severe lactic acidosis  AM labs reviewed. BUN/Cr/Acidosis better as a result of dialysis  No osmolar gap present. Alc/Salicylates level normal. Lactate marginally up now normalized  Please check beta hydroxy butarate  Monitor today with no HD  Reassess in am for additional sessions of dialysis  Maintain on Antibiotics. Check vancomycin level  Decrease vitamin To 250 mg  F/U Serological w/u as above    Rest as per Dr. Eleuterio Lozano MD  O: [phone]  Contact me on teams

## 2024-05-19 NOTE — ED ADULT NURSE REASSESSMENT NOTE - NS ED NURSE REASSESS COMMENT FT1
turned, pt had mod size soft brown BM while lying on side; pericare provided and diaper changed; stage I on coccyx; bear hugger off now that rectal temp 98.5.

## 2024-05-19 NOTE — H&P ADULT - HISTORY OF PRESENT ILLNESS
79-year-old female with past medical history of GERD, MI, chronic pancreatitis, HTN, incontinence, recent hip surgery and was recently discharged from outpatient rehab to home presented to the emergency department with increased lethargy, failure to thrive, decreased PO intake nausea/vomiting.    Upon presentation to ED patient noted to also have suprapubic pain but denied dysuria and hematuria. Per  who provided collateral--Pt was just discharged from rehab center after prolonged stay from R femur fracture. At home the first 1-2 days she was fine but then over last 48hrs appeared to be more lethargic and less interactive prompting ED visit. No one at home is sick, no recent travel. Unsure of medications. Patient did not have any fevers, chills, N/V/D.     ED course notable for lab with  Leukocytosis to 40 with urine appearing like pus metabolic acidemia, ph 7.13, bicarb 8. Started on bicarb gtt. Carrillo placed minimal output new ROBERT BUN/Cr 103/5.56. Nephrology consulted for urgent HD. Initially  unsure of GOC and deferred decision. Pt started on bicarb drip, received 3L NS and 1L LR, Cefepime 1g, Vanc 1g, Tylenol 1g.    Family ultimately agreeable to HD and patient accepted to MICU. Verbal consent obtained for Shiley catheter placement from .

## 2024-05-19 NOTE — CONSULT NOTE ADULT - SUBJECTIVE AND OBJECTIVE BOX
HPI:  79y Female with PMHx HTN, MI, anxiety and depression, GERD, pancreatitis, presents to ED with generalized weakness. Urology consulted for right kidney hydronephrosis on CT. Patient is ill appearing but awake. Patient unable to provide further history and her  Elfego was called. As per , patient had recent hospitalization on Jan due to broke her hip. Per note, admitted to [facility] on 1/20/24, ORIF of R hip on 1/21/24 and discharged to [facility] on 1/30/24.    PAST MEDICAL & SURGICAL HISTORY:  Hypertension      Anxiety and depression      GERD (gastroesophageal reflux disease)      History of pancreatitis  2018 taking creon been stable      Stress incontinence, female  S/P Sling      Heart attack      H/O left inguinal hernia repair      Hx of tonsillectomy      Hx of appendectomy      H/O dilation and curettage      Pacemaker          FAMILY HISTORY:  No known  malignancy     Social History:  Denies alcohol and drug abuse, nonsmoker     MEDICATIONS  (STANDING):  calcium gluconate IVPB 1 Gram(s) IV Intermittent Once  chlorhexidine 2% Cloths 1 Application(s) Topical daily  lactated ringers Bolus 1000 milliLiter(s) IV Bolus once  sodium bicarbonate  Infusion 0.287 mEq/kG/Hr (100 mL/Hr) IV Continuous <Continuous>    MEDICATIONS  (PRN):  sodium chloride 0.9% Bolus. 100 milliLiter(s) IV Bolus every 5 minutes PRN SBP LESS THAN or EQUAL to 90 mmHg    Allergies    penicillin (Swelling)    Intolerances    epinephrine (Other)    REVIEW OF SYSTEMS: Pertinent positives and negatives as stated in HPI, otherwise negative    Vital signs  T(C): 36.9 (05-19-24 @ 22:30), Max: 36.9 (05-19-24 @ 19:49)  HR: 88 (05-19-24 @ 22:30)  BP: 101/66 (05-19-24 @ 22:30)  SpO2: 100% (05-19-24 @ 22:30)  Wt(kg): --    Physical Exam  Gen: NAD  HEENT: normocephalic, atraumatic, no scleral icterus  Pulm: CTA b/l, No respiratory distress, no subcostal retractions, no accessory muscle use   CV: S1 S2, RRR,  no JVD  Abd: Soft, NT, ND, no rebound tenderness or guarding  : Uncircumcised/Circumcised, no lesions.  No discharge or blood at urethral meatus.  Testes descended bilaterally.  Testes and epididymis nontender bilaterally.  Cremasteric reflex present bilaterally.  MSK:  No CVAT, Moving all extremities, full ROM in all extremities, No edema present  NEURO: A&Ox3, no focal neurological deficits, CN 2-12 grossly intact  SKIN: warm, dry, no rash.    LABS:  CBC                       10.6   40.60 )-----------( 597      ( 19 May 2024 15:39 )             33.7     BMP   05-19    137  |  107  |  91<H>  ----------------------------<  297<H>  3.4<L>   |  <10<LL>  |  4.85<H>    Ca    6.5<LL>      19 May 2024 21:31  Mg     1.9     05-19    TPro  6.4  /  Alb  2.5<L>  /  TBili  0.3  /  DBili  x   /  AST  11  /  ALT  8<L>  /  AlkPhos  174<H>  05-19    PT/INR - ( 19 May 2024 16:31 )   PT: 13.9 sec;   INR: 1.27 ratio         PTT - ( 19 May 2024 16:31 )  PTT:32.9 sec    Urinalysis Basic - ( 19 May 2024 21:31 )    Color: x / Appearance: x / SG: x / pH: x  Gluc: 297 mg/dL / Ketone: x  / Bili: x / Urobili: x   Blood: x / Protein: x / Nitrite: x   Leuk Esterase: x / RBC: x / WBC x   Sq Epi: x / Non Sq Epi: x / Bacteria: x      Urine Cx:   Blood Cx:    Radiology: HPI:  79y Female with PMHx HTN, MI, anxiety and depression, GERD, pancreatitis, presents to ED with generalized weakness. Urology consulted for right hydronephrosis on CT. Patient is ill appearing but awake. Patient unable to provide further history and her  Elfego was called. As per , patient had recent hospitalization on Jan due to broke her hip. Per note, admitted to [facility] on 1/20/24, ORIF of R hip on 1/21/24 and discharged to [facility] on 1/30/24. Pt returned home on 5/8/24. While at [facility] she was treated for a UTI. Patient had decreased PO intake and weakness x 2 days. Patient also has chronic diarrhea 2/2 pancreatic insufficiency. Per note review, patient had Cr 5.56 in ED initially, and repeated Cr 4.85 (baseline 0.6-0.8 on Jan, up to approximal 2 on March).  denies hematuria, retention, flank pain, or any other urinary symptoms. Never follow up with any urologist.     PAST MEDICAL & SURGICAL HISTORY:  Hypertension      Anxiety and depression      GERD (gastroesophageal reflux disease)      History of pancreatitis  2018 taking creon been stable      Stress incontinence, female  S/P Sling      Heart attack      H/O left inguinal hernia repair      Hx of tonsillectomy      Hx of appendectomy      H/O dilation and curettage      Pacemaker          FAMILY HISTORY:  No known  malignancy     Social History:  Denies alcohol and drug abuse, nonsmoker     MEDICATIONS  (STANDING):  calcium gluconate IVPB 1 Gram(s) IV Intermittent Once  chlorhexidine 2% Cloths 1 Application(s) Topical daily  lactated ringers Bolus 1000 milliLiter(s) IV Bolus once  sodium bicarbonate  Infusion 0.287 mEq/kG/Hr (100 mL/Hr) IV Continuous <Continuous>    MEDICATIONS  (PRN):  sodium chloride 0.9% Bolus. 100 milliLiter(s) IV Bolus every 5 minutes PRN SBP LESS THAN or EQUAL to 90 mmHg    Allergies    penicillin (Swelling)    Intolerances    epinephrine (Other)    REVIEW OF SYSTEMS: Pertinent positives and negatives as stated in HPI, otherwise negative    Vital signs  T(C): 36.9 (05-19-24 @ 22:30), Max: 36.9 (05-19-24 @ 19:49)  HR: 88 (05-19-24 @ 22:30)  BP: 101/66 (05-19-24 @ 22:30)  SpO2: 100% (05-19-24 @ 22:30)  Wt(kg): --    Physical Exam  Gen: illness appearance   Abd: Soft, NT, ND, no rebound tenderness or guarding  : pimentel in place with small amount of clear yellow color urine  MSK:  No CVAT bilaterally.    LABS:  CBC                       10.6   40.60 )-----------( 597      ( 19 May 2024 15:39 )             33.7     BMP   05-19    137  |  107  |  91<H>  ----------------------------<  297<H>  3.4<L>   |  <10<LL>  |  4.85<H>    Ca    6.5<LL>      19 May 2024 21:31  Mg     1.9     05-19    TPro  6.4  /  Alb  2.5<L>  /  TBili  0.3  /  DBili  x   /  AST  11  /  ALT  8<L>  /  AlkPhos  174<H>  05-19    PT/INR - ( 19 May 2024 16:31 )   PT: 13.9 sec;   INR: 1.27 ratio         PTT - ( 19 May 2024 16:31 )  PTT:32.9 sec    Urinalysis Basic - ( 19 May 2024 21:31 )    Color: x / Appearance: x / SG: x / pH: x  Gluc: 297 mg/dL / Ketone: x  / Bili: x / Urobili: x   Blood: x / Protein: x / Nitrite: x   Leuk Esterase: x / RBC: x / WBC x   Sq Epi: x / Non Sq Epi: x / Bacteria: x    Radiology:    ACC: [id number] EXAM:  CT ABDOMEN AND PELVIS   ORDERED BY:  JONATHON DHILLON     PROCEDURE DATE:  05/19/2024          INTERPRETATION:  CLINICAL INFORMATION: Epigastric and suprapubic   tenderness to palpation. Failure to thrive.    COMPARISON: CT abdomen and pelvis 6/6/2020.    CONTRAST/COMPLICATIONS:  IV Contrast: NONE  Oral Contrast: NONE  Complications: None reported at time of study completion    PROCEDURE:  CT of the Abdomen and Pelvis was performed.  Sagittal and coronal reformats were performed.    FINDINGS:  LOWER CHEST: Partially visualized cardiac leads in place.    LIVER: Within normal limits.  BILE DUCTS: Normal caliber.  GALLBLADDER: Within normal limits.  SPLEEN: Within normal limits.  PANCREAS: Atrophic with coarse calcifications.  ADRENALS: Within normal limits.  KIDNEYS/URETERS: Mild to moderate right hydronephrosis and hydroureter   with cirrhosis. No obstructing stone is seen.  Unremarkable left kidney.    BLADDER: Urinary bladder collapsed around a Pimentel catheter limiting   evaluation.  REPRODUCTIVE ORGANS: Uterus and adnexa within normal limits.    BOWEL: Rectum is mildly distended with stool and there is rectal wall   thickening. Question stercoral colitis. No bowel obstruction. Appendix is   not visualized. No evidence of inflammation in the pericecal region.   Diverticulosis without evidence of acute diverticulitis.  PERITONEUM: No ascites.  VESSELS: Atherosclerotic changes.  RETROPERITONEUM/LYMPH NODES: No lymphadenopathy.  ABDOMINAL WALL: Within normal limits.  BONES: Orthopedic fixation of the right femur. Chronic rib fracture   deformities. Degenerative changes.    IMPRESSION:    Mild to moderate right hydronephrosis and hydroureter with cirrhosis. No   obstructing stone is seen.    Rectum is mildly distended with stool and there is rectal wall   thickening. Question stercoral colitis.        --- End of Report ---

## 2024-05-19 NOTE — ED ADULT NURSE NOTE - NSFALLASSESSNEED_ED_ALL_ED
Onset: yesterday    Location / description:  dry mouth, dizziness and lightheaded, feeling nervous. Patient stated he thinks Bupropion XL started the symptoms. Difficulty to swallow. Denies any breathing issues or chest pain, denies any weakness or numbness to one side of body. Patient stated he only slept for an half hour last night. Around 1 am this morning patient stated he felt anxious, felt like he could not breath and felt like throat was closing up, he went out side for 2 minutes and taking deep breathes and symptom started to subside a little. Currently patient stated he feels lightheaded    Precipitating Factors: as above    Pain Scale (1-10), 10 highest: 0/10    What  improves / worsens symptoms: going outside helps a little    Symptom specific medications: Bupropion  mg by mouth twice a day, last dose around 10 pm last night    Recent visits (last 3-4 weeks) for same reason or recent surgery:  has not been evaluated for symptoms  Last visit:  last visit with PCP on 03/13/2024    PLAN:  Go to the Emergency Department    Unsure if patient/caller will follow recommendations.  Reason for Disposition   Patient states that they are having an anxiety or panic attack   [1] Lightheadedness or dizziness AND [2] persists > 10 minutes AND [3] not relieved by reassurance provided by triager    Protocols used: Dizziness - [id number], Anxiety and Panic Attack-A-AH     yes

## 2024-05-19 NOTE — CONSULT NOTE ADULT - ASSESSMENT
79F Chronic pancreatitis BIBEMS p/w weakness x 3 days. UA c/f UTI w/ large hematuria. Notably w/ ROBERT c/b acidosis and uremia. VBG pH 7.13/32. Non-tachypneic and non-toxic appearing patient. MICU consulted for acidemia. Patient evaluated after 2LNS, initiation of bicarb gtt. vanc/cefepime.    #ROBERT/ ARF  #UTI    MICU Recommendations  No e/o impending respiratory failure as patient presently comfortable and non-tachypneic on room air.  Please evaluate patient's endorsement of diarrhea (GI PCR, stool count, C.diff if liquid stool)  f/u nephrology for w/u of ROBERT  Strict Is/Os  c/w bicarb gtt  Repeat VBG/BMP after initiation of bicarb gtt  c/w infection w/u Blood culture, urine culture    No MICU indications at this time, please advise us if nephrology would want urgent HD or if bicarb worsening on bicarb gtt.

## 2024-05-19 NOTE — PATIENT PROFILE ADULT - FUNCTIONAL ASSESSMENT - BASIC MOBILITY 1.
[id number] Afebrile. HR 50-70. PRN hydralazine given x1 for /95. BP improved to 118/68 after hydralazine. LS clear. Denied nausea. No PO intake overnight. Mucositis lesions and swelling unchanged. Mouth cares encouraged. Pain well-controlled with dilaudid PCA, 1 PCA bump taken. Platelets infused per orders with no s/s of adverse reaction. Mom at bedside in evening and attentive to patient.      2 = A lot of assistance

## 2024-05-19 NOTE — ED PROVIDER NOTE - OBJECTIVE STATEMENT
79-year-old female with past medical history of GERD, MI, chronic pancreatitis, HTN, incontinence, recent hip surgery and was recently discharged from outpatient rehab to home presented to the emergency department accompanied by ED was reporting increased lethargy, failure to thrive, decreased p.o. intake, nausea and vomiting.  Patient is also reporting some mild suprapubic pain but denies dysuria or hematuria.  Normally voids into a diaper.  Is functionally bedbound.  Denies chest pain or shortness of breath.  Denies fever/chills.  Spoke with PCP Dr. Benoit who mentioned that patient was reported to him as being hypotensive and lethargic. 79-year-old female with past medical history of GERD, MI, chronic pancreatitis, HTN, incontinence, recent hip surgery and was recently discharged from outpatient rehab to home presented to the emergency department accompanied by home aid was reporting increased lethargy, failure to thrive, decreased p.o. intake, nausea and vomiting.  Patient is also reporting some mild suprapubic pain but denies dysuria or hematuria.  Normally voids into a diaper.  Is functionally bedbound.  Denies chest pain or shortness of breath.  Denies fever/chills.  Spoke with PCP Dr. Benoit who mentioned that patient was reported to him as being hypotensive and lethargic.

## 2024-05-19 NOTE — CONSULT NOTE ADULT - SUBJECTIVE AND OBJECTIVE BOX
CHIEF COMPLAINT:    HPI: 79F Chronic pancreatitis BIBEMS p/w weakness x 3 days.  On interview patient appears comfortable, A/Ox2-3 which is her baseline. Reports discharge from rehab a week ago. Since then has had poor PO intake, generalized malaise. Denies chest pain, confusion, SOB. Endorses mild suprapubic pain and diarrhea.    In ED, she receivewd 2L of NS, Vanc/cefepime, Ofirmev, started on a bicarb gtt. Vitals notable for oral temp 96.6, hemodynamics w/ MAP >65. Non-tachypneic on room air. VBG of 7.13/23. Bicarb <10. Creatinine 5.56. Neutrophile predominant Leukocytosis to 41. Purulent urine output. Pending CT A/P.    PAST MEDICAL & SURGICAL HISTORY:  Hypertension      Anxiety and depression      GERD (gastroesophageal reflux disease)      History of pancreatitis  2018 taking creon been stable      Stress incontinence, female  S/P Sling      Heart attack      H/O left inguinal hernia repair      Hx of tonsillectomy      Hx of appendectomy      H/O dilation and curettage      Pacemaker          FAMILY HISTORY:      SOCIAL HISTORY:      Allergies    penicillin (Swelling)    Intolerances    epinephrine (Other)      HOME MEDICATIONS:    REVIEW OF SYSTEMS:    CONSTITUTIONAL:  No weakness, fevers or chills  EYES/ENT:  No visual changes;  No vertigo or throat pain   NECK:  No pain or stiffness  RESPIRATORY:  No cough, wheezing, hemoptysis; No shortness of breath  CARDIOVASCULAR:  No chest pain or palpitations  GASTROINTESTINAL:  No abdominal or epigastric pain. No nausea, vomiting, or hematemesis; No diarrhea or constipation. No melena or hematochezia.  GENITOURINARY:  No dysuria, frequency or hematuria  NEUROLOGICAL:  No numbness or weakness  SKIN:  No itching, rashes    [ ] Unable to assess ROS because ________    OBJECTIVE:  ICU Vital Signs Last 24 Hrs  T(C): 35.9 (19 May 2024 16:32), Max: 35.9 (19 May 2024 16:32)  T(F): 96.6 (19 May 2024 16:32), Max: 96.6 (19 May 2024 16:32)  HR: 72 (19 May 2024 17:45) (72 - 89)  BP: 123/66 (19 May 2024 17:45) (101/69 - 123/66)  BP(mean): --  ABP: --  ABP(mean): --  RR: 18 (19 May 2024 17:45) (16 - 18)  SpO2: 96% (19 May 2024 17:45) (93% - 96%)    O2 Parameters below as of 19 May 2024 17:45  Patient On (Oxygen Delivery Method): room air              CAPILLARY BLOOD GLUCOSE          Vital Signs Last 24 Hrs  T(C): 35.9 (19 May 2024 16:32), Max: 35.9 (19 May 2024 16:32)  T(F): 96.6 (19 May 2024 16:32), Max: 96.6 (19 May 2024 16:32)  HR: 72 (19 May 2024 17:45) (72 - 89)  BP: 123/66 (19 May 2024 17:45) (101/69 - 123/66)  BP(mean): --  RR: 18 (19 May 2024 17:45) (16 - 18)  SpO2: 96% (19 May 2024 17:45) (93% - 96%)    Parameters below as of 19 May 2024 17:45  Patient On (Oxygen Delivery Method): room air        General: WN/WD NAD  Neurology: A&Ox3, nonfocal, BROWNLEE x 4  Eyes: PERRLA/ EOMI, Gross vision intact  ENT/Neck: Neck supple, trachea midline, No JVD, Gross hearing intact  Respiratory: CTA B/L, No wheezing, rales, rhonchi  CV: RRR, +S1/S2, -S3/S4, no murmurs, rubs or gallops  Abdominal: Soft, NT, ND +BS, No HSM  MSK: 5/5 strength UE/LE bilaterally  Extremities: No edema, 2+ peripheral pulses  Skin: No Rashes, Hematoma, Ecchymosis  Incisions:   Tubes:    HOSPITAL MEDICATIONS:  MEDICATIONS  (STANDING):  sodium bicarbonate  Infusion 0.287 mEq/kG/Hr (100 mL/Hr) IV Continuous <Continuous>  sodium chloride 0.9% Bolus 1000 milliLiter(s) IV Bolus once    MEDICATIONS  (PRN):      LABS:                        10.6   40.60 )-----------( 597      ( 19 May 2024 15:39 )             33.7     05-19    139  |  105  |  103<H>  ----------------------------<  158<H>  4.8   |  <10<LL>  |  5.56<H>    Ca    8.1<L>      19 May 2024 15:39  Mg     1.9         TPro  6.4  /  Alb  2.5<L>  /  TBili  0.3  /  DBili  x   /  AST  11  /  ALT  8<L>  /  AlkPhos  174<H>      PT/INR - ( 19 May 2024 16:31 )   PT: 13.9 sec;   INR: 1.27 ratio         PTT - ( 19 May 2024 16:31 )  PTT:32.9 sec  Urinalysis Basic - ( 19 May 2024 16:22 )    Color: Yellow / Appearance: Turbid / S.010 / pH: x  Gluc: x / Ketone: Negative mg/dL  / Bili: Negative / Urobili: 0.2 mg/dL   Blood: x / Protein: 300 mg/dL / Nitrite: Negative   Leuk Esterase: Large / RBC: >50 /HPF / WBC Too Numerous to count /HPF   Sq Epi: x / Non Sq Epi: x / Bacteria: Few /HPF        Venous Blood Gas:   @ 16:25  7.13/23/30/8/34.3  VBG Lactate: 1.7      MICROBIOLOGY:     RADIOLOGY:  [ ] Reviewed and interpreted by me    EKG:

## 2024-05-19 NOTE — PROCEDURE NOTE - NSINFORMCONSENT_GEN_A_CORE
patient's /Benefits, risks, and possible complications of procedure explained to patient/caregiver who verbalized understanding and gave verbal consent.

## 2024-05-19 NOTE — H&P ADULT - ATTENDING COMMENTS
79F Hx HTN, Chronic Pancreatitis, Rt Hip ORIF 1/2024 p/w ED Generalized Weakness, Vague Abdominal Discomfort found smoky UA positive UTI, Severe HAG-MA, HCO3= <10, vpH= 7.13, Azotemia BUN/Ca=604/5056 without improvement onHCO3 Gtt will require Urgent Hemodialysis as recommended by Renal Service.   - A&O x 2 baseline unchanged answering questions appropriately   - Hemodynamically stable on RAO2   - Agreeable to start empiric ABx Coverage s/p CT Abdo/Pelvis   - Rt moderate Hydroureteronephrosis with nonobstructive stone and Stercoral Colitis   - Serial CBC, CMPs, PT/INR, vpH, Lactate   - Urgent HD after HD Catheter placement   - DVT PPx - SQH  - GOC - Full Code     Patient seen and examined with ICU Resident/Fellow at bedside after lab data, medical records and radiology reports reviewed. I have read and agreeable in general with resident's Documented Note, Assessment and Management Plan which reflected my opinions from bedside round and discussion.   Total Critical Care Time = 45 Min excluding teaching and procedure activity.

## 2024-05-19 NOTE — ED PROVIDER NOTE - PROGRESS NOTE DETAILS
Sam Villavicencio MD PGY1: Patient reassessed, stable. Leukocytosis to 40 with urine appearing like pus. Dx likely urosepsis c/b by metabolic acidemia, ph 7.13, bicarb 8. Started on bicarb gtt. Carrillo placed minimal output, unlikely obstructive but with new significant ROBERT/renal failure BUN/Cr 103/5.56. MICU consulted will see patient. Patient responding to 2L fluids. CTAP non con pending. Dispo pending. Sam Villavicencio MD PGY1: Patient reassessed, stable. MICU will see patient. Will get repeat BMP and VBG. Nephro and MICU pending CT for disposition. Karthikeyan Plasencia MD PGY-2: Discussed with nephrology who states that no indication for dialysis at this time recommends trending of labs.  Nephrology spoke with patient and  regarding getting dialysis consent and they did not consent at this time.  MICU rejected admission at this time and reports no ICU needs. Karthikeyan Plasencia MD PGY-2: Discussed with nephro again repeat VBG shows pH 7.28 from 7.13 but bicarb same on VBG. Nephro reassured. Recommends no changes to bicarb drip. Recs urology consult, tox screen, serum osm. Karthikeyan Plasencia MD PGY-2: Urology aware, will see pt. Nephro at bedside

## 2024-05-20 NOTE — PROGRESS NOTE ADULT - SUBJECTIVE AND OBJECTIVE BOX
Urology Progress Note     Subjective/24hour Events:   Patient seen and examined. Appears confused, on dialysis (actively).   Pain controlled.     Vital Signs:  Vital Signs Last 24 Hrs  T(C): 37.3 (20 May 2024 06:45), Max: 43 (20 May 2024 01:00)  T(F): 99.1 (20 May 2024 06:45), Max: 109.4 (20 May 2024 01:00)  HR: 93 (20 May 2024 06:45) (68 - 97)  BP: 98/56 (20 May 2024 06:45) (66/40 - 123/66)  BP(mean): 70 (20 May 2024 06:45) (49 - 83)  RR: 22 (20 May 2024 06:45) (15 - 28)  SpO2: 100% (20 May 2024 06:45) (93% - 100%)    Parameters below as of 20 May 2024 06:45  Patient On (Oxygen Delivery Method): room air        CAPILLARY BLOOD GLUCOSE          I&O's Detail    19 May 2024 07:01  -  20 May 2024 07:00  --------------------------------------------------------  IN:    IV PiggyBack: 50 mL    IV PiggyBack: 250 mL    IV PiggyBack: 100 mL    Lactated Ringers Bolus: 1000 mL    Norepinephrine: 64.5 mL    Sodium Bicarbonate: 700 mL  Total IN: 2164.5 mL    OUT:    Dexmedetomidine: 0 mL    Indwelling Catheter - Urethral (mL): 35 mL  Total OUT: 35 mL    Total NET: 2129.5 mL          MEDICATIONS  (STANDING):  ascorbic acid 500 milliGRAM(s) Oral daily  cefTRIAXone   IVPB 1000 milliGRAM(s) IV Intermittent every 24 hours  chlorhexidine 2% Cloths 1 Application(s) Topical daily  chlorhexidine 4% Liquid 1 Application(s) Topical <User Schedule>  dexMEDEtomidine Infusion 0.2 MICROgram(s)/kG/Hr (2.34 mL/Hr) IV Continuous <Continuous>  heparin   Injectable 5000 Unit(s) SubCutaneous every 12 hours  multivitamin 1 Tablet(s) Oral daily  norepinephrine Infusion 0.05 MICROgram(s)/kG/Min (4.39 mL/Hr) IV Continuous <Continuous>  pancrelipase  (CREON 24,000 Lipase Units) 1 Capsule(s) Oral three times a day with meals  pantoprazole    Tablet 40 milliGRAM(s) Oral before breakfast  sodium bicarbonate  Infusion 0.287 mEq/kG/Hr (100 mL/Hr) IV Continuous <Continuous>  vancomycin  IVPB 1000 milliGRAM(s) IV Intermittent every 24 hours    MEDICATIONS  (PRN):  sodium chloride 0.9% Bolus. 100 milliLiter(s) IV Bolus every 5 minutes PRN SBP LESS THAN or EQUAL to 90 mmHg  sodium chloride 0.9% lock flush 10 milliLiter(s) IV Push every 1 hour PRN Pre/post blood products, medications, blood draw, and to maintain line patency      Physical Exam:  Gen: NAD.  Lungs: Non labored breathing.   Ab: Soft, nondistended.   : Mercado in place, draining low UOP    Labs:    05-19    136  |  107  |  92<H>  ----------------------------<  215<H>  3.5   |  <10<LL>  |  4.83<H>    Ca    6.5<LL>      19 May 2024 23:34  Phos  7.7     05-19  Mg     1.4     05-19    TPro  4.9<L>  /  Alb  2.0<L>  /  TBili  0.2  /  DBili  x   /  AST  12  /  ALT  8<L>  /  AlkPhos  130<H>  05-19    LIVER FUNCTIONS - ( 19 May 2024 23:34 )  Alb: 2.0 g/dL / Pro: 4.9 g/dL / ALK PHOS: 130 U/L / ALT: 8 U/L / AST: 12 U/L / GGT: x                                 7.3    33.79 )-----------( 439      ( 19 May 2024 23:34 )             21.7     PT/INR - ( 20 May 2024 01:27 )   PT: 20.4 sec;   INR: 1.98 ratio         PTT - ( 20 May 2024 01:27 )  PTT:44.9 sec

## 2024-05-20 NOTE — PROGRESS NOTE ADULT - ATTENDING COMMENTS
Agree with above  79F PMH GERD, chronic pancreatitis, hypertension, recent hip surgery with progressive decline since now p/w AMS, found to have ROBERT (BUN/Cr 103/5.6) and metabolic acidosis (bicarb <10). Evidence of R hydroureteronephrosis and pyuria. Admitted to ICU for Shiley placement and urgent HD.  - BCx positive for GNR, c/w empiric antibiotics with CTX / vancomycin, awaiting final ID/SS of all cultures. Change to targeted therapy once results return.  - F/U post-HD labs.  - Trend urine output  - Hgb drop but no evidence of acute ongoing bleeding, likely dilutional given was given 4L IVF  - Was on pressors, titrating off; continue to monitor closely, maintain MAP >65  - DVT ppx: Hep SC

## 2024-05-20 NOTE — PHARMACOTHERAPY INTERVENTION NOTE - COMMENTS
IR is an 78yo F with a PMH of GERD, MI, chronic pancreatitis, hypertension, and incontinence. The patient was recently hospitalized for hip surgery and was recently discharged to outpatient rehab. The patient represented to the emergency department with increased lethargy and initial labs concerning for sepsis with metabolic acidemia. Nephrology recommended urgent HD. The patient was transferred to the MICU for Shiley and urgent HD. In the Ed patient was started on vancomycin 1 gram q24h and ceftriaxone 1g q24h IV. Patient's blood and urine cultures were drawn prior to antibiotic administration.    Blood Culture (05/19)  Organism: E. Coli (PCR): gram- negative rods  Pending susceptibilities      Recommendations:  1) Stop vancomycin as patient is growing gram-negative rods.  2) Increase dose of ceftriaxone to 2 grams daily for gram-negative bacteremia. (Patient received 1g ceftriaxone 05/20, ensure proper timing of new order to start 05/21.)    Jacinto Titus, PharmD  PGY1 Pharmacy Resident   Available on [facility]  SpectraLink: [id number]

## 2024-05-20 NOTE — CHART NOTE - NSCHARTNOTEFT_GEN_A_CORE
Confidential Drug Utilization Report  Search Terms: Arianna lr, 1945Search Date: 05/20/2024 01:58:06 AM  Searching on behalf of: [id number] - [facility]  The Drug Utilization Report below displays all of the controlled substance prescriptions, if any, that your patient has filled in the last twelve months. The information displayed on this report is compiled from pharmacy submissions to the Department, and accurately reflects the information as submitted by the pharmacies.    This report was requested by: Rosanne Anton | Reference #: [id number]    Practitioner Count: 1  Pharmacy Count: 1  Current Opioid Prescriptions: 1  Current Benzodiazepine Prescriptions: 0  Current Stimulant Prescriptions: 0      Patient Demographic Information (PDI)       PDI	First Name	Last Name	Birth Date	Gender	Street Address	City	State	Zip Code  A	Arianna Lr	1945	Female	[address]	[place]	[address]  B	Arianna Lr	1945	Female	[place]	[place]	[address]    Prescription Information      PDI Filter:    PDI	Current Rx	Drug Type	Rx Written	Rx Dispensed	Drug	Quantity	Days Supply	Prescriber Name	Prescriber YARON #	Payment Method	Dispenser  A	Y	O	05/16/2024	05/16/2024	acetaminophen-cod #3 tablet	90	15	Esvin Benoit)	[id number]	Cash	[id number]  B	N	O	02/06/2024	02/06/2024	tramadol hcl 50 mg tablet	20	10	Corbin Aden MD	[id number]	Other	[id number]    * - Details of Drug Type : O = Opioid, B = Benzodiazepine, S = Stimulant    * - Drugs marked with an asterisk are compound drugs. If the compound drug is made up of more than one controlled substance, then each controlled substance will be a separate row in the table.

## 2024-05-20 NOTE — PROGRESS NOTE ADULT - ASSESSMENT
A/P: 79y Female with PMHx HTN, MI, anxiety and depression, GERD, pancreatitis, presents to ED with generalized weakness. Urology consulted for right hydronephrosis. Had Cr 5.56 in ED initially, and repeated Cr 4.85 (baseline 0.6-0.8 on Jan, up to approximal 2 on March). WBC 40. CT showed Mild to moderate right hydronephrosis and hydroureter with cirrhosis. UA showed large LE, few bacteria, negative nitrite. CT images reviewed, no obvious transition point or clear cause of obstruction, possible reactive.    - Given that CT shows no obvious obstruction in the Right kidney and ureter with hydro that appears to the level of the bladder, likely 2/2 reactive pyelonephritis.   - Blood cultures with GNR, Please obtain and follow up urine culture/ repeat blood cultures  - Continue to trend WBC- WBC down trending  - Currently receiving dialysis  - Need nephrology to help with the acidosis- currently on dialysis.   - Will continue to monitor vitals, hemodynamic monitoring per MICU.   - Urology to follow.   - Discussed with Dr. Eliu Israel Polvadera for Urology  [address] [place] [address] [phone]         A/P: 79y Female with PMHx HTN, MI, anxiety and depression, GERD, pancreatitis, presents to ED with generalized weakness. Urology consulted for right pyelonephritis and hydronephrosis. Had Cr 5.56 in ED initially, and repeated Cr 4.85 (baseline 0.6-0.8 on Jan, up to approximal 2 on March). WBC 40. CT showed Mild to moderate right hydronephrosis and hydroureter with cirrhosis??. UA showed large LE, few bacteria, negative nitrite. CT images reviewed, no obvious transition point or clear cause of obstruction, possible reactive to R pyelonephritis.    - Given that CT shows no obvious obstruction in the Right kidney and ureter with hydro that appears to the level of the bladder, likely 2/2 reactive pyelonephritis.   - Blood cultures with GNR, Please obtain and follow up urine culture/ repeat blood cultures  - Continue to trend WBC- WBC down trending  - Currently receiving dialysis  - Need nephrology to help with the acidosis- currently on dialysis.   - Will continue to monitor vitals, hemodynamic monitoring per MICU.   - Urology to follow.   - Discussed with Dr. Eliu Israel Munday for Urology  [address] [place] [address] [phone]

## 2024-05-20 NOTE — PROGRESS NOTE ADULT - ATTENDING COMMENTS
Sepsis and ROBERT 2/2 UTI/pyelonephritis  CT scan reviewed - there is R hydro and fullness/enlargement of R kidney - c/w R pyelo.  No obvious obstruction/stone visible  Pt on HD this AM, remains critically ill  At this time would observe pt, if pt does not respond to abx/resuscitation, would consider need for R ureteral stent, however uncertain benefit if hydro related to R pyelo/ascending infection and not true obstruction. MOLECULAR PCR

## 2024-05-20 NOTE — OCCUPATIONAL THERAPY INITIAL EVALUATION ADULT - TRANSFER SAFETY CONCERNS NOTED: BED/CHAIR, REHAB EVAL
Continued Stay Review    Date: 10/22/2017    Vital Signs: /78   Pulse 92   Temp 97 6 °F (36 4 °C) (Temporal)   Resp 18   Ht 5' (1 524 m)   Wt 84 3 kg (185 lb 13 6 oz)   SpO2 93%   BMI 36 30 kg/m²     Medications:   Scheduled Meds:   ALPRAZolam 0 25 mg Oral BID   aspirin 325 mg Oral Daily   enoxaparin 40 mg Subcutaneous Q24H SHIRA   insulin glargine 60 Units Subcutaneous QAM   insulin lispro 1-6 Units Subcutaneous HS   insulin lispro 1-6 Units Subcutaneous TID AC   insulin lispro 12 Units Subcutaneous TID With Meals   pantoprazole 40 mg Oral BID AC   predniSONE 3 mg Oral Daily   saccharomyces boulardii 250 mg Oral BID     Continuous Infusions:   sodium chloride 50 mL/hr Last Rate: 50 mL/hr (10/23/17 0957)     PRN Meds:   acetaminophen    albuterol    bisacodyl    docusate sodium    influenza vaccine    labetalol    OLANZapine      Age/Sex: 75 y o  female     Assessment/Plan:   Assessment:     Principal Problem:    Encephalopathy  Active Problems:    UTI (urinary tract infection)    Diabetes mellitus with hyperglycemia (HCC)    A-fib (HCC)    SHANDA (obstructive sleep apnea)    RA (rheumatoid arthritis) (HCC)    Altered mental status    Aphasia        Plan:     · Acute encephalopathy, present on admission, resolved, EEG pending, altered mental status likely due to Levaquin  · Recent UTI, patient off of antibiotics, repeat UA negative  · Diabetes mellitus type 2, patient has had some hypoglycemia, insulin regimen has been decreased, continue with current regimen monitor Accu-Cheks AC and HS    · Atrial fibrillation, patient not on anticoagulation due to previous GI bleed  · Duodenal ulcer, continue with twice daily proton pump inhibitor  · History of rheumatoid arthritis, patient on prednisone      Current Patient Status: Inpatient      Code Status: Level 3 - DNAR and DNI       Discharge Plan:  Return to [facility] decreased weight-shifting ability

## 2024-05-20 NOTE — OCCUPATIONAL THERAPY INITIAL EVALUATION ADULT - ADDITIONAL COMMENTS
Pt is a poor historian; reports that she lives with her  in a private home, ambulates with a RW. As per previous admission, pt with recent hip fx, requiring stay in [facility] following discharge in January.

## 2024-05-20 NOTE — PHYSICAL THERAPY INITIAL EVALUATION ADULT - ADDITIONAL COMMENTS
Pt is a poor historian; reports that she lives with her  in a private home, ambulates with a RW. As per previous admission, pt with recent hip fx, requiring stay in [facility] following discharge in January.

## 2024-05-20 NOTE — PROGRESS NOTE ADULT - SUBJECTIVE AND OBJECTIVE BOX
CHIEF COMPLAINT: Patient is a 79y old  Female who presents with a chief complaint of Weakness (19 May 2024 22:40)    Interval Events:    REVIEW OF SYSTEMS:  Constitutional:     [ ] negative [ ] fevers [ ] chills [ ] weight loss [ ] weight gain  HEENT:                  [ ] negative [ ] dry eyes [ ] eye irritation [ ] postnasal drip [ ] nasal congestion  CV:                         [ ] negative  [ ] chest pain [ ] orthopnea [ ] palpitations [ ] murmur  Resp:                     [ ] negative [ ] cough [ ] shortness of breath [ ] dyspnea [ ] wheezing [ ] sputum [ ] hemoptysis  GI:                          [ ] negative [ ] nausea [ ] vomiting [ ] diarrhea [ ] constipation [ ] abd pain [ ] dysphagia   :                        [ ] negative [ ] dysuria [ ] nocturia [ ] hematuria [ ] increased urinary frequency  Musculoskeletal: [ ] negative [ ] back pain [ ] myalgias [ ] arthralgias [ ] fracture  Skin:                       [ ] negative [ ] rash [ ] itch  Neurological:        [ ] negative [ ] headache [ ] dizziness [ ] syncope [ ] weakness [ ] numbness  Psychiatric:           [ ] negative [ ] anxiety [ ] depression  Endocrine:            [ ] negative [ ] diabetes [ ] thyroid problem  Heme/Lymph:      [ ] negative [ ] anemia [ ] bleeding problem  Allergic/Immune: [ ] negative [ ] itchy eyes [ ] nasal discharge [ ] hives [ ] angioedema    [ ] All other systems negative  [ ] Unable to assess ROS because pt intubated and sedated.    OBJECTIVE:  ICU Vital Signs Last 24 Hrs  T(C): 37.6 (20 May 2024 04:00), Max: 43 (20 May 2024 01:00)  T(F): 99.7 (20 May 2024 04:00), Max: 109.4 (20 May 2024 01:00)  HR: 93 (20 May 2024 04:45) (68 - 94)  BP: 96/52 (20 May 2024 04:30) (66/40 - 123/66)  BP(mean): 72 (20 May 2024 04:30) (49 - 79)  ABP: --  ABP(mean): --  RR: 17 (20 May 2024 04:45) (16 - 28)  SpO2: 100% (20 May 2024 04:45) (93% - 100%)    O2 Parameters below as of 19 May 2024 23:00  Patient On (Oxygen Delivery Method): room air              05-19 @ 07:01  -  05-20 @ 05:50  --------------------------------------------------------  IN: 1831.5 mL / OUT: 20 mL / NET: 1811.5 mL      CAPILLARY BLOOD GLUCOSE        PHYSICAL EXAM:  CONSTITUTIONAL: Well groomed, no apparent distress  EYES: Pupils equal and reactive, maintaining eye contact and tracking examiner intermittently.   ENMT: Oral mucosa dry.  Normal dentition; no pharyngeal injection or exudates  RESP: No respiratory distress, no use of accessory muscles; CTA b/l, no WRR  CV: RRR, no murmurs appreciated, no peripheral edema  GI: Soft, NT, ND, no rebound, no guarding;   MSK: Moving extremities spontaneously, Limited movement of R leg.   SKIN: No rashes or ulcers noted;   NEURO: Non focal. Moving some extremities as above.   PSYCH: Pleasant affect, most of recent and remote memory intact AOx2 (hospital and name)    HOSPITAL MEDICATIONS:  MEDICATIONS  (STANDING):  ascorbic acid 500 milliGRAM(s) Oral daily  cefTRIAXone   IVPB 1000 milliGRAM(s) IV Intermittent every 24 hours  chlorhexidine 2% Cloths 1 Application(s) Topical daily  chlorhexidine 4% Liquid 1 Application(s) Topical <User Schedule>  dexMEDEtomidine Infusion 0.2 MICROgram(s)/kG/Hr (2.34 mL/Hr) IV Continuous <Continuous>  heparin   Injectable 5000 Unit(s) SubCutaneous every 12 hours  multivitamin 1 Tablet(s) Oral daily  norepinephrine Infusion 0.05 MICROgram(s)/kG/Min (4.39 mL/Hr) IV Continuous <Continuous>  pancrelipase  (CREON 24,000 Lipase Units) 1 Capsule(s) Oral three times a day with meals  pantoprazole    Tablet 40 milliGRAM(s) Oral before breakfast  sodium bicarbonate  Infusion 0.287 mEq/kG/Hr (100 mL/Hr) IV Continuous <Continuous>  vancomycin  IVPB 1000 milliGRAM(s) IV Intermittent every 24 hours    MEDICATIONS  (PRN):  sodium chloride 0.9% Bolus. 100 milliLiter(s) IV Bolus every 5 minutes PRN SBP LESS THAN or EQUAL to 90 mmHg  sodium chloride 0.9% lock flush 10 milliLiter(s) IV Push every 1 hour PRN Pre/post blood products, medications, blood draw, and to maintain line patency      LABS:  (05-19 @ 23:34)                        7.3  33.79 )-----------( 439                 21.7    Neutrophils = 31.94 (94.5%)  Lymphocytes = 0.66 (2.0%)  Eosinophils = 0.03 (0.1%)  Basophils = 0.06 (0.2%)  Monocytes = 0.75 (2.2%)  Bands = --%    WBC Trend: 33.79<--, 40.60<--  Hb Trend: 7.3<--, 10.6<--  Plt Trend: 439<--, 597<--  05-19    136  |  107  |  92<H>  ----------------------------<  215<H>  3.5   |  <10<LL>  |  4.83<H>    Ca    6.5<LL>      19 May 2024 23:34  Phos  7.7     05-19  Mg     1.4     05-19    TPro  4.9<L>  /  Alb  2.0<L>  /  TBili  0.2  /  DBili  x   /  AST  12  /  ALT  8<L>  /  AlkPhos  130<H>  05-19    Creatinine Trend: 4.83<--, 4.85<--, 5.08<--, 5.56<--  PT/INR - ( 20 May 2024 01:27 )   PT: 20.4 sec;   INR: 1.98 ratio         PTT - ( 20 May 2024 01:27 )  PTT:44.9 sec  Urinalysis Basic - ( 19 May 2024 23:34 )    Color: x / Appearance: x / SG: x / pH: x  Gluc: 215 mg/dL / Ketone: x  / Bili: x / Urobili: x   Blood: x / Protein: x / Nitrite: x   Leuk Esterase: x / RBC: x / WBC x   Sq Epi: x / Non Sq Epi: x / Bacteria: x        Venous Blood Gas:  05-19 @ 23:44  7.17/24/55/9/80.7  VBG Lactate: 1.3  Venous Blood Gas:  05-19 @ 23:25  7.23/21/33/9/48.5  VBG Lactate: 1.7  Venous Blood Gas:  05-19 @ 21:29  7.28/<19/63/8/89.0  VBG Lactate: 0.8  Venous Blood Gas:  05-19 @ 18:48  7.13/24/32/8/42.3  VBG Lactate: 1.7  Venous Blood Gas:  05-19 @ 16:25  7.13/23/30/8/34.3  VBG Lactate: 1.7          MICROBIOLOGY:   Blood Cx:  Urine Cx:  Sputum Cx:  Legionella:  RVP:    RADIOLOGY:  X Ray:  CT:  MRI:  Ultrasound:  [ ] Reviewed and interpreted by me    EKG:   Redd Ybarra PGY3    Progress Note  05-19-24 (1d)  Patient is a 79y old  Female who presents with a chief complaint of Weakness (20 May 2024 08:27)    Subjective / Interval 24 Events :  - Admitted overnight to MICU for urgent HD.  - Underwent HD this AM, completed around 9am. Net even.   - Pt seen and examined at bedside.     Additional ROS (if any): see above, otherwise negative     MEDICATIONS  (STANDING):  ascorbic acid 500 milliGRAM(s) Oral daily  cefTRIAXone   IVPB 1000 milliGRAM(s) IV Intermittent every 24 hours  chlorhexidine 2% Cloths 1 Application(s) Topical daily  chlorhexidine 4% Liquid 1 Application(s) Topical <User Schedule>  dexMEDEtomidine Infusion 0.2 MICROgram(s)/kG/Hr (2.34 mL/Hr) IV Continuous <Continuous>  heparin   Injectable 5000 Unit(s) SubCutaneous every 12 hours  multivitamin 1 Tablet(s) Oral daily  norepinephrine Infusion 0.05 MICROgram(s)/kG/Min (4.39 mL/Hr) IV Continuous <Continuous>  pancrelipase  (CREON 24,000 Lipase Units) 1 Capsule(s) Oral three times a day with meals  pantoprazole    Tablet 40 milliGRAM(s) Oral before breakfast  sodium bicarbonate  Infusion 0.287 mEq/kG/Hr (100 mL/Hr) IV Continuous <Continuous>  vancomycin  IVPB 1000 milliGRAM(s) IV Intermittent every 24 hours    MEDICATIONS  (PRN):  sodium chloride 0.9% Bolus. 100 milliLiter(s) IV Bolus every 5 minutes PRN SBP LESS THAN or EQUAL to 90 mmHg  sodium chloride 0.9% lock flush 10 milliLiter(s) IV Push every 1 hour PRN Pre/post blood products, medications, blood draw, and to maintain line patency      CAPILLARY BLOOD GLUCOSE        I&O's Summary    19 May 2024 07:01  -  20 May 2024 07:00  --------------------------------------------------------  IN: 2164.5 mL / OUT: 35 mL / NET: 2129.5 mL    20 May 2024 07:01  -  20 May 2024 09:39  --------------------------------------------------------  IN: 600 mL / OUT: 600 mL / NET: 0 mL        PHYSICAL EXAM:  Vital Signs Last 24 Hrs  T(C): 37 (20 May 2024 08:50), Max: 43 (20 May 2024 01:00)  T(F): 98.6 (20 May 2024 08:50), Max: 109.4 (20 May 2024 01:00)  HR: 101 (20 May 2024 08:50) (68 - 109)  BP: 115/57 (20 May 2024 08:50) (62/45 - 123/66)  BP(mean): 79 (20 May 2024 08:50) (49 - 83)  RR: 34 (20 May 2024 08:50) (15 - 36)  SpO2: 100% (20 May 2024 08:50) (88% - 100%)    Parameters below as of 20 May 2024 08:50  Patient On (Oxygen Delivery Method): room air        General: NAD, non-toxic appearing   HEENT: EOMi, no scleral icterus  CV: RRR, normal S1 and S2  Lungs: normal respiratory effort, CTAB  Abd: soft, nontender, nondistended  Ext: no edema, warm, well perfused  Pysch: AAOx3, appropriate affect   Neuro: grossly non-focal, moving all extremities spontaneously   Skin: no rashes or lesions       LABS:                          7.3    33.79 )-----------( 439      ( 19 May 2024 23:34 )             21.7       WBC Trend: 33.79<--, 40.60<--  Hb Trend: 7.3<--, 10.6<--    05-19    136  |  107  |  92<H>  ----------------------------<  215<H>  3.5   |  <10<LL>  |  4.83<H>    Ca    6.5<LL>      19 May 2024 23:34  Phos  7.7     05-19  Mg     1.4     05-19    TPro  4.9<L>  /  Alb  2.0<L>  /  TBili  0.2  /  DBili  x   /  AST  12  /  ALT  8<L>  /  AlkPhos  130<H>  05-19    PT/INR - ( 20 May 2024 01:27 )   PT: 20.4 sec;   INR: 1.98 ratio         PTT - ( 20 May 2024 01:27 )  PTT:44.9 sec      Urinalysis Basic - ( 19 May 2024 23:34 )    Color: x / Appearance: x / SG: x / pH: x  Gluc: 215 mg/dL / Ketone: x  / Bili: x / Urobili: x   Blood: x / Protein: x / Nitrite: x   Leuk Esterase: x / RBC: x / WBC x   Sq Epi: x / Non Sq Epi: x / Bacteria: x        Culture - Blood (collected 19 May 2024 16:25)  Source: .Blood Blood-Peripheral  Gram Stain (20 May 2024 08:01):    Growth in anaerobic bottle: Gram Negative Rods    Growth in aerobic bottle: Gram Negative Rods  Preliminary Report (20 May 2024 08:02):    Growth in anaerobic bottle: Gram Negative Rods    Growth in aerobic bottle: Gram Negative Rods    Culture - Blood (collected 19 May 2024 16:10)  Source: .Blood Blood-Peripheral  Gram Stain (20 May 2024 07:36):    Growth in aerobic bottle: Gram Negative Rods  Preliminary Report (20 May 2024 07:36):    Growth in aerobic bottle: Gram Negative Rods    Direct identification is available within approximately 3-5    hours either by Blood Panel Multiplexed PCR or Direct    MALDI-TOF. Details: [email or web address]          RADIOLOGY & ADDITIONAL TESTS: Reviewed  - No new images

## 2024-05-21 NOTE — DIETITIAN INITIAL EVALUATION ADULT - SIGNS/SYMPTOMS
pt w/ suspected DTI to sacrum per documentation pt w/ poor reported PO intake of full liquids before diet adv

## 2024-05-21 NOTE — PROGRESS NOTE ADULT - PROBLEM SELECTOR PLAN 2
Pt with severe high anion gap metabolic acidosis in setting of ROBERT, infectious process and hypotension. SCO2 <10, pH of 7.13. Lactate 1.9. Pt placed on IV bicarb infusion. MICU consulted, note reviewed. Continue IVFs, repeat labs including serum osm, uosm, osmolar gap and further serologies as above. Monitor SCO2 and pH. Pt with severe high anion gap metabolic acidosis in setting of ROBERT, infectious process and hypotension. SCO2 <10, pH of 7.13. Lactate 1.9. Pt placed on IV bicarb infusion. MICU consulted and pt admitted to MICU for urgent HD as noted above. SCO2 low/stable at 19. Monitor SCO2/pH.

## 2024-05-21 NOTE — DIETITIAN INITIAL EVALUATION ADULT - REASON FOR ADMISSION
Sepsis    Per chart, patient is a 80 y/o female with PMH including GERD, h/o MI, chronic pancreatitis, HTN, incontinence, h/o recent hip surgery. Patient presents to [facility] w/ increased lethargy, FTT, decreased PO intake, N/V. Admitted w/ c/f sepsis, metabolic acidemia, urgent HD; c/b urosepsis w/ E. Coli bacteremia requiring vasopressors per MD.

## 2024-05-21 NOTE — PROGRESS NOTE ADULT - ATTENDING COMMENTS
Agree with above  79F PMH GERD, chronic pancreatitis, hypertension, recent hip surgery with progressive decline since now p/w AMS, found to have ROBERT (BUN/Cr 103/5.6) and metabolic acidosis (bicarb <10). Evidence of R hydroureteronephrosis and pyuria. Admitted to ICU for Shiley placement and urgent HD.  - BCx positive for GNR, c/w empiric antibiotics with CTX / vancomycin, awaiting final ID/SS of all cultures. Change to targeted therapy once results return.  - Trend urine output. Remains oliguric with worsening renal failure that is not showing signs of recovery. Further HD per Nephro, no need for HD today.  - Hgb drop but no evidence of acute ongoing bleeding, getting 1U pRBCs today for Hgb <7. Continue to monitor and transfuse PRN Hgb <7  - Now off pressors, continue to monitor closely, maintain MAP >65  - DVT ppx: Hep SC

## 2024-05-21 NOTE — PROGRESS NOTE ADULT - ATTENDING COMMENTS
Sepsis and ROBERT 2/2 UTI/pyelonephritis  CT scan reviewed - there is R hydro and fullness/enlargement of R kidney - c/w R pyelo.  No obvious obstruction/stone visible  Pt on HD this AM, remains critically ill but stable  At this time would observe pt, if pt does not respond to abx/resuscitation, would consider need for R ureteral stent, however uncertain benefit if hydro related to R pyelo/ascending infection and not true obstruction.

## 2024-05-21 NOTE — DIETITIAN INITIAL EVALUATION ADULT - ENERGY INTAKE
Per d/w RN patient w/ current poor PO intake/appetite, not responsive to most liquids so far (previously ordered for full liquid diet, advanced to soft/bite sized diet later this AM) and unlikely to participate in any solid food intake 2/2 current clinical status. Oral cavity appearing very dry when seen at bedside. Patient could not offer significant insight/answers regarding PO intake. Poor (<50%)

## 2024-05-21 NOTE — CHART NOTE - NSCHARTNOTEFT_GEN_A_CORE
: Geremias    INDICATION: shock    PROCEDURE:  [x] LIMITED ECHO  [x] LIMITED CHEST  [ ] LIMITED RETROPERITONEAL  [ ] LIMITED ABDOMINAL  [ ] LIMITED DVT  [ ] NEEDLE GUIDANCE VASCULAR  [ ] NEEDLE GUIDANCE THORACENTESIS  [ ] NEEDLE GUIDANCE PARACENTESIS  [ ] NEEDLE GUIDANCE PERICARDIOCENTESIS  [ ] OTHER    FINDINGS: A-lines. Grossly normal LV and RV size and function. IVC 1.8 cm.      INTERPRETATION: No evidence of volume responsiveness. No cardiac limitation.

## 2024-05-21 NOTE — DIETITIAN INITIAL EVALUATION ADULT - PERTINENT LABORATORY DATA
05-20    136  |  103  |  50<H>  ----------------------------<  113<H>  4.4   |  19<L>  |  2.90<H>    Ca    7.4<L>      20 May 2024 23:53  Phos  3.5     05-20  Mg     1.5     05-20    TPro  4.3<L>  /  Alb  1.8<L>  /  TBili  0.1<L>  /  DBili  x   /  AST  14  /  ALT  7<L>  /  AlkPhos  141<H>  05-20  POCT Blood Glucose.: 101 mg/dL (05-21-24 @ 06:03)

## 2024-05-21 NOTE — DIETITIAN INITIAL EVALUATION ADULT - PERTINENT MEDS FT
MEDICATIONS  (STANDING):  ascorbic acid 500 milliGRAM(s) Oral daily  cefTRIAXone   IVPB 2000 milliGRAM(s) IV Intermittent every 24 hours  chlorhexidine 2% Cloths 1 Application(s) Topical daily  heparin   Injectable 5000 Unit(s) SubCutaneous every 12 hours  hydrocortisone sodium succinate Injectable 50 milliGRAM(s) IV Push every 8 hours  mirtazapine 15 milliGRAM(s) Oral at bedtime  multivitamin 1 Tablet(s) Oral daily  norepinephrine Infusion 0.05 MICROgram(s)/kG/Min (4.39 mL/Hr) IV Continuous <Continuous>  pancrelipase  (CREON 24,000 Lipase Units) 1 Capsule(s) Oral three times a day with meals  pantoprazole    Tablet 40 milliGRAM(s) Oral before breakfast    MEDICATIONS  (PRN):

## 2024-05-21 NOTE — CHART NOTE - NSCHARTNOTEFT_GEN_A_CORE
Attending: Dr. Wall   : ERICK Potts NP     INDICATION: Shock     PROCEDURE:  [X] LIMITED ECHO  [X] LIMITED CHEST  [ ] LIMITED RETROPERITONEAL  [] LIMITED ABDOMINAL  [ ] LIMITED DVT  [ ] NEEDLE GUIDANCE VASCULAR  [ ] NEEDLE GUIDANCE THORACENTESIS  [ ] NEEDLE GUIDANCE PARACENTESIS  [ ] NEEDLE GUIDANCE PERICARDIOCENTESIS  [ ] OTHER    FINDINGS:   Normal lung sliding A line predominant in bilateral  anterior lung fields Few focal B lines in bilateral  posterior lung fields no effusions or consolidations noted   Mildly reduced LV function RV < LV No effusion noted   IVC estimated to be 1.4cm with >50% collapsibility w/ respiration.    INTERPRETATION:   IVC 1.4cm with over > 50% resp variability -  low fluid volume   IVC collapsible, likely fluid responsive. Pt may benefit from further fluid resuscitation pt s/p dialysis with no fluid removal 5/20/24  Pt albumin 1.8 will order 5% 250ml for fluid resuscitation Attending: Dr. Wall   : ERICK Potts NP     INDICATION: Shock     PROCEDURE:  [X] LIMITED ECHO  [X] LIMITED CHEST  [ ] LIMITED RETROPERITONEAL  [] LIMITED ABDOMINAL  [ ] LIMITED DVT  [ ] NEEDLE GUIDANCE VASCULAR  [ ] NEEDLE GUIDANCE THORACENTESIS  [ ] NEEDLE GUIDANCE PARACENTESIS  [ ] NEEDLE GUIDANCE PERICARDIOCENTESIS  [ ] OTHER    FINDINGS:   Normal lung sliding A line predominant in bilateral  anterior lung fields Few focal B lines in bilateral  posterior lung fields no effusions or consolidations noted   Mildly reduced LV function RV < LV No effusion noted   IVC estimated to be 1.4cm with >50% collapsibility w/ respiration.    INTERPRETATION:   IVC 1.4cm with over > 50% resp variability   IVC small and collapsible, No contraindication to volume. Pt may benefit from further fluid resuscitation pt s/p dialysis with  fluid removal 5/20/24  Pt albumin 1.8 will order 5% 250ml for fluid resuscitation

## 2024-05-21 NOTE — DIETITIAN INITIAL EVALUATION ADULT - REASON INDICATOR FOR ASSESSMENT
Consult for "MST score 2 or >, pressure injury stage 2 or >"  Source: chart, RN (patient lethargic, could not provide significant responses to questions asked at bedside)  Chart reviewed, events noted

## 2024-05-21 NOTE — PROGRESS NOTE ADULT - SUBJECTIVE AND OBJECTIVE BOX
[facility] DIVISION OF KIDNEY DISEASES AND HYPERTENSION   FOLLOW UP NOTE  --------------------------------------------------------------------------------  Chief Complaint: Pt with oliguric/anuric ROBERT with severe metabolic acidosis requiring renal replacement therapy    24 hour events/subjective: Pt. was seen and examined today. Overnight events  noted.       PAST HISTORY  --------------------------------------------------------------------------------  No significant changes to PMH, PSH, FHx, SHx, unless otherwise noted    ALLERGIES & MEDICATIONS  --------------------------------------------------------------------------------  Allergies  penicillin (Swelling)    Intolerances  epinephrine (Other)    Standing Inpatient Medications  ascorbic acid 500 milliGRAM(s) Oral daily  cefTRIAXone   IVPB 2000 milliGRAM(s) IV Intermittent every 24 hours  chlorhexidine 2% Cloths 1 Application(s) Topical daily  heparin   Injectable 5000 Unit(s) SubCutaneous every 12 hours  hydrocortisone sodium succinate Injectable 50 milliGRAM(s) IV Push every 8 hours  mirtazapine 15 milliGRAM(s) Oral at bedtime  multivitamin 1 Tablet(s) Oral daily  norepinephrine Infusion 0.05 MICROgram(s)/kG/Min IV Continuous <Continuous>  pancrelipase  (CREON 24,000 Lipase Units) 1 Capsule(s) Oral three times a day with meals  pantoprazole    Tablet 40 milliGRAM(s) Oral before breakfast    PRN Inpatient Medications    REVIEW OF SYSTEMS  --------------------------------------------------------------------------------  Limited ROS    VITALS/PHYSICAL EXAM  --------------------------------------------------------------------------------  T(C): 36.7 (05-21-24 @ 12:00), Max: 37.1 (05-20-24 @ 16:00)  HR: 73 (05-21-24 @ 12:45) (69 - 117)  BP: 116/57 (05-21-24 @ 12:45) (73/44 - 179/68)  RR: 18 (05-21-24 @ 12:45) (15 - 27)  SpO2: 98% (05-21-24 @ 12:45) (86% - 100%)  Wt(kg): --  Height (cm): 154.9 (05-19-24 @ 14:27)  Weight (kg): 46.8 (05-19-24 @ 23:00)  BMI (kg/m2): 19.5 (05-19-24 @ 23:00)  BSA (m2): 1.43 (05-19-24 @ 23:00)    05-20-24 @ 07:01  -  05-21-24 @ 07:00  --------------------------------------------------------  IN: 4411.8 mL / OUT: 665 mL / NET: 3746.8 mL    05-21-24 @ 07:01  -  05-21-24 @ 13:24  --------------------------------------------------------  IN: 156.5 mL / OUT: 0 mL / NET: 156.5 mL    Physical Exam:  	Gen: Ill appearing   	HEENT: Dry MM.Anicteric  	Pulm: CTA B/L  	CV: S1S2+  	Abd: Soft, +BS   	Ext: No LE edema B/L  	Neuro: Somnolent  	Skin: Warm and dry  	Dialysis access: R IJ non tunneled HD catheter     LABS/STUDIES  --------------------------------------------------------------------------------              9.8    26.61 >-----------<  374      [05-21-24 @ 06:19]              28.7     136  |  103  |  50  ----------------------------<  113      [05-20-24 @ 23:53]  4.4   |  19  |  2.90        Ca     7.4     [05-20-24 @ 23:53]      Mg     1.5     [05-20-24 @ 23:53]      Phos  3.5     [05-20-24 @ 23:53]    TPro  4.3  /  Alb  1.8  /  TBili  0.1  /  DBili  x   /  AST  14  /  ALT  7   /  AlkPhos  141  [05-20-24 @ 23:53]    PT/INR: PT 18.3 , INR 1.77       [05-20-24 @ 23:53]  PTT: 41.1       [05-20-24 @ 23:53]    Serum Osmolality 290      [05-20-24 @ 23:53]    Creatinine Trend:  SCr 2.90 [05-20 @ 23:53]  SCr 2.83 [05-20 @ 12:32]  SCr 2.40 [05-20 @ 11:29]  SCr 4.83 [05-19 @ 23:34]  SCr 4.85 [05-19 @ 21:31]    Urine Protein 342      [05-20-24 @ 05:22]    HBsAg Nonreact      [05-19-24 @ 23:37]  HCV 0.09, Nonreact      [05-19-24 @ 23:37]  HIV Nonreact      [05-19-24 @ 23:37]    C3 Complement 75      [05-19-24 @ 23:37]  C4 Complement 28      [05-19-24 @ 23:37]  Syphilis Screen (Treponema Pallidum Ab) Negative      [05-19-24 @ 23:37]  Free Light Chains: kappa 22.09, lambda 21.07, ratio = 1.05      [05-19 @ 23:37] [facility] DIVISION OF KIDNEY DISEASES AND HYPERTENSION   FOLLOW UP NOTE  --------------------------------------------------------------------------------  Chief Complaint: Pt with oliguric/anuric ROBERT with severe metabolic acidosis requiring renal replacement therapy    24 hour events/subjective: Pt. was seen and examined in the MICU. Pt received HD overnight on 5/19-5/20. Pt currently off of IVFs but remains on IV vasopressors for persistent hypotension. Carrillo catheter was removed in setting of anuria. Pt is somnolent and ill appearing, minimally conversant.     PAST HISTORY  --------------------------------------------------------------------------------  No significant changes to PMH, PSH, FHx, SHx, unless otherwise noted    ALLERGIES & MEDICATIONS  --------------------------------------------------------------------------------  Allergies  penicillin (Swelling)    Intolerances  epinephrine (Other)    Standing Inpatient Medications  ascorbic acid 500 milliGRAM(s) Oral daily  cefTRIAXone   IVPB 2000 milliGRAM(s) IV Intermittent every 24 hours  chlorhexidine 2% Cloths 1 Application(s) Topical daily  heparin   Injectable 5000 Unit(s) SubCutaneous every 12 hours  hydrocortisone sodium succinate Injectable 50 milliGRAM(s) IV Push every 8 hours  mirtazapine 15 milliGRAM(s) Oral at bedtime  multivitamin 1 Tablet(s) Oral daily  norepinephrine Infusion 0.05 MICROgram(s)/kG/Min IV Continuous <Continuous>  pancrelipase  (CREON 24,000 Lipase Units) 1 Capsule(s) Oral three times a day with meals  pantoprazole    Tablet 40 milliGRAM(s) Oral before breakfast    PRN Inpatient Medications    REVIEW OF SYSTEMS  --------------------------------------------------------------------------------  Limited ROS    VITALS/PHYSICAL EXAM  --------------------------------------------------------------------------------  T(C): 36.7 (05-21-24 @ 12:00), Max: 37.1 (05-20-24 @ 16:00)  HR: 73 (05-21-24 @ 12:45) (69 - 117)  BP: 116/57 (05-21-24 @ 12:45) (73/44 - 179/68)  RR: 18 (05-21-24 @ 12:45) (15 - 27)  SpO2: 98% (05-21-24 @ 12:45) (86% - 100%)  Wt(kg): --  Height (cm): 154.9 (05-19-24 @ 14:27)  Weight (kg): 46.8 (05-19-24 @ 23:00)  BMI (kg/m2): 19.5 (05-19-24 @ 23:00)  BSA (m2): 1.43 (05-19-24 @ 23:00)    05-20-24 @ 07:01  -  05-21-24 @ 07:00  --------------------------------------------------------  IN: 4411.8 mL / OUT: 665 mL / NET: 3746.8 mL    05-21-24 @ 07:01  -  05-21-24 @ 13:24  --------------------------------------------------------  IN: 156.5 mL / OUT: 0 mL / NET: 156.5 mL    Physical Exam:  	Gen: Ill appearing   	HEENT: Dry MM. Anicteric  	Pulm: CTA B/L  	CV: S1S2+  	Abd: Soft, +BS   	Ext: No LE edema B/L  	Neuro: Somnolent  	Skin: Warm and dry  	Dialysis access: R IJ non tunneled HD catheter     LABS/STUDIES  --------------------------------------------------------------------------------              9.8    26.61 >-----------<  374      [05-21-24 @ 06:19]              28.7     136  |  103  |  50  ----------------------------<  113      [05-20-24 @ 23:53]  4.4   |  19  |  2.90        Ca     7.4     [05-20-24 @ 23:53]      Mg     1.5     [05-20-24 @ 23:53]      Phos  3.5     [05-20-24 @ 23:53]    TPro  4.3  /  Alb  1.8  /  TBili  0.1  /  DBili  x   /  AST  14  /  ALT  7   /  AlkPhos  141  [05-20-24 @ 23:53]    PT/INR: PT 18.3 , INR 1.77       [05-20-24 @ 23:53]  PTT: 41.1       [05-20-24 @ 23:53]    Serum Osmolality 290      [05-20-24 @ 23:53]    Creatinine Trend:  SCr 2.90 [05-20 @ 23:53]  SCr 2.83 [05-20 @ 12:32]  SCr 2.40 [05-20 @ 11:29]  SCr 4.83 [05-19 @ 23:34]  SCr 4.85 [05-19 @ 21:31]    Urine Protein 342      [05-20-24 @ 05:22]    HBsAg Nonreact      [05-19-24 @ 23:37]  HCV 0.09, Nonreact      [05-19-24 @ 23:37]  HIV Nonreact      [05-19-24 @ 23:37]    C3 Complement 75      [05-19-24 @ 23:37]  C4 Complement 28      [05-19-24 @ 23:37]  Syphilis Screen (Treponema Pallidum Ab) Negative      [05-19-24 @ 23:37]  Free Light Chains: kappa 22.09, lambda 21.07, ratio = 1.05      [05-19 @ 23:37]

## 2024-05-21 NOTE — PROGRESS NOTE ADULT - ATTENDING COMMENTS
79-year-old female with past medical history of GERD, MI, chronic pancreatitis, HTN, incontinence, recent hip surgery and was recently discharged from outpatient rehab to home presented to the emergency department with increased lethargy, initially labs concerning for severe ROBERT,  sepsis with metabolic acidemia.     ROBERT on CKD with Right Hydronephrosis/Pyelonephritis. G-ve bacteremia present.( E Coli)  Pt has had increasing SCr since 3/14/24 with a SCr of 1.8, trended up to 1.96 on 3/23/24, then 2.19 on 4/30/24 (most recent). Prior to March 2024, pt had normal kidney function dating back to October 2017.    Underwent urgent HD 5/19-5/20 ( overnight) for severe lactic acidosis  No osmolar gap present. Alc/Salicylates level normal. Urine tox screen negative. Hep B/C negative,. Lactate normalized. beta hydroxy butarate normal      AM labs with worsening BUN/Cr. Also, oligoanuric  Please check: serum free light chains, ANCA ( MPO/PR3)  Monitor today with no HD  Reassess in am for additional session  of dialysis  Maintain on Antibiotics.    pRBC transfusion per primary team  Decrease vitamin To 250 mg  F/U Serological w/u as above    Rest as per Dr. Eleuterio Lozano MD  O: [phone]  Contact me on teams

## 2024-05-21 NOTE — PROGRESS NOTE ADULT - ASSESSMENT
A/P: 79y Female with PMHx HTN, MI, anxiety and depression, GERD, pancreatitis, presents to ED with generalized weakness. Urology consulted for right pyelonephritis and hydronephrosis. Had Cr 5.56 in ED initially, and repeated Cr 4.85 (baseline 0.6-0.8 on Jan, up to approximal 2 on March). WBC 40. CT showed Mild to moderate right hydronephrosis and hydroureter with cirrhosis??. UA showed large LE, few bacteria, negative nitrite. CT images reviewed, no obvious transition point or clear cause of obstruction, possible reactive to R pyelonephritis.    - Given that CT shows no obvious obstruction in the Right kidney and ureter with hydro that appears to the level of the bladder, likely 2/2 reactive pyelonephritis.   - Blood cultures with GNR, Please obtain and follow up urine culture/ repeat blood cultures  - Continue to trend WBC- WBC down trending  - Currently receiving dialysis  - Need nephrology to help with the acidosis- currently on dialysis.   - Will continue to monitor vitals, hemodynamic monitoring per MICU.   - Urology to follow.   - Discussed with Dr. Eliu [facility]  [address] [place] [id number] [phone]

## 2024-05-21 NOTE — PROGRESS NOTE ADULT - SUBJECTIVE AND OBJECTIVE BOX
Urology Progress Note     Subjective/24hour Events:   Patient seen and examined.   No acute events overnight.   Pain controlled.     Vital Signs:  Vital Signs Last 24 Hrs  T(C): 36.7 (21 May 2024 12:00), Max: 37.1 (20 May 2024 16:00)  T(F): 98 (21 May 2024 12:00), Max: 98.8 (20 May 2024 16:00)  HR: 73 (21 May 2024 12:45) (69 - 117)  BP: 116/57 (21 May 2024 12:45) (73/44 - 179/68)  BP(mean): 81 (21 May 2024 12:45) (54 - 107)  RR: 18 (21 May 2024 12:45) (15 - 27)  SpO2: 98% (21 May 2024 12:45) (86% - 100%)    Parameters below as of 21 May 2024 07:45  Patient On (Oxygen Delivery Method): room air        CAPILLARY BLOOD GLUCOSE      POCT Blood Glucose.: 101 mg/dL (21 May 2024 06:03)      I&O's Detail    20 May 2024 07:01  -  21 May 2024 07:00  --------------------------------------------------------  IN:    IV PiggyBack: 400 mL    IV PiggyBack: 1350 mL    Lactated Ringers Bolus: 1000 mL    Norepinephrine: 301.8 mL    Oral Fluid: 60 mL    Other (mL): 600 mL    PRBCs (Packed Red Blood Cells): 300 mL    Sodium Bicarbonate: 400 mL  Total IN: 4411.8 mL    OUT:    Indwelling Catheter - Urethral (mL): 65 mL    Other (mL): 600 mL  Total OUT: 665 mL    Total NET: 3746.8 mL      21 May 2024 07:01  -  21 May 2024 13:17  --------------------------------------------------------  IN:    IV PiggyBack: 50 mL    Norepinephrine: 106.5 mL  Total IN: 156.5 mL    OUT:  Total OUT: 0 mL    Total NET: 156.5 mL          MEDICATIONS  (STANDING):  ascorbic acid 500 milliGRAM(s) Oral daily  cefTRIAXone   IVPB 2000 milliGRAM(s) IV Intermittent every 24 hours  chlorhexidine 2% Cloths 1 Application(s) Topical daily  heparin   Injectable 5000 Unit(s) SubCutaneous every 12 hours  hydrocortisone sodium succinate Injectable 50 milliGRAM(s) IV Push every 8 hours  mirtazapine 15 milliGRAM(s) Oral at bedtime  multivitamin 1 Tablet(s) Oral daily  norepinephrine Infusion 0.05 MICROgram(s)/kG/Min (4.39 mL/Hr) IV Continuous <Continuous>  pancrelipase  (CREON 24,000 Lipase Units) 1 Capsule(s) Oral three times a day with meals  pantoprazole    Tablet 40 milliGRAM(s) Oral before breakfast    MEDICATIONS  (PRN):      Physical Exam:  Gen: NAD.  Lungs: Non labored breathing.   Ab: Soft, nontender, nondistended.   : Carrillo in place with minimal urine output.    Labs:    05-20    136  |  103  |  50<H>  ----------------------------<  113<H>  4.4   |  19<L>  |  2.90<H>    Ca    7.4<L>      20 May 2024 23:53  Phos  3.5     05-20  Mg     1.5     05-20    TPro  4.3<L>  /  Alb  1.8<L>  /  TBili  0.1<L>  /  DBili  x   /  AST  14  /  ALT  7<L>  /  AlkPhos  141<H>  05-20    LIVER FUNCTIONS - ( 20 May 2024 23:53 )  Alb: 1.8 g/dL / Pro: 4.3 g/dL / ALK PHOS: 141 U/L / ALT: 7 U/L / AST: 14 U/L / GGT: x                                 9.8    26.61 )-----------( 374      ( 21 May 2024 06:19 )             28.7     PT/INR - ( 20 May 2024 23:53 )   PT: 18.3 sec;   INR: 1.77 ratio         PTT - ( 20 May 2024 23:53 )  PTT:41.1 sec

## 2024-05-21 NOTE — PROGRESS NOTE ADULT - ASSESSMENT
79F with PMH GERD, MI, chronic pancreatitis/insufficiency, HTN, incontinence, recent R hip fx s/p repair (1/2024) and was recently discharged from outpatient rehab to home initially presenting for increased lethargy in setting of poor PO intake, admitted to MICU for urosepsis c/b E. coli bacteremia requiring vasopressors and acute renal failure requiring urgent HD for acidosis. Now s/p HD x1 with improving renal function     =====Neurologic=====  #Toxic metabolic encephalopathy   Patient is baseline is AOx4, though noted to be AOx2 at admission  - appears to be improving. although pt w/ flat affect, not really engaging     #depression  per pt's brother, who is also a cardiologist, pt with worsening depression, appetite/decreased PO intake since hip fracture in Jan. Prior to this, pt was a practicing dentist   - continue home mirtazapine 15mg qhs   - was reportedly also prescribed sertraline recently but have not started, hold off this for now     =====Pulmonary=====  - No active issues. Patient breathing comfortably/saturating well on room air    =====Cardiovascular=====  #shock  - suspect secondary to sepsis, continue abx as below  - can check TTE   - wean pressors as tolerated    #HTN  - holding home atenolol and olmesartan given shock     =====GI=====  #H/o Chronic pancreatitis/insufficiency   - No concern for active flare   - Continue home Creon TID premeal    #H/o GERD  - Continue home med: Protonix 40mg PO QD    #Diet  - Renal restricted diet    =====Renal/=====  #acute renal failure  with marked acidosis to 7.13 and oliguria on admission now s/p bicarb gtt and urgent HD x1 (5/20) with improvement in acidosis. Still with decreased UOP  - s/p CARMELINA clark 5/19   - HD per nephrology       =====Endocrine=====  - No active issues    ===MSK====  # Femur facture Jan 2024 s/p repair   Bed bound since discharge from rehab requiring full adl support  - Activity as tolerated   - Pain management with Tylenol PRN, Lidocaine patches PRN, Ice/Hot Compresses PRN    =====Infectious Disease=====  #urosepsis c/b E. coli bacteremia   - BCxs (5/19) 4/4 bottles GNR - E. coli  - UCx (5/19): >100k E. coli  - continue CTX 2g daily x 14 days   - follow repeat blood cxs     =====Heme/Onc=====  #anemia  baseline hgb 7-8s   - s/p 1u pRBC 5/21 for hgb <7    #DVT Ppx  - Hep subq    =====Lines======  - CARMELINA clark 5/19 -     =====Ethics=====  FULL CODE

## 2024-05-21 NOTE — DIETITIAN INITIAL EVALUATION ADULT - FUNCTIONAL SCREEN CURRENT LEVEL: SWALLOWING (IF SCORE 2 OR MORE FOR ANY ITEM, CONSULT REHAB SERVICES), MLM)
0 = swallows foods/liquids without difficulty Vermilion Border Text: The closure involved the vermilion border.

## 2024-05-21 NOTE — DIETITIAN INITIAL EVALUATION ADULT - OTHER INFO
NKFA per chart. Recent weight history per [facility] growth chart as follows: 119lbs (1/20/2024), 122lbs (7/24/2023), 123lbs (7/6/2023). Current bedscale weight of 108.4lbs today w/ +1 generalized edema reflective of a degree of weight loss from 1/20 to now ~4 months. Will monitor weight trend.    - Vasopressor support w/ levo.  - On antibiotic regimen for bacteremia, ID following for management.  - On steroid regimen w/ solucortef (added today). BG trend noted from lower 100s to mid 200s intermittently likely influenced from metabolic stress of acute illness, no HgbA1C on file. To follow BG trend with steroid regimen being added.  - Ordered for MVI, vitamin C (has suspected DTI to sacrum per documentation).  - Previous hypokalemia 5/19, 5/20. Had previous supplementation noted.  - Previous hyperphosphatemia, now WNL as of 5/20.  - Intermittent hypomagnesemia: supplementation noted.  - Ordered for creon (chronic pancreatitis history noted). NKFA per chart. Recent weight history per [facility] growth chart as follows: 119lbs (1/20/2024), 122lbs (7/24/2023), 123lbs (7/6/2023). Current bedscale weight of 108.4lbs today w/ +1 generalized edema reflective of a degree of weight loss from 1/20 to now ~4 months. Will monitor weight trend.    - Vasopressor support w/ levo.  - On antibiotic regimen for bacteremia, ID following for management.  - On steroid regimen w/ solucortef (added today). BG trend noted from lower 100s to mid 200s intermittently likely influenced from metabolic stress of acute illness, no HgbA1C on file. To follow BG trend with steroid regimen being added.  - Ordered for MVI, vitamin C (has suspected DTI to sacrum per documentation).  - Previous hypokalemia 5/19, 5/20. Had previous supplementation noted.  - Previous hyperphosphatemia, now WNL as of 5/20.  - Intermittent hypomagnesemia: supplementation noted.  - Ordered for creon (chronic pancreatitis history noted, not suspected to be in active flare per MD).

## 2024-05-21 NOTE — PROGRESS NOTE ADULT - PROBLEM SELECTOR PLAN 1
Pt with oliguric/anuric ROBERT requiring renal replacement therapy in the setting of GN bacteremia, septic shock, ?medication use (Benicar) and diarrhea. Per Kath/THALIA review, pt has had increasing SCr since 3/14/24 with a SCr of 1.8, trended up to 1.96 on 3/23/24, then 2.19 on 4/30/24 (most recent). Prior to March 2024, pt had normal kidney function dating back to October 2017. SCr on admission of 5.56. UA significant for infectious etiology. Pt initiated on IV abx. Recommend UPCR. Carrillo CT abd/pel wo contrast pending. Pt placed on IV bicarb infusion.     Send serological work-up for secondary causes of renal failure including KIKE, P-ANCA, C-ANCA, Anti-GBM ab, HBsAg, Hep C antibody, HIV, Parvovirus, C3, C4, PLA2R, FLC, serum and urine immunofixation, RPR. Recommend tox screen, salicylate, acetaminophen, etoh level.     Will need to consider HD if renal failure continues to worsen. This was discussed at length with her  Elfego who stated that since her hip fx in January, pt has declined significantly. He believes since she has not been able to work, the pt has lost motivation to live and he is unsure if she would be agreeable to HD/RRT at this time. He asked I reach out to her PMD (called no answer). Monitor labs and urine output. Avoid NSAIDs, ACEI/ARBS, RCA and nephrotoxins. Dose medications as per eGFR. Pt with oliguric/anuric ROBERT requiring renal replacement therapy in the setting of GN bacteremia, septic shock, ?medication use (Benicar) and diarrhea. Per Kath/THALIA review, pt has had increasing SCr since 3/14/24 with a SCr of 1.8, trended up to 1.96 on 3/23/24, then 2.19 on 4/30/24 (most recent). Prior to March 2024, pt had normal kidney function dating back to October 2017. SCr on admission of 5.56. UA significant for infectious etiology. Pt initiated on IV abx. CT abd/pel with R hydronephrosis noted. Urology was consulted. Pt with persistent metabolic acidosis despite IV bicarb infusion and was initiated on HD on 5/19.     Serologies thus far as above reviewed. Recommend UPCR. No urgent indications for HD today. Will continue to assess daily. Consider IVFs and continue with hemodynamic support. Follow serologies. Monitor labs and urine output. Avoid NSAIDs, ACEI/ARBS, RCA and nephrotoxins. Dose medications as per eGFR.

## 2024-05-21 NOTE — DIETITIAN INITIAL EVALUATION ADULT - ADD RECOMMEND
1. continue current diet as tolerated of: soft/bite sized diet - monitor need for SLP evaluation  2. encourage fluid intake as medically feasible (patient's oral cavity very dry appearing when seen)  3. provide assistance with meals for the patient  4. recommend addition of Ensure Plus HP BID for extra caloric/protein intake  5. monitor PO intake, weight trend, electrolytes, blood glucose levels, labs, BMs

## 2024-05-21 NOTE — PROGRESS NOTE ADULT - SUBJECTIVE AND OBJECTIVE BOX
Redd Ybarra PGY3    Progress Note  05-19-24 (2d)  Patient is a 79y old  Female who presents with a chief complaint of Weakness (20 May 2024 08:27)    Subjective / Interval 24 Events :  - No acute events overnight. Was given 250cc x1 for potential intravascular depletion to decreased pressor requirements. Given 1u pRBC for hgb 6.8 > 9.8 on repeat   - Pt seen and examined at bedside this AM. Nods appropriately to questioning and giving simple 1 worded responses to some questions, otherwise not engaging much in interview. Denies any pain or acute complaints   - On levophed gtt @ 0.25     Additional ROS (if any): see above, otherwise negative     MEDICATIONS  (STANDING):  ascorbic acid 500 milliGRAM(s) Oral daily  cefTRIAXone   IVPB 2000 milliGRAM(s) IV Intermittent every 24 hours  chlorhexidine 2% Cloths 1 Application(s) Topical daily  heparin   Injectable 5000 Unit(s) SubCutaneous every 12 hours  multivitamin 1 Tablet(s) Oral daily  norepinephrine Infusion 0.05 MICROgram(s)/kG/Min (4.39 mL/Hr) IV Continuous <Continuous>  pancrelipase  (CREON 24,000 Lipase Units) 1 Capsule(s) Oral three times a day with meals  pantoprazole    Tablet 40 milliGRAM(s) Oral before breakfast    MEDICATIONS  (PRN):      CAPILLARY BLOOD GLUCOSE      POCT Blood Glucose.: 101 mg/dL (21 May 2024 06:03)    I&O's Summary    20 May 2024 07:01  -  21 May 2024 07:00  --------------------------------------------------------  IN: 4411.8 mL / OUT: 665 mL / NET: 3746.8 mL        PHYSICAL EXAM:  Vital Signs Last 24 Hrs  T(C): 36.8 (21 May 2024 07:45), Max: 37.1 (20 May 2024 16:00)  T(F): 98.2 (21 May 2024 07:45), Max: 98.8 (20 May 2024 16:00)  HR: 73 (21 May 2024 08:00) (69 - 117)  BP: 97/52 (21 May 2024 08:00) (71/50 - 179/90)  BP(mean): 72 (21 May 2024 08:00) (54 - 124)  RR: 17 (21 May 2024 08:00) (13 - 36)  SpO2: 97% (21 May 2024 08:00) (83% - 100%)    Parameters below as of 21 May 2024 07:45  Patient On (Oxygen Delivery Method): room air      General: NAD, non-toxic appearing   HEENT: EOMi, no scleral icterus  CV: RRR, normal S1 and S2  Lungs: normal respiratory effort, CTAB  Abd: soft, nontender, nondistended  Ext: trace dependent BLE edema, warm, well perfused  Pysch: calm, flat affect   Neuro: grossly non-focal, moving all extremities spontaneously, following commands, nodding to questioning appropriately, AOx1 ?not really engaging in questioning   Skin: no rashes or lesions       LABS:                          9.8    26.61 )-----------( 374      ( 21 May 2024 06:19 )             28.7       WBC Trend: 26.61<--, 33.19<--, 33.79<--  Hb Trend: 9.8<--, 6.8<--, 7.3<--, 10.6<--    05-20    136  |  103  |  50<H>  ----------------------------<  113<H>  4.4   |  19<L>  |  2.90<H>    Ca    7.4<L>      20 May 2024 23:53  Phos  3.5     05-20  Mg     1.5     05-20    TPro  4.3<L>  /  Alb  1.8<L>  /  TBili  0.1<L>  /  DBili  x   /  AST  14  /  ALT  7<L>  /  AlkPhos  141<H>  05-20    PT/INR - ( 20 May 2024 23:53 )   PT: 18.3 sec;   INR: 1.77 ratio         PTT - ( 20 May 2024 23:53 )  PTT:41.1 sec      Urinalysis Basic - ( 20 May 2024 23:53 )    Color: x / Appearance: x / SG: x / pH: x  Gluc: 113 mg/dL / Ketone: x  / Bili: x / Urobili: x   Blood: x / Protein: x / Nitrite: x   Leuk Esterase: x / RBC: x / WBC x   Sq Epi: x / Non Sq Epi: x / Bacteria: x      Culture - Blood (collected 19 May 2024 16:25)  Source: .Blood Blood-Peripheral  Gram Stain (20 May 2024 08:01):    Growth in anaerobic bottle: Gram Negative Rods    Growth in aerobic bottle: Gram Negative Rods  Preliminary Report (20 May 2024 08:02):    Growth in anaerobic bottle: Gram Negative Rods    Growth in aerobic bottle: Gram Negative Rods    Culture - Urine (collected 19 May 2024 16:23)  Source: Clean Catch Clean Catch (Midstream)  Preliminary Report (20 May 2024 23:15):    >100,000 CFU/ml Escherichia coli    <10,000 CFU/ml Normal Urogenital magalys present    Culture - Blood (collected 19 May 2024 16:10)  Source: .Blood Blood-Peripheral  Gram Stain (20 May 2024 09:48):    Growth in aerobic bottle: Gram Negative Rods    Growth in anaerobic bottle: Gram Negative Rods  Preliminary Report (20 May 2024 09:48):    Growth in aerobic bottle: Gram Negative Rods    Growth in anaerobic bottle: Gram Negative Rods    Direct identification is available within approximately 3-5    hours either by Blood Panel Multiplexed PCR or Direct    MALDI-TOF. Details: [email or web address]  Organism: Blood Culture PCR (20 May 2024 09:50)  Organism: Blood Culture PCR (20 May 2024 09:50)      RADIOLOGY & ADDITIONAL TESTS: Reviewed  - No new images

## 2024-05-22 NOTE — PROGRESS NOTE ADULT - SUBJECTIVE AND OBJECTIVE BOX
[facility] DIVISION OF KIDNEY DISEASES AND HYPERTENSION   FOLLOW UP NOTE  --------------------------------------------------------------------------------  Chief Complaint: Pt with oliguric/anuric ROBERT with severe metabolic acidosis requiring renal replacement therapy    24 hour events/subjective: Pt. was seen and examined in the MICU. Pt received HD overnight on 5/19-5/20. Pt remains critically ill in the MICU. Pt is off IV Vasopressors but BP remains low in the 90s systolic. Pt remains anuric and altered. Unable to obtain ROS.     PAST HISTORY  --------------------------------------------------------------------------------  No significant changes to PMH, PSH, FHx, SHx, unless otherwise noted    ALLERGIES & MEDICATIONS  --------------------------------------------------------------------------------  Allergies  penicillin (Swelling)    Intolerances  epinephrine (Other)    Standing Inpatient Medications  ascorbic acid 250 milliGRAM(s) Oral daily  cefTRIAXone   IVPB 2000 milliGRAM(s) IV Intermittent every 24 hours  chlorhexidine 2% Cloths 1 Application(s) Topical daily  heparin   Injectable 5000 Unit(s) SubCutaneous every 12 hours  hydrocortisone sodium succinate Injectable 50 milliGRAM(s) IV Push every 6 hours  lidocaine   4% Patch 1 Patch Transdermal every 24 hours  mirtazapine 15 milliGRAM(s) Oral at bedtime  multivitamin 1 Tablet(s) Oral daily  norepinephrine Infusion 0.05 MICROgram(s)/kG/Min IV Continuous <Continuous>  pancrelipase  (CREON 24,000 Lipase Units) 1 Capsule(s) Oral three times a day with meals  pantoprazole    Tablet 40 milliGRAM(s) Oral before breakfast    PRN Inpatient Medications    REVIEW OF SYSTEMS  --------------------------------------------------------------------------------  Unable to obtain ROS    VITALS/PHYSICAL EXAM  --------------------------------------------------------------------------------  T(C): 36.8 (05-22-24 @ 08:00), Max: 36.8 (05-21-24 @ 16:00)  HR: 83 (05-22-24 @ 10:00) (67 - 87)  BP: 123/56 (05-22-24 @ 10:00) (93/53 - 127/61)  RR: 20 (05-22-24 @ 10:00) (15 - 27)  SpO2: 90% (05-22-24 @ 10:00) (84% - 100%)  Wt(kg): --    05-21-24 @ 07:01  -  05-22-24 @ 07:00  --------------------------------------------------------  IN: 964.7 mL / OUT: 10 mL / NET: 954.7 mL    05-22-24 @ 07:01  -  05-22-24 @ 11:36  --------------------------------------------------------  IN: 0 mL / OUT: 0 mL / NET: 0 mL    Physical Exam:  	Gen: Ill appearing   	HEENT: Dry MM. Anicteric  	Pulm: CTA B/L  	CV: S1S2+  	Abd: Soft, +BS   	Ext: No LE edema B/L  	Neuro: Somnolent  	Skin: Warm and dry  	Dialysis access: R IJ non tunneled HD catheter     LABS/STUDIES  --------------------------------------------------------------------------------              9.1    22.77 >-----------<  323      [05-22-24 @ 00:39]              26.8     136  |  102  |  54  ----------------------------<  132      [05-22-24 @ 00:39]  4.5   |  12  |  3.71        Ca     8.2     [05-22-24 @ 00:39]      Mg     2.0     [05-22-24 @ 00:39]      Phos  4.6     [05-22-24 @ 00:39]    TPro  4.9  /  Alb  2.0  /  TBili  0.2  /  DBili  x   /  AST  15  /  ALT  11  /  AlkPhos  152  [05-22-24 @ 00:39]    PT/INR: PT 16.3 , INR 1.50       [05-22-24 @ 00:39]  PTT: 38.7       [05-22-24 @ 00:39]    Serum Osmolality 299      [05-22-24 @ 00:39]    Creatinine Trend:  SCr 3.71 [05-22 @ 00:39]  SCr 2.90 [05-20 @ 23:53]  SCr 2.83 [05-20 @ 12:32]  SCr 2.40 [05-20 @ 11:29]  SCr 4.83 [05-19 @ 23:34]    Urine Protein 342      [05-20-24 @ 05:22]    HBsAg Nonreact      [05-19-24 @ 23:37]  HCV 0.09, Nonreact      [05-19-24 @ 23:37]  HIV Nonreact      [05-19-24 @ 23:37]    C3 Complement 75      [05-19-24 @ 23:37]  C4 Complement 28      [05-19-24 @ 23:37]  Syphilis Screen (Treponema Pallidum Ab) Negative      [05-19-24 @ 23:37]  Free Light Chains: kappa 22.09, lambda 21.07, ratio = 1.05      [05-19 @ 23:37]

## 2024-05-22 NOTE — BH CONSULTATION LIAISON ASSESSMENT NOTE - CURRENT MEDICATION
MEDICATIONS  (STANDING):  ascorbic acid 250 milliGRAM(s) Oral daily  cefTRIAXone   IVPB 2000 milliGRAM(s) IV Intermittent every 24 hours  chlorhexidine 2% Cloths 1 Application(s) Topical daily  heparin   Injectable 5000 Unit(s) SubCutaneous every 12 hours  hydrocortisone sodium succinate Injectable 50 milliGRAM(s) IV Push every 6 hours  lidocaine   4% Patch 1 Patch Transdermal every 24 hours  mirtazapine 15 milliGRAM(s) Oral at bedtime  multivitamin 1 Tablet(s) Oral daily  norepinephrine Infusion 0.05 MICROgram(s)/kG/Min (4.39 mL/Hr) IV Continuous <Continuous>  pancrelipase  (CREON 24,000 Lipase Units) 1 Capsule(s) Oral three times a day with meals  pantoprazole    Tablet 40 milliGRAM(s) Oral before breakfast    MEDICATIONS  (PRN):

## 2024-05-22 NOTE — BH CONSULTATION LIAISON ASSESSMENT NOTE - NSBHCHARTREVIEWLAB_PSY_A_CORE FT
Please see chart.                        9.1    22.77 )-----------( 323      ( 22 May 2024 00:39 )             26.8     05-22    136  |  102  |  54<H>  ----------------------------<  132<H>  4.5   |  12<L>  |  3.71<H>    Ca    8.2<L>      22 May 2024 00:39  Phos  4.6     05-22  Mg     2.0     05-22    TPro  4.9<L>  /  Alb  2.0<L>  /  TBili  0.2  /  DBili  x   /  AST  15  /  ALT  11  /  AlkPhos  152<H>  05-22    Urinalysis Basic - ( 22 May 2024 00:39 )    Color: x / Appearance: x / SG: x / pH: x  Gluc: 132 mg/dL / Ketone: x  / Bili: x / Urobili: x   Blood: x / Protein: x / Nitrite: x   Leuk Esterase: x / RBC: x / WBC x   Sq Epi: x / Non Sq Epi: x / Bacteria: x

## 2024-05-22 NOTE — BH CONSULTATION LIAISON ASSESSMENT NOTE - RISK ASSESSMENT
No weapons at home, safe home, supportive family, no reported SI/HI.  Low risk for dangerous behavior at this time.

## 2024-05-22 NOTE — PROGRESS NOTE ADULT - ATTENDING COMMENTS
Agree with above  79F PMH GERD, chronic pancreatitis, hypertension, recent hip surgery with progressive decline since now p/w AMS, found to have ROBERT (BUN/Cr 103/5.6) and metabolic acidosis (bicarb <10). Evidence of R hydroureteronephrosis and pyuria. Admitted to ICU for Shiley placement and urgent HD.  - UCx and BCx positive for pan-sensitive E. coli. c/w ceftriaxone.  - Persistent oliguria with worsening renal failure, no signs of renal recovery. Scheduled for HD today. Will get repeat renal U/S to evaluate for interval change of hydroureteronephrosis.  - Hgb now stable s/p 1U pRBCs yesterday. No evidence of acute ongoing bleeding. Continue to monitor and transfuse PRN Hgb <7  - Now off pressors, continue to monitor closely, maintain MAP >65. Close hemodynamic monitoring, especially during HD today.  - DVT ppx: Hep SC

## 2024-05-22 NOTE — BH CONSULTATION LIAISON ASSESSMENT NOTE - NSBHSAALC_PSY_A_CORE FT
Make appointment with OBGYN   continue weaning off of Zoloft   if diarrhea does not totally resolve call the office for GI appointment   Bring in urine sample  
Drinks wine socially

## 2024-05-22 NOTE — BH CONSULTATION LIAISON ASSESSMENT NOTE - NSBHCONSULTFOLLOWAFTERCARE_PSY_A_CORE FT
OP psych f/u at [facility]- [phone]  [phone]

## 2024-05-22 NOTE — BH CONSULTATION LIAISON ASSESSMENT NOTE - SUMMARY
79-year-old female with past medical history of GERD, MI, chronic pancreatitis, HTN, incontinence, recent hip surgery and was recently discharged from outpatient rehab to home presented to the emergency department with increased lethargy, failure to thrive, decreased PO intake nausea/vomiting. Pt was just discharged from rehab center after prolonged stay from R femur fracture. ED course notable for lab with  Leukocytosis to 40 with urine appearing like pus metabolic acidemia, ph 7.13, bicarb 8. Started on bicarb gtt. Carrillo placed minimal output new ROBERT BUN/Cr 103/5.56. Nephrology consulted for urgent HD. Admitted to MICU being treated for renal failure.     Psychiatry consulted for concerns of depression. On evaluation, pt was AAO x 0 and also minimally verbal, confused. Given underlying confusion with current underlying medical problems, would consider delirium as a primary differential at this time. Cannot diagnose with MDD or any other psychiatric disorders at this time given underlying medical problems and confusion.

## 2024-05-22 NOTE — BH CONSULTATION LIAISON ASSESSMENT NOTE - NSBHATTESTCOMMENTATTENDFT_PSY_A_CORE
79F , domiciled, has HHA, was working as a dentist until recently, with no formal PPhx, no prior SA or psych admissions, no active substance abuse, with PMH significant for GERD, MI, chronic pancreatitis, HTN, incontinence, recent hip surgery and was recently discharged from outpatient rehab to home presented to the emergency department with increased lethargy, failure to thrive, decreased PO intake nausea/vomiting, psychiatry consulted to evaluate for depression.  Pt minimally verbal on interview, oriented x0, unable to clearly answer questions, at times giving incoherent word fragments.  Nursing staff notes pt has been intermittently confused but not acutely agitated.  Presentation c/w hypoactive delirium.  DX: Delirium 2/2 C.  Recs: Agree with Zyprexa/Seroquel PRNs as above, check TSH.  Agree with resident's assessment and plan as above.

## 2024-05-22 NOTE — PROGRESS NOTE ADULT - PROBLEM SELECTOR PLAN 2
Pt with severe high anion gap metabolic acidosis in setting of ROBERT, infectious process and hypotension. Admission labs significant for SCO2 <10, pH of 7.13 and Lactate 1.9. Pt placed on IV bicarb infusion. MICU consulted and pt admitted to MICU for urgent HD as noted above. SCO2 has decreased from 20 to 12 today. Recommend HD today as above. Monitor SCO2/pH.

## 2024-05-22 NOTE — PROGRESS NOTE ADULT - SUBJECTIVE AND OBJECTIVE BOX
Redd Ybarra PGY3    Progress Note  05-19-24 (3d)  Patient is a 79y old  Female who presents with a chief complaint of Weakness (21 May 2024 13:24)    Subjective / Interval 24 Events :  - No acute events overnight.  - Anuric overnight with negative bladder scan. BUN/SCr uptrending again with decrease in bicarb, ph compensating. Discussed with nephro, will plan for another session of HD, however with soft BPs, will require pressor support   - Pt seen and examined at bedside this AM, appears more lethargic than yesterday.     Additional ROS (if any): see above, otherwise negative     MEDICATIONS  (STANDING):  ascorbic acid 250 milliGRAM(s) Oral daily  cefTRIAXone   IVPB 2000 milliGRAM(s) IV Intermittent every 24 hours  chlorhexidine 2% Cloths 1 Application(s) Topical daily  heparin   Injectable 5000 Unit(s) SubCutaneous every 12 hours  hydrocortisone sodium succinate Injectable 50 milliGRAM(s) IV Push every 6 hours  lidocaine   4% Patch 1 Patch Transdermal every 24 hours  mirtazapine 15 milliGRAM(s) Oral at bedtime  multivitamin 1 Tablet(s) Oral daily  norepinephrine Infusion 0.05 MICROgram(s)/kG/Min (4.39 mL/Hr) IV Continuous <Continuous>  pancrelipase  (CREON 24,000 Lipase Units) 1 Capsule(s) Oral three times a day with meals  pantoprazole    Tablet 40 milliGRAM(s) Oral before breakfast    MEDICATIONS  (PRN):      CAPILLARY BLOOD GLUCOSE        I&O's Summary    21 May 2024 07:01  -  22 May 2024 07:00  --------------------------------------------------------  IN: 964.7 mL / OUT: 10 mL / NET: 954.7 mL        PHYSICAL EXAM:  Vital Signs Last 24 Hrs  T(C): 36.8 (22 May 2024 04:00), Max: 36.8 (21 May 2024 16:00)  T(F): 98.2 (22 May 2024 04:00), Max: 98.2 (21 May 2024 16:00)  HR: 76 (22 May 2024 06:00) (68 - 87)  BP: 106/64 (22 May 2024 06:00) (93/53 - 127/61)  BP(mean): 79 (22 May 2024 06:00) (68 - 89)  RR: 25 (22 May 2024 06:00) (15 - 27)  SpO2: 96% (22 May 2024 06:00) (84% - 100%)    Parameters below as of 21 May 2024 20:00  Patient On (Oxygen Delivery Method): room air      General: NAD, lethargic, thin   HEENT: EOMi, no scleral icterus  CV: RRR, normal S1 and S2  Lungs: normal respiratory effort, CTAB  Abd: soft, nontender, nondistended  Ext: no edema, warm, well perfused  Pysch: AAOx3, appropriate affect   Neuro: grossly non-focal, moving all extremities spontaneously   Skin: no rashes or lesions       LABS:                          9.1    22.77 )-----------( 323      ( 22 May 2024 00:39 )             26.8       WBC Trend: 22.77<--, 26.61<--, 33.19<--  Hb Trend: 9.1<--, 9.8<--, 6.8<--, 7.3<--, 10.6<--    05-22    136  |  102  |  54<H>  ----------------------------<  132<H>  4.5   |  12<L>  |  3.71<H>    Ca    8.2<L>      22 May 2024 00:39  Phos  4.6     05-22  Mg     2.0     05-22    TPro  4.9<L>  /  Alb  2.0<L>  /  TBili  0.2  /  DBili  x   /  AST  15  /  ALT  11  /  AlkPhos  152<H>  05-22    PT/INR - ( 22 May 2024 00:39 )   PT: 16.3 sec;   INR: 1.50 ratio         PTT - ( 22 May 2024 00:39 )  PTT:38.7 sec      Urinalysis Basic - ( 22 May 2024 00:39 )    Color: x / Appearance: x / SG: x / pH: x  Gluc: 132 mg/dL / Ketone: x  / Bili: x / Urobili: x   Blood: x / Protein: x / Nitrite: x   Leuk Esterase: x / RBC: x / WBC x   Sq Epi: x / Non Sq Epi: x / Bacteria: x        Culture - Nose (collected 20 May 2024 05:25)  Source: .Nose Nose  Final Report (21 May 2024 14:16):    Staphylococcus aureus isolated    No Methicillin Resistant Staphylococcus aureus    Culture - Blood (collected 19 May 2024 16:25)  Source: .Blood Blood-Peripheral  Gram Stain (20 May 2024 08:01):    Growth in anaerobic bottle: Gram Negative Rods    Growth in aerobic bottle: Gram Negative Rods  Final Report (22 May 2024 07:47):    Growth in aerobic and anaerobic bottles: Escherichia coli    See previous culture [id number]    Culture - Urine (collected 19 May 2024 16:23)  Source: Clean Catch Clean Catch (Midstream)  Preliminary Report (20 May 2024 23:15):    >100,000 CFU/ml Escherichia coli    <10,000 CFU/ml Normal Urogenital magalys present    Culture - Blood (collected 19 May 2024 16:10)  Source: .Blood Blood-Peripheral  Gram Stain (20 May 2024 09:48):    Growth in aerobic bottle: Gram Negative Rods    Growth in anaerobic bottle: Gram Negative Rods  Final Report (22 May 2024 07:47):    Growth in aerobic and anaerobic bottles: Escherichia coli    Direct identification is available within approximately 3-5    hours either by Blood Panel Multiplexed PCR or Direct    MALDI-TOF. Details: [email or web address]  Organism: Blood Culture PCR  Escherichia coli (22 May 2024 07:47)  Organism: Escherichia coli (22 May 2024 07:47)  Organism: Blood Culture PCR (22 May 2024 07:47)      RADIOLOGY & ADDITIONAL TESTS: Reviewed  - No new images

## 2024-05-22 NOTE — PROGRESS NOTE ADULT - ATTENDING COMMENTS
79-year-old female with past medical history of GERD, MI, chronic pancreatitis, HTN, incontinence, recent hip surgery and was recently discharged from outpatient rehab to home presented to the emergency department with increased lethargy, initially labs concerning for severe ROBERT,  sepsis with metabolic acidemia.     ROBERT on CKD with Right Hydronephrosis/Pyelonephritis. G-ve bacteremia present.( E Coli)  Pt has had increasing SCr since 3/14/24 with a SCr of 1.8, trended up to 1.96 on 3/23/24, then 2.19 on 4/30/24 (most recent). Prior to March 2024, pt had normal kidney function dating back to October 2017.    Underwent urgent HD 5/19-5/20 ( overnight) for severe lactic acidosis  No osmolar gap present. Alc/Salicylates level normal. Urine tox screen negative. Hep B/C /Light chain negative,. Lactate normalized. beta hydroxy butarate normal      AM labs with worsening BUN/Cr. Also, anuric  Schedule for HD today  Maintain on Antibiotics.    pRBC transfusion per primary team  F/U Serological w/u      Rest as per Dr. Eleuterio Lozano MD  O: [phone]  Contact me on teams

## 2024-05-22 NOTE — BH CONSULTATION LIAISON ASSESSMENT NOTE - HPI (INCLUDE ILLNESS QUALITY, SEVERITY, DURATION, TIMING, CONTEXT, MODIFYING FACTORS, ASSOCIATED SIGNS AND SYMPTOMS)
79-year-old female with past medical history of GERD, MI, chronic pancreatitis, HTN, incontinence, recent hip surgery and was recently discharged from outpatient rehab to home presented to the emergency department with increased lethargy, failure to thrive, decreased PO intake nausea/vomiting. Pt was just discharged from rehab center after prolonged stay from R femur fracture. ED course notable for lab with  Leukocytosis to 40 with urine appearing like pus metabolic acidemia, ph 7.13, bicarb 8. Started on bicarb gtt. Carrillo placed minimal output new ROBERT BUN/Cr 103/5.56. Nephrology consulted for urgent HD. Admitted to MICU being treated for renal failure.     Saw pt at bedside, currently getting dialysis. Pt minimally verbal, collateral from son and friend over phone. They state pt broke her hip on 1/17/2024 after which she went to [facility] until the end of January after which she went to rehab until last week- after that she went home. She had been bed bound since her fall. At rehab she always expressed that she wanted to go home. She was doing well at rehab until a month ago when she started to regress physically and mentally, even though she was getting PT. She was having feeling of depression.     After she went home a week ago from rehab, she initially was in good spirits as she was back home. Per family she was hoping to improve and be back to her usual self at home but then she realized that she could not be back to her usual self due to hip fracture etc and started to feel depressed, she was sleeping more than usual, had decreased interest in activities, low energy, decreased appetite, not eating. Family denies her having SI/HI and A/V hallucinations. Her interests prior to hip fracture included hiking, dentistry. Per nurse, pt had been somewhat confused recently, especially today.     Pt lives with her  at a house. 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 Safe residence at home. They have 24 hour Aide at home now. No access to guns or weapons at home. No prior hx of suicide attempts. No psych history. Grew up in [place] prior to coming to [facility]. Pt reported gets confused when she has UTIs.    79-year-old female with past medical history of GERD, MI, chronic pancreatitis, HTN, incontinence, recent hip surgery and was recently discharged from outpatient rehab to home presented to the emergency department with increased lethargy, failure to thrive, decreased PO intake nausea/vomiting. Pt was just discharged from rehab center after prolonged stay from R femur fracture. ED course notable for lab with  Leukocytosis to 40 with urine appearing like pus metabolic acidemia, ph 7.13, bicarb 8. Started on bicarb gtt. Carrillo placed minimal output new ROBERT BUN/Cr 103/5.56. Nephrology consulted for urgent HD. Admitted to MICU being treated for renal failure.  Psychiatry consulted to evaluate for depression.    Saw pt at bedside, currently getting dialysis. Pt minimally verbal, collateral from son and friend over phone. They state pt broke her hip on 1/17/2024 after which she went to [facility] until the end of January after which she went to rehab until last week- after that she went home. She had been bed bound since her fall. At rehab she always expressed that she wanted to go home. She was doing well at rehab until a month ago when she started to regress physically and mentally, even though she was getting PT. She was having feeling of depression.     After she went home a week ago from rehab, she initially was in good spirits as she was back home. Per family she was hoping to improve and be back to her usual self at home but then she realized that she could not be back to her usual self due to hip fracture etc and started to feel depressed, she was sleeping more than usual, had decreased interest in activities, low energy, decreased appetite, not eating. Family denies her having SI/HI and A/V hallucinations. Her interests prior to hip fracture included hiking, dentistry. Per nurse, pt had been somewhat confused recently, especially today.     Pt lives with her  at a house. 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 Safe residence at home. They have 24 hour Aide at home now. No access to guns or weapons at home. No prior hx of suicide attempts. No psych history. Grew up in [place] prior to coming to [facility]. Pt reported gets confused when she has UTIs.

## 2024-05-22 NOTE — BH CONSULTATION LIAISON ASSESSMENT NOTE - NSBHCHARTREVIEWVS_PSY_A_CORE FT
Vital Signs Last 24 Hrs  T(C): 36.6 (22 May 2024 13:31), Max: 36.8 (21 May 2024 16:00)  T(F): 97.8 (22 May 2024 13:31), Max: 98.2 (21 May 2024 16:00)  HR: 72 (22 May 2024 13:31) (67 - 87)  BP: 94/60 (22 May 2024 13:31) (93/53 - 127/61)  BP(mean): 73 (22 May 2024 13:31) (68 - 89)  RR: 19 (22 May 2024 13:31) (15 - 27)  SpO2: 95% (22 May 2024 13:31) (84% - 100%)    Parameters below as of 22 May 2024 13:31  Patient On (Oxygen Delivery Method): room air

## 2024-05-22 NOTE — PROGRESS NOTE ADULT - PROBLEM SELECTOR PLAN 1
Pt with oliguric/anuric ROBERT requiring renal replacement therapy in the setting of GN bacteremia, septic shock, ?medication use (Benicar) and diarrhea. Per Kath/THALIA review, pt has had increasing SCr since 3/14/24 with a SCr of 1.8, trended up to 1.96 on 3/23/24, then 2.19 on 4/30/24 (most recent). Prior to March 2024, pt had normal kidney function dating back to October 2017. SCr on admission of 5.56. UA significant for infectious etiology. Pt initiated on IV abx. CT abd/pel with R hydronephrosis noted. Urology was consulted. Pt with persistent metabolic acidosis despite IV bicarb infusion and was initiated on HD on 5/19.     Serologies thus far as above reviewed. Recommend UPCR (urine protein-creatinine ratio). Pt remains anuric with worsening acidosis, plan for HD in the MICU today, pt may require vasopressor support while on HD. Follow serologies. Monitor labs and urine output. Avoid NSAIDs, ACEI/ARBS, RCA and nephrotoxins. Dose medications as per eGFR.

## 2024-05-22 NOTE — BH CONSULTATION LIAISON ASSESSMENT NOTE - NSBHMSEAFFCONG_PSY_A_CORE
Consent: The patient's consent was obtained including but not limited to risks of crusting, scabbing, blistering, scarring, darker or lighter pigmentary change, recurrence, incomplete removal and infection. Include Z78.9 (Other Specified Conditions Influencing Health Status) As An Associated Diagnosis?: No Medical Necessity Information: It is in your best interest to select a reason for this procedure from the list below. All of these items fulfill various CMS LCD requirements except the new and changing color options. Detail Level: Detailed Medical Necessity Clause: This procedure was medically necessary because the lesions that were treated were: Post-Care Instructions: I reviewed with the patient in detail post-care instructions. Patient is to wear sunprotection, and avoid picking at any of the treated lesions. Pt may apply Vaseline to crusted or scabbing areas. Duration Of Freeze Thaw-Cycle (Seconds): 2 Congruent Unable to assess

## 2024-05-22 NOTE — BH CONSULTATION LIAISON ASSESSMENT NOTE - NSBHCHARTREVIEWINVESTIGATE_PSY_A_CORE FT
Please see chart.  < from: 12 Lead ECG (05.19.24 @ 16:02) >      Ventricular Rate 75 BPM    Atrial Rate 75 BPM    P-R Interval 114 ms    QRS Duration 82 ms    Q-T Interval 442 ms    QTC Calculation(Bazett) 493 ms    R Axis -21 degrees    T Axis 26 degrees    Diagnosis Line NORMAL SINUS RHYTHM  LOW VOLTAGE QRS  POSSIBLE ANTEROLATERAL INFARCT , AGE UNDETERMINED  ABNORMAL ECG  WHEN COMPARED WITH ECG OF 26-JAN-2024 13:46,  SIGNIFICANT CHANGES HAVE OCCURRED  no pacing is seen   Confirmed by TIARA Gan Zlata ([id number]) on 5/20/2024 12:43:58 PM    < end of copied text >

## 2024-05-22 NOTE — BH CONSULTATION LIAISON ASSESSMENT NOTE - NSICDXBHTERTIARYDX_PSY_ALL_CORE
R/O MDD (major depressive disorder)   F32.9  R/O Mood disorder secondary to multiple medical problems   F06.30

## 2024-05-22 NOTE — PROGRESS NOTE ADULT - ASSESSMENT
79F with PMH GERD, MI, chronic pancreatitis/insufficiency, HTN, incontinence, recent R hip fx s/p repair (1/2024) and was recently discharged from outpatient rehab to home initially presenting for increased lethargy in setting of poor PO intake, admitted to MICU for urosepsis c/b E. coli bacteremia requiring vasopressors and acute renal failure requiring urgent HD for acidosis. Now s/p HD x1 with improving renal function     =====Neurologic=====  #Toxic metabolic encephalopathy   Patient is baseline is AOx4, though noted to be AOx2 at admission  - waxes/wanes, lethargic     #depression  per pt's brother, who is also a cardiologist, pt with worsening depression, appetite/decreased PO intake since hip fracture in Jan. Prior to this, pt was a practicing dentist   - continue home mirtazapine 15mg qhs   - was reportedly also prescribed sertraline recently but have not started, hold off for now     =====Pulmonary=====  - No active issues. Patient breathing comfortably/saturating well on room air    =====Cardiovascular=====  #shock, resolved  - suspect secondary to sepsis, continue abx as below  - initiated on stress dosed steroids 5/21, now weaned off pressors   - f/u TTE     #HTN  - holding home atenolol and olmesartan given shock     =====GI=====  #H/o Chronic pancreatitis/insufficiency   - No concern for active flare   - Continue home Creon TID premeal    #H/o GERD  - Continue home med: Protonix 40mg PO QD    #Diet  - Renal restricted diet    =====Renal/=====  #acute renal failure  with marked acidosis to 7.13/23 and oliguria on admission now s/p bicarb gtt and urgent HD x1 (5/20) with improvement in acidosis. Suspect multifactorial secondary to ATN/sepsis, ARB/NSAID use, and decreased PO intake. Now anuric with worsening bicarb, nephro planning for HD again today   - s/p CARMELINA clark 5/19   - HD per nephrology     #R hydronephrosis   suspect reactive secondary to pyelonephritis. CT without obvious obstruction/stone  - likely not contributing significantly to sepsis/ARF. Was evaluated by urology, low suspicion for true obstruction, no indications for acute interventions for now but can consider ureteral stent if not improving with medical management       =====Endocrine=====  - No active issues    ===MSK====  # Femur facture Jan 2024 s/p repair   Bed bound since discharge from rehab requiring full adl support  - Activity as tolerated   - Pain management with Tylenol PRN, Lidocaine patches PRN, Ice/Hot Compresses PRN    =====Infectious Disease=====  #urosepsis c/b E. coli bacteremia   - BCxs (5/19) 4/4 bottles GNR - E. coli (sensitive to CTX)  - UCx (5/19): >100k E. coli  - continue CTX 2g daily, plan for 14 day course    - follow repeat blood cxs     =====Heme/Onc=====  #anemia  baseline hgb 7-8s   - s/p 1u pRBC 5/21 for hgb <7    #DVT Ppx  - Hep subq    =====Lines======  - CARMELINA clark 5/19 -     =====Ethics=====  FULL CODE

## 2024-05-22 NOTE — BH CONSULTATION LIAISON ASSESSMENT NOTE - NSBHCONSULTMEDAGITATION_PSY_A_CORE FT
Seroquel 12.5 mg PO PRN Q6 for agitation  Zyprexa 1.25 mg IM PRN Q6 for agitation Seroquel 12.5 mg PO PRN Q6h for agitation  Zyprexa 1.25 mg IM PRN Q6h for agitation

## 2024-05-23 NOTE — PROGRESS NOTE ADULT - ATTENDING COMMENTS
79-year-old female with past medical history of GERD, MI, chronic pancreatitis, HTN, incontinence, recent hip surgery and was recently discharged from outpatient rehab to home presented to the emergency department with increased lethargy, initially labs concerning for severe ROBERT,  sepsis with metabolic acidemia.     ROBERT on CKD with Right Hydronephrosis/Pyelonephritis. G-ve bacteremia present.( E Coli)/UTI  Pt has had increasing SCr since 3/14/24 with a SCr of 1.8, trended up to 1.96 on 3/23/24, then 2.19 on 4/30/24 (most recent). Prior to March 2024, pt had normal kidney function dating back to October 2017.    Underwent urgent HD 5/19-5/20 ( overnight) for severe lactic acidosis  No osmolar gap present. Alc/Salicylates level normal. Urine tox screen negative. Hep B/C /Light chain/pla2r/C4  negative,. C3 low  Lactate normalized. beta hydroxy butarate normal  Renal sono: Mild rt hydro      Unable to tolerate HD yesterday due to hypotension   AM labs reviewed. Monitor today with no dialysis  IVF/Colloid per MICU  Schedule for HD in am   Maintain on Antibiotics.    pRBC transfusion per primary team       Rest as per Dr. Eleuterio Lozano MD  O: [phone]  Contact me on teams

## 2024-05-23 NOTE — PROGRESS NOTE ADULT - ATTENDING COMMENTS
Agree with above  79F PMH GERD, chronic pancreatitis, hypertension, recent hip surgery with progressive decline since now p/w AMS, found to have ROBERT (BUN/Cr 103/5.6) and metabolic acidosis (bicarb <10). Evidence of R hydroureteronephrosis and pyuria. Admitted to ICU for Shiley placement and urgent HD.  - UCx and BCx positive for pan-sensitive E. coli. c/w ceftriaxone.  - Back on pressors after failed HD session yesterday. c/w stress-dose steroids and Florinef  - POCU/S shows collapsible IVC with A-lines anteriorly. Likely volume depletion; will give albumin 25% x 1 and re-evaluate.  - Will re-attempt HD tomorrow, may benefit from pre-medication with additional dose of albumin prior to initiation of HD.  - Remains anuric, unclear if renal recovery is possible at this point.  - Hgb remains stable, no signs of acute ongoing bleeding. Continue to monitor and transfuse PRN Hgb <7  - Seroquel QHS to attempt to maintain appropriate sleep-wake cycles.  - DVT ppx: Hep SC  - GOC discussions ongoing. Overall prognosis for a meaningful recovery is unclear given her slow progressive decline over past several months.

## 2024-05-23 NOTE — PROGRESS NOTE ADULT - ASSESSMENT
79F with PMH GERD, MI, chronic pancreatitis/insufficiency, HTN, incontinence, recent R hip fx s/p repair (1/2024) and was recently discharged from outpatient rehab to home initially presenting for increased lethargy in setting of poor PO intake, admitted to MICU for urosepsis c/b E. coli bacteremia requiring vasopressors and acute renal failure requiring urgent HD for acidosis. Now s/p HD x1 with improving renal function     =====Neurologic=====  #Toxic metabolic encephalopathy   Patient is baseline is AOx4, here AOx1-3, waxes/wanes consistent w/ delirium, very flat affect, minimally verbal     #depression  per pt's brother, who is also a cardiologist, pt with worsening depression, appetite/decreased PO intake since hip fracture in Jan. Prior to this, pt was a practicing dentist   - continue home mirtazapine 15mg qhs   - was reportedly also prescribed sertraline recently but have not started, hold off for now     =====Pulmonary=====  - No active issues. Patient breathing comfortably/saturating well on room air    =====Cardiovascular=====  #shock, resolved  - suspect secondary to sepsis, continue abx as below  - initiated on stress dosed steroids 5/21, now weaned off pressors   - f/u TTE     #HTN  - holding home atenolol and olmesartan given shock     =====GI=====  #H/o Chronic pancreatitis/insufficiency   - No concern for active flare   - Continue home Creon TID premeal    #H/o GERD  - Continue home med: Protonix 40mg PO QD    #Diet  - Renal restricted diet    =====Renal/=====  #acute renal failure  with marked acidosis to 7.13/23 and oliguria on admission now s/p bicarb gtt and urgent HD x1 (5/20) with improvement in acidosis. Suspect multifactorial secondary to ATN/sepsis, ARB/NSAID use, and decreased PO intake. Now anuric, worsening bicarb   - s/p CARMELINA clark 5/19   - HD attempted 5/22 but terminated after 1 hr due to escalating pressor requirement/tachycardic   - HD per nephrology     #R hydronephrosis   suspect reactive secondary to pyelonephritis. CT without obvious obstruction/stone  - likely not contributing significantly to sepsis/ARF. Was evaluated by urology, low suspicion for true obstruction, no indications for acute interventions for now but can consider ureteral stent if not improving with medical management   - renal US 5/22 with mild R hydro, mild pelvic free fluid       =====Endocrine=====  - No active issues    ===MSK====  # Femur facture Jan 2024 s/p repair   Bed bound since discharge from rehab requiring full adl support  - Activity as tolerated   - Pain management with Tylenol PRN, Lidocaine patches PRN, Ice/Hot Compresses PRN    =====Infectious Disease=====  #urosepsis c/b E. coli bacteremia   - BCxs (5/19) 4/4 bottles GNR - E. coli (sensitive to CTX)  - UCx (5/19): >100k E. coli  - continue CTX 2g daily, plan for 14 day course    - follow repeat blood cxs     =====Heme/Onc=====  #anemia  baseline hgb 7-8s   - s/p 1u pRBC 5/21 for hgb <7    #DVT Ppx  - Hep subq    =====Lines======  - CARMELINA clark 5/19 -     =====Ethics=====  FULL CODE

## 2024-05-23 NOTE — PROGRESS NOTE ADULT - SUBJECTIVE AND OBJECTIVE BOX
Redd Ybarra PGY3    Progress Note  05-19-24 (4d)  Patient is a 79y old  Female who presents with a chief complaint of Weakness (23 May 2024 07:09)    Subjective / Interval 24 Events :  - Attempted 2nd session of HD yesterday afternoon, however, terminated after 1 hr because pt became very tachycardic with escalating doses of levophed up to 1. No volume removal was attempted  - No acute events overnight.   - Pt seen and examined at bedside this AM. Appears lethargic, not engaging in interview   - still anuric     Additional ROS (if any): see above, otherwise negative     MEDICATIONS  (STANDING):  ascorbic acid 250 milliGRAM(s) Oral daily  cefTRIAXone   IVPB 2000 milliGRAM(s) IV Intermittent every 24 hours  chlorhexidine 2% Cloths 1 Application(s) Topical daily  fludroCORTISONE 0.1 milliGRAM(s) Oral two times a day  heparin   Injectable 5000 Unit(s) SubCutaneous every 12 hours  hydrocortisone sodium succinate Injectable 50 milliGRAM(s) IV Push every 6 hours  lidocaine   4% Patch 1 Patch Transdermal every 24 hours  mirtazapine 15 milliGRAM(s) Oral at bedtime  multivitamin 1 Tablet(s) Oral daily  norepinephrine Infusion 0.05 MICROgram(s)/kG/Min (4.39 mL/Hr) IV Continuous <Continuous>  pancrelipase  (CREON 24,000 Lipase Units) 1 Capsule(s) Oral three times a day with meals  pantoprazole  Injectable 40 milliGRAM(s) IV Push daily    MEDICATIONS  (PRN):      CAPILLARY BLOOD GLUCOSE      I&O's Summary    22 May 2024 07:01  -  23 May 2024 07:00  --------------------------------------------------------  IN: 2070.5 mL / OUT: 400 mL / NET: 1670.5 mL    23 May 2024 07:01  -  23 May 2024 07:56  --------------------------------------------------------  IN: 12.3 mL / OUT: 0 mL / NET: 12.3 mL      PHYSICAL EXAM:  Vital Signs Last 24 Hrs  T(C): 36.7 (23 May 2024 08:00), Max: 36.8 (22 May 2024 08:00)  T(F): 98 (23 May 2024 08:00), Max: 98.2 (22 May 2024 08:00)  HR: 69 (23 May 2024 07:30) (64 - 130)  BP: 104/55 (23 May 2024 07:30) (65/36 - 140/91)  BP(mean): 74 (23 May 2024 07:30) (46 - 109)  RR: 13 (23 May 2024 07:30) (12 - 31)  SpO2: 94% (23 May 2024 07:30) (82% - 100%)    Parameters below as of 23 May 2024 08:00  Patient On (Oxygen Delivery Method): room air    General: NAD, lethargic, thin   HEENT: EOMi, no scleral icterus  CV: RRR, normal S1 and S2  Lungs: normal respiratory effort, CTAB  Abd: soft, nontender, nondistended  Ext: no edema, warm, well perfused  Pysch: flat affect   Neuro: minimally verbal, following commands, grossly non-focal, moving all extremities spontaneously   Skin: no rashes or lesions       LABS:                          8.1    18.15 )-----------( 274      ( 23 May 2024 00:42 )             24.7       WBC Trend: 18.15<--, 22.77<--, 26.61<--  Hb Trend: 8.1<--, 9.1<--, 9.8<--, 6.8<--, 7.3<--    05-23    138  |  103  |  44<H>  ----------------------------<  122<H>  4.2   |  12<L>  |  2.94<H>    Ca    8.3<L>      23 May 2024 00:42  Phos  4.9     05-23  Mg     1.8     05-23    TPro  4.8<L>  /  Alb  2.4<L>  /  TBili  0.2  /  DBili  x   /  AST  12  /  ALT  7<L>  /  AlkPhos  117  05-23    PT/INR - ( 23 May 2024 00:42 )   PT: 19.8 sec;   INR: 1.92 ratio         PTT - ( 23 May 2024 00:42 )  PTT:41.5 sec      Urinalysis Basic - ( 23 May 2024 00:42 )    Color: x / Appearance: x / SG: x / pH: x  Gluc: 122 mg/dL / Ketone: x  / Bili: x / Urobili: x   Blood: x / Protein: x / Nitrite: x   Leuk Esterase: x / RBC: x / WBC x   Sq Epi: x / Non Sq Epi: x / Bacteria: x      Culture - Blood (collected 21 May 2024 10:30)  Source: .Blood Blood-Peripheral  Preliminary Report (22 May 2024 16:02):    No growth at 24 hours    Culture - Blood (collected 21 May 2024 10:00)  Source: .Blood Blood-Peripheral  Preliminary Report (22 May 2024 16:02):    No growth at 24 hours        RADIOLOGY & ADDITIONAL TESTS: Reviewed  < from:  Kidney and Bladder (05.22.24 @ 17:59) >    INTERPRETATION:  CLINICAL INFORMATION: Acute kidney injury    COMPARISON: None available.    TECHNIQUE: Sonography of the kidneys and bladder.    FINDINGS:  Right kidney: 8.5. Mild hydronephrosis    Left kidney: 9.6. No renal mass, hydronephrosis or calculi.    Urinary bladder: Within normal limits.    Mild pelvic free fluid.    IMPRESSION: Mild right hydronephrosis. Mild pelvic free fluid.    --- End of Report ---          < end of copied text >

## 2024-05-23 NOTE — PROGRESS NOTE ADULT - PROBLEM SELECTOR PLAN 1
Pt with oliguric/anuric ROBERT requiring renal replacement therapy in the setting of GN bacteremia, septic shock, ?medication use (Benicar) and diarrhea. Per Kath/THALIA review, pt has had increasing SCr since 3/14/24 with a SCr of 1.8, trended up to 1.96 on 3/23/24, then 2.19 on 4/30/24 (most recent). Prior to March 2024, pt had normal kidney function dating back to October 2017. SCr on admission of 5.56. UA significant for infectious etiology. Pt initiated on IV abx. CT abd/pel with R hydronephrosis noted. Urology was consulted. Pt with persistent metabolic acidosis despite IV bicarb infusion and was initiated on HD on 5/19. Pt did not tolerate HD on 5/22 due to significant hypotension.     Serologies thus far as above reviewed. Recommend UPCR (urine protein-creatinine ratio). Pt remains anuric and acidotic but respiratory status appears stable. Pt requiring significant vasopressor support. Per discussion with MICU, plan for IVFs today. No plans for HD today. Follow serologies. Monitor labs and urine output. Avoid NSAIDs, ACEI/ARBS, RCA and nephrotoxins. Dose medications as per eGFR.

## 2024-05-23 NOTE — PROGRESS NOTE ADULT - PROBLEM SELECTOR PLAN 2
Pt with severe high anion gap metabolic acidosis in setting of ROBERT, infectious process and hypotension. Admission labs significant for SCO2 <10, pH of 7.13 and Lactate 1.9. Pt placed on IV bicarb infusion. MICU consulted and pt admitted to MICU for urgent HD on 5/19 as noted above. SCO2 remains low at 12, pH of 7.32, lactate 1.7. Plan for IVFs today. Monitor SCO2/pH.

## 2024-05-23 NOTE — PROGRESS NOTE ADULT - SUBJECTIVE AND OBJECTIVE BOX
[facility] DIVISION OF KIDNEY DISEASES AND HYPERTENSION   FOLLOW UP NOTE  --------------------------------------------------------------------------------  Chief Complaint: Pt with oliguric/anuric ROBRET with severe metabolic acidosis requiring renal replacement therapy    24 hour events/subjective: Pt. was seen and examined in the MICU. Pt unable to tolerate HD on 5/22 due to persistent hypotension with increasing vasopressor requirements. Pt remains somnolent/altered. Pt remains anuric but respiratory status appears stable.     PAST HISTORY  --------------------------------------------------------------------------------  No significant changes to PMH, PSH, FHx, SHx, unless otherwise noted    ALLERGIES & MEDICATIONS  --------------------------------------------------------------------------------  Allergies  penicillin (Swelling)    Intolerances  epinephrine (Other)    Standing Inpatient Medications  acetaminophen   IVPB .. 700 milliGRAM(s) IV Intermittent once  ascorbic acid 250 milliGRAM(s) Oral daily  cefTRIAXone   IVPB 2000 milliGRAM(s) IV Intermittent every 24 hours  chlorhexidine 2% Cloths 1 Application(s) Topical daily  fludroCORTISONE 0.1 milliGRAM(s) Oral two times a day  heparin   Injectable 5000 Unit(s) SubCutaneous every 12 hours  hydrocortisone sodium succinate Injectable 50 milliGRAM(s) IV Push every 6 hours  lidocaine   4% Patch 1 Patch Transdermal every 24 hours  mirtazapine 15 milliGRAM(s) Oral at bedtime  multivitamin 1 Tablet(s) Oral daily  norepinephrine Infusion 0.05 MICROgram(s)/kG/Min IV Continuous <Continuous>  pancrelipase  (CREON 24,000 Lipase Units) 1 Capsule(s) Oral three times a day with meals  pantoprazole  Injectable 40 milliGRAM(s) IV Push daily  QUEtiapine 12.5 milliGRAM(s) Oral at bedtime  sodium bicarbonate  Injectable 50 milliEquivalent(s) IV Push once    PRN Inpatient Medications    REVIEW OF SYSTEMS  --------------------------------------------------------------------------------  Unable to obtain ROS     VITALS/PHYSICAL EXAM  --------------------------------------------------------------------------------  T(C): 36.7 (05-23-24 @ 12:00), Max: 36.7 (05-22-24 @ 16:00)  HR: 66 (05-23-24 @ 12:00) (62 - 130)  BP: 87/54 (05-23-24 @ 12:00) (65/36 - 140/91)  RR: 12 (05-23-24 @ 12:00) (12 - 31)  SpO2: 100% (05-23-24 @ 12:00) (82% - 100%)  Wt(kg): --    05-22-24 @ 07:01  -  05-23-24 @ 07:00  --------------------------------------------------------  IN: 2070.5 mL / OUT: 400 mL / NET: 1670.5 mL    05-23-24 @ 07:01  -  05-23-24 @ 12:20  --------------------------------------------------------  IN: 61.5 mL / OUT: 0 mL / NET: 61.5 mL    Physical Exam:  	Gen: Ill appearing   	HEENT: Dry MM. Anicteric  	Pulm: CTA B/L  	CV: S1S2+  	Abd: Soft, +BS   	Ext: No LE edema B/L  	Neuro: Somnolent  	Skin: Warm and dry  	Dialysis access: R IJ non tunneled HD catheter     LABS/STUDIES  --------------------------------------------------------------------------------              8.1    18.15 >-----------<  274      [05-23-24 @ 00:42]              24.7     138  |  103  |  44  ----------------------------<  122      [05-23-24 @ 00:42]  4.2   |  12  |  2.94        Ca     8.3     [05-23-24 @ 00:42]      Mg     1.8     [05-23-24 @ 00:42]      Phos  4.9     [05-23-24 @ 00:42]    TPro  4.8  /  Alb  2.4  /  TBili  0.2  /  DBili  x   /  AST  12  /  ALT  7   /  AlkPhos  117  [05-23-24 @ 00:42]    PT/INR: PT 19.8 , INR 1.92       [05-23-24 @ 00:42]  PTT: 41.5       [05-23-24 @ 00:42]    Serum Osmolality 299      [05-22-24 @ 00:39]    Creatinine Trend:  SCr 2.94 [05-23 @ 00:42]  SCr 3.71 [05-22 @ 00:39]  SCr 2.90 [05-20 @ 23:53]  SCr 2.83 [05-20 @ 12:32]  SCr 2.40 [05-20 @ 11:29]    Urine Protein 342      [05-20-24 @ 05:22]    HBsAg Nonreact      [05-19-24 @ 23:37]  HCV 0.09, Nonreact      [05-19-24 @ 23:37]  HIV Nonreact      [05-19-24 @ 23:37]    dsDNA <1      [05-19-24 @ 23:37]  C3 Complement 75      [05-19-24 @ 23:37]  C4 Complement 28      [05-19-24 @ 23:37]  Syphilis Screen (Treponema Pallidum Ab) Negative      [05-19-24 @ 23:37]  PLA2R: JUANJO <1.8, IFA --      [05-19-24 @ 23:37]  Free Light Chains: kappa 22.09, lambda 21.07, ratio = 1.05      [05-19 @ 23:37]

## 2024-05-24 NOTE — PROGRESS NOTE ADULT - ASSESSMENT
79F with PMH GERD, MI, chronic pancreatitis/insufficiency, HTN, incontinence, recent R hip fx s/p repair (1/2024) and was recently discharged from outpatient rehab to home initially presenting for increased lethargy in setting of poor PO intake, admitted to MICU for urosepsis c/b E. coli bacteremia requiring vasopressors and acute renal failure requiring urgent HD for acidosis. Now s/p HD x1 with improving renal function     =====Neurologic=====  #Toxic metabolic encephalopathy   Patient is baseline is AOx4, here AOx1-3, waxes/wanes consistent w/ delirium, very flat affect, minimally verbal     #depression  per pt's brother, who is also a cardiologist, pt with worsening depression, appetite/decreased PO intake since hip fracture in Jan. Prior to this, pt was a practicing dentist   - continue home mirtazapine 15mg qhs   - was reportedly also prescribed sertraline recently but have not started, hold off for now   - was evaluated by , however, still with ongoing medical issues    =====Pulmonary=====  - No active issues. Patient breathing comfortably/saturating well on room air    =====Cardiovascular=====  #shock, resolved  - suspect secondary to sepsis, continue abx as below  - initiated on stress dosed steroids 5/21, now weaned off pressors   - f/u TTE     #HTN  - holding home atenolol and olmesartan given shock     =====GI=====  #H/o Chronic pancreatitis/insufficiency   - No concern for active flare   - Continue home Creon TID premeal    #H/o GERD  - Continue home med: Protonix 40mg PO QD    #Diet  - Renal restricted diet    =====Renal/=====  #acute renal failure  with marked acidosis to 7.13/23 and oliguria on admission now s/p bicarb gtt and urgent HD x1 (5/20) with improvement in acidosis. Suspect multifactorial secondary to ATN/sepsis, ARB/NSAID use, and decreased PO intake. Remains anuric, low bicarb but with adequate respiratory ventilation   - s/p CARMELINA clark 5/19   - HD attempted 5/22 but terminated after 1 hr due to escalating pressor requirement/tachycardic.   - HD per nephrology     #R hydronephrosis   suspect reactive secondary to pyelonephritis. CT without obvious obstruction/stone  - likely not contributing significantly to sepsis/ARF. Was evaluated by urology, low suspicion for true obstruction, no indications for acute interventions for now but can consider ureteral stent if not improving with medical management   - renal US 5/22 with mild R hydro, mild pelvic free fluid       =====Endocrine=====  - No active issues    ===MSK====  # Femur facture Jan 2024 s/p repair   Bed bound since discharge from rehab requiring full adl support  - Activity as tolerated   - Pain management with Tylenol PRN, Lidocaine patches PRN, Ice/Hot Compresses PRN    =====Infectious Disease=====  #urosepsis c/b E. coli bacteremia   - BCxs (5/19) 4/4 bottles GNR - E. coli (sensitive to CTX)  - UCx (5/19): >100k E. coli  - continue CTX 2g daily, plan for 14 day course        =====Heme/Onc=====  #anemia  baseline hgb 7-8s   - s/p 1u pRBC 5/21 for hgb <7    #DVT Ppx  - Hep subq    =====Lines======  - CARMELINA clark 5/19 -     =====Ethics=====  FULL CODE

## 2024-05-24 NOTE — PROGRESS NOTE ADULT - ATTENDING COMMENTS
79-year-old female with past medical history of GERD, MI, chronic pancreatitis, HTN, incontinence, recent hip surgery and was recently discharged from outpatient rehab to home presented to the emergency department with increased lethargy, initially labs concerning for severe ROBERT,  sepsis with metabolic acidemia.     ROBERT on CKD with Right Hydronephrosis/Pyelonephritis. G-ve bacteremia present.( E Coli)/UTI  Pt has had increasing SCr since 3/14/24 with a SCr of 1.8, trended up to 1.96 on 3/23/24, then 2.19 on 4/30/24 (most recent). Prior to March 2024, pt had normal kidney function dating back to October 2017.    Underwent urgent HD 5/19-5/20 ( overnight) for severe lactic acidosis  No osmolar gap present. Alc/Salicylates level normal. Urine tox screen negative. Hep B/C /Light chain/pla2r/ANCA/C4  negative,. C3 low  Lactate normalized. beta hydroxy butarate normal  Renal sono: Mild rt hydro  Unable to tolerate HD on 5/22 due to hypotension       AM labs reviewed. Schedule for HD today  Colloid, Pressor support  per MICU  Maintain on Antibiotics.    pRBC transfusion per primary team       Rest as per Dr. Eleuterio Lozano MD  O: [phone]  Contact me on teams

## 2024-05-24 NOTE — PROGRESS NOTE ADULT - PROBLEM SELECTOR PLAN 2
Pt with severe high anion gap metabolic acidosis in setting of ROBERT, infectious process and hypotension. Admission labs significant for SCO2 <10, pH of 7.13 and Lactate 1.9. Pt placed on IV bicarb infusion. MICU consulted and pt admitted to MICU for urgent HD on 5/19 as noted above. SCO2 remains low at 13, pH of 7.32, lactate 1.7. Plan for HD today as above. Monitor SCO2/pH.

## 2024-05-24 NOTE — PROGRESS NOTE ADULT - ATTENDING COMMENTS
Agree with above  79F PMH GERD, chronic pancreatitis, hypertension, recent hip surgery with progressive decline since now p/w AMS, found to have ROBERT (BUN/Cr 103/5.6) and metabolic acidosis (bicarb <10). Evidence of R hydroureteronephrosis and pyuria. Admitted to ICU for Shiley placement and urgent HD.  - UCx and BCx positive for pan-sensitive E. coli. c/w ceftriaxone.  - Off pressors since 0830 this AM. Scheduled for HD today, which she did not tolerate last time. Will pre-medicate with albumin + midodrine + low threshold for pressors. c/w stress-dose steroids and Florinef  - Remains anuric, unclear if renal recovery is possible at this point.  - Hgb remains stable, no signs of acute ongoing bleeding. Continue to monitor and transfuse PRN Hgb <7  - Seroquel QHS to attempt to maintain appropriate sleep-wake cycles.  - DVT ppx: Hep SC  - GOC discussions ongoing. Overall prognosis for a meaningful recovery is unclear given her slow progressive decline over past several months.

## 2024-05-24 NOTE — PROGRESS NOTE ADULT - PROBLEM SELECTOR PLAN 1
Pt with oliguric/anuric ROBERT requiring renal replacement therapy in the setting of GN bacteremia, septic shock, ?medication use (Benicar) and diarrhea. Per Kath/THALIA review, pt has had increasing SCr since 3/14/24 with a SCr of 1.8, trended up to 1.96 on 3/23/24, then 2.19 on 4/30/24 (most recent). Prior to March 2024, pt had normal kidney function dating back to October 2017. SCr on admission of 5.56. UA significant for infectious etiology. Pt initiated on IV abx. CT abd/pel with R hydronephrosis noted. Urology notes reviewed (no intervention recommended). Pt with persistent metabolic acidosis despite IV bicarb infusion and was initiated on HD on 5/19. Pt did not tolerate HD on 5/22 due to significant hypotension.     Serologies thus far as above reviewed. Recommend UPCR (urine protein-creatinine ratio). Pt remains anuric and acidotic. Pt requiring significant vasopressor support. Per discussion with MICU, plan for albumin with IV vasopressor support. Will plan for trial of HD today. Recommend repeat Kidney US (evaluate R hydro previously noted). Continue to follow serologies. Monitor labs and urine output. Avoid NSAIDs, ACEI/ARBS, RCA and nephrotoxins. Dose medications as per eGFR.

## 2024-05-24 NOTE — CHART NOTE - NSCHARTNOTEFT_GEN_A_CORE
MICU Transfer Note    Transfer from: MICU    Transfer to: (  ) Medicine    (  ) Telemetry     (   ) RCU        (    ) Palliative         (   ) Stroke Unit          (   ) __________________    Accepting Physician:  Signout given to:     MICU COURSE    79 year old female with PMH of GERD, MI, chronic pancreatitis, HTN, incontinence, recent hip surgery (1/2024) and was recently discharged from outpatient rehab to home presented to the emergency department with increased lethargy, failure to thrive, decreased PO intake nausea/vomiting. ED course notable for leukocytosis to 40 with +UA  metabolic acidemia, ph 7.13, bicarb 8. Started on bicarb gtt. Carrillo placed minimal output new ROBERT BUN/Cr 103/5.56. Nephrology consulted for urgent HD. Pt started on bicarb drip, received 3L NS and 1L LR, Cefepime 1g, Vanc 1g, Tylenol 1g. Admitted to MICU 5/19/24 for initiation of urgent HD. Pt found to have e coli bacteremia hypotensive started on vasopressors and  treated with ceftriaxone.     ASSESSMENT & PLAN:   Neurologic    #Toxic metabolic encephalopathy   Patient is baseline is AOx4, here AOx1-3, waxes/wanes consistent w/ delirium, very flat affect, minimally verbal     #depression  per pt's brother, who is also a cardiologist, pt with worsening depression, appetite/decreased PO intake since hip fracture in Jan. Prior to this, pt was a practicing dentist   - continue home mirtazapine 15mg qhs   - was reportedly also prescribed sertraline recently but have not started, hold off for now   - was evaluated by psych agree with current treatment plan pt to follow up outpatient     Pulmonary  - No active issues. Patient breathing comfortably/saturating well on room air    Cardiovascular  #shock, resolved  - suspect secondary to sepsis, continue abx as below  - initiated on stress dosed steroids 5/21, now weaned off pressors   - TTE showing moderate aortic stenosis and mildly decreased left ventricle systolic function EF: 46%     #HTN  - holding home atenolol and olmesartan given shock     GI  #H/o Chronic pancreatitis/insufficiency   - No concern for active flare   - Continue home Creon TID pre meal    #H/o GERD  - Continue home med: Protonix 40mg PO QD    #Diet  - Renal restricted diet    Renal/  #acute renal failure  with marked acidosis to 7.13/23 and oliguria on admission now s/p bicarb gtt and urgent HD x1 (5/20) with improvement in acidosis. Suspect multifactorial secondary to ATN/sepsis, ARB/NSAID use, and decreased PO intake. Remains anuric  - s/p CARMELINA clark 5/19   - HD per nephrology 5/19 and 5/24 completed     #R hydronephrosis   suspect reactive secondary to pyelonephritis. CT without obvious obstruction/stone  - likely not contributing significantly to sepsis/ARF. Was evaluated by urology, low suspicion for true obstruction, no indications for acute interventions for now but can consider ureteral stent if not improving with medical management   - renal US 5/22 with mild R hydro, mild pelvic free fluid       =====Endocrine=====  - No active issues    ===MSK====  # Femur facture Jan 2024 s/p repair   Bed bound since discharge from rehab requiring full adl support  - Activity as tolerated   - Pain management with Tylenol PRN, Lidocaine patches PRN, Ice/Hot Compresses PRN    =====Infectious Disease=====  #urosepsis c/b E. coli bacteremia   - BCxs (5/19) 4/4 bottles GNR - E. coli (sensitive to CTX) repeat cultures 5/21: NTD   - UCx (5/19): >100k E. coli  - continue CTX 2g daily, plan for 14 day course        =====Heme/Onc=====  #anemia  baseline hgb 7-8s   - s/p 1u pRBC 5/21 for hgb <7    #DVT Ppx  - Hep subq    Lines  - CARMELINA clark 5/19 -     Ethics  FULL CODE              For Followup:          ERICK Potts AGACNP (ext. 3063) MICU Transfer Note    Transfer from: MICU    Transfer to: (  ) Medicine    (  ) Telemetry     (   ) RCU        (    ) Palliative         (   ) Stroke Unit          (   ) __________________    Accepting Physician:  Signout given to:     MICU COURSE    79 year old female with PMH of GERD, MI, chronic pancreatitis, HTN, incontinence, recent hip surgery (1/2024) and was recently discharged from outpatient rehab to home presented to the emergency department with increased lethargy, failure to thrive, decreased PO intake nausea/vomiting. ED course notable for leukocytosis to 40 with +UA  metabolic acidemia, ph 7.13, bicarb 8. Started on bicarb gtt. Carrillo placed minimal output new ROBERT BUN/Cr 103/5.56. Nephrology consulted for urgent HD. Pt started on bicarb drip, received 3L NS and 1L LR, Cefepime 1g, Vanc 1g, Tylenol 1g. Admitted to MICU 5/19/24 for initiation of urgent HD. Pt found to have e coli bacteremia hypotensive started on vasopressors and  treated with ceftriaxone.     ASSESSMENT & PLAN:     Neurologic  #Toxic metabolic encephalopathy   Patient is baseline is AOx4, here AOx1-3, waxes/wanes consistent w/ delirium, very flat affect, minimally verbal     #depression  per pt's brother, who is also a cardiologist, pt with worsening depression, appetite/decreased PO intake since hip fracture in Jan. Prior to this, pt was a practicing dentist   - continue home mirtazapine 15mg qhs   - was reportedly also prescribed sertraline recently but have not started, hold off for now   - was evaluated by psych agree with current treatment plan pt to follow up outpatient     Pulmonary  - No active issues. Patient breathing comfortably/saturating well on room air    Cardiovascular  #shock, resolved  - suspect secondary to sepsis, continue abx as below  - initiated on stress dosed steroids 5/21, now weaned off pressors   - TTE showing moderate aortic stenosis and mildly decreased left ventricle systolic function EF: 46%     #HTN  - holding home atenolol and olmesartan given shock     GI  #H/o Chronic pancreatitis/insufficiency   - No concern for active flare   - Continue home Creon TID pre meal    #H/o GERD  - Continue home med: Protonix 40mg PO QD    #Diet  - Renal restricted diet    Renal/  #acute renal failure  with marked acidosis to 7.13/23 and oliguria on admission now s/p bicarb gtt and urgent HD x1 (5/20) with improvement in acidosis. Suspect multifactorial secondary to ATN/sepsis, ARB/NSAID use, and decreased PO intake. Remains anuric  - s/p CARMELINA clark 5/19   - HD per nephrology 5/19 and 5/24 completed     #R hydronephrosis   suspect reactive secondary to pyelonephritis. CT without obvious obstruction/stone  - likely not contributing significantly to sepsis/ARF. Was evaluated by urology, low suspicion for true obstruction, no indications for acute interventions for now but can consider ureteral stent if not improving with medical management   - renal US 5/22 with mild R hydro, mild pelvic free fluid       Endocrine  - No active issues    MSK  # Femur facture Jan 2024 s/p repair   Bed bound since discharge from rehab requiring full adl support  - Activity as tolerated   - Pain management with Tylenol PRN, Lidocaine patches PRN, Ice/Hot Compresses PRN    Infectious Disease  #urosepsis c/b E. coli bacteremia   - BCxs (5/19) 4/4 bottles GNR - E. coli (sensitive to CTX) repeat cultures 5/21: NTD   - UCx (5/19): >100k E. coli  - continue CTX 2g daily, plan for 14 day course        Heme/Onc  #anemia  baseline hgb 7-8s   - s/p 1u pRBC 5/21 for hgb <7    #DVT Ppx  - Hep subq    Lines  - CARMELINA clark 5/19 -     Ethics  FULL CODE      For Followup:  - taper down stress dose steroids   - continue to monitor for renal recovery bladder scan q 6 hrs   - continue to monitor need for NG tube when pt more awake       ERICK Potts AGACNP (ext. 1043) MICU Transfer Note    Transfer from: MICU    Transfer to: ( x ) Medicine    (  ) Telemetry     (   ) RCU        (    ) Palliative         (   ) Stroke Unit          (   ) __________________    Accepting Physician: [facility]  Signout given to:     MICU COURSE    79 year old female with PMH of GERD, MI, chronic pancreatitis, HTN, incontinence, recent hip surgery (1/2024) and was recently discharged from outpatient rehab to home presented to the emergency department with increased lethargy, failure to thrive, decreased PO intake nausea/vomiting. ED course notable for leukocytosis to 40 with +UA  metabolic acidemia, ph 7.13, bicarb 8. Started on bicarb gtt. Carrillo placed minimal output new ROBERT BUN/Cr 103/5.56. Nephrology consulted for urgent HD. Pt started on bicarb drip, received 3L NS and 1L LR, Cefepime 1g, Vanc 1g, Tylenol 1g. Admitted to MICU 5/19/24 for initiation of urgent HD. Pt found to have e coli bacteremia hypotensive started on vasopressors and  treated with ceftriaxone.     ASSESSMENT & PLAN:     Neurologic  #Toxic metabolic encephalopathy   Patient is baseline is AOx4, here AOx1-3, waxes/wanes consistent w/ delirium, very flat affect, minimally verbal     #depression  per pt's brother, who is also a cardiologist, pt with worsening depression, appetite/decreased PO intake since hip fracture in Jan. Prior to this, pt was a practicing dentist   - continue home mirtazapine 15mg qhs   - was reportedly also prescribed sertraline recently but have not started, hold off for now   - was evaluated by psych agree with current treatment plan pt to follow up outpatient     Pulmonary  - No active issues. Patient breathing comfortably/saturating well on room air    Cardiovascular  #shock, resolved  - suspect secondary to sepsis, continue abx as below  - initiated on stress dosed steroids 5/21, now weaned off pressors   - TTE showing moderate aortic stenosis and mildly decreased left ventricle systolic function EF: 46%     #HTN  - holding home atenolol and olmesartan given shock     GI  #H/o Chronic pancreatitis/insufficiency   - No concern for active flare   - Continue home Creon TID pre meal    #H/o GERD  - Continue home med: Protonix 40mg PO QD    #Diet  - Renal restricted diet    Renal/  #acute renal failure  with marked acidosis to 7.13/23 and oliguria on admission now s/p bicarb gtt and urgent HD x1 (5/20) with improvement in acidosis. Suspect multifactorial secondary to ATN/sepsis, ARB/NSAID use, and decreased PO intake. Remains anuric  - s/p CARMELINA clark 5/19   - HD per nephrology 5/19 and 5/24 completed     #R hydronephrosis   suspect reactive secondary to pyelonephritis. CT without obvious obstruction/stone  - likely not contributing significantly to sepsis/ARF. Was evaluated by urology, low suspicion for true obstruction, no indications for acute interventions for now but can consider ureteral stent if not improving with medical management   - renal US 5/22 with mild R hydro, mild pelvic free fluid       Endocrine  - No active issues    MSK  # Femur facture Jan 2024 s/p repair   Bed bound since discharge from rehab requiring full adl support  - Activity as tolerated   - Pain management with Tylenol PRN, Lidocaine patches PRN, Ice/Hot Compresses PRN    Infectious Disease  #urosepsis c/b E. coli bacteremia   - BCxs (5/19) 4/4 bottles GNR - E. coli (sensitive to CTX) repeat cultures 5/21: NTD   - UCx (5/19): >100k E. coli  - continue CTX 2g daily, plan for 14 day course        Heme/Onc  #anemia  baseline hgb 7-8s   - s/p 1u pRBC 5/21 for hgb <7    #DVT Ppx  - Hep subq    Lines  - RICARLOS eduardo 5/19 -     Ethics  FULL CODE      For Followup:  - taper down stress dose steroids   - continue to monitor for renal recovery bladder scan q 6 hrs   - continue to monitor need for NG tube when pt more awake       ERICK Potts AGACNP (ext. [id number]) MICU Transfer Note    Transfer from: MICU    Transfer to: ( x ) Medicine    (  ) Telemetry     (   ) RCU        (    ) Palliative         (   ) Stroke Unit          (   ) __________________    Accepting Physician: scott herrera  Signout given to:     MICU COURSE    79 year old female with PMH of GERD, MI, chronic pancreatitis, HTN, incontinence, recent hip surgery (1/2024) and was recently discharged from outpatient rehab to home presented to the emergency department with increased lethargy, failure to thrive, decreased PO intake nausea/vomiting. ED course notable for leukocytosis to 40 with +UA  metabolic acidemia, ph 7.13, bicarb 8. Started on bicarb gtt. Carrillo placed minimal output new ROBERT BUN/Cr 103/5.56. Nephrology consulted for urgent HD. Pt started on bicarb drip, received 3L NS and 1L LR, Cefepime 1g, Vanc 1g, Tylenol 1g. Admitted to MICU 5/19/24 for initiation of urgent HD. Pt found to have e coli bacteremia hypotensive started on vasopressors and  treated with ceftriaxone.     ASSESSMENT & PLAN:     Neurologic  #Toxic metabolic encephalopathy   Patient is baseline is AOx4, here AOx1-3, waxes/wanes consistent w/ delirium, very flat affect, minimally verbal     #depression  per pt's brother, who is also a cardiologist, pt with worsening depression, appetite/decreased PO intake since hip fracture in Jan. Prior to this, pt was a practicing dentist   - continue home mirtazapine 15mg qhs   - was reportedly also prescribed sertraline recently but have not started, hold off for now   - was evaluated by psych agree with current treatment plan pt to follow up outpatient     Pulmonary  - No active issues. Patient breathing comfortably/saturating well on room air    Cardiovascular  #shock, resolved  - suspect secondary to sepsis, continue abx as below  - initiated on stress dosed steroids 5/21, now weaned off pressors   - TTE showing moderate aortic stenosis and mildly decreased left ventricle systolic function EF: 46%     #HTN  - holding home atenolol and olmesartan given shock     GI  #H/o Chronic pancreatitis/insufficiency   - No concern for active flare   - Continue home Creon TID pre meal    #H/o GERD  - Continue home med: Protonix 40mg PO QD    #Diet  - Renal restricted diet    Renal/  #acute renal failure  with marked acidosis to 7.13/23 and oliguria on admission now s/p bicarb gtt and urgent HD x1 (5/20) with improvement in acidosis. Suspect multifactorial secondary to ATN/sepsis, ARB/NSAID use, and decreased PO intake. Remains anuric  - s/p CARMELINA clark 5/19   - HD per nephrology 5/19 and 5/24 completed     #R hydronephrosis   suspect reactive secondary to pyelonephritis. CT without obvious obstruction/stone  - likely not contributing significantly to sepsis/ARF. Was evaluated by urology, low suspicion for true obstruction, no indications for acute interventions for now but can consider ureteral stent if not improving with medical management   - renal US 5/22 with mild R hydro, mild pelvic free fluid       Endocrine  - No active issues    MSK  # Femur facture Jan 2024 s/p repair   Bed bound since discharge from rehab requiring full adl support  - Activity as tolerated   - Pain management with Tylenol PRN, Lidocaine patches PRN, Ice/Hot Compresses PRN    Infectious Disease  #urosepsis c/b E. coli bacteremia   - BCxs (5/19) 4/4 bottles GNR - E. coli (sensitive to CTX) repeat cultures 5/21: NTD   - UCx (5/19): >100k E. coli  - continue CTX 2g daily, plan for 14 day course        Heme/Onc  #anemia  baseline hgb 7-8s   - s/p 1u pRBC 5/21 for hgb <7    #DVT Ppx  - Hep subq    Lines  - CARMELINA clark 5/19 -     Ethics  FULL CODE      For Followup:  - taper down stress dose steroids   - continue to monitor for renal recovery bladder scan q 6 hrs   - continue to monitor need for NG tube when pt more awake       ERICK Potts AGACNP (ext. [id number]) MICU Transfer Note    Transfer from: MICU    Transfer to: ( x ) Medicine    (  ) Telemetry     (   ) RCU        (    ) Palliative         (   ) Stroke Unit          (   ) __________________    Accepting Physician: scott herrera  [phone]  Signout given to: [facility]     MICU COURSE    79 year old female with PMH of GERD, MI, chronic pancreatitis, HTN, incontinence, recent hip surgery (1/2024) and was recently discharged from outpatient rehab to home presented to the emergency department with increased lethargy, failure to thrive, decreased PO intake nausea/vomiting. ED course notable for leukocytosis to 40 with +UA  metabolic acidemia, ph 7.13, bicarb 8. Started on bicarb gtt. Carrillo placed minimal output new ROBERT BUN/Cr 103/5.56. Nephrology consulted for urgent HD. Pt started on bicarb drip, received 3L NS and 1L LR, Cefepime 1g, Vanc 1g, Tylenol 1g. Admitted to MICU 5/19/24 for initiation of urgent HD. Pt found to have e coli bacteremia hypotensive started on vasopressors and  treated with ceftriaxone.     ASSESSMENT & PLAN:     Neurologic  #Toxic metabolic encephalopathy   Patient is baseline is AOx4, here AOx1-3, waxes/wanes consistent w/ delirium, very flat affect, minimally verbal     #depression  per pt's brother, who is also a cardiologist, pt with worsening depression, appetite/decreased PO intake since hip fracture in Jan. Prior to this, pt was a practicing dentist   - continue home mirtazapine 15mg qhs   - was reportedly also prescribed sertraline recently but have not started, hold off for now   - was evaluated by psych agree with current treatment plan pt to follow up outpatient     Pulmonary  - No active issues. Patient breathing comfortably/saturating well on room air    Cardiovascular  #shock, resolved  - suspect secondary to sepsis, continue abx as below  - initiated on stress dosed steroids 5/21, now weaned off pressors   - TTE showing moderate aortic stenosis and mildly decreased left ventricle systolic function EF: 46%     #HTN  - holding home atenolol and olmesartan given shock     GI  #H/o Chronic pancreatitis/insufficiency   - No concern for active flare   - Continue home Creon TID pre meal    #H/o GERD  - Continue home med: Protonix 40mg PO QD    #Diet  - Renal restricted diet    Renal/  #acute renal failure  with marked acidosis to 7.13/23 and oliguria on admission now s/p bicarb gtt and urgent HD x1 (5/20) with improvement in acidosis. Suspect multifactorial secondary to ATN/sepsis, ARB/NSAID use, and decreased PO intake. Remains anuric  - s/p CARMELINA clark 5/19   - HD per nephrology 5/19 and 5/24 completed     #R hydronephrosis   suspect reactive secondary to pyelonephritis. CT without obvious obstruction/stone  - likely not contributing significantly to sepsis/ARF. Was evaluated by urology, low suspicion for true obstruction, no indications for acute interventions for now but can consider ureteral stent if not improving with medical management   - renal US 5/22 with mild R hydro, mild pelvic free fluid       Endocrine  - No active issues    MSK  # Femur facture Jan 2024 s/p repair   Bed bound since discharge from rehab requiring full adl support  - Activity as tolerated   - Pain management with Tylenol PRN, Lidocaine patches PRN, Ice/Hot Compresses PRN    Infectious Disease  #urosepsis c/b E. coli bacteremia   - BCxs (5/19) 4/4 bottles GNR - E. coli (sensitive to CTX) repeat cultures 5/21: NTD   - UCx (5/19): >100k E. coli  - continue CTX 2g daily, plan for 14 day course        Heme/Onc  #anemia  baseline hgb 7-8s   - s/p 1u pRBC 5/21 for hgb <7    #DVT Ppx  - Hep subq    Lines  - RICARLOS eduardo 5/19 -     Ethics  FULL CODE      For Followup:  - taper down stress dose steroids   - continue to monitor for renal recovery bladder scan q 6 hrs   - continue to monitor need for NG tube when pt more awake       ERICK Potts AGACNP (ext. [id number]) MICU Transfer Note    Transfer from: MICU    Transfer to: ( x ) Medicine    (  ) Telemetry     (   ) RCU        (    ) Palliative         (   ) Stroke Unit          (   ) __________________    Accepting Physician: scott herrera  3 [address] [phone]  Signout given to: [facility]     MICU COURSE    79 year old female with PMH of GERD, MI, chronic pancreatitis, HTN, incontinence, recent hip surgery (1/2024) and was recently discharged from outpatient rehab to home presented to the emergency department with increased lethargy, failure to thrive, decreased PO intake nausea/vomiting. ED course notable for leukocytosis to 40 with +UA  metabolic acidemia, ph 7.13, bicarb 8. Started on bicarb gtt. Carrillo placed minimal output new ROBERT BUN/Cr 103/5.56. Nephrology consulted for urgent HD. Pt started on bicarb drip, received 3L NS and 1L LR, Cefepime 1g, Vanc 1g, Tylenol 1g. Admitted to MICU 5/19/24 for initiation of urgent HD. Pt found to have e coli bacteremia hypotensive started on vasopressors and  treated with ceftriaxone.     ASSESSMENT & PLAN:     Neurologic  #Toxic metabolic encephalopathy   Patient is baseline is AOx4, here AOx1-3, waxes/wanes consistent w/ delirium, very flat affect, minimally verbal     #depression  per pt's brother, who is also a cardiologist, pt with worsening depression, appetite/decreased PO intake since hip fracture in Jan. Prior to this, pt was a practicing dentist   - continue home mirtazapine 15mg qhs   - was reportedly also prescribed sertraline recently but have not started, hold off for now   - was evaluated by psych agree with current treatment plan pt to follow up outpatient     Pulmonary  - No active issues. Patient breathing comfortably/saturating well on room air    Cardiovascular  #shock, resolved  - suspect secondary to sepsis, continue abx as below  - initiated on stress dosed steroids 5/21, now weaned off pressors   - TTE showing moderate aortic stenosis and mildly decreased left ventricle systolic function EF: 46%     #HTN  - holding home atenolol and olmesartan given shock     GI  #H/o Chronic pancreatitis/insufficiency   - No concern for active flare   - Continue home Creon TID pre meal    #H/o GERD  - Continue home med: Protonix 40mg PO QD    #Diet  - Renal restricted diet    Renal/  #acute renal failure  with marked acidosis to 7.13/23 and oliguria on admission now s/p bicarb gtt and urgent HD x1 (5/20) with improvement in acidosis. Suspect multifactorial secondary to ATN/sepsis, ARB/NSAID use, and decreased PO intake. Remains anuric  - s/p CARMELINA clark 5/19   - HD per nephrology 5/19 and 5/24 completed     #R hydronephrosis   suspect reactive secondary to pyelonephritis. CT without obvious obstruction/stone  - likely not contributing significantly to sepsis/ARF. Was evaluated by urology, low suspicion for true obstruction, no indications for acute interventions for now but can consider ureteral stent if not improving with medical management   - renal US 5/22 with mild R hydro, mild pelvic free fluid       Endocrine  - No active issues    MSK  # Femur facture Jan 2024 s/p repair   Bed bound since discharge from rehab requiring full adl support  - Activity as tolerated   - Pain management with Tylenol PRN, Lidocaine patches PRN, Ice/Hot Compresses PRN    Infectious Disease  #urosepsis c/b E. coli bacteremia   - BCxs (5/19) 4/4 bottles GNR - E. coli (sensitive to CTX) repeat cultures 5/21: NTD   - UCx (5/19): >100k E. coli  - continue CTX 2g daily, plan for 14 day course        Heme/Onc  #anemia  baseline hgb 7-8s   - s/p 1u pRBC 5/21 for hgb <7    #DVT Ppx  - Hep subq    Lines  - CARMELINA clark 5/19 -     Ethics  FULL CODE      For Followup:  - taper down stress dose steroids   - continue to monitor for renal recovery bladder scan q 6 hrs   - continue to monitor need for NG tube when pt more awake   [ ] discuss with nephrology plans for further dialysis. If no plans, JEAN-PIERRE clark. If needs more HD, eduardo should be exchanged by 5/27      ERICK RUIZ (ext. [id number])

## 2024-05-24 NOTE — PROGRESS NOTE ADULT - SUBJECTIVE AND OBJECTIVE BOX
Redd Ybarra PGY3    Progress Note  05-19-24 (5d)  Patient is a 79y old  Female who presents with a chief complaint of Weakness (24 May 2024 07:27)    Subjective / Interval 24 Events :  - No acute events overnight. Still anuric. Bladder scan showed 79cc.   - Pt seen and examined at bedside this AM. Lethargic, not engaging in conversation. Groaning to some physical stimulation. On levophed gtt at 0.07.   - Planned for HD today, will give more albumin, midodrine, and initiate pressors prior to HD    Additional ROS (if any): see above, otherwise negative     MEDICATIONS  (STANDING):  albumin human 25% IVPB 50 milliLiter(s) IV Intermittent once  ascorbic acid 250 milliGRAM(s) Oral daily  cefTRIAXone   IVPB 2000 milliGRAM(s) IV Intermittent every 24 hours  chlorhexidine 2% Cloths 1 Application(s) Topical daily  fludroCORTISONE 0.1 milliGRAM(s) Oral two times a day  heparin   Injectable 5000 Unit(s) SubCutaneous every 12 hours  hydrocortisone sodium succinate Injectable 50 milliGRAM(s) IV Push every 6 hours  lidocaine   4% Patch 1 Patch Transdermal every 24 hours  midodrine 10 milliGRAM(s) Oral once  mirtazapine 15 milliGRAM(s) Oral at bedtime  multivitamin 1 Tablet(s) Oral daily  norepinephrine Infusion 0.05 MICROgram(s)/kG/Min (4.39 mL/Hr) IV Continuous <Continuous>  pancrelipase  (CREON 24,000 Lipase Units) 1 Capsule(s) Oral three times a day with meals  pantoprazole  Injectable 40 milliGRAM(s) IV Push daily  QUEtiapine 12.5 milliGRAM(s) Oral at bedtime    MEDICATIONS  (PRN):      CAPILLARY BLOOD GLUCOSE      I&O's Summary    23 May 2024 07:01  -  24 May 2024 07:00  --------------------------------------------------------  IN: 812.1 mL / OUT: 0 mL / NET: 812.1 mL    24 May 2024 07:01  -  24 May 2024 11:28  --------------------------------------------------------  IN: 136.2 mL / OUT: 0 mL / NET: 136.2 mL      PHYSICAL EXAM:  Vital Signs Last 24 Hrs  T(C): 36 (24 May 2024 08:00), Max: 36.7 (23 May 2024 12:00)  T(F): 96.8 (24 May 2024 08:00), Max: 98 (23 May 2024 12:00)  HR: 75 (24 May 2024 11:00) (60 - 105)  BP: 95/57 (24 May 2024 11:00) (83/50 - 145/64)  BP(mean): 70 (24 May 2024 11:00) (55 - 101)  RR: 19 (24 May 2024 11:00) (10 - 22)  SpO2: 100% (24 May 2024 11:00) (93% - 100%)    Parameters below as of 24 May 2024 08:00  Patient On (Oxygen Delivery Method): room air    General: NAD, lethargic, frail appearing  HEENT: EOMi, no scleral icterus  CV: RRR, normal S1 and S2  Lungs: normal respiratory effort on RA, CTAB  Abd: soft, nontender, nondistended  Ext: +edema of BUE, warm, well perfused  Pysch: flat affect   Neuro: minimally verbal, following commands, grossly non-focal, moving all extremities spontaneously   Skin: no rashes or lesions       LABS:                          8.8    12.57 )-----------( 280      ( 24 May 2024 00:28 )             28.2       WBC Trend: 12.57<--, 18.15<--, 22.77<--  Hb Trend: 8.8<--, 8.1<--, 9.1<--, 9.8<--, 6.8<--    05-24    140  |  103  |  51<H>  ----------------------------<  143<H>  4.4   |  13<L>  |  3.33<H>    Ca    8.6      24 May 2024 00:28  Phos  5.9     05-24  Mg     2.6     05-24    TPro  5.1<L>  /  Alb  2.5<L>  /  TBili  0.2  /  DBili  x   /  AST  9<L>  /  ALT  7<L>  /  AlkPhos  112  05-24    PT/INR - ( 24 May 2024 00:28 )   PT: 17.9 sec;   INR: 1.65 ratio         PTT - ( 24 May 2024 00:28 )  PTT:31.7 sec      Urinalysis Basic - ( 24 May 2024 00:28 )    Color: x / Appearance: x / SG: x / pH: x  Gluc: 143 mg/dL / Ketone: x  / Bili: x / Urobili: x   Blood: x / Protein: x / Nitrite: x   Leuk Esterase: x / RBC: x / WBC x   Sq Epi: x / Non Sq Epi: x / Bacteria: x      RADIOLOGY & ADDITIONAL TESTS: Reviewed  - No new images

## 2024-05-24 NOTE — PROGRESS NOTE ADULT - SUBJECTIVE AND OBJECTIVE BOX
[facility] DIVISION OF KIDNEY DISEASES AND HYPERTENSION   FOLLOW UP NOTE  --------------------------------------------------------------------------------  Chief Complaint: Pt with oliguric/anuric ROBERT with severe metabolic acidosis requiring renal replacement therapy    24 hour events/subjective: Pt. was seen and examined in the MICU. Pt unable to tolerate HD on 5/22 due to persistent hypotension with increasing vasopressor requirements. Pt remains somnolent/altered. Pt remains anuric and persistently acidotic. Per discussion with MICU, plan to increase pressors, give albumin and trial of HD today.     PAST HISTORY  --------------------------------------------------------------------------------  No significant changes to PMH, PSH, FHx, SHx, unless otherwise noted    ALLERGIES & MEDICATIONS  --------------------------------------------------------------------------------  Allergies  penicillin (Swelling)    Intolerances  epinephrine (Other)    Standing Inpatient Medications  ascorbic acid 250 milliGRAM(s) Oral daily  cefTRIAXone   IVPB 2000 milliGRAM(s) IV Intermittent every 24 hours  chlorhexidine 2% Cloths 1 Application(s) Topical daily  fludroCORTISONE 0.1 milliGRAM(s) Oral two times a day  heparin   Injectable 5000 Unit(s) SubCutaneous every 12 hours  hydrocortisone sodium succinate Injectable 50 milliGRAM(s) IV Push every 6 hours  lidocaine   4% Patch 1 Patch Transdermal every 24 hours  mirtazapine 15 milliGRAM(s) Oral at bedtime  multivitamin 1 Tablet(s) Oral daily  norepinephrine Infusion 0.05 MICROgram(s)/kG/Min IV Continuous <Continuous>  pancrelipase  (CREON 24,000 Lipase Units) 1 Capsule(s) Oral three times a day with meals  pantoprazole  Injectable 40 milliGRAM(s) IV Push daily  QUEtiapine 12.5 milliGRAM(s) Oral at bedtime    PRN Inpatient Medications    REVIEW OF SYSTEMS  --------------------------------------------------------------------------------  Unable to obtain ROS     VITALS/PHYSICAL EXAM  --------------------------------------------------------------------------------  T(C): 36 (05-24-24 @ 12:00), Max: 36.3 (05-23-24 @ 20:00)  HR: 69 (05-24-24 @ 13:00) (60 - 105)  BP: 130/61 (05-24-24 @ 13:00) (77/39 - 145/64)  RR: 14 (05-24-24 @ 13:00) (10 - 22)  SpO2: 100% (05-24-24 @ 13:00) (93% - 100%)  Wt(kg): --    05-23-24 @ 07:01  -  05-24-24 @ 07:00  --------------------------------------------------------  IN: 812.1 mL / OUT: 0 mL / NET: 812.1 mL    05-24-24 @ 07:01  -  05-24-24 @ 14:08  --------------------------------------------------------  IN: 198 mL / OUT: 0 mL / NET: 198 mL    Physical Exam:  	Gen: Ill appearing   	HEENT: Dry MM. Anicteric  	Pulm: CTA B/L  	CV: S1S2+  	Abd: Soft, +BS   	Ext: No LE edema B/L  	Neuro: Somnolent  	Skin: Warm and dry  	Dialysis access: R IJ non tunneled HD catheter     LABS/STUDIES  --------------------------------------------------------------------------------              8.8    12.57 >-----------<  280      [05-24-24 @ 00:28]              28.2     140  |  103  |  51  ----------------------------<  143      [05-24-24 @ 00:28]  4.4   |  13  |  3.33        Ca     8.6     [05-24-24 @ 00:28]      Mg     2.6     [05-24-24 @ 00:28]      Phos  5.9     [05-24-24 @ 00:28]    TPro  5.1  /  Alb  2.5  /  TBili  0.2  /  DBili  x   /  AST  9   /  ALT  7   /  AlkPhos  112  [05-24-24 @ 00:28]    PT/INR: PT 17.9 , INR 1.65       [05-24-24 @ 00:28]  PTT: 31.7       [05-24-24 @ 00:28]    Creatinine Trend:  SCr 3.33 [05-24 @ 00:28]  SCr 2.94 [05-23 @ 00:42]  SCr 3.71 [05-22 @ 00:39]  SCr 2.90 [05-20 @ 23:53]  SCr 2.83 [05-20 @ 12:32]    Urine Protein 342      [05-20-24 @ 05:22]    HBsAg Nonreact      [05-19-24 @ 23:37]  HCV 0.09, Nonreact      [05-19-24 @ 23:37]  HIV Nonreact      [05-19-24 @ 23:37]    dsDNA <1      [05-19-24 @ 23:37]  C3 Complement 75      [05-19-24 @ 23:37]  C4 Complement 28      [05-19-24 @ 23:37]  ANCA: cANCA Negative, pANCA Negative, atypical ANCA Negative      [05-22-24 @ 00:39]  Syphilis Screen (Treponema Pallidum Ab) Negative      [05-19-24 @ 23:37]  PLA2R: JUANJO <1.8, IFA --      [05-19-24 @ 23:37]  Free Light Chains: kappa 22.09, lambda 21.07, ratio = 1.05      [05-19 @ 23:37]  Immunofixation Serum: IgM Lambda Band Identified    Reference Range: None Detected      [05-19-24 @ 23:37]  SPEP Interpretation: Beta-Migrating Paraprotein Identified      [05-19-24 @ 23:37]

## 2024-05-25 NOTE — PROGRESS NOTE ADULT - ASSESSMENT
79F with PMH GERD, MI, chronic pancreatitis/insufficiency, HTN, incontinence, recent R hip fx s/p repair (1/2024) and was recently discharged from outpatient rehab to home initially presenting for increased lethargy in setting of poor PO intake, admitted to MICU for urosepsis c/b E. coli bacteremia requiring vasopressors and acute renal failure requiring urgent HD for acidosis. Now s/p HD x1 with improving renal function     =====Neurologic=====  #Toxic metabolic encephalopathy   Patient is baseline is AOx4, here AOx1-3, waxes/wanes consistent w/ delirium, very flat affect, minimally verbal     #depression  per pt's brother, who is also a cardiologist, pt with worsening depression, appetite/decreased PO intake since hip fracture in Jan. Prior to this, pt was a practicing dentist   - continue home mirtazapine 15mg qhs   - was reportedly also prescribed sertraline recently but have not started, hold off for now   - was evaluated by , however, still with ongoing medical issues    =====Pulmonary=====  - No active issues. Patient breathing comfortably/saturating well on room air    =====Cardiovascular=====  #shock, resolved  - suspect secondary to sepsis, continue abx as below  - initiated on stress dosed steroids 5/21, now weaned off pressors   - f/u TTE     #HTN  - holding home atenolol and olmesartan given shock     =====GI=====  #H/o Chronic pancreatitis/insufficiency   - No concern for active flare   - Continue home Creon TID premeal    #H/o GERD  - Continue home med: Protonix 40mg PO QD    #Diet  - Renal restricted diet    =====Renal/=====  #acute renal failure  with marked acidosis to 7.13/23 and oliguria on admission now s/p bicarb gtt and urgent HD x1 (5/20) with improvement in acidosis. Suspect multifactorial secondary to ATN/sepsis, ARB/NSAID use, and decreased PO intake. Remains anuric, low bicarb but with adequate respiratory ventilation   - s/p CARMELINA clark 5/19   - HD attempted 5/22 but terminated after 1 hr due to escalating pressor requirement/tachycardic.   - HD per nephrology     #R hydronephrosis   suspect reactive secondary to pyelonephritis. CT without obvious obstruction/stone  - likely not contributing significantly to sepsis/ARF. Was evaluated by urology, low suspicion for true obstruction, no indications for acute interventions for now but can consider ureteral stent if not improving with medical management   - renal US 5/22 with mild R hydro, mild pelvic free fluid       =====Endocrine=====  - No active issues    ===MSK====  # Femur facture Jan 2024 s/p repair   Bed bound since discharge from rehab requiring full adl support  - Activity as tolerated   - Pain management with Tylenol PRN, Lidocaine patches PRN, Ice/Hot Compresses PRN    =====Infectious Disease=====  #urosepsis c/b E. coli bacteremia   - BCxs (5/19) 4/4 bottles GNR - E. coli (sensitive to CTX)  - UCx (5/19): >100k E. coli  - continue CTX 2g daily, plan for 14 day course        =====Heme/Onc=====  #anemia  baseline hgb 7-8s   - s/p 1u pRBC 5/21 for hgb <7    #DVT Ppx  - Hep subq    =====Lines======  - CARMELINA clark 5/19 -     =====Ethics=====  FULL CODE 79F with PMH GERD, MI, chronic pancreatitis/insufficiency, HTN, incontinence, recent R hip fx s/p repair (1/2024) and was recently discharged from outpatient rehab to home initially presenting for increased lethargy in setting of poor PO intake, admitted to MICU for urosepsis c/b E. coli bacteremia requiring vasopressors and acute renal failure requiring urgent HD for acidosis. Now s/p HD x1 with improving renal function     =====Neurologic=====  #Toxic metabolic encephalopathy   Patient is baseline is AOx4, here AOx0-3, waxes/wanes consistent w/ delirium, very flat affect, minimally verbal     #depression  per pt's brother, who is also a cardiologist, pt with worsening depression, appetite/decreased PO intake since hip fracture in Jan. Prior to this, pt was a practicing dentist   - hold home mirtazapine and seroquel iso lethargy  - was reportedly also prescribed sertraline recently but have not started, hold off for now   - was evaluated by , however, still with ongoing medical issues    =====Pulmonary=====  - No active issues. Patient breathing comfortably/saturating well on room air    =====Cardiovascular=====  #shock, resolved  - suspect secondary to sepsis, continue abx as below  - initiated on stress dosed steroids 5/21, now weaned off pressors   - f/u TTE     #HTN  - holding home atenolol and olmesartan given shock     =====GI=====  #H/o Chronic pancreatitis/insufficiency   - No concern for active flare   - Continue home Creon TID premeal (held for now bc pt too lethargic to take po and cannot be crushed)    #H/o GERD  - Continue home med: Protonix 40mg PO QD    #Diet  - Renal restricted diet---TFs. Needs S/S when awake enough.     =====Renal/=====  #acute renal failure  with marked acidosis to 7.13/23 and oliguria on admission now s/p bicarb gtt and urgent HD x1 (5/20) with improvement in acidosis. Suspect multifactorial secondary to ATN/sepsis, ARB/NSAID use, and decreased PO intake. Remains anuric, low bicarb but with adequate respiratory ventilation   - s/p CARMELINA clark 5/19 and pressor assisted HD  -last HD 5/24 tolerated without need of pressors.  - HD per nephrology     #R hydronephrosis   suspect reactive secondary to pyelonephritis. CT without obvious obstruction/stone  - likely not contributing significantly to sepsis/ARF. Was evaluated by urology, low suspicion for true obstruction, no indications for acute interventions for now but can consider ureteral stent if not improving with medical management   - renal US 5/22 with mild R hydro, mild pelvic free fluid       =====Endocrine=====  - No active issues    ===MSK====  # Femur facture Jan 2024 s/p repair   Bed bound since discharge from rehab requiring full adl support  - Activity as tolerated   - Pain management with Tylenol PRN, Lidocaine patches PRN, Ice/Hot Compresses PRN    =====Infectious Disease=====  #urosepsis c/b E. coli bacteremia   - BCxs (5/19) 4/4 bottles GNR - E. coli (sensitive to CTX)  - UCx (5/19): >100k E. coli  - continue CTX 2g daily, plan for 14 day course        =====Heme/Onc=====  #anemia  baseline hgb 7-8s   - s/p 1u pRBC 5/21 for hgb <7    #DVT Ppx  - Hep subq    =====Lines======  - CARMELINA clark 5/19 -     =====Ethics=====  FULL CODE 79F with PMH GERD, MI, chronic pancreatitis/insufficiency, HTN, incontinence, recent R hip fx s/p repair (1/2024) and was recently discharged from outpatient rehab to home initially presenting for increased lethargy in setting of poor PO intake, admitted to MICU for urosepsis c/b E. coli bacteremia requiring vasopressors and acute renal failure requiring urgent HD for acidosis. Now s/p HD x1 with improving renal function     =====Neurologic=====  #Toxic metabolic encephalopathy   Patient is baseline is AOx4, here AOx0-3, waxes/wanes consistent w/ delirium, very flat affect, minimally verbal     #depression  per pt's brother, who is also a cardiologist, pt with worsening depression, appetite/decreased PO intake since hip fracture in Jan. Prior to this, pt was a practicing dentist   - hold home mirtazapine and seroquel iso lethargy  - was reportedly also prescribed sertraline recently but have not started, hold off for now   - was evaluated by , however, still with ongoing medical issues    =====Pulmonary=====  - No active issues. Patient breathing comfortably/saturating well on room air    =====Cardiovascular=====  #shock, resolved  - suspect secondary to sepsis, continue abx as below  - initiated on stress dosed steroids 5/21, now weaned off pressors   -weaned off florief, now just on solumedrol 50mg qd, continue to wean off  - TTE LVEF 46%     #HTN  - holding home atenolol and olmesartan given shock     =====GI=====  #H/o Chronic pancreatitis/insufficiency   - No concern for active flare   - Continue home Creon TID premeal (held for now bc pt too lethargic to take po and cannot be crushed)    #H/o GERD  - Continue home med: Protonix 40mg PO QD    #Diet  - Renal restricted diet---TFs. Needs S/S when awake enough.     =====Renal/=====  #acute renal failure  with marked acidosis to 7.13/23 and oliguria on admission now s/p bicarb gtt and urgent HD x1 (5/20) with improvement in acidosis. Suspect multifactorial secondary to ATN/sepsis, ARB/NSAID use, and decreased PO intake. Remains anuric, low bicarb but with adequate respiratory ventilation   - s/p RIJ eduardo 5/19 and pressor assisted HD  -last HD 5/24 tolerated without need of pressors.  - HD per nephrology     #R hydronephrosis   suspect reactive secondary to pyelonephritis. CT without obvious obstruction/stone  - likely not contributing significantly to sepsis/ARF. Was evaluated by urology, low suspicion for true obstruction, no indications for acute interventions for now but can consider ureteral stent if not improving with medical management   - renal US 5/22 with mild R hydro, mild pelvic free fluid       =====Endocrine=====  - No active issues    ===MSK====  # Femur facture Jan 2024 s/p repair   Bed bound since discharge from rehab requiring full adl support  - Activity as tolerated   - Pain management with Tylenol PRN, Lidocaine patches PRN, Ice/Hot Compresses PRN    =====Infectious Disease=====  #urosepsis c/b E. coli bacteremia   - BCxs (5/19) 4/4 bottles GNR - E. coli (sensitive to CTX)  - UCx (5/19): >100k E. coli  - continue CTX 2g daily, plan for 14 day course        =====Heme/Onc=====  #anemia  baseline hgb 7-8s   - s/p 1u pRBC 5/21 for hgb <7    #DVT Ppx  - Hep subq    =====Lines======  - CARMELINA clark 5/19 -     =====Ethics=====  FULL CODE

## 2024-05-25 NOTE — PROGRESS NOTE ADULT - SUBJECTIVE AND OBJECTIVE BOX
Orlin De Los Santos MD  Internal Medicine, PGY2    MICU Progress Note    CHIEF COMPLAINT: SRINI VALDOVINOS is a 78yo Female with PMH *** presenting with ***.    Interval Events:    Meds administered:  fludroCORTISONE: 0.1 milliGRAM(s) Oral (05-25 @ 05:15)  fentaNYL    Injectable: 50 MICROGram(s) IV Push (05-25 @ 05:15)  hydrocortisone sodium succinate Injectable: 50 milliGRAM(s) IV Push (05-25 @ 05:14)  heparin   Injectable: 5000 Unit(s) SubCutaneous (05-25 @ 05:14)  potassium chloride   Solution: 40 milliEquivalent(s) Oral (05-25 @ 01:23)  hydrocortisone sodium succinate Injectable: 50 milliGRAM(s) IV Push (05-25 @ 00:00)  fentaNYL    Injectable: 50 MICROGram(s) IV Push (05-25 @ 00:00)  lidocaine   4% Patch: 1 Patch Transdermal (05-24 @ 21:18)  mirtazapine: 15 milliGRAM(s) Oral (05-24 @ 21:14)  QUEtiapine: 12.5 milliGRAM(s) Oral (05-24 @ 21:14)        OBJECTIVE:  Vitals:  T(F): 96.8 (05-25-24 @ 04:00), Max: 97.2 (05-24-24 @ 20:00)  HR: 64 (05-25-24 @ 06:00) (60 - 89)  BP: 105/53 (05-25-24 @ 06:00) (77/39 - 142/77)  BP(mean): 77 (05-25-24 @ 06:00) (53 - 112)  ABP: --  ABP(mean): --  RR: 10 (05-25-24 @ 06:00) (10 - 20)  SpO2: 98% (05-25-24 @ 06:00) (91% - 100%)  I/O Detail 24H    24 May 2024 07:01  -  25 May 2024 07:00  --------------------------------------------------------  IN:    Enteral Tube Flush: 250 mL    IV PiggyBack: 130 mL    Nepro with Carb Steady: 230 mL    Norepinephrine: 9.8 mL    Other (mL): 800 mL  Total IN: 1419.8 mL    OUT:    Other (mL): 800 mL  Total OUT: 800 mL    Total NET: 619.8 mL          HOSPITAL MEDICATIONS:  Standing Meds:  ascorbic acid 250 milliGRAM(s) Oral daily  cefTRIAXone   IVPB 2000 milliGRAM(s) IV Intermittent every 24 hours  chlorhexidine 2% Cloths 1 Application(s) Topical daily  fludroCORTISONE 0.1 milliGRAM(s) Oral two times a day  heparin   Injectable 5000 Unit(s) SubCutaneous every 12 hours  hydrocortisone sodium succinate Injectable 50 milliGRAM(s) IV Push every 12 hours  lidocaine   4% Patch 1 Patch Transdermal every 24 hours  mirtazapine 15 milliGRAM(s) Oral at bedtime  multivitamin 1 Tablet(s) Oral daily  pancrelipase  (CREON 24,000 Lipase Units) 1 Capsule(s) Oral three times a day with meals  pantoprazole  Injectable 40 milliGRAM(s) IV Push daily  QUEtiapine 12.5 milliGRAM(s) Oral at bedtime      PRN Meds:      PHYSICAL EXAM:  GENERAL: NAD, lying in bed comfortably  HEAD:  Atraumatic, Normocephalic  EYES: EOMI, PERRLA, conjunctiva and sclera clear  ENT: Moist mucous membranes  NECK: Supple, No JVD  CHEST/LUNG: Clear to auscultation bilaterally; No rales, rhonchi, wheezing, or rubs. Unlabored respirations  HEART: Regular rate and rhythm; No murmurs, rubs, or gallops  ABDOMEN: Bowel sounds present; Soft, Nontender, Nondistended. No hepatomegaly  EXTREMITIES:  2+ Peripheral Pulses, brisk capillary refill. No clubbing, cyanosis, or edema  NERVOUS SYSTEM:  Alert & Oriented X3, speech clear. No deficits   MSK: FROM all 4 extremities, full and equal strength  SKIN: No rashes or lesions   Orlin De Los Santos MD  Internal Medicine, PGY2    MICU Progress Note    CHIEF COMPLAINT: urgent HD, acidosis, septic shock    Interval Events: NAEO. Off pressors since 3pm. Continues to be Aox0, groans to touch minimally interactive.    Meds administered:  fludroCORTISONE: 0.1 milliGRAM(s) Oral (05-25 @ 05:15)  fentaNYL    Injectable: 50 MICROGram(s) IV Push (05-25 @ 05:15)  hydrocortisone sodium succinate Injectable: 50 milliGRAM(s) IV Push (05-25 @ 05:14)  heparin   Injectable: 5000 Unit(s) SubCutaneous (05-25 @ 05:14)  potassium chloride   Solution: 40 milliEquivalent(s) Oral (05-25 @ 01:23)  hydrocortisone sodium succinate Injectable: 50 milliGRAM(s) IV Push (05-25 @ 00:00)  fentaNYL    Injectable: 50 MICROGram(s) IV Push (05-25 @ 00:00)  lidocaine   4% Patch: 1 Patch Transdermal (05-24 @ 21:18)  mirtazapine: 15 milliGRAM(s) Oral (05-24 @ 21:14)  QUEtiapine: 12.5 milliGRAM(s) Oral (05-24 @ 21:14)        OBJECTIVE:  Vitals:  T(F): 96.8 (05-25-24 @ 04:00), Max: 97.2 (05-24-24 @ 20:00)  HR: 64 (05-25-24 @ 06:00) (60 - 89)  BP: 105/53 (05-25-24 @ 06:00) (77/39 - 142/77)  BP(mean): 77 (05-25-24 @ 06:00) (53 - 112)  ABP: --  ABP(mean): --  RR: 10 (05-25-24 @ 06:00) (10 - 20)  SpO2: 98% (05-25-24 @ 06:00) (91% - 100%)  I/O Detail 24H    24 May 2024 07:01  -  25 May 2024 07:00  --------------------------------------------------------  IN:    Enteral Tube Flush: 250 mL    IV PiggyBack: 130 mL    Nepro with Carb Steady: 230 mL    Norepinephrine: 9.8 mL    Other (mL): 800 mL  Total IN: 1419.8 mL    OUT:    Other (mL): 800 mL  Total OUT: 800 mL    Total NET: 619.8 mL          HOSPITAL MEDICATIONS:  Standing Meds:  ascorbic acid 250 milliGRAM(s) Oral daily  cefTRIAXone   IVPB 2000 milliGRAM(s) IV Intermittent every 24 hours  chlorhexidine 2% Cloths 1 Application(s) Topical daily  fludroCORTISONE 0.1 milliGRAM(s) Oral two times a day  heparin   Injectable 5000 Unit(s) SubCutaneous every 12 hours  hydrocortisone sodium succinate Injectable 50 milliGRAM(s) IV Push every 12 hours  lidocaine   4% Patch 1 Patch Transdermal every 24 hours  mirtazapine 15 milliGRAM(s) Oral at bedtime  multivitamin 1 Tablet(s) Oral daily  pancrelipase  (CREON 24,000 Lipase Units) 1 Capsule(s) Oral three times a day with meals  pantoprazole  Injectable 40 milliGRAM(s) IV Push daily  QUEtiapine 12.5 milliGRAM(s) Oral at bedtime      PRN Meds:      PHYSICAL EXAM:  GENERAL: NAD, lying in bed comfortably  HEAD:  Atraumatic, Normocephalic  EYES: EOMI, PERRLA, conjunctiva and sclera clear  ENT: Moist mucous membranes  NECK: Supple, No JVD  CHEST/LUNG: Clear to auscultation bilaterally; No rales, rhonchi, wheezing, or rubs. Unlabored respirations  HEART: Regular rate and rhythm; No murmurs, rubs, or gallops  ABDOMEN: Bowel sounds present; Soft, groans to palpation of lower abdomen, Nondistended. No hepatomegaly  EXTREMITIES:  2+ Peripheral Pulses, brisk capillary refill. No clubbing, cyanosis, or edema  NERVOUS SYSTEM:  Alert & Oriented X0, groans to touch, not following commands   MSK: RADHA  SKIN: No rashes or lesions

## 2024-05-25 NOTE — PROGRESS NOTE ADULT - ATTENDING COMMENTS
1. Acute renal failure and acidemia from hypotension and sepsis. Pt likely with ATN. Pt s/p HD x1 . Pt no longer acidemic.  Pt is anuric. Atill may need HD.  2. Severe sepsis from e. coli bacteremia. Pt with E coli UTI. Mild hydronephrosis on R. No IR or urology intervention sat this point. Off pressors. Continue ceftriaxone. Follow up blood cx. Wean off stress steroids.  3. Neuro: Delirium, acute metabolic encephelopathy from sepsis. Baseline? A &  O x3?  4. Psych: H/O depression. All psych home meds held for now. Pt lethargic.   5. Pancreatic insufficiency. Continue Creon.  6. DVT prophylaxis: Sq heparin.  7. GOC; Full code.

## 2024-05-25 NOTE — PROGRESS NOTE ADULT - ATTENDING COMMENTS
# ATN - secondary to bacteremia. Currently dialysis-dependent. Last HD 5/24/24. No metabolic derangement, no indication for dialysis today.     # Medication monitoring encounter: medication dose reviewed. Please dose medications to CrCl<15    The rest of the recommendations as per fellow's note.    Mariluz Dodd MD  Attending Nephrologist  [phone] or via [employer]

## 2024-05-25 NOTE — PROGRESS NOTE ADULT - SUBJECTIVE AND OBJECTIVE BOX
[facility] Division of Kidney Diseases & Hypertension  FOLLOW UP NOTE  [phone]--------------------------------------------------------------------------------    Chief Complaint: Pt with oliguric/anuric ROBERT with severe metabolic acidosis requiring renal replacement therapy    24 hour events/subjective: Pt. received HD treatment yesterday (5/24), was well tolerated. Pt. was seen and examined in the MICU. Pt remains somnolent/altered. Unable to obtain ROS.     PAST HISTORY  --------------------------------------------------------------------------------  No significant changes to PMH, PSH, FHx, SHx, unless otherwise noted    ALLERGIES & MEDICATIONS  --------------------------------------------------------------------------------  Allergies    penicillin (Swelling)    Intolerances    epinephrine (Other)    Standing Inpatient Medications  ascorbic acid 250 milliGRAM(s) Oral daily  cefTRIAXone   IVPB 2000 milliGRAM(s) IV Intermittent every 24 hours  chlorhexidine 2% Cloths 1 Application(s) Topical daily  heparin   Injectable 5000 Unit(s) SubCutaneous every 12 hours  hydrocortisone sodium succinate Injectable 50 milliGRAM(s) IV Push daily  lidocaine   4% Patch 1 Patch Transdermal every 24 hours  multivitamin 1 Tablet(s) Oral daily  pancrelipase  (CREON 24,000 Lipase Units) 1 Capsule(s) Oral three times a day with meals  pantoprazole  Injectable 40 milliGRAM(s) IV Push daily    PRN Inpatient Medications      REVIEW OF SYSTEMS  --------------------------------------------------------------------------------  Unable to obtain ROS, see HPI    VITALS/PHYSICAL EXAM  --------------------------------------------------------------------------------  T(C): 36 (05-25-24 @ 07:00), Max: 36.2 (05-24-24 @ 20:00)  HR: 61 (05-25-24 @ 09:00) (61 - 89)  BP: 135/70 (05-25-24 @ 09:00) (77/39 - 142/77)  RR: 11 (05-25-24 @ 09:00) (10 - 20)  SpO2: 100% (05-25-24 @ 09:00) (91% - 100%)  Wt(kg): --    05-24-24 @ 07:01  -  05-25-24 @ 07:00  --------------------------------------------------------  IN: 1419.8 mL / OUT: 800 mL / NET: 619.8 mL    Physical Exam:  Gen: Ill appearing   HEENT: Dry MM. Anicteric  Pulm: CTA B/L  CV: S1S2+  Abd: Soft, +BS   Ext: No LE edema B/L  Neuro: Somnolent  Skin: Warm and dry  Dialysis access: R IJ non tunneled HD catheter     LABS/STUDIES  --------------------------------------------------------------------------------              8.5    7.89  >-----------<  227      [05-25-24 @ 00:28]              27.4     139  |  102  |  29  ----------------------------<  149      [05-25-24 @ 00:28]  3.4   |  18  |  2.24        Ca     8.6     [05-25-24 @ 00:28]      Mg     2.2     [05-25-24 @ 00:28]      Phos  3.9     [05-25-24 @ 00:28]    TPro  4.9  /  Alb  2.5  /  TBili  0.2  /  DBili  x   /  AST  10  /  ALT  7   /  AlkPhos  101  [05-25-24 @ 00:28]    PT/INR: PT 19.3 , INR 1.87       [05-25-24 @ 00:28]  PTT: 39.9       [05-25-24 @ 00:28]    Creatinine Trend:  SCr 2.24 [05-25 @ 00:28]  SCr 3.33 [05-24 @ 00:28]  SCr 2.94 [05-23 @ 00:42]  SCr 3.71 [05-22 @ 00:39]  SCr 2.90 [05-20 @ 23:53]    Urine Protein 342      [05-20-24 @ 05:22]    HBsAg Nonreact      [05-19-24 @ 23:37]  HCV 0.09, Nonreact      [05-19-24 @ 23:37]  HIV Nonreact      [05-19-24 @ 23:37]    dsDNA <1      [05-19-24 @ 23:37]  C3 Complement 75      [05-19-24 @ 23:37]  C4 Complement 28      [05-19-24 @ 23:37]  ANCA: cANCA Negative, pANCA Negative, atypical ANCA Negative      [05-22-24 @ 00:39]  Syphilis Screen (Treponema Pallidum Ab) Negative      [05-19-24 @ 23:37]  PLA2R: JUANJO <1.8, IFA --      [05-19-24 @ 23:37]  Free Light Chains: kappa 22.09, lambda 21.07, ratio = 1.05      [05-19 @ 23:37]  Immunofixation Serum:   IgM Lambda Band Identified      Reference Range: None Detected      [05-19-24 @ 23:37]  SPEP Interpretation: Beta-Migrating Paraprotein Identified      [05-19-24 @ 23:37]

## 2024-05-25 NOTE — PROGRESS NOTE ADULT - PROBLEM SELECTOR PLAN 2
Pt with severe high anion gap metabolic acidosis in setting of ROBERT, infectious process and hypotension. Admission labs significant for SCO2 <10, pH of 7.13 and Lactate 1.9. Pt placed on IV bicarb infusion. MICU consulted and pt admitted to MICU for urgent HD on 5/19 as noted above. SCO2 improved to 18, pH improved to 7.38. Monitor SCO2/pH.    If you have any questions, please feel free to contact me  Catarino Barnes  Nephrology Fellow  [phone] / [employer] Teams(Preferred)  (After 5pm or on weekends please page the on-call fellow)

## 2024-05-25 NOTE — PROGRESS NOTE ADULT - PROBLEM SELECTOR PLAN 1
Pt with oliguric/anuric ROBERT requiring renal replacement therapy in the setting of GN bacteremia, septic shock, ?medication use (Benicar) and diarrhea. Per Kath/THALIA review, pt has had increasing SCr since 3/14/24 with a SCr of 1.8, trended up to 1.96 on 3/23/24, then 2.19 on 4/30/24 (most recent). Prior to March 2024, pt had normal kidney function dating back to October 2017. SCr on admission of 5.56. UA significant for infectious etiology. Pt initiated on IV abx. CT abd/pel with R hydronephrosis noted. Urology notes reviewed (no intervention recommended). Pt with persistent metabolic acidosis despite IV bicarb infusion and was initiated on HD on 5/19. Last HD treatment was on 5/24, was well tolerated.    Serologies thus far as above reviewed. Recommend UPCR (urine protein-creatinine ratio). Pt remains anuric and acidotic. Pt requiring significant vasopressor support. Recommend repeat Kidney US (evaluate R hydro previously noted). Continue to follow serologies. Monitor labs and urine output. Avoid NSAIDs, ACEI/ARBS, RCA and nephrotoxins. Dose medications as per eGFR.

## 2024-05-26 NOTE — CONSULT NOTE ADULT - SUBJECTIVE AND OBJECTIVE BOX
[facility] Initial Consult Note    HPI  79y old Female with history of MI, Chronic pancreatitis, HTN, Incontinence, GERD, Hip fracture here with septic shock secondary to E. Coli bacteremia.     History was obtained from chart review.    Patient admitted for septic shock complicated by acute renal failure on HD. She was on pressors while in the MICU and weaned off. Transferred to floors yesterday. Placed on stress steroids on 5/21 starting with hydrocortisone 50 mg IV Q8H and then tapered. Currently, she's ordered for hydrocortisone 25 mg IV Q8H, followed by hydrocortisone 25 mg IV Q12H and then prednisone. Based on home medication list, she is not on steroids outpatient.    Vital Signs Last 24 Hrs  T(C): 36.3 (05-26-24 @ 04:34), Max: 37 (05-25-24 @ 19:20)  HR: 79 (05-26-24 @ 04:34) (79 - 93)  BP: 120/76 (05-26-24 @ 04:34) (85/65 - 120/76)  RR: 18 (05-26-24 @ 04:34) (12 - 18)  SpO2: 96% (05-26-24 @ 04:34) (96% - 100%)    Physical Exam  Gen: NAD  HEENT: NC/AT, NG tube present  Chest: normal respiratory effort  Heart: +S1 S2    Medications  acetaminophen   Oral Liquid .. 470 milliGRAM(s) Oral every 6 hours PRN  ascorbic acid 250 milliGRAM(s) Oral daily  cefTRIAXone   IVPB 2000 milliGRAM(s) IV Intermittent every 24 hours  chlorhexidine 2% Cloths 1 Application(s) Topical daily  heparin   Injectable 5000 Unit(s) SubCutaneous every 12 hours  lidocaine   4% Patch 1 Patch Transdermal every 24 hours  multivitamin 1 Tablet(s) Oral daily  pantoprazole  Injectable 40 milliGRAM(s) IV Push daily    Pertinent labs/imaging  POCT Blood Glucose.: 151 mg/dL (05-25-24 @ 17:08)  POCT Blood Glucose.: 158 mg/dL (05-25-24 @ 12:26)    eGFR: 16 mL/min/1.73m2 (05-26-24 @ 07:26)  eGFR: 22 mL/min/1.73m2 (05-25-24 @ 00:28)

## 2024-05-26 NOTE — CONSULT NOTE ADULT - ASSESSMENT
79-year-old female with past medical history of GERD, MI, chronic pancreatitis, HTN, incontinence, recent hip surgery who presented w/ increased lethargy, failure to thrive, decreased PO intake nausea/vomiting. Patient admitted to ICU for metabolic acidosis and ROBERT requiring HD found to have E Coli bacteremia 2/2 UTI.    E Coli bacteremia 2/2 UTI  blood cx 5/19 w/ E Coli  urine cx w/ E Coli  blood cx 5/21- NGTD    ROBERT requiring HD  per nephrology 2/2 ATN    Recommendations  c/w ceftriaxone 2g daily  complete 10 day course of antibiotics from date of negative blood cultures w/ last day 5/30    Jeison Iqbal M.D.  [facility], Division of Infectious Diseases  [phone]  After 5pm on weekdays and all day on weekends - please call [phone]  79-year-old female with past medical history of GERD, MI, chronic pancreatitis, HTN, incontinence, recent hip surgery who presented w/ increased lethargy, failure to thrive, decreased PO intake nausea/vomiting. Patient admitted to ICU for metabolic acidosis and ROBERT requiring HD found to have E Coli bacteremia 2/2 UTI.    E Coli bacteremia 2/2 UTI  blood cx 5/19 w/ E Coli  urine cx w/ E Coli & E faecalis  - low CFU of E faecalis so unlikely to be contributing  blood cx 5/21- NGTD    ROBERT requiring HD  per nephrology 2/2 ATN    Recommendations  c/w ceftriaxone 2g daily  complete 10 day course of antibiotics from date of negative blood cultures w/ last day 5/30    Jeison Iqbal M.D.  [facility], Division of Infectious Diseases  [phone]  After 5pm on weekdays and all day on weekends - please call [phone]

## 2024-05-26 NOTE — CONSULT NOTE ADULT - ASSESSMENT
79y old Female with history of MI, Chronic pancreatitis, HTN, Incontinence, GERD, Hip fracture here with septic shock secondary to E. Coli bacteremia.     1. Sepsis  2. E. Coli bacteremia  - Patient is now stable, off pressors and out of the ICU  - Agree with the steroid taper as ordered- Hydrocortisone 25 mg IV Q8H for 1 day, Hydrocortisone 25 m IV Q12H for 1 day, then prednisone 10 mg for 1 day and prednisone 5 mg one day  - Monitor vital signs     3. Steroid induced hyperglycemia  - No history of diabetes previously  - FS are within goal  - No need for sliding scale  - Monitor FS Q6H since she's on tube feeds    4. Acute renal failure on HD  - monitor for hypoglycemia  - nephrology following    Will continue to follow.    Deborah Jeff MD  [facility]    [address] [place] [address]    T [phone]  F [phone]

## 2024-05-26 NOTE — CONSULT NOTE ADULT - SUBJECTIVE AND OBJECTIVE BOX
[facility], Division of Infectious Diseases  RIGO Tyler S. Shah, Y. Patel, [facility]  [phone]  ([phone] - weekdays after 5pm and weekends)    SRINI VALDOVINOS  79y, Female  [id number]    HPI--  HPI:  79-year-old female with past medical history of GERD, MI, chronic pancreatitis, HTN, incontinence, recent hip surgery and was recently discharged from outpatient rehab to home presented to the emergency department with increased lethargy, failure to thrive, decreased PO intake nausea/vomiting.    Upon presentation to ED patient noted to also have suprapubic pain but denied dysuria and hematuria. Per  who provided collateral--Pt was just discharged from rehab center after prolonged stay from R femur fracture. At home the first 1-2 days she was fine but then over last 48hrs appeared to be more lethargic and less interactive prompting ED visit. No one at home is sick, no recent travel. Unsure of medications. Patient did not have any fevers, chills, N/V/D.     ED course notable for lab with  Leukocytosis to 40 with urine appearing like pus metabolic acidemia, ph 7.13, bicarb 8. Started on bicarb gtt. Carrillo placed minimal output new ROBERT BUN/Cr 103/5.56. Nephrology consulted for urgent HD. Initially  unsure of GOC and deferred decision. Pt started on bicarb drip, received 3L NS and 1L LR, Cefepime 1g, Vanc 1g, Tylenol 1g.    Family ultimately agreeable to HD and patient accepted to MICU. Verbal consent obtained for Shiley catheter placement from .  (19 May 2024 22:40)  Patient unable to provide history therefore history obtained from chart review.     Patient admitted to ICU for metabolic acidosis and ROBERT requiring HD found to have E Coli bacteremia 2/2 UTI.    ROS: unable to assess 2/2 patient's mentation     Allergies: penicillin (Swelling)    PMH -- Hypertension    Anxiety and depression    Rosacea    Stress incontinence, female    GERD (gastroesophageal reflux disease)    History of pancreatitis    Stress incontinence, female    Heart attack    2019 novel coronavirus disease (COVID-19)      PSH -- H/O left inguinal hernia repair    Hx of tonsillectomy    Hx of appendectomy    H/O dilation and curettage    Pacemaker      FH -- No pertinent family history in first degree relatives      Social History -- unable to assess 2/2 patient's mentation     Physical Exam--  Vital Signs Last 24 Hrs  T(F): 97.4 (26 May 2024 12:00), Max: 98.6 (25 May 2024 19:20)  HR: 67 (26 May 2024 12:00) (67 - 90)  BP: 98/62 (26 May 2024 12:00) (85/65 - 120/76)  RR: 18 (26 May 2024 12:00) (12 - 18)  SpO2: 95% (26 May 2024 12:00) (95% - 100%)  General: chronically ill appearing  HEENT: anicteric, NGT  Lungs: decreased breath sounds  Heart: S1, S2, normal rate.   Abdomen: Soft. Nondistended.    Neuro: lethargic  Extremities: upper ext edema.   Skin: Warm. Dry.   Psychiatric: unable to assess 2/2 patient's mentation     Laboratory & Imaging Data--  CBC:                       7.9    10.01 )-----------( 198      ( 26 May 2024 07:26 )             26.2     WBC Count: 10.01 K/uL (05-26-24 @ 07:26)  WBC Count: 7.89 K/uL (05-25-24 @ 00:28)  WBC Count: 12.57 K/uL (05-24-24 @ 00:28)  WBC Count: 18.15 K/uL (05-23-24 @ 00:42)  WBC Count: 22.77 K/uL (05-22-24 @ 00:39)  WBC Count: 26.61 K/uL (05-21-24 @ 06:19)  WBC Count: 33.19 K/uL (05-20-24 @ 23:53)  WBC Count: 33.79 K/uL (05-19-24 @ 23:34)  WBC Count: 40.60 K/uL (05-19-24 @ 15:39)    CMP: 05-26    140  |  104  |  43<H>  ----------------------------<  165<H>  4.0   |  20<L>  |  2.83<H>    Ca    8.4      26 May 2024 07:26  Phos  3.4     05-26  Mg     2.1     05-26    TPro  4.4<L>  /  Alb  2.1<L>  /  TBili  0.1<L>  /  DBili  x   /  AST  10  /  ALT  7<L>  /  AlkPhos  90  05-26    LIVER FUNCTIONS - ( 26 May 2024 07:26 )  Alb: 2.1 g/dL / Pro: 4.4 g/dL / ALK PHOS: 90 U/L / ALT: 7 U/L / AST: 10 U/L / GGT: x           Urinalysis Basic - ( 26 May 2024 07:26 )    Color: x / Appearance: x / SG: x / pH: x  Gluc: 165 mg/dL / Ketone: x  / Bili: x / Urobili: x   Blood: x / Protein: x / Nitrite: x   Leuk Esterase: x / RBC: x / WBC x   Sq Epi: x / Non Sq Epi: x / Bacteria: x      Microbiology:     Culture - Blood (collected 05-21-24 @ 10:30)  Source: .Blood Blood-Peripheral  Preliminary Report (05-25-24 @ 16:01):    No growth at 4 days    Culture - Blood (collected 05-21-24 @ 10:00)  Source: .Blood Blood-Peripheral  Preliminary Report (05-25-24 @ 16:01):    No growth at 4 days    Culture - Nose (collected 05-20-24 @ 05:25)  Source: .Nose Nose  Final Report (05-21-24 @ 14:16):    Staphylococcus aureus isolated    No Methicillin Resistant Staphylococcus aureus    Culture - Blood (collected 05-19-24 @ 16:25)  Source: .Blood Blood-Peripheral  Gram Stain (05-20-24 @ 08:01):    Growth in anaerobic bottle: Gram Negative Rods    Growth in aerobic bottle: Gram Negative Rods  Final Report (05-22-24 @ 07:47):    Growth in aerobic and anaerobic bottles: Escherichia coli    See previous culture [id number]    Culture - Urine (collected 05-19-24 @ 16:23)  Source: Clean Catch Clean Catch (Midstream)  Final Report (05-23-24 @ 16:39):    >100,000 CFU/ml Escherichia coli    10,000 - 49,000 CFU/mL Enterococcus faecalis    <10,000 CFU/ml Normal Urogenital magalys present  Organism: Escherichia coli  Enterococcus faecalis (05-23-24 @ 16:39)  Organism: Enterococcus faecalis (05-23-24 @ 16:39)      Method Type: BENTON      -  Ampicillin: S <=2 Predicts results to ampicillin/sulbactam, amoxacillin-clavulanate and  piperacillin-tazobactam.      -  Ciprofloxacin: S <=1      -  Levofloxacin: S 1      -  Nitrofurantoin: S <=32 Should not be used to treat pyelonephritis.      -  Tetracycline: R >8      -  Vancomycin: S 2  Organism: Escherichia coli (05-23-24 @ 16:39)      Method Type: BENTON      -  Amoxicillin/Clavulanic Acid: S <=8/4 Consider reserving for cystitis when ampicillin/sulbactam is resistant      -  Ampicillin: R >16 These ampicillin results predict results for amoxicillin      -  Ampicillin/Sulbactam: R >16/8      -  Aztreonam: S <=4      -  Cefazolin: S <=2 For uncomplicated UTI with K. pneumoniae, E. coli, or P. mirablis: BENTON <=16 is sensitive and BENTON >=32 is resistant. This also predicts results for oral agents cefaclor, cefdinir, cefpodoxime, cefprozil, cefuroxime axetil, cephalexin and locarbef for uncomplicated UTI. Note that some isolates may be susceptible to these agents while testing resistant to cefazolin.      -  Cefepime: S <=2      -  Cefoxitin: S <=8      -  Ceftriaxone: S <=1      -  Cefuroxime: S <=4      -  Ciprofloxacin: S <=0.25      -  Ertapenem: S <=0.5      -  Gentamicin: S <=2      -  Imipenem: S <=1      -  Levofloxacin: S <=0.5      -  Meropenem: S <=1      -  Nitrofurantoin: S <=32 Should not be used to treat pyelonephritis      -  Piperacillin/Tazobactam: S <=8      -  Tobramycin: S <=2      -  Trimethoprim/Sulfamethoxazole: S <=0.5/9.5    Culture - Blood (collected 05-19-24 @ 16:10)  Source: .Blood Blood-Peripheral  Gram Stain (05-20-24 @ 09:48):    Growth in aerobic bottle: Gram Negative Rods    Growth in anaerobic bottle: Gram Negative Rods  Final Report (05-22-24 @ 07:47):    Growth in aerobic and anaerobic bottles: Escherichia coli    Direct identification is available within approximately 3-5    hours either by Blood Panel Multiplexed PCR or Direct    MALDI-TOF. Details: [email or web address]  Organism: Blood Culture PCR  Escherichia coli (05-22-24 @ 07:47)  Organism: Escherichia coli (05-22-24 @ 07:47)      Method Type: BENTON      -  Ampicillin: R >16 These ampicillin results predict results for amoxicillin      -  Ampicillin/Sulbactam: R >16/8      -  Aztreonam: S <=4      -  Cefazolin: I 4      -  Cefepime: S <=2      -  Cefoxitin: S <=8      -  Ceftriaxone: S <=1      -  Ciprofloxacin: S <=0.25      -  Ertapenem: S <=0.5      -  Gentamicin: S <=2      -  Imipenem: S <=1      -  Levofloxacin: S <=0.5      -  Meropenem: S <=1      -  Piperacillin/Tazobactam: S <=8      -  Tobramycin: S <=2      -  Trimethoprim/Sulfamethoxazole: S <=0.5/9.5  Organism: Blood Culture PCR (05-22-24 @ 07:47)      Method Type: PCR      -  Escherichia coli: Detec        Radiology--  ***  Active Medications--  acetaminophen   Oral Liquid .. 470 milliGRAM(s) Oral every 6 hours PRN  ascorbic acid 250 milliGRAM(s) Oral daily  cefTRIAXone   IVPB 2000 milliGRAM(s) IV Intermittent every 24 hours  chlorhexidine 2% Cloths 1 Application(s) Topical daily  heparin   Injectable 5000 Unit(s) SubCutaneous every 12 hours  lidocaine   4% Patch 1 Patch Transdermal every 24 hours  multivitamin 1 Tablet(s) Oral daily  pantoprazole  Injectable 40 milliGRAM(s) IV Push daily    Antimicrobials:   cefTRIAXone   IVPB 2000 milliGRAM(s) IV Intermittent every 24 hours    Immunologic:

## 2024-05-26 NOTE — PROGRESS NOTE ADULT - SUBJECTIVE AND OBJECTIVE BOX
Patient is a 79y old  Female who presents with a chief complaint of Weakness (26 May 2024 08:11)      SUBJECTIVE / OVERNIGHT EVENTS:    Patient seen and examined. pt is nonverbal, does not follow commands. cannot provide ros. transferred from MICU to floor.      Vital Signs Last 24 Hrs  T(C): 36.3 (26 May 2024 04:34), Max: 37 (25 May 2024 19:20)  T(F): 97.3 (26 May 2024 04:34), Max: 98.6 (25 May 2024 19:20)  HR: 79 (26 May 2024 04:34) (62 - 93)  BP: 120/76 (26 May 2024 04:34) (85/65 - 120/76)  BP(mean): 81 (25 May 2024 15:00) (73 - 86)  RR: 18 (26 May 2024 04:34) (11 - 18)  SpO2: 96% (26 May 2024 04:34) (96% - 100%)    Parameters below as of 26 May 2024 04:34  Patient On (Oxygen Delivery Method): room air      I&O's Summary    25 May 2024 07:01  -  26 May 2024 07:00  --------------------------------------------------------  IN: 1080 mL / OUT: 0 mL / NET: 1080 mL        PE:  GENERAL: NAD, AAOx0, frail  CHEST/LUNG: CTABL, No wheeze  HEART: Regular rate and rhythm; no murmur  ABDOMEN: Soft, Nontender, Nondistended; Bowel sounds present  EXTREMITIES:  2+ Peripheral Pulses, +1 le edema  NEURO: No focal deficits    LABS:                        7.9    10.01 )-----------( 198      ( 26 May 2024 07:26 )             26.2     05-26    140  |  104  |  43<H>  ----------------------------<  165<H>  4.0   |  20<L>  |  2.83<H>    Ca    8.4      26 May 2024 07:26  Phos  3.4     05-26  Mg     2.1     05-26    TPro  4.4<L>  /  Alb  2.1<L>  /  TBili  0.1<L>  /  DBili  x   /  AST  10  /  ALT  7<L>  /  AlkPhos  90  05-26    PT/INR - ( 26 May 2024 07:26 )   PT: 14.0 sec;   INR: 1.34 ratio         PTT - ( 26 May 2024 07:26 )  PTT:31.8 sec  CAPILLARY BLOOD GLUCOSE      POCT Blood Glucose.: 151 mg/dL (25 May 2024 17:08)  POCT Blood Glucose.: 158 mg/dL (25 May 2024 12:26)        Urinalysis Basic - ( 26 May 2024 07:26 )    Color: x / Appearance: x / SG: x / pH: x  Gluc: 165 mg/dL / Ketone: x  / Bili: x / Urobili: x   Blood: x / Protein: x / Nitrite: x   Leuk Esterase: x / RBC: x / WBC x   Sq Epi: x / Non Sq Epi: x / Bacteria: x        RADIOLOGY & ADDITIONAL TESTS:    Imaging Personally Reviewed:  [x] YES  [ ] NO    Consultant(s) Notes Reviewed:  [x] YES  [ ] NO    MEDICATIONS  (STANDING):  ascorbic acid 250 milliGRAM(s) Oral daily  cefTRIAXone   IVPB 2000 milliGRAM(s) IV Intermittent every 24 hours  chlorhexidine 2% Cloths 1 Application(s) Topical daily  heparin   Injectable 5000 Unit(s) SubCutaneous every 12 hours  hydrocortisone sodium succinate Injectable 50 milliGRAM(s) IV Push daily  lidocaine   4% Patch 1 Patch Transdermal every 24 hours  multivitamin 1 Tablet(s) Oral daily  pantoprazole  Injectable 40 milliGRAM(s) IV Push daily    MEDICATIONS  (PRN):  acetaminophen   Oral Liquid .. 470 milliGRAM(s) Oral every 6 hours PRN Mild Pain (1 - 3), Moderate Pain (4 - 6)      Care Discussed with Consultants/Other Providers [x] YES  [ ] NO    HEALTH ISSUES - PROBLEM Dx:  ROBERT (acute kidney injury)    Metabolic acidosis

## 2024-05-26 NOTE — PROGRESS NOTE ADULT - ASSESSMENT
79F with PMH GERD, MI, chronic pancreatitis/insufficiency, HTN, incontinence, recent R hip fx s/p repair (1/2024) and was recently discharged from outpatient rehab to home initially presenting for increased lethargy in setting of poor PO intake, admitted to MICU for urosepsis c/b E. coli bacteremia requiring vasopressors and acute renal failure requiring urgent HD for acidosis. Now s/p HD x1 with improving renal function.    # acute renal failure and acidemia from hypotension and sepsis, ATN  - s/p RIJ shiley 5/19 and pressor assisted HD  - last HD 5/24, Remains anuric, HD per nephrology     # urosepsis c/b E. coli bacteremia   # septic shock, resolved  # r hydro  - e. coli bacteremia. Pt with E coli UTI. Mild hydronephrosis on R. evaluated by urology, low suspicion for true obstruction  - continue CTX 2g daily, plan for 14 day course, ID called  - initiated on stress dosed steroids 5/21, now weaned off pressors   - weaned off florief, now just on solumedrol 50mg qd, continue to wean off  - TTE LVEF 46%     # Toxic metabolic encephalopathy   Patient is baseline is AOx4, here AOx0-3, waxes/wanes consistent w/ delirium, very flat affect, minimally verbal     # depression  per pt's brother, who is also a cardiologist, pt with worsening depression, appetite/decreased PO intake since hip fracture in Jan. Prior to this, pt was a practicing dentist   - hold home mirtazapine and seroquel iso lethargy, was evaluated by     # HTN  - holding home atenolol and olmesartan given shock     # H/o Chronic pancreatitis/insufficiency   - Continue home Creon TID premeal (held for now bc pt too lethargic to take po and cannot be crushed)    # H/o GERD  - Continue home med Protonix 40mg PO QD    # Femur facture Jan 2024 s/p repair   Bed bound since discharge from rehab requiring full adl support  - Activity as tolerated   - Pain management with Tylenol PRN, Lidocaine patches PRN, Ice/Hot Compresses PRN    # anemia  baseline hgb 7-8s   - s/p 1u pRBC 5/21 for hgb <7    DVT Ppx Hep subq    FULL CODE    Please call [facility] with questions [phone].   79F with PMH GERD, MI, chronic pancreatitis/insufficiency, HTN, incontinence, recent R hip fx s/p repair (1/2024) and was recently discharged from outpatient rehab to home initially presenting for increased lethargy in setting of poor PO intake, admitted to MICU for urosepsis c/b E. coli bacteremia requiring vasopressors and acute renal failure requiring urgent HD for acidosis. Now s/p HD x1 with improving renal function.    # acute renal failure and acidemia from hypotension and sepsis, ATN  - s/p RIJ shiley 5/19 and pressor assisted HD  - last HD 5/24, Remains anuric, HD per nephrology     # urosepsis c/b E. coli bacteremia   # septic shock, resolved  # r hydro  - e. coli bacteremia. Pt with E coli UTI. Mild hydronephrosis on R. evaluated by urology, low suspicion for true obstruction  - continue CTX 2g daily, plan for 14 day course, ID called  - initiated on stress dosed steroids 5/21, now weaned off pressors, taper as ordered  - TTE LVEF 46%     # Toxic metabolic encephalopathy   - Patient is baseline is AOx4, here nonverbal, not following commands, very flat affect  - neuro consult to be called  - start with CT head NC  - sp NGT    # depression  - per pt's brother, who is also a cardiologist, pt with worsening depression, appetite/decreased PO intake since hip fracture in Jan. Prior to this, pt was a practicing dentist   - hold home mirtazapine and seroquel iso lethargy, was evaluated by     # HTN  - holding home atenolol and olmesartan given shock     # H/o Chronic pancreatitis/insufficiency   - Continue home Creon TID premeal (held for now bc pt too lethargic to take po and cannot be crushed)    # H/o GERD  - Continue home med Protonix 40mg PO QD    # Femur facture Jan 2024 s/p repair   Bed bound since discharge from rehab requiring full adl support  - Activity as tolerated   - Pain management with Tylenol PRN, Lidocaine patches PRN, Ice/Hot Compresses PRN    # anemia  baseline hgb 7-8s   - s/p 1u pRBC 5/21 for hgb <7    DVT PPX Hep subq    FULL CODE    Please call [facility] with questions [phone].   79F with PMH GERD, MI, chronic pancreatitis/insufficiency, HTN, incontinence, recent R hip fx s/p repair (1/2024) and was recently discharged from outpatient rehab to home initially presenting for increased lethargy in setting of poor PO intake, admitted to MICU for urosepsis c/b E. coli bacteremia requiring vasopressors and acute renal failure requiring urgent HD for acidosis. Now s/p HD x1 with improving renal function.    # acute renal failure and acidemia from hypotension and sepsis, ATN  - s/p RIJ shiley 5/19 and pressor assisted HD  - last HD 5/24, Remains anuric, HD per nephrology     # urosepsis c/b E. coli bacteremia   # septic shock, resolved  # r hydro  - e. coli bacteremia. Pt with E coli UTI. Mild hydronephrosis on R. evaluated by urology, low suspicion for true obstruction  - continue CTX 2g daily, plan for 14 day course, ID called  - initiated on stress dosed steroids 5/21, now weaned off pressors, taper as ordered  - TTE LVEF 46%     # Toxic metabolic encephalopathy   # depression  - Patient is baseline is AOx4, here nonverbal, not following commands, very flat affect  - per pt's brother, who is also a cardiologist, pt with worsening depression, appetite/decreased PO intake since hip fracture in Jan. slow progressive decline over past several months. Prior to this, pt was a practicing dentist  - neuro consult to be called  - start with CT head NC  - sp NGT  - hold home mirtazapine and seroquel iso lethargy, was evaluated by   - palliative care consult for GOC    # HTN  - holding home atenolol and olmesartan given shock     # H/o Chronic pancreatitis/insufficiency   - Continue home Creon TID premeal (held for now bc pt too lethargic to take po and cannot be crushed)    # H/o GERD  - Continue home med Protonix 40mg PO QD    # Femur facture Jan 2024 s/p repair   - Bed bound since discharge from rehab requiring full adl support  - Activity as tolerated   - Pain management with Tylenol PRN, Lidocaine patches PRN, Ice/Hot Compresses PRN    # anemia  - baseline hgb 7-8s, s/p 1u pRBC 5/21 for hgb <7  - heme called    DVT PPX Hep subq    FULL CODE    Please call [facility] with questions [phone].

## 2024-05-26 NOTE — CHART NOTE - NSCHARTNOTEFT_GEN_A_CORE
TEAMS messages sent to attending and covering ACP regarding need to follow up with nephrology regarding HD plans. If no further HD plans, shiley needs to be removed. If still planned for HD, shiley needs to be exchanged since it has been in for a week.

## 2024-05-27 NOTE — PROGRESS NOTE ADULT - SUBJECTIVE AND OBJECTIVE BOX
[facility] DIVISION OF KIDNEY DISEASES AND HYPERTENSION   FOLLOW UP NOTE    --------------------------------------------------------------------------------  Chief Complaint:    24 hour events/subjective: Pt. was seen and examined today. No acute events. Unable to obtain ROS.       PAST HISTORY  --------------------------------------------------------------------------------  No significant changes to PMH, PSH, FHx, SHx, unless otherwise noted    ALLERGIES & MEDICATIONS  --------------------------------------------------------------------------------  Allergies    penicillin (Swelling)    Intolerances    epinephrine (Other)    Standing Inpatient Medications  ascorbic acid 250 milliGRAM(s) Oral daily  cefTRIAXone   IVPB 2000 milliGRAM(s) IV Intermittent every 24 hours  chlorhexidine 2% Cloths 1 Application(s) Topical daily  heparin   Injectable 5000 Unit(s) SubCutaneous every 12 hours  hydrocortisone sodium succinate Injectable 25 milliGRAM(s) IV Push every 8 hours  lidocaine   4% Patch 1 Patch Transdermal every 24 hours  multivitamin 1 Tablet(s) Oral daily  pantoprazole  Injectable 40 milliGRAM(s) IV Push daily    PRN Inpatient Medications  acetaminophen   Oral Liquid .. 470 milliGRAM(s) Oral every 6 hours PRN      REVIEW OF SYSTEMS  --------------------------------------------------------------------------------  as above    VITALS/PHYSICAL EXAM  --------------------------------------------------------------------------------  T(C): 36.6 (05-27-24 @ 12:09), Max: 36.9 (05-27-24 @ 04:52)  HR: 75 (05-27-24 @ 12:09) (68 - 78)  BP: 105/70 (05-27-24 @ 12:09) (105/70 - 124/78)  RR: 18 (05-27-24 @ 12:09) (18 - 18)  SpO2: 96% (05-27-24 @ 12:09) (96% - 99%)  Wt(kg): --        05-26-24 @ 07:01  -  05-27-24 @ 07:00  --------------------------------------------------------  IN: 410 mL / OUT: 0 mL / NET: 410 mL    05-27-24 @ 07:01  -  05-27-24 @ 15:33  --------------------------------------------------------  IN: 350 mL / OUT: 3 mL / NET: 347 mL        Physical Exam:  Gen: Ill appearing   HEENT: Dry MM. Anicteric  Pulm: CTA B/L  CV: S1S2+  Abd: Soft, +BS   Ext: No LE edema B/L  Neuro: Somnolent  Skin: Warm and dry  Dialysis access: R IJ non tunneled HD catheter       LABS/STUDIES  --------------------------------------------------------------------------------              8.4    12.04 >-----------<  200      [05-27-24 @ 07:14]              28.7     139  |  106  |  56  ----------------------------<  163      [05-27-24 @ 07:15]  3.4   |  19  |  3.16        Ca     8.4     [05-27-24 @ 07:15]      Mg     2.1     [05-26-24 @ 07:26]      Phos  3.4     [05-26-24 @ 07:26]    TPro  4.4  /  Alb  2.1  /  TBili  0.1  /  DBili  x   /  AST  10  /  ALT  7   /  AlkPhos  90  [05-26-24 @ 07:26]    PT/INR: PT 14.0 , INR 1.34       [05-26-24 @ 07:26]  PTT: 31.8       [05-26-24 @ 07:26]          [05-27-24 @ 10:48]    Creatinine Trend:  SCr 3.16 [05-27 @ 07:15]  SCr 2.83 [05-26 @ 07:26]  SCr 2.24 [05-25 @ 00:28]  SCr 3.33 [05-24 @ 00:28]  SCr 2.94 [05-23 @ 00:42]    Urinalysis - [05-27-24 @ 07:15]      Color  / Appearance  / SG  / pH       Gluc 163 / Ketone   / Bili  / Urobili        Blood  / Protein  / Leuk Est  / Nitrite       RBC  / WBC  / Hyaline  / Gran  / Sq Epi  / Non Sq Epi  / Bacteria       HBsAg Nonreact      [05-19-24 @ 23:37]  HCV 0.09, Nonreact      [05-19-24 @ 23:37]  HIV Nonreact      [05-19-24 @ 23:37]    dsDNA <1      [05-19-24 @ 23:37]  C3 Complement 75      [05-19-24 @ 23:37]  C4 Complement 28      [05-19-24 @ 23:37]  ANCA: cANCA Negative, pANCA Negative, atypical ANCA Negative      [05-22-24 @ 00:39]  Syphilis Screen (Treponema Pallidum Ab) Negative      [05-19-24 @ 23:37]  PLA2R: JUANJO <1.8, IFA --      [05-19-24 @ 23:37]  Free Light Chains: kappa 22.09, lambda 21.07, ratio = 1.05      [05-19 @ 23:37]  Immunofixation Serum:   IgM Lambda Band Identified      Reference Range: None Detected      [05-19-24 @ 23:37]  SPEP Interpretation: Beta-Migrating Paraprotein Identified      [05-19-24 @ 23:37]    Tacrolimus  Cyclosporine  Sirolimus  Mycophenolate  BK PCR  CMV PCR  Parvo PCR  EBV PCR

## 2024-05-27 NOTE — CONSULT NOTE ADULT - ATTENDING COMMENTS
HPI as per resident note, personally verified by me. Patient admitted for severe sepsis with infections and bacteremia. Patient with continued poor mental status. No focal neurologic deficits or abnormal movements noted.    Neurologic exam as per resident note with additions as below:  Mildly lethargic, attends to all stimuli b/l, no speech output, does not follow commands  CN's II-XII intact (VFF as BTT) but could not assess tongue/palate  Dec tone and bulk throughout. No spontaneous movement. She grimaces slightly to RUE strong tactile stimuli only and withdraws 1-2/5  Sens as per Motor above  No tremors noted  DTR's - 1+ BR b/l, 0+ rest, and triple flexion b/l plantar response    BMP with inc BUN/Cr 56/3.16 (GFR dec 14), otherwise essentially WNL  B12/folate WNL, TSH WNL, HIV (-), c-ANCA/p-ANCA (-), Hep screen (-), KIKE (-), dsDNA (-), syphilis serology TP (-)    < from: CT Head No Cont (05.26.24 @ 10:10) >    Noacute hemorrhage. Ventricles are stable. No hydrocephalus, mass effect   or midline shift. No extra-axial collection. Approximately 9 x 4 mm   meningioma the left lateral frontoparietal convexity is stable (6;5,   9;20).    Gray to white differentiation appears preserved, without CT evidence of   recent transcortical or territorial infarct.    Bone algorithm images show no calvarial fracture or acute abnormality of   the calvarium or skull base. Paranasal sinuses and mastoids included on   this scan show no acute disease. Right sided nasogastric tube partially   seen.      IMPRESSION:  No acute intracranial findings. No change compared to 01/20/2024.    < end of copied text >      A/P:  Encephalopathy  Atrial fibrillation s/p PPM  HTN  CAD/MI  Pancreatic insufficiency  RBOERT  MGUS  UTI  Left sided meningioma    - Etiology for patient's continued poor mentation likely secondary to toxic/metabolic process from ongoing acute medical issues. Less likely seizures or cerebrovascular event. No abnormal movements reported. CT head, personally reviewed by me, with stable left frontoparietal meningioma without vasogenic edema but no other acute intracranial findings  - Labs as per resident note  - If mental status fails to further improve with optimal medical treatment can then check vEEG +/- MRI brain w/o  - Continue to address above medical problems, as you are doing  - Will continue to follow patient with you

## 2024-05-27 NOTE — PROGRESS NOTE ADULT - ATTENDING COMMENTS
# ATN - secondary to bacteremia. Currently dialysis-dependent. Last HD 5/24/24.   HD today as planned.     # Medication monitoring encounter: medication dose reviewed. Please dose medications to CrCl<15    The rest of the recommendations as per fellow's note.    Mariluz Dodd MD  Attending Nephrologist  [phone] or via [employer] .

## 2024-05-27 NOTE — PROGRESS NOTE ADULT - ASSESSMENT
79y old Female with history of MI, Chronic pancreatitis, HTN, Incontinence, GERD, Hip fracture here with septic shock secondary to E. Coli bacteremia.     1. Sepsis  2. E. Coli bacteremia  - remains stable  - Continue with steroid taper- Hydrocortisone 25 mg IV Q8H for 1 day, Hydrocortisone 25 m IV Q12H for 1 day, then prednisone 10 mg for 1 day and prednisone 5 mg one day  - Monitor vital signs     3. Steroid induced hyperglycemia  - No history of diabetes previously  - FS are within goal  - No need for sliding scale  - Monitor FS Q6H since she's on tube feeds    4. Acute renal failure on HD  - monitor for hypoglycemia  - nephrology following    Will continue to follow.    Deborah Jeff MD  [facility]- Division of Endocrinology    [address] [place] [address]    T [phone]  F [phone]

## 2024-05-27 NOTE — CONSULT NOTE ADULT - SUBJECTIVE AND OBJECTIVE BOX
[facility] HEMATOLOGY/ONCOLOGY CONSULT NOTE     HPI:  Ms. Yee is a 79y Female with PMHx of     ROS: pertinent positives and negatives as per HPI    Allergies: penicillin (Swelling)    PMHx:  Hypertension  Anxiety and depression  Rosacea  GERD (gastroesophageal reflux disease)  History of pancreatitis  Stress incontinence, female  Heart attack  2019 novel coronavirus disease (COVID-19)    SurgHx:   H/O left inguinal hernia repair  Hx of tonsillectomy  Hx of appendectomy  H/O dilation and curettage  Pacemaker    FHx:   No pertinent family history in first degree relatives    SocHx:     Meds:   MEDICATIONS  (STANDING):  ascorbic acid 250 milliGRAM(s) Oral daily  cefTRIAXone   IVPB 2000 milliGRAM(s) IV Intermittent every 24 hours  chlorhexidine 2% Cloths 1 Application(s) Topical daily  heparin   Injectable 5000 Unit(s) SubCutaneous every 12 hours  hydrocortisone sodium succinate Injectable 25 milliGRAM(s) IV Push every 8 hours  lidocaine   4% Patch 1 Patch Transdermal every 24 hours  multivitamin 1 Tablet(s) Oral daily  pantoprazole  Injectable 40 milliGRAM(s) IV Push daily    MEDICATIONS  (PRN):  acetaminophen   Oral Liquid .. 470 milliGRAM(s) Oral every 6 hours PRN Mild Pain (1 - 3), Moderate Pain (4 - 6)    Vital Signs  T(C): 36.9 (05-27-24 @ 04:52), Max: 36.9 (05-27-24 @ 04:52)  T(F): 98.4 (05-27-24 @ 04:52), Max: 98.4 (05-27-24 @ 04:52)  HR: 78 (05-27-24 @ 04:52) (67 - 78)  BP: 124/78 (05-27-24 @ 04:52) (98/62 - 124/78)  RR: 18 (05-27-24 @ 04:52) (18 - 18)  SpO2: 97% (05-27-24 @ 04:52) (95% - 99%)    Physical Exam:  Gen:   HEENT:   Chest:   Cardiac:  Abd:   Ext:   Neuro:   Integument:     Labs:  CBC Full  -  ( 26 May 2024 07:26 )  WBC Count : 10.01 K/uL  RBC Count : 2.56 M/uL  Hemoglobin : 7.9 g/dL  Hematocrit : 26.2 %  Platelet Count - Automated : 198 K/uL  Mean Cell Volume : 102.3 fl  Mean Cell Hemoglobin : 30.9 pg  Mean Cell Hemoglobin Concentration : 30.2 gm/dL  Auto Neutrophil # : 8.66 K/uL  Auto Lymphocyte # : 0.83 K/uL  Auto Monocyte # : 0.44 K/uL  Auto Eosinophil # : 0.01 K/uL  Auto Basophil # : 0.00 K/uL  Auto Neutrophil % : 86.5 %  Auto Lymphocyte % : 8.3 %  Auto Monocyte % : 4.4 %  Auto Eosinophil % : 0.1 %  Auto Basophil % : 0.0 %    05-26    140  |  104  |  43<H>  ----------------------------<  165<H>  4.0   |  20<L>  |  2.83<H>    Ca    8.4      26 May 2024 07:26  Phos  3.4     05-26  Mg     2.1     05-26    TPro  4.4<L>  /  Alb  2.1<L>  /  TBili  0.1<L>  /  DBili  x   /  AST  10  /  ALT  7<L>  /  AlkPhos  90  05-26    PT/INR - ( 26 May 2024 07:26 )   PT: 14.0 sec;   INR: 1.34 ratio         PTT - ( 26 May 2024 07:26 )  PTT:31.8 sec  Bilirubin Total: 0.1 mg/dL (05-26-24 @ 07:26)   [facility] HEMATOLOGY/ONCOLOGY CONSULT NOTE     HPI:  Ms. Yee is a 79y Female with PMHx of HTN, pA.fibm HTN, macrocytic anemia, pancreatic insufficiency hypertriglyceridemia, aortic valve stenosis, R hip fracture s/p ORIF 01/2024, depression who is admitted since 05/19/2024 with septic shock due to UTI and E.coli bacteremia, R hydronephrosis with acute renal failure now on HD, toxic metabolic encephalopathy. Pt is non-verbal this morning so history is obtained from chart and coordinating team members. Now on floor from MICU. CTH negative for acute changes.     Pt received 1u PRBC on 05/21/2024 with appropriate response. Labs on my initial evaluation show Hb 7.9 Hct 26.2 .3 with neutrophilia and lymphopenia. Normal bilirubin, liver function. Serum FLC ratio normal. SPEP with 0.6 Mspike without ALEKSEY. UPEP with K/L elevated and ratio 4 in setting of ARF. Additionally CT A/P with reported cirrhosis.     ROS: pertinent positives and negatives as per HPI    Allergies: penicillin (Swelling)    PMHx:  Hypertension  Anxiety and depression  Rosacea  GERD (gastroesophageal reflux disease)  History of pancreatitis  Stress incontinence, female  Heart attack  2019 novel coronavirus disease (COVID-19)    SurgHx:   H/O left inguinal hernia repair  Hx of tonsillectomy  Hx of appendectomy  H/O dilation and curettage  Pacemaker    FHx:   No pertinent family history in first degree relatives    Meds:   MEDICATIONS  (STANDING):  ascorbic acid 250 milliGRAM(s) Oral daily  cefTRIAXone   IVPB 2000 milliGRAM(s) IV Intermittent every 24 hours  chlorhexidine 2% Cloths 1 Application(s) Topical daily  heparin   Injectable 5000 Unit(s) SubCutaneous every 12 hours  hydrocortisone sodium succinate Injectable 25 milliGRAM(s) IV Push every 8 hours  lidocaine   4% Patch 1 Patch Transdermal every 24 hours  multivitamin 1 Tablet(s) Oral daily  pantoprazole  Injectable 40 milliGRAM(s) IV Push daily    MEDICATIONS  (PRN):  acetaminophen   Oral Liquid .. 470 milliGRAM(s) Oral every 6 hours PRN Mild Pain (1 - 3), Moderate Pain (4 - 6)    Vital Signs  T(C): 36.9 (05-27-24 @ 04:52), Max: 36.9 (05-27-24 @ 04:52)  T(F): 98.4 (05-27-24 @ 04:52), Max: 98.4 (05-27-24 @ 04:52)  HR: 78 (05-27-24 @ 04:52) (67 - 78)  BP: 124/78 (05-27-24 @ 04:52) (98/62 - 124/78)  RR: 18 (05-27-24 @ 04:52) (18 - 18)  SpO2: 97% (05-27-24 @ 04:52) (95% - 99%)    Physical Exam:  Gen: NAD, shiley and NG in place  HEENT: MMM  Chest: Equal chest rise  Cardiac: irregular  Abd: Nondistended  Neuro: AAOx0    Labs:                        7.9    10.01 )-----------( 198      ( 26 May 2024 07:26 )             26.2     CBC Full  -  ( 26 May 2024 07:26 )  WBC Count : 10.01 K/uL  RBC Count : 2.56 M/uL  Hemoglobin : 7.9 g/dL  Hematocrit : 26.2 %  Platelet Count - Automated : 198 K/uL  Mean Cell Volume : 102.3 fl  Mean Cell Hemoglobin : 30.9 pg  Mean Cell Hemoglobin Concentration : 30.2 gm/dL  Auto Neutrophil # : 8.66 K/uL  Auto Lymphocyte # : 0.83 K/uL  Auto Monocyte # : 0.44 K/uL  Auto Eosinophil # : 0.01 K/uL  Auto Basophil # : 0.00 K/uL  Auto Neutrophil % : 86.5 %  Auto Lymphocyte % : 8.3 %  Auto Monocyte % : 4.4 %  Auto Eosinophil % : 0.1 %  Auto Basophil % : 0.0 %    05-26    140  |  104  |  43<H>  ----------------------------<  165<H>  4.0   |  20<L>  |  2.83<H>    Ca    8.4      26 May 2024 07:26  Phos  3.4     05-26  Mg     2.1     05-26    TPro  4.4<L>  /  Alb  2.1<L>  /  TBili  0.1<L>  /  DBili  x   /  AST  10  /  ALT  7<L>  /  AlkPhos  90  05-26    PT/INR - ( 26 May 2024 07:26 )   PT: 14.0 sec;   INR: 1.34 ratio         PTT - ( 26 May 2024 07:26 )  PTT:31.8 sec  Bilirubin Total: 0.1 mg/dL (05-26-24 @ 07:26)

## 2024-05-27 NOTE — CONSULT NOTE ADULT - ASSESSMENT
Ms. Yee is a 79y Female with PMHx of HTN, pA.fibm HTN, macrocytic anemia, pancreatic insufficiency hypertriglyceridemia, aortic valve stenosis, R hip fracture s/p ORIF 01/2024, depression who is admitted since 05/19/2024 with septic shock due to UTI and E.coli bacteremia, R hydronephrosis with acute renal failure now on HD, toxic metabolic encephalopathy. Pt is non-verbal this morning so history is obtained from chart and coordinating team members. Now on floor from MICU. CTH negative for acute changes.     Pt received 1u PRBC on 05/21/2024 with appropriate response. Labs on my initial evaluation show Hb 7.9 Hct 26.2 .3 with neutrophilia and lymphopenia. Normal bilirubin, liver function. Serum FLC ratio normal. SPEP with 0.6 Mspike without ALEKSEY. UPEP with K/L elevated and ratio 4 in setting of ARF. Additionally CT A/P with reported cirrhosis.       # Macrocytic anemia  # B12 deficiency  - B12 last year was 192  - Repeat B12  - Obtain folate, previously low   - Start B12 1000mcg weekly after B12 tested and folic acid 1mg PO daily   - Also may have underlying BM disorder such as MDS, outpatient labs with persistent macrocytosis and anemia Hb around 10, at this time further workup such as BmBx would need to be considered as outpatient given overall clinical context   - f/u palliative care recommendations   - Transfuse to maintain Hb > 7     # MGUS  - Obtain full panel for complete workup  - SPEP/ALEKSEY, Serum immunoglobulins, Serum FLCs with ratio, LDH, B2MCG   - See above     # Acute renal failure  - HD per nephrology    # E.coli UTI and bacteremia  - Antibiotics per primary team and ID       Thank you for allowing me to participate in the care of this patient, please do  not hesitate to call or text me if you have further questions or concerns.     Rajeev Mcdowell MD  [facility]   Division of Hematology/Oncology  [address], [address]  [place] [address]  P: [phone]  F: [phone]    Attestation:   Total time spent on the encounter: >60 minutes   ----Including face-to-face interaction in addition to chart review, reviewing treatment plan, and managing the patient’s chronic diagnoses as listed in the assessment----     1.	I have reviewed, analyzed and interpreted the following labs: CBC, CMP, SPEP, Serum FLCs imaging:  CT A/P and Head impressions as above.   2.	I have reviewed notes stating pts current admission, consultants and follow ups.   3.	I have reviewed the past medical, family, and surgical history and confirmed with outpatient records

## 2024-05-27 NOTE — PROGRESS NOTE ADULT - PROBLEM SELECTOR PLAN 2
Pt with severe high anion gap metabolic acidosis in setting of ROBERT, infectious process and hypotension. Admission labs significant for SCO2 <10, pH of 7.13 and Lactate 1.9. Pt placed on IV bicarb infusion. MICU consulted and pt admitted to MICU for urgent HD on 5/19 as noted above. SCO2 improved to 19. Monitor SCO2.    Erickson Helm  Nephrology Fellow  Feel free to contact me on TEAMS  After 4 pm please contact the on-call Fellow.

## 2024-05-27 NOTE — CONSULT NOTE ADULT - SUBJECTIVE AND OBJECTIVE BOX
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 Patient functionally bedbound after recent fall 1/17/24 requiring ORIF.    In ED, initially labs concerning for severe ROBERT, sepsis with metabolic acidemia. Patient was admitted to the MICU for sepsis workup 5/19. Patient was found to have E coli bacteremia requiring vasopressors. blood cx 5/19 w/ E Coli. Blood cx 5/21- NGTD. UCx with e coli and e faecalis. c/w ceftriaxone 2g daily. ID on board, recommend completing 10 day course of antibiotics from date of negative blood cultures w/ last day 5/30.    Patient also with ROBERT on CKD with Right Hydronephrosis/Pyelonephritis. SCr on admission of 5.56. Nephrology was consulted for ROBERT. Pt has had increasing SCr since 3/14/24 with a SCr of 1.8, trended up to 1.96 on 3/23/24, then 2.19 on 4/30/24 (most recent). Prior to March 2024, pt had normal kidney function dating back to October 2017. Underwent urgent HD overnight for severe lactic acidosis. Urology was alos consulted for CT showing mild to moderate right hydronephrosis and hydroureter with cirrhosis. No obvious transition point or clear cause of obstruction found, hydropnephrosis possible reactive. Patient in acute renal failure from hypotensions, sepsis, ATN.     Patient was placed on stress steroids on 5/21 starting with hydrocortisone 50mg IV q8h, which was tapered down. She is not on steroids outpatient. Endocrine following.      Hemeonc also on board. Pt received 1u PRBC on 05/21/2024. Labs on initial evaluation Hb 7.9 Hct 26.2 .3 with neutrophilia and lymphopenia.  SPEP with 0.6 Mspike without ALEKSEY. UPEP with K/L elevated and ratio 4 in setting of ARF.      Patient was noted to be nonverbal 5/27 AM. AO0. No focal neuro deficits per primary team. At baseline, is AO4. CTH non con done today without acute change. Neurology consulted for the altered mental status.     Home mirtazipine and seroquel held in setting of lethargy. Patient has had progressive slow decline over past few months, concern for depression, has decreased appetite.     Unable to obtain further history from patient due to mental status.      Allergies:  epinephrine (Other)  penicillin (Swelling)      PMHx/PSHx/Family Hx: As above, otherwise see below   Hypertension    Anxiety and depression    Rosacea    Stress incontinence, female    GERD (gastroesophageal reflux disease)    History of pancreatitis    Stress incontinence, female    Heart attack    2019 novel coronavirus disease (COVID-19)        Social Hx:  No current use of tobacco, alcohol, or illicit drugs  Lives with ***    Medications:  MEDICATIONS  (STANDING):  ascorbic acid 250 milliGRAM(s) Oral daily  cefTRIAXone   IVPB 2000 milliGRAM(s) IV Intermittent every 24 hours  chlorhexidine 2% Cloths 1 Application(s) Topical daily  heparin   Injectable 5000 Unit(s) SubCutaneous every 12 hours  hydrocortisone sodium succinate Injectable 25 milliGRAM(s) IV Push every 8 hours  lidocaine   4% Patch 1 Patch Transdermal every 24 hours  multivitamin 1 Tablet(s) Oral daily  pantoprazole  Injectable 40 milliGRAM(s) IV Push daily    MEDICATIONS  (PRN):  acetaminophen   Oral Liquid .. 470 milliGRAM(s) Oral every 6 hours PRN Mild Pain (1 - 3), Moderate Pain (4 - 6)      Vitals:  T(C): 36.4 (05-27-24 @ 18:05), Max: 36.9 (05-27-24 @ 04:52)  HR: 75 (05-27-24 @ 18:05) (68 - 78)  BP: 113/65 (05-27-24 @ 18:05) (105/70 - 124/78)  RR: 18 (05-27-24 @ 18:05) (18 - 18)  SpO2: 98% (05-27-24 @ 18:05) (96% - 98%)    Physical Examination:   Neurologic Exam:  Mental status - Not responding to LT, verbal, or noxious stimuli. Not following commands. No meaningful verbal output    Cranial nerves - Pupils equal, no BTT b/l, eyes roaming horizontally but no fixed gaze deviation, +corneal intact b/l, no gag reflex appreciated    Motor - Normal bulk, decreased tone throughout. withdraws to pain x 4 extremities. Unable to perform manual motor strength testing because unable to follow command.    Sensation - withdraws to pain x 4 extremities    DTR's - MÓNICA triceps due to patient position and inability to physically access triceps             Biceps      Triceps     Brachioradialis      Patellar    Ankle    Toes/plantar response  R            1+           MÓNICA                1+                    1+          1+                Mute  L            1+          MÓNICA                 1+                     1+          1+               Mute    Coordination - MÓNICA due to mental status    Gait and station - MÓNICA due to mental status    Labs:                        8.4    12.04 )-----------( 200      ( 27 May 2024 07:14 )             28.7     05-27    139  |  106  |  56<H>  ----------------------------<  163<H>  3.4<L>   |  19<L>  |  3.16<H>    Ca    8.4      27 May 2024 07:15  Phos  3.4     05-26  Mg     2.1     05-26    TPro  4.4<L>  /  Alb  2.1<L>  /  TBili  0.1<L>  /  DBili  x   /  AST  10  /  ALT  7<L>  /  AlkPhos  90  05-26    CAPILLARY BLOOD GLUCOSE      POCT Blood Glucose.: 145 mg/dL (27 May 2024 18:32)    LIVER FUNCTIONS - ( 26 May 2024 07:26 )  Alb: 2.1 g/dL / Pro: 4.4 g/dL / ALK PHOS: 90 U/L / ALT: 7 U/L / AST: 10 U/L / GGT: x             PT/INR - ( 26 May 2024 07:26 )   PT: 14.0 sec;   INR: 1.34 ratio         PTT - ( 26 May 2024 07:26 )  PTT:31.8 sec  CSF:                  Radiology:  CT Head No Cont:  (26 May 2024 10:10)

## 2024-05-27 NOTE — CONSULT NOTE ADULT - ASSESSMENT
Impression: Altered mental status with nonlateralizing neuro exam, likely toxic metabolic infectious encephalopathy i/s/o recent bacteremia, septic shock requiring pressors, stress dose steroids, uremia, anemia    Recommendations:  [ ] Toxic Metabolic workup (do not need to redraw if already obtained): Infectious workup (BCx, UCx, UA, CXR, RVP+COVID), CBC, CMP, Mg, Phos, Vitamin B1, B6, B12, Folate, Vitamin D 25OH, Vitamin E, TSH, FT4, Ammonia, Lactate, CK  [ ] Management of medical issues per primary team/respective consultants  [ ] If no clinical improvement after above workup and addressing active medical issues, consider rEEG and MRI.    Case to be seen by neuro attending on AM rounds. Recommendations not finalized until after attending attestation.

## 2024-05-27 NOTE — PROGRESS NOTE ADULT - PROBLEM SELECTOR PLAN 1
Pt with oliguric/anuric ROBERT requiring renal replacement therapy in the setting of GN bacteremia, septic shock, ?medication use (Benicar) and diarrhea. Per Kath/THALIA review, pt has had increasing SCr since 3/14/24 with a SCr of 1.8, trended up to 1.96 on 3/23/24, then 2.19 on 4/30/24 (most recent). Prior to March 2024, pt had normal kidney function dating back to October 2017. SCr on admission of 5.56. UA significant for infectious etiology. Pt initiated on IV abx. CT abd/pel with R hydronephrosis noted. Urology notes reviewed (no intervention recommended). Pt with persistent metabolic acidosis despite IV bicarb infusion and was initiated on HD on 5/19. Last HD treatment was on 5/24, was well tolerated. Plan for HD today 5/27.    Serologies thus far as above reviewed. Recommend UPCR (urine protein-creatinine ratio). Pt remains anuric and acidotic. Pt requiring significant vasopressor support. Recommend repeat Kidney US (evaluate R hydro previously noted). Continue to follow serologies. Monitor labs and urine output. Avoid NSAIDs, ACEI/ARBS, RCA and nephrotoxins. Dose medications as per eGFR.

## 2024-05-27 NOTE — PROGRESS NOTE ADULT - SUBJECTIVE AND OBJECTIVE BOX
Patient is a 79y old  Female who presents with a chief complaint of Weakness (27 May 2024 06:37)      SUBJECTIVE / OVERNIGHT EVENTS:    Patient seen and examined.      Vital Signs Last 24 Hrs  T(C): 36.9 (27 May 2024 04:52), Max: 36.9 (27 May 2024 04:52)  T(F): 98.4 (27 May 2024 04:52), Max: 98.4 (27 May 2024 04:52)  HR: 78 (27 May 2024 04:52) (67 - 78)  BP: 124/78 (27 May 2024 04:52) (98/62 - 124/78)  BP(mean): --  RR: 18 (27 May 2024 04:52) (18 - 18)  SpO2: 97% (27 May 2024 04:52) (95% - 99%)    Parameters below as of 27 May 2024 04:52  Patient On (Oxygen Delivery Method): room air      I&O's Summary    26 May 2024 07:01  -  27 May 2024 07:00  --------------------------------------------------------  IN: 410 mL / OUT: 0 mL / NET: 410 mL        PE:  GENERAL: NAD, AAOx0, frail  CHEST/LUNG: CTABL, No wheeze  HEART: Regular rate and rhythm; no murmur  ABDOMEN: Soft, Nontender, Nondistended; Bowel sounds present  EXTREMITIES:  2+ Peripheral Pulses, +1 le edema  NEURO: No focal deficits    LABS:                        7.9    10.01 )-----------( 198      ( 26 May 2024 07:26 )             26.2     05-26    140  |  104  |  43<H>  ----------------------------<  165<H>  4.0   |  20<L>  |  2.83<H>    Ca    8.4      26 May 2024 07:26  Phos  3.4     05-26  Mg     2.1     05-26    TPro  4.4<L>  /  Alb  2.1<L>  /  TBili  0.1<L>  /  DBili  x   /  AST  10  /  ALT  7<L>  /  AlkPhos  90  05-26    PT/INR - ( 26 May 2024 07:26 )   PT: 14.0 sec;   INR: 1.34 ratio         PTT - ( 26 May 2024 07:26 )  PTT:31.8 sec  CAPILLARY BLOOD GLUCOSE      POCT Blood Glucose.: 154 mg/dL (27 May 2024 00:23)  POCT Blood Glucose.: 141 mg/dL (26 May 2024 16:52)  POCT Blood Glucose.: 177 mg/dL (26 May 2024 12:31)        Urinalysis Basic - ( 26 May 2024 07:26 )    Color: x / Appearance: x / SG: x / pH: x  Gluc: 165 mg/dL / Ketone: x  / Bili: x / Urobili: x   Blood: x / Protein: x / Nitrite: x   Leuk Esterase: x / RBC: x / WBC x   Sq Epi: x / Non Sq Epi: x / Bacteria: x        RADIOLOGY & ADDITIONAL TESTS:    Imaging Personally Reviewed:  [x] YES  [ ] NO    Consultant(s) Notes Reviewed:  [x] YES  [ ] NO    MEDICATIONS  (STANDING):  ascorbic acid 250 milliGRAM(s) Oral daily  cefTRIAXone   IVPB 2000 milliGRAM(s) IV Intermittent every 24 hours  chlorhexidine 2% Cloths 1 Application(s) Topical daily  heparin   Injectable 5000 Unit(s) SubCutaneous every 12 hours  hydrocortisone sodium succinate Injectable 25 milliGRAM(s) IV Push every 8 hours  lidocaine   4% Patch 1 Patch Transdermal every 24 hours  multivitamin 1 Tablet(s) Oral daily  pantoprazole  Injectable 40 milliGRAM(s) IV Push daily    MEDICATIONS  (PRN):  acetaminophen   Oral Liquid .. 470 milliGRAM(s) Oral every 6 hours PRN Mild Pain (1 - 3), Moderate Pain (4 - 6)      Care Discussed with Consultants/Other Providers [x] YES  [ ] NO    HEALTH ISSUES - PROBLEM Dx:  ROBERT (acute kidney injury)    Metabolic acidosis         Patient is a 79y old Female who presents with a chief complaint of Weakness (27 May 2024 06:37)      SUBJECTIVE / OVERNIGHT EVENTS:    Patient seen and examined. still lethargic not responding. CT no acute findings. no acute events.      Vital Signs Last 24 Hrs  T(C): 36.9 (27 May 2024 04:52), Max: 36.9 (27 May 2024 04:52)  T(F): 98.4 (27 May 2024 04:52), Max: 98.4 (27 May 2024 04:52)  HR: 78 (27 May 2024 04:52) (67 - 78)  BP: 124/78 (27 May 2024 04:52) (98/62 - 124/78)  BP(mean): --  RR: 18 (27 May 2024 04:52) (18 - 18)  SpO2: 97% (27 May 2024 04:52) (95% - 99%)    Parameters below as of 27 May 2024 04:52  Patient On (Oxygen Delivery Method): room air      I&O's Summary    26 May 2024 07:01  -  27 May 2024 07:00  --------------------------------------------------------  IN: 410 mL / OUT: 0 mL / NET: 410 mL        PE:  GENERAL: NAD, AAOx 0, frail  CHEST/LUNG: CTABL, No wheeze  HEART: Regular rate and rhythm; no murmur  ABDOMEN: Soft, Nontender, Nondistended; Bowel sounds present  EXTREMITIES:  2+ Peripheral Pulses, +1 le edema  NEURO: No focal deficits    LABS:                        7.9    10.01 )-----------( 198      ( 26 May 2024 07:26 )             26.2     05-26    140  |  104  |  43<H>  ----------------------------<  165<H>  4.0   |  20<L>  |  2.83<H>    Ca    8.4      26 May 2024 07:26  Phos  3.4     05-26  Mg     2.1     05-26    TPro  4.4<L>  /  Alb  2.1<L>  /  TBili  0.1<L>  /  DBili  x   /  AST  10  /  ALT  7<L>  /  AlkPhos  90  05-26    PT/INR - ( 26 May 2024 07:26 )   PT: 14.0 sec;   INR: 1.34 ratio         PTT - ( 26 May 2024 07:26 )  PTT:31.8 sec  CAPILLARY BLOOD GLUCOSE      POCT Blood Glucose.: 154 mg/dL (27 May 2024 00:23)  POCT Blood Glucose.: 141 mg/dL (26 May 2024 16:52)  POCT Blood Glucose.: 177 mg/dL (26 May 2024 12:31)        Urinalysis Basic - ( 26 May 2024 07:26 )    Color: x / Appearance: x / SG: x / pH: x  Gluc: 165 mg/dL / Ketone: x  / Bili: x / Urobili: x   Blood: x / Protein: x / Nitrite: x   Leuk Esterase: x / RBC: x / WBC x   Sq Epi: x / Non Sq Epi: x / Bacteria: x        RADIOLOGY & ADDITIONAL TESTS:    Imaging Personally Reviewed:  [x] YES  [ ] NO    Consultant(s) Notes Reviewed:  [x] YES  [ ] NO    MEDICATIONS  (STANDING):  ascorbic acid 250 milliGRAM(s) Oral daily  cefTRIAXone   IVPB 2000 milliGRAM(s) IV Intermittent every 24 hours  chlorhexidine 2% Cloths 1 Application(s) Topical daily  heparin   Injectable 5000 Unit(s) SubCutaneous every 12 hours  hydrocortisone sodium succinate Injectable 25 milliGRAM(s) IV Push every 8 hours  lidocaine   4% Patch 1 Patch Transdermal every 24 hours  multivitamin 1 Tablet(s) Oral daily  pantoprazole  Injectable 40 milliGRAM(s) IV Push daily    MEDICATIONS  (PRN):  acetaminophen   Oral Liquid .. 470 milliGRAM(s) Oral every 6 hours PRN Mild Pain (1 - 3), Moderate Pain (4 - 6)      Care Discussed with Consultants/Other Providers [x] YES  [ ] NO    HEALTH ISSUES - PROBLEM Dx:  ROBERT (acute kidney injury)    Metabolic acidosis

## 2024-05-27 NOTE — PROGRESS NOTE ADULT - SUBJECTIVE AND OBJECTIVE BOX
[facility] Progress Note    MAR, chart notes, fingersticks and labs reviewed    Subjective: no events    Objective  Vital Signs  T(C): 36.9 (05-27-24 @ 04:52), Max: 36.9 (05-27-24 @ 04:52)  HR: 78 (05-27-24 @ 04:52) (67 - 78)  BP: 124/78 (05-27-24 @ 04:52) (98/62 - 124/78)  RR: 18 (05-27-24 @ 04:52) (18 - 18)  SpO2: 97% (05-27-24 @ 04:52) (95% - 99%)    Physical Exam  Gen: NAD  HEENT: NC/AT  Neck: supple  Chest: breathing comfortably  Heart: +s1 +s2    Medications  acetaminophen   Oral Liquid .. 470 milliGRAM(s) Oral every 6 hours PRN  ascorbic acid 250 milliGRAM(s) Oral daily  cefTRIAXone   IVPB 2000 milliGRAM(s) IV Intermittent every 24 hours  chlorhexidine 2% Cloths 1 Application(s) Topical daily  heparin   Injectable 5000 Unit(s) SubCutaneous every 12 hours  hydrocortisone sodium succinate Injectable 25 milliGRAM(s) IV Push every 8 hours  lidocaine   4% Patch 1 Patch Transdermal every 24 hours  multivitamin 1 Tablet(s) Oral daily  pantoprazole  Injectable 40 milliGRAM(s) IV Push daily    Pertinent labs/Imaging  POCT Blood Glucose.: 154 mg/dL (05-27-24 @ 00:23)  POCT Blood Glucose.: 141 mg/dL (05-26-24 @ 16:52)  POCT Blood Glucose.: 177 mg/dL (05-26-24 @ 12:31)    eGFR: 16 mL/min/1.73m2 (05-26-24 @ 07:26)

## 2024-05-27 NOTE — PROGRESS NOTE ADULT - ASSESSMENT
79F with PMH GERD, MI, chronic pancreatitis/insufficiency, HTN, incontinence, recent R hip fx s/p repair (1/2024) and was recently discharged from outpatient rehab to home initially presenting for increased lethargy in setting of poor PO intake, admitted to MICU for urosepsis c/b E. coli bacteremia requiring vasopressors and acute renal failure requiring urgent HD for acidosis. Now s/p HD x1 with improving renal function.    # acute renal failure and acidemia from hypotension and sepsis, ATN  - s/p RIJ shiley 5/19 and pressor assisted HD  - last HD 5/24, Remains anuric, HD per nephrology  - eduardo to be exchanged if still requires HD    # urosepsis c/b E. coli bacteremia   # septic shock, resolved  # r hydro  - e. coli bacteremia. Pt with E coli UTI. Mild hydronephrosis on R. evaluated by urology, low suspicion for true obstruction  - continue CTX 2g daily, plan for 14 day course per ID  - initiated on stress dosed steroids 5/21, now weaned off pressors, taper as ordered  - TTE LVEF 46%     # Toxic metabolic encephalopathy   # depression  - Patient is baseline is AOx4, here nonverbal, not following commands, very flat affect  - per pt's brother, who is also a cardiologist, pt with worsening depression, appetite/decreased PO intake since hip fracture in Jan. slow progressive decline over past several months. Prior to this, pt was a practicing dentist  - neuro consult to be called  - CT head NC no acute change  - sp NGT  - hold home mirtazapine and seroquel iso lethargy, was evaluated by   - palliative care consult for GOC    # HTN  - holding home atenolol and olmesartan given shock     # H/o Chronic pancreatitis/insufficiency   - Continue home Creon TID premeal (held for now bc pt too lethargic to take po and cannot be crushed)    # H/o GERD  - Continue home med Protonix 40mg PO QD    # Femur facture Jan 2024 s/p repair   - Bed bound since discharge from rehab requiring full adl support    # anemia  - baseline hgb 7-8s, s/p 1u pRBC 5/21 for hgb <7, heme following    DVT PPX Hep subq    FULL CODE    Please call [facility] with questions [phone].

## 2024-05-28 NOTE — PROGRESS NOTE ADULT - SUBJECTIVE AND OBJECTIVE BOX
[facility] Progress Note    MAR, chart notes, fingersticks and labs reviewed    Subjective: seen by palliative care, remains unresponsive, neuro consulted    Objective  Vital Signs  T(C): 36.3 (05-28-24 @ 12:04), Max: 36.5 (05-28-24 @ 04:42)  HR: 61 (05-28-24 @ 12:04) (61 - 91)  BP: 146/91 (05-28-24 @ 12:04) (107/72 - 146/91)  RR: 18 (05-28-24 @ 12:04) (18 - 18)  SpO2: 95% (05-28-24 @ 12:04) (95% - 99%)    Physical Exam  Gen: NAD, alert, awake  HEENT: NC/AT, EOMI  Neck: supple  Chest: breathing comfortably  Heart: +s1 +s2    Medications  acetaminophen   Oral Liquid .. 470 milliGRAM(s) Oral every 6 hours PRN  ascorbic acid 250 milliGRAM(s) Oral daily  cefTRIAXone   IVPB 2000 milliGRAM(s) IV Intermittent every 24 hours  chlorhexidine 2% Cloths 1 Application(s) Topical daily  heparin   Injectable 5000 Unit(s) SubCutaneous every 12 hours  lidocaine   4% Patch 1 Patch Transdermal every 24 hours  multivitamin 1 Tablet(s) Oral daily  pantoprazole  Injectable 40 milliGRAM(s) IV Push daily    Pertinent labs/Imaging  POCT Blood Glucose.: 138 mg/dL (05-28-24 @ 17:10)  POCT Blood Glucose.: 147 mg/dL (05-28-24 @ 12:22)  POCT Blood Glucose.: 138 mg/dL (05-28-24 @ 05:48)  POCT Blood Glucose.: 159 mg/dL (05-27-24 @ 23:55)  POCT Blood Glucose.: 145 mg/dL (05-27-24 @ 18:32)    eGFR: 21 mL/min/1.73m2 (05-28-24 @ 10:33)  eGFR: 14 mL/min/1.73m2 (05-27-24 @ 07:15)       [facility] Progress Note    MAR, chart notes, fingersticks and labs reviewed    Subjective: seen by palliative care, remains unresponsive, neuro consulted    Objective  Vital Signs  T(C): 36.3 (05-28-24 @ 12:04), Max: 36.5 (05-28-24 @ 04:42)  HR: 61 (05-28-24 @ 12:04) (61 - 91)  BP: 146/91 (05-28-24 @ 12:04) (107/72 - 146/91)  RR: 18 (05-28-24 @ 12:04) (18 - 18)  SpO2: 95% (05-28-24 @ 12:04) (95% - 99%)    Physical Exam  Gen: NAD, unresponsive, tube feeds running  HEENT: NC/AT  Chest: breathing comfortably  Heart: +s1 +s2    Medications  acetaminophen   Oral Liquid .. 470 milliGRAM(s) Oral every 6 hours PRN  ascorbic acid 250 milliGRAM(s) Oral daily  cefTRIAXone   IVPB 2000 milliGRAM(s) IV Intermittent every 24 hours  chlorhexidine 2% Cloths 1 Application(s) Topical daily  heparin   Injectable 5000 Unit(s) SubCutaneous every 12 hours  lidocaine   4% Patch 1 Patch Transdermal every 24 hours  multivitamin 1 Tablet(s) Oral daily  pantoprazole  Injectable 40 milliGRAM(s) IV Push daily    Pertinent labs/Imaging  POCT Blood Glucose.: 138 mg/dL (05-28-24 @ 17:10)  POCT Blood Glucose.: 147 mg/dL (05-28-24 @ 12:22)  POCT Blood Glucose.: 138 mg/dL (05-28-24 @ 05:48)  POCT Blood Glucose.: 159 mg/dL (05-27-24 @ 23:55)  POCT Blood Glucose.: 145 mg/dL (05-27-24 @ 18:32)    eGFR: 21 mL/min/1.73m2 (05-28-24 @ 10:33)  eGFR: 14 mL/min/1.73m2 (05-27-24 @ 07:15)

## 2024-05-28 NOTE — PROGRESS NOTE ADULT - ASSESSMENT
Ms. Yee is a 79y Female with PMHx of HTN, pA.fibm HTN, macrocytic anemia, pancreatic insufficiency hypertriglyceridemia, aortic valve stenosis, R hip fracture s/p ORIF 01/2024, depression who is admitted since 05/19/2024 with septic shock due to UTI and E.coli bacteremia, R hydronephrosis with acute renal failure now on HD, toxic metabolic encephalopathy. Pt is non-verbal this morning so history is obtained from chart and coordinating team members. Now on floor from MICU. CTH negative for acute changes.     Pt received 1u PRBC on 05/21/2024 with appropriate response. Labs on my initial evaluation show Hb 7.9 Hct 26.2 .3 with neutrophilia and lymphopenia. Normal bilirubin, liver function. Serum FLC ratio normal. SPEP with 0.6 Mspike without ALEKSEY. UPEP with K/L elevated and ratio 4 in setting of ARF. Additionally CT A/P with reported cirrhosis.       # Macrocytic anemia  # B12 deficiency  - B12 last year was 192  - Repeat B12  - Obtain folate, previously low   - Start B12 1000mcg weekly after B12 tested and folic acid 1mg PO daily   - Also may have underlying BM disorder such as MDS, outpatient labs with persistent macrocytosis and anemia Hb around 10, at this time further workup such as BmBx would need to be considered as outpatient given overall clinical context   - f/u palliative care recommendations   - Transfuse to maintain Hb > 7     # MGUS  - Obtain full panel for complete workup  - SPEP/ALEKSEY, Serum immunoglobulins, Serum FLCs with ratio, LDH, B2MCG   - See above     # Acute renal failure  - HD per nephrology    # E.coli UTI and bacteremia  - Antibiotics per primary team and ID       Thank you for allowing me to participate in the care of this patient, please do  not hesitate to call or text me if you have further questions or concerns.     Rajeev Mcdowell MD  [facility] [place]   Division of Hematology/Oncology  [address], [address]  [place] [address]  P: [phone]  F: [phone]    Attestation:   Total time spent on the encounter: >60 minutes   ----Including face-to-face interaction in addition to chart review, reviewing treatment plan, and managing the patient’s chronic diagnoses as listed in the assessment----     1.	I have reviewed, analyzed and interpreted the following labs: CBC, CMP, SPEP, Serum FLCs imaging:  CT A/P and Head impressions as above.   2.	I have reviewed notes stating pts current admission, consultants and follow ups.   3.	I have reviewed the past medical, family, and surgical history and confirmed with outpatient records   Ms. Yee is a 79y Female with PMHx of HTN, pA.fibm HTN, macrocytic anemia, pancreatic insufficiency hypertriglyceridemia, aortic valve stenosis, R hip fracture s/p ORIF 01/2024, depression who is admitted since 05/19/2024 with septic shock due to UTI and E.coli bacteremia, R hydronephrosis with acute renal failure now on HD, toxic metabolic encephalopathy. Pt is non-verbal this morning so history is obtained from chart and coordinating team members. Now on floor from MICU. CTH negative for acute changes.     Pt received 1u PRBC on 05/21/2024 with appropriate response. Labs on my initial evaluation show Hb 7.9 Hct 26.2 .3 with neutrophilia and lymphopenia. Normal bilirubin, liver function. Serum FLC ratio normal. SPEP with 0.6 Mspike without ALEKSEY. UPEP with K/L elevated and ratio 4 in setting of ARF. Additionally CT A/P with reported cirrhosis.       # Macrocytic anemia  # B12 deficiency  - B12 last year was 192  - Repeat B12 1172  - folate >20, previously low   - Paraproteinemia workup so far negative  - Also may have underlying BM disorder such as MDS, outpatient labs with persistent macrocytosis and anemia Hb around 10, at this time further workup such as BmBx would need to be considered as outpatient given overall clinical context   - f/u palliative care recommendations   - Transfuse to maintain Hb > 7     # MGUS  - Obtain full panel for complete workup  - f/u SPEP/ALEKSEY, Serum immunoglobulins increased IgM 526, Serum FLCs ratio normal, LDH normal, B2MCG   - See above     # Acute renal failure  - HD per nephrology    # E.coli UTI and bacteremia  - Antibiotics per primary team and ID       Thank you for allowing me to participate in the care of Ms. Yee, please do not hesitate to call or text me if you have further questions or concerns.     Rajeev Shon Mcdowell MD  [facility] [place]   Division of Hematology/Oncology  [address], [address]  [place] [address]  P: [phone]  F: [phone]    Attestation:    ----Pt evaluated including face-to-face interaction in addition to chart review, reviewing treatment plan, and managing the patient’s chronic diagnoses as listed in the assessment----

## 2024-05-28 NOTE — CONSULT NOTE ADULT - PROBLEM SELECTOR RECOMMENDATION 2
On HD.    Renal is f/u.   Under her current state, I am not sure if she is really a good candidate for long term HD. Will leave it up to renal to determine so.

## 2024-05-28 NOTE — PROGRESS NOTE ADULT - ATTENDING COMMENTS
79-year-old female with past medical history of GERD, MI, chronic pancreatitis, HTN, incontinence, recent hip surgery and was recently discharged from outpatient rehab to home presented to the emergency department with increased lethargy, initially labs concerning for severe ROBERT,  sepsis with metabolic acidemia.     ROBERT on CKD with Right Hydronephrosis/Pyelonephritis. G-ve bacteremia present.( E Coli)/UTI  Pt has had increasing SCr since 3/14/24 with a SCr of 1.8, trended up to 1.96 on 3/23/24, then 2.19 on 4/30/24 (most recent). Prior to March 2024, pt had normal kidney function dating back to October 2017.  Underwent urgent HD 5/19-5/20 ( overnight) for severe lactic acidosis  No osmolar gap present. Alc/Salicylates level normal. Urine tox screen negative. Hep B/C /Light chain/pla2r/ANCA/C4  negative,. C3 low  Lactate normalized. beta hydroxy butarate normal. SIFE with Co-Migrating IgM Lambda Band, IgG Lambda Band, and  IgG Kappa Band. Urine woth Bence Babb  Renal sono: Mild rt hydro       Underwent HD yesterday  AM labs reviewed. Monitor without dialysis  Maintain on Antibiotics.    Please repeat renal sono, Check Urine Protein/creat  pRBC transfusion per primary team  ?MGUS: Management per Heme     Rest as per Dr. Eleuterio Lozano MD  O: [phone]  Contact me on teams

## 2024-05-28 NOTE — CONSULT NOTE ADULT - SUBJECTIVE AND OBJECTIVE BOX
Patient is a 79y old  Female who presents with a chief complaint of Weakness (28 May 2024 13:52)      HPI:  79-year-old female with past medical history of GERD, MI, chronic pancreatitis, HTN, incontinence, recent hip surgery and was recently discharged from outpatient rehab to home presented to the emergency department with increased lethargy, failure to thrive, decreased PO intake nausea/vomiting.    Upon presentation to ED patient noted to also have suprapubic pain but denied dysuria and hematuria. Per  who provided collateral--Pt was just discharged from rehab center after prolonged stay from R femur fracture. At home the first 1-2 days she was fine but then over last 48hrs appeared to be more lethargic and less interactive prompting ED visit. No one at home is sick, no recent travel. Unsure of medications. Patient did not have any fevers, chills, N/V/D.     ED course notable for lab with  Leukocytosis to 40 with urine appearing like pus metabolic acidemia, ph 7.13, bicarb 8. Started on bicarb gtt. Carrillo placed minimal output new ROBERT BUN/Cr 103/5.56. Nephrology consulted for urgent HD. Initially  unsure of GOC and deferred decision. Pt started on bicarb drip, received 3L NS and 1L LR, Cefepime 1g, Vanc 1g, Tylenol 1g.    Family ultimately agreeable to HD and patient accepted to MICU. Verbal consent obtained for Shiley catheter placement from .  (19 May 2024 22:40)      PAST MEDICAL & SURGICAL HISTORY:  Hypertension      Anxiety and depression      GERD (gastroesophageal reflux disease)      History of pancreatitis  2018 taking creon been stable      Stress incontinence, female  S/P Sling      Heart attack      H/O left inguinal hernia repair      Hx of tonsillectomy      Hx of appendectomy      H/O dilation and curettage      Pacemaker          MEDICATIONS  (STANDING):  ascorbic acid 250 milliGRAM(s) Oral daily  cefTRIAXone   IVPB 2000 milliGRAM(s) IV Intermittent every 24 hours  chlorhexidine 2% Cloths 1 Application(s) Topical daily  heparin   Injectable 5000 Unit(s) SubCutaneous every 12 hours  hydrocortisone sodium succinate Injectable 25 milliGRAM(s) IV Push every 12 hours  lidocaine   4% Patch 1 Patch Transdermal every 24 hours  multivitamin 1 Tablet(s) Oral daily  pantoprazole  Injectable 40 milliGRAM(s) IV Push daily    MEDICATIONS  (PRN):  acetaminophen   Oral Liquid .. 470 milliGRAM(s) Oral every 6 hours PRN Mild Pain (1 - 3), Moderate Pain (4 - 6)      Allergies    penicillin (Swelling)    Intolerances    epinephrine (Other)      VITALS:    Vital Signs Last 24 Hrs  T(C): 36.3 (28 May 2024 12:04), Max: 36.5 (28 May 2024 04:42)  T(F): 97.4 (28 May 2024 12:04), Max: 97.7 (28 May 2024 04:42)  HR: 61 (28 May 2024 12:04) (61 - 91)  BP: 146/91 (28 May 2024 12:04) (107/72 - 146/91)  BP(mean): --  RR: 18 (28 May 2024 12:04) (18 - 18)  SpO2: 95% (28 May 2024 12:04) (95% - 99%)    Parameters below as of 28 May 2024 12:04  Patient On (Oxygen Delivery Method): room air        LABS:                          8.4    12.04 )-----------( 200      ( 27 May 2024 07:14 )             28.7       05-28    138  |  103  |  46<H>  ----------------------------<  150<H>  4.3   |  22  |  2.29<H>    Ca    8.1<L>      28 May 2024 10:33        CAPILLARY BLOOD GLUCOSE      POCT Blood Glucose.: 147 mg/dL (28 May 2024 12:22)  POCT Blood Glucose.: 138 mg/dL (28 May 2024 05:48)  POCT Blood Glucose.: 159 mg/dL (27 May 2024 23:55)  POCT Blood Glucose.: 145 mg/dL (27 May 2024 18:32)  POCT Blood Glucose.: 175 mg/dL (27 May 2024 16:59)          LOWER EXTREMITY PHYSICAL EXAM:    Vascular: DP 2/4, PT 0/4 B/L, CFT <3 seconds B/L, Temperature gradientWNL B/L.   Neuro: unable to assess  Skin: Bilateral heel, bilateral Lateral ankle And left fifth metatarsal base DTI's, No open wounds no signs of infection noted

## 2024-05-28 NOTE — CONSULT NOTE ADULT - SUBJECTIVE AND OBJECTIVE BOX
Date of Service:24 @ 10:11  HPI:  79-year-old female with past medical history of GERD, MI, chronic pancreatitis, HTN, incontinence, recent hip surgery and was recently discharged from outpatient rehab to home presented to the emergency department with increased lethargy, failure to thrive, decreased PO intake nausea/vomiting.    Upon presentation to ED patient noted to also have suprapubic pain but denied dysuria and hematuria. Per  who provided collateral--Pt was just discharged from rehab center after prolonged stay from R femur fracture. At home the first 1-2 days she was fine but then over last 48hrs appeared to be more lethargic and less interactive prompting ED visit. No one at home is sick, no recent travel. Unsure of medications. Patient did not have any fevers, chills, N/V/D.     ED course notable for lab with  Leukocytosis to 40 with urine appearing like pus metabolic acidemia, ph 7.13, bicarb 8. Started on bicarb gtt. Carrillo placed minimal output new ROBERT BUN/Cr 103/5.56. Nephrology consulted for urgent HD. Initially  unsure of GOC and deferred decision. Pt started on bicarb drip, received 3L NS and 1L LR, Cefepime 1g, Vanc 1g, Tylenol 1g.    Family ultimately agreeable to HD and patient accepted to MICU. Verbal consent obtained for Shiley catheter placement from .  (19 May 2024 22:40)      She is currently treated for: ROBERT on HD, Urosepsis/Septic Shock (E Coli Bacteremia), Metabolic encephalopathy, Depression (BH and Neuro are following. Likely Metabolic encephalopathy). Geriatrics and Palliative Medicine (GaP) Team was called for GOC.       Hypertension    Anxiety and depression    Rosacea    Stress incontinence, female    GERD (gastroesophageal reflux disease)    History of pancreatitis    Stress incontinence, female    Heart attack    2019 novel coronavirus disease (COVID-19)      H/O left inguinal hernia repair    Hx of tonsillectomy    Hx of appendectomy    H/O dilation and curettage    Pacemaker      FAMILY HISTORY:    Family Hx substance abuse [ ]yes [ ]no  ITEMS NOT CHECKED ARE NOT PRESENT    SOCIAL HISTORY:   Significant other/partner[ ]  Children[ ]  Presybeterian/Spirituality:  Substance hx:  [ ]   Tobacco hx:  [ ]   Alcohol hx: [ ]   Home Opioid hx:  [ ] I-Stop Reference No:  Living Situation: [ ]Home  [ ]Long term care  [ ]Rehab [ ]Other   met with patient at bedside to introduce self and explain role-  patient with some confusion and unable to complete the assessment. SW called  patient's emergency contact Judy Lerma (pt's  & )  [phone] and she was able to complete assessment. Patient is  to  Elfego [phone] & they live in a private house in [place], [place]. They  have a chair lift at home. Patient with 1 estranged daughter (no info given).  She has 1 brother Otis living in [place]. Prior to hospitalization patient was  dependent in all ADL's and ambulation. She uses a WC at home but is mostly  bedbound. 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 Her PMD is Dr. Esvin Benoit [phone] with [facility]. Patient is full code.  ADVANCE DIRECTIVES:    DNR/MOLST  [ ]  Living Will  [ ]   DECISION MAKER(s):  [ ] Health Care Proxy(s)  [ ] Surrogate(s)  [ ] Guardian           Name(s): Phone Number(s):    BASELINE (I)ADL(s) (prior to admission):  [place]: [ ]Total  [ ] Moderate [ ]Dependent    Allergies    penicillin (Swelling)    Intolerances    epinephrine (Other)  MEDICATIONS  (STANDING):  ascorbic acid 250 milliGRAM(s) Oral daily  cefTRIAXone   IVPB 2000 milliGRAM(s) IV Intermittent every 24 hours  chlorhexidine 2% Cloths 1 Application(s) Topical daily  heparin   Injectable 5000 Unit(s) SubCutaneous every 12 hours  hydrocortisone sodium succinate Injectable 25 milliGRAM(s) IV Push every 12 hours  lidocaine   4% Patch 1 Patch Transdermal every 24 hours  multivitamin 1 Tablet(s) Oral daily  pantoprazole  Injectable 40 milliGRAM(s) IV Push daily    MEDICATIONS  (PRN):  acetaminophen   Oral Liquid .. 470 milliGRAM(s) Oral every 6 hours PRN Mild Pain (1 - 3), Moderate Pain (4 - 6)    PRESENT SYMPTOMS: [ ]Unable to self-report  [ ] CPOT [ ] PAINADs [ ] RDOS  Source if other than patient:  [ ]Family   [ ]Team     Pain: [ ]yes [ ]no  QOL impact -   Location -                    Aggravating factors -  Quality -  Radiation -  Timing-  Severity (0-10 scale):  Minimal acceptable level (0-10 scale):     CPOT:    https://www.[facility].org/getattachment/[email or web address]/Critical-Care-Pain-Observation-Tool-(CPOT)    PAINAD Score: See PAINAD tool and score below     Dyspnea:                           [ ]Mild [ ]Moderate [ ]Severe    RDOS: See RDOS tool and score below   0 to 2  minimal or no respiratory distress   3  mild distress  4 to 6 moderate distress  >7 severe distress      Anxiety:                             [ ]Mild [ ]Moderate [ ]Severe  Fatigue:                             [ ]Mild [ ]Moderate [ ]Severe  Nausea:                             [ ]Mild [ ]Moderate [ ]Severe  Loss of appetite:              [ ]Mild [ ]Moderate [ ]Severe  Constipation:                    [ ]Mild [ ]Moderate [ ]Severe    PCSSQ[Palliative Care Spiritual Screening Question]   Severity (0-10):  Score of 4 or > indicate consideration of Chaplaincy referral.  Chaplaincy Referral: [ ] yes [ ] refused [ ] following [ ] Deferred     Caregiver [place]? : [ ] yes [ ] no [ ] Deferred [ ] Declined             Social work referral [ ] Patient & Family Centered Care Referral [ ]     Anticipatory Grief present?:  [ ] yes [ ] no  [ ] Deferred                  Social work referral [ ] Chaplaincy Referral [ ]    		  Other Symptoms:  [ ]All other review of systems negative     Palliative Performance Status Version 2:   See PPSv2 tool and score below          PHYSICAL EXAM:  Vital Signs Last 24 Hrs  T(C): 36.5 (28 May 2024 04:42), Max: 36.6 (27 May 2024 12:09)  T(F): 97.7 (28 May 2024 04:42), Max: 97.9 (27 May 2024 12:09)  HR: 83 (28 May 2024 04:42) (75 - 91)  BP: 126/76 (28 May 2024 04:42) (105/70 - 126/76)  BP(mean): --  RR: 18 (28 May 2024 04:42) (18 - 18)  SpO2: 99% (28 May 2024 04:42) (96% - 99%)    Parameters below as of 28 May 2024 04:42  Patient On (Oxygen Delivery Method): room air     I&O's Summary    27 May 2024 07:01  -  28 May 2024 07:00  --------------------------------------------------------  IN: 670 mL / OUT: 3 mL / NET: 667 mL      GENERAL: [ ]Cachexia    [ ]Alert  [ ]Oriented x   [ ]Lethargic  [ ]Unarousable  [ ]Verbal  [ ]Non-Verbal  Behavioral:   [ ] Anxiety  [ ] Delirium [ ] Agitation [ ] Other  HEENT:  [ ]Normal   [ ]Dry mouth   [ ]ET Tube/Trach  [ ]Oral lesions  PULMONARY:   [ ]Clear [ ]Tachypnea  [ ]Audible excessive secretions   [ ]Rhonchi        [ ]Right [ ]Left [ ]Bilateral  [ ]Crackles        [ ]Right [ ]Left [ ]Bilateral  [ ]Wheezing     [ ]Right [ ]Left [ ]Bilateral  [ ]Diminished breath sounds [ ]right [ ]left [ ]bilateral  CARDIOVASCULAR:    [ ]Regular [ ]Irregular [ ]Tachy  [ ]Pato [ ]Murmur [ ]Other  GASTROINTESTINAL:  [ ]Soft  [ ]Distended   [ ]+BS  [ ]Non tender [ ]Tender  [ ]Other [ ]PEG [ ]OGT/ NGT  Last BM:  GENITOURINARY:  [ ]Normal [ ] Incontinent   [ ]Oliguria/Anuria   [ ]Carrillo  MUSCULOSKELETAL:   [ ]Normal   [ ]Weakness  [ ]Bed/Wheelchair bound [ ]Edema  NEUROLOGIC:   [ ]No focal deficits  [ ]Cognitive impairment  [ ]Dysphagia [ ]Dysarthria [ ]Paresis [ ]Other   SKIN:   [ ]Normal  [ ]Rash  [ ]Other  [ ]Pressure ulcer(s)       Present on admission [ ]y [ ]n    CRITICAL CARE:  [ ] Shock Present  [ ]Septic [ ]Cardiogenic [ ]Neurologic [ ]Hypovolemic  [ ]  Vasopressors [ ]  Inotropes   [ ]Respiratory failure present [ ]Mechanical ventilation [ ]Non-invasive ventilatory support [ ]High flow    [ ]Acute  [ ]Chronic [ ]Hypoxic  [ ]Hypercarbic [ ]Other  [ ]Other organ failure     LABS:                        8.4    12.04 )-----------( 200      ( 27 May 2024 07:14 )             28.7   05-27    139  |  106  |  56<H>  ----------------------------<  163<H>  3.4<L>   |  19<L>  |  3.16<H>    Ca    8.4      27 May 2024 07:15        Urinalysis Basic - ( 27 May 2024 23:08 )    Color: Yellow / Appearance: Turbid / S.015 / pH: x  Gluc: x / Ketone: Negative mg/dL  / Bili: Negative / Urobili: 0.2 mg/dL   Blood: x / Protein: 100 mg/dL / Nitrite: Negative   Leuk Esterase: Large / RBC: 19 /HPF /  /HPF   Sq Epi: x / Non Sq Epi: 1 /HPF / Bacteria: Many /HPF      RADIOLOGY & ADDITIONAL STUDIES:    PROTEIN CALORIE MALNUTRITION PRESENT: [ ]mild [ ]moderate [ ]severe [ ]underweight [ ]morbid obesity  https://www.andeal.org/vault/2440/web/files/ONC/Table_Clinical%20Characteristics%20to%20Document%20Malnutrition-White%20JV%20et%20al%578702.pdf    Height (cm): 154.9 (24 @ 14:27), 162.6 (24 @ 14:53)  Weight (kg): 46.8 (24 @ 23:00), 54.4 (24 @ 14:53)  BMI (kg/m2): 19.5 (24 @ 23:00), 22.7 (24 @ 14:27), 20.6 (24 @ 14:53)    [ ]PPSV2 < or = to 30% [ ]significant weight loss  [ ]poor nutritional intake  [ ]anasarca[ ]Artificial Nutrition      Other REFERRALS:  [ ]Hospice  [ ]Child Life  [ ]Social Work  [ ]Case management [ ]Holistic Therapy     Goals of Care Document:  Date of Service:24 @ 10:11  HPI:  79-year-old female with past medical history of GERD, MI, chronic pancreatitis, HTN, incontinence, recent hip surgery and was recently discharged from outpatient rehab to home presented to the emergency department with increased lethargy, failure to thrive, decreased PO intake nausea/vomiting.    Upon presentation to ED patient noted to also have suprapubic pain but denied dysuria and hematuria. Per  who provided collateral--Pt was just discharged from rehab center after prolonged stay from R femur fracture. At home the first 1-2 days she was fine but then over last 48hrs appeared to be more lethargic and less interactive prompting ED visit. No one at home is sick, no recent travel. Unsure of medications. Patient did not have any fevers, chills, N/V/D.     ED course notable for lab with  Leukocytosis to 40 with urine appearing like pus metabolic acidemia, ph 7.13, bicarb 8. Started on bicarb gtt. Carrillo placed minimal output new ROBERT BUN/Cr 103/5.56. Nephrology consulted for urgent HD. Initially  unsure of GOC and deferred decision. Pt started on bicarb drip, received 3L NS and 1L LR, Cefepime 1g, Vanc 1g, Tylenol 1g.    Family ultimately agreeable to HD and patient accepted to MICU. Verbal consent obtained for Shiley catheter placement from .  (19 May 2024 22:40)    She is currently treated for: ROBERT on HD, Urosepsis/Septic Shock (E Coli Bacteremia), Metabolic encephalopathy, Depression (BH and Neuro are following. Likely Metabolic encephalopathy). During my visit the patient was deeply lethargic and not able to f/u any command or answer any questions. Geriatrics and Palliative Medicine (GaP) Team was called for GOC.      Hypertension    Anxiety and depression    Rosacea    Stress incontinence, female    GERD (gastroesophageal reflux disease)    History of pancreatitis    Stress incontinence, female    Heart attack    2019 novel coronavirus disease (COVID-19)      H/O left inguinal hernia repair    Hx of tonsillectomy    Hx of appendectomy    H/O dilation and curettage    Pacemaker      FAMILY HISTORY:    Family Hx substance abuse [ ]yes [ ]no  ITEMS NOT CHECKED ARE NOT PRESENT    SOCIAL HISTORY:   Significant other/partner[ ]  Children[ ]  Yazdanism/Spirituality:  Substance hx:  [ ]   Tobacco hx:  [ ]   Alcohol hx: [ ]   Home Opioid hx:  [ ] I-Stop Reference No:  Living Situation: [ ]Home  [ ]Long term care  [ ]Rehab [ ]Other   met with patient at bedside to introduce self and explain role-  patient with some confusion and unable to complete the assessment. SW called  patient's emergency contact Judy Lerma (pt's  & )  [phone] and she was able to complete assessment. Patient is  to  Elfego [phone] & they live in a private house in [place], [place]. They  have a chair lift at home. Patient with 1 estranged daughter (no info given).  She has 1 brother Otis living in [place]. Prior to hospitalization patient was  dependent in all ADL's and ambulation. She uses a WC at home but is mostly  bedbound. 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 Her PMD is Dr. Esvin Benoit [phone] with [facility]. Patient is full code.  ADVANCE DIRECTIVES:    DNR/MOLST  [ ]  Living Will  [ ]   DECISION MAKER(s):  [ ] Health Care Proxy(s)  [x ] Surrogate(s)  [ ] Guardian           Name(s): Phone Number(s): See [facility] note below     BASELINE (I)ADL(s) (prior to admission):  [place]: [ ]Total  [ ] Moderate [x ]Dependent    Allergies    penicillin (Swelling)    Intolerances    epinephrine (Other)  MEDICATIONS  (STANDING):  ascorbic acid 250 milliGRAM(s) Oral daily  cefTRIAXone   IVPB 2000 milliGRAM(s) IV Intermittent every 24 hours  chlorhexidine 2% Cloths 1 Application(s) Topical daily  heparin   Injectable 5000 Unit(s) SubCutaneous every 12 hours  hydrocortisone sodium succinate Injectable 25 milliGRAM(s) IV Push every 12 hours  lidocaine   4% Patch 1 Patch Transdermal every 24 hours  multivitamin 1 Tablet(s) Oral daily  pantoprazole  Injectable 40 milliGRAM(s) IV Push daily    MEDICATIONS  (PRN):  acetaminophen   Oral Liquid .. 470 milliGRAM(s) Oral every 6 hours PRN Mild Pain (1 - 3), Moderate Pain (4 - 6)    PRESENT SYMPTOMS: [x ]Unable to self-report  [ ] CPOT [ ] PAINADs [ ] RDOS  Source if other than patient:  [ ]Family   [ ]Team     Pain: [ ]yes [ ]no  QOL impact -   Location -                    Aggravating factors -  Quality -  Radiation -  Timing-  Severity (0-10 scale):  Minimal acceptable level (0-10 scale):     CPOT:    https://www.[facility].org/getattachment/[email or web address]/Critical-Care-Pain-Observation-Tool-(CPOT)    PAINAD Score: See PAINAD tool and score below     Dyspnea:                           [ ]Mild [ ]Moderate [ ]Severe    RDOS: See RDOS tool and score below   0 to 2  minimal or no respiratory distress   3  mild distress  4 to 6 moderate distress  >7 severe distress      Anxiety:                             [ ]Mild [ ]Moderate [ ]Severe  Fatigue:                             [ ]Mild [ ]Moderate [ ]Severe  Nausea:                             [ ]Mild [ ]Moderate [ ]Severe  Loss of appetite:              [ ]Mild [ ]Moderate [ ]Severe  Constipation:                    [ ]Mild [ ]Moderate [ ]Severe    PCSSQ[Palliative Care Spiritual Screening Question]   Severity (0-10):  Score of 4 or > indicate consideration of Chaplaincy referral.  Chaplaincy Referral: [ ] yes [ ] refused [ ] following [ x] Deferred     Caregiver [place]? : [ ] yes [ ] no [x ] Deferred [ ] Declined             Social work referral [ ] Patient & Family Centered Care Referral [ ]     Anticipatory Grief present?:  [ ] yes [ ] no  [x ] Deferred                  Social work referral [ ] Chaplaincy Referral [ ]    		  Other Symptoms:  [ ]All other review of systems negative     Palliative Performance Status Version 2:   See PPSv2 tool and score below          PHYSICAL EXAM:  Vital Signs Last 24 Hrs  T(C): 36.5 (28 May 2024 04:42), Max: 36.6 (27 May 2024 12:09)  T(F): 97.7 (28 May 2024 04:42), Max: 97.9 (27 May 2024 12:09)  HR: 83 (28 May 2024 04:42) (75 - 91)  BP: 126/76 (28 May 2024 04:42) (105/70 - 126/76)  BP(mean): --  RR: 18 (28 May 2024 04:42) (18 - 18)  SpO2: 99% (28 May 2024 04:42) (96% - 99%)    Parameters below as of 28 May 2024 04:42  Patient On (Oxygen Delivery Method): room air     I&O's Summary    27 May 2024 07:01  -  28 May 2024 07:00  --------------------------------------------------------  IN: 670 mL / OUT: 3 mL / NET: 667 mL      GENERAL: [ ]Cachexia    [ ]Alert  [ ]Oriented x   [ ]Lethargic  [x ]Unarousable  [ ]Verbal  [ ]Non-Verbal  Behavioral:   [ ] Anxiety  [ ] Delirium [ ] Agitation [x ] Other: Encephalopathy   HEENT:  [ ]Normal   [ ]Dry mouth   [ ]ET Tube/Trach  [ ]Oral lesions  PULMONARY:   [ ]Clear [ ]Tachypnea  [ ]Audible excessive secretions   [ ]Rhonchi        [ ]Right [ ]Left [ ]Bilateral  [ ]Crackles        [ ]Right [ ]Left [ ]Bilateral  [ ]Wheezing     [ ]Right [ ]Left [ ]Bilateral  [x ]Diminished breath sounds [ ]right [ ]left [ x]bilateral  CARDIOVASCULAR:    [x ]Regular [ ]Irregular [ ]Tachy  [ ]Pato [ ]Murmur [ ]Other  GASTROINTESTINAL:  [ ]Soft  [ ]Distended   [ x]+BS  [ ]Non tender [ ]Tender  [ ]Other [ ]PEG [ ]OGT/ NGT  Last BM:   GENITOURINARY:  [ ]Normal [ ] Incontinent   [x ]Oliguria/Anuria   [ ]Carrillo  MUSCULOSKELETAL:   [ ]Normal   [ ]Weakness  [x ]Bed/Wheelchair bound [ x]Edema  NEUROLOGIC:   [ ]No focal deficits  [ ]Cognitive impairment  [ ]Dysphagia [ ]Dysarthria [ ]Paresis [x ]Other: Encephalopathic    SKIN:   [ ]Normal  [ ]Rash  [ ]Other  [ ]Pressure ulcer(s)       Present on admission [ ]y [ ]n  [x] See nursing flowsheets for details   CRITICAL CARE:  [ ] Shock Present  [ ]Septic [ ]Cardiogenic [ ]Neurologic [ ]Hypovolemic  [ ]  Vasopressors [ ]  Inotropes   [ ]Respiratory failure present [ ]Mechanical ventilation [ ]Non-invasive ventilatory support [ ]High flow    [ ]Acute  [ ]Chronic [ ]Hypoxic  [ ]Hypercarbic [ ]Other  [ ]Other organ failure     LABS:                        8.4    12.04 )-----------( 200      ( 27 May 2024 07:14 )             28.7       139  |  106  |  56<H>  ----------------------------<  163<H>  3.4<L>   |  19<L>  |  3.16<H>    Ca    8.4      27 May 2024 07:15        Urinalysis Basic - ( 27 May 2024 23:08 )    Color: Yellow / Appearance: Turbid / S.015 / pH: x  Gluc: x / Ketone: Negative mg/dL  / Bili: Negative / Urobili: 0.2 mg/dL   Blood: x / Protein: 100 mg/dL / Nitrite: Negative   Leuk Esterase: Large / RBC: 19 /HPF /  /HPF   Sq Epi: x / Non Sq Epi: 1 /HPF / Bacteria: Many /HPF      RADIOLOGY & ADDITIONAL STUDIES:  < from: CT Head No Cont (24 @ 10:10) >    IMPRESSION:  No acute intracranial findings. No change compared to 2024.    --- End of Report ---    < end of copied text >  < from: US Kidney and Bladder (24 @ 17:59) >  IMPRESSION: Mild right hydronephrosis. Mild pelvic free fluid.    --- End of Report ---            PRADEEP PACHECO MD; Attending Interventional Radiologist  This document has been electronically signed. May 22 2024  7:55PM    < end of copied text >  < from: CT Abdomen and Pelvis No Cont (24 @ 19:44) >  IMPRESSION:    Mild to moderate right hydronephrosis and hydroureter with cirrhosis. No   obstructing stone is seen.    Rectum is mildly distended with stool and there is rectal wall   thickening. Question stercoral colitis.        --- End of Report ---           DESMOND HACKETT MD; Resident Radiologist  This document has been electronically signed.  CONOR WASHINGTON MD; Attending Radiologist  This document has been electronically signed. May 19 2024  8:39PM    < end of copied text >    PROTEIN CALORIE MALNUTRITION PRESENT: [ ]mild [ ]moderate [ ]severe [ ]underweight [ ]morbid obesity  https://www.andeal.org/vault/2440/web/files/ONC/Table_Clinical%20Characteristics%20to%20Document%20Malnutrition-White%20JV%20et%20al%2020.pdf    Height (cm): 154.9 (24 @ 14:27), 162.6 (24 @ 14:53)  Weight (kg): 46.8 (24 @ 23:00), 54.4 (24 @ 14:53)  BMI (kg/m2): 19.5 (24 @ 23:00), 22.7 (24 @ 14:27), 20.6 (24 @ 14:53)    [ ]PPSV2 < or = to 30% [ ]significant weight loss  [ ]poor nutritional intake  [ ]anasarca[ ]Artificial Nutrition      Other REFERRALS:  [ ]Hospice  [ ]Child Life  [ ]Social Work  [ ]Case management [ ]Holistic Therapy     Goals of Care Document:

## 2024-05-28 NOTE — PROGRESS NOTE ADULT - SUBJECTIVE AND OBJECTIVE BOX
Patient is a 79y old  Female who presents with a chief complaint of Weakness (28 May 2024 10:10)      SUBJECTIVE / OVERNIGHT EVENTS:    Patient seen and examined.       Vital Signs Last 24 Hrs  T(C): 36.5 (28 May 2024 04:42), Max: 36.6 (27 May 2024 12:09)  T(F): 97.7 (28 May 2024 04:42), Max: 97.9 (27 May 2024 12:09)  HR: 83 (28 May 2024 04:42) (75 - 91)  BP: 126/76 (28 May 2024 04:42) (105/70 - 126/76)  BP(mean): --  RR: 18 (28 May 2024 04:42) (18 - 18)  SpO2: 99% (28 May 2024 04:42) (96% - 99%)    Parameters below as of 28 May 2024 04:42  Patient On (Oxygen Delivery Method): room air      I&O's Summary    27 May 2024 07:01  -  28 May 2024 07:00  --------------------------------------------------------  IN: 670 mL / OUT: 3 mL / NET: 667 mL        PE:  GENERAL: NAD, AAOx 0, frail  CHEST/LUNG: CTABL, No wheeze  HEART: Regular rate and rhythm; no murmur  ABDOMEN: Soft, Nontender, Nondistended; Bowel sounds present  EXTREMITIES:  2+ Peripheral Pulses, +1 le edema  NEURO: No focal deficits    LABS:                        8.4    12.04 )-----------( 200      ( 27 May 2024 07:14 )             28.7     05-27    139  |  106  |  56<H>  ----------------------------<  163<H>  3.4<L>   |  19<L>  |  3.16<H>    Ca    8.4      27 May 2024 07:15        CAPILLARY BLOOD GLUCOSE      POCT Blood Glucose.: 138 mg/dL (28 May 2024 05:48)  POCT Blood Glucose.: 159 mg/dL (27 May 2024 23:55)  POCT Blood Glucose.: 145 mg/dL (27 May 2024 18:32)  POCT Blood Glucose.: 175 mg/dL (27 May 2024 16:59)  POCT Blood Glucose.: 162 mg/dL (27 May 2024 12:34)        Urinalysis Basic - ( 27 May 2024 23:08 )    Color: Yellow / Appearance: Turbid / S.015 / pH: x  Gluc: x / Ketone: Negative mg/dL  / Bili: Negative / Urobili: 0.2 mg/dL   Blood: x / Protein: 100 mg/dL / Nitrite: Negative   Leuk Esterase: Large / RBC: 19 /HPF /  /HPF   Sq Epi: x / Non Sq Epi: 1 /HPF / Bacteria: Many /HPF        RADIOLOGY & ADDITIONAL TESTS:    Imaging Personally Reviewed:  [x] YES  [ ] NO    Consultant(s) Notes Reviewed:  [x] YES  [ ] NO    MEDICATIONS  (STANDING):  ascorbic acid 250 milliGRAM(s) Oral daily  cefTRIAXone   IVPB 2000 milliGRAM(s) IV Intermittent every 24 hours  chlorhexidine 2% Cloths 1 Application(s) Topical daily  heparin   Injectable 5000 Unit(s) SubCutaneous every 12 hours  hydrocortisone sodium succinate Injectable 25 milliGRAM(s) IV Push every 12 hours  lidocaine   4% Patch 1 Patch Transdermal every 24 hours  multivitamin 1 Tablet(s) Oral daily  pantoprazole  Injectable 40 milliGRAM(s) IV Push daily    MEDICATIONS  (PRN):  acetaminophen   Oral Liquid .. 470 milliGRAM(s) Oral every 6 hours PRN Mild Pain (1 - 3), Moderate Pain (4 - 6)      Care Discussed with Consultants/Other Providers [x] YES  [ ] NO    HEALTH ISSUES - PROBLEM Dx:  ROBERT (acute kidney injury)    Metabolic acidosis         Patient is a 79y old  Female who presents with a chief complaint of Weakness (28 May 2024 10:10)      SUBJECTIVE / OVERNIGHT EVENTS:    Patient seen and examined. awake but not responsive. does not follow commands.      Vital Signs Last 24 Hrs  T(C): 36.5 (28 May 2024 04:42), Max: 36.6 (27 May 2024 12:09)  T(F): 97.7 (28 May 2024 04:42), Max: 97.9 (27 May 2024 12:09)  HR: 83 (28 May 2024 04:42) (75 - 91)  BP: 126/76 (28 May 2024 04:42) (105/70 - 126/76)  BP(mean): --  RR: 18 (28 May 2024 04:42) (18 - 18)  SpO2: 99% (28 May 2024 04:42) (96% - 99%)    Parameters below as of 28 May 2024 04:42  Patient On (Oxygen Delivery Method): room air      I&O's Summary    27 May 2024 07:01  -  28 May 2024 07:00  --------------------------------------------------------  IN: 670 mL / OUT: 3 mL / NET: 667 mL        PE:  GENERAL: NAD, AAOx 0, frail  CHEST/LUNG: CTABL, No wheeze  HEART: Regular rate and rhythm; no murmur  ABDOMEN: Soft, Nontender, Nondistended; Bowel sounds present  EXTREMITIES:  2+ Peripheral Pulses, +1 le edema  NEURO: No focal deficits    LABS:                        8.4    12.04 )-----------( 200      ( 27 May 2024 07:14 )             28.7     05-    139  |  106  |  56<H>  ----------------------------<  163<H>  3.4<L>   |  19<L>  |  3.16<H>    Ca    8.4      27 May 2024 07:15        CAPILLARY BLOOD GLUCOSE      POCT Blood Glucose.: 138 mg/dL (28 May 2024 05:48)  POCT Blood Glucose.: 159 mg/dL (27 May 2024 23:55)  POCT Blood Glucose.: 145 mg/dL (27 May 2024 18:32)  POCT Blood Glucose.: 175 mg/dL (27 May 2024 16:59)  POCT Blood Glucose.: 162 mg/dL (27 May 2024 12:34)        Urinalysis Basic - ( 27 May 2024 23:08 )    Color: Yellow / Appearance: Turbid / S.015 / pH: x  Gluc: x / Ketone: Negative mg/dL  / Bili: Negative / Urobili: 0.2 mg/dL   Blood: x / Protein: 100 mg/dL / Nitrite: Negative   Leuk Esterase: Large / RBC: 19 /HPF /  /HPF   Sq Epi: x / Non Sq Epi: 1 /HPF / Bacteria: Many /HPF        RADIOLOGY & ADDITIONAL TESTS:    Imaging Personally Reviewed:  [x] YES  [ ] NO    Consultant(s) Notes Reviewed:  [x] YES  [ ] NO    MEDICATIONS  (STANDING):  ascorbic acid 250 milliGRAM(s) Oral daily  cefTRIAXone   IVPB 2000 milliGRAM(s) IV Intermittent every 24 hours  chlorhexidine 2% Cloths 1 Application(s) Topical daily  heparin   Injectable 5000 Unit(s) SubCutaneous every 12 hours  hydrocortisone sodium succinate Injectable 25 milliGRAM(s) IV Push every 12 hours  lidocaine   4% Patch 1 Patch Transdermal every 24 hours  multivitamin 1 Tablet(s) Oral daily  pantoprazole  Injectable 40 milliGRAM(s) IV Push daily    MEDICATIONS  (PRN):  acetaminophen   Oral Liquid .. 470 milliGRAM(s) Oral every 6 hours PRN Mild Pain (1 - 3), Moderate Pain (4 - 6)      Care Discussed with Consultants/Other Providers [x] YES  [ ] NO    HEALTH ISSUES - PROBLEM Dx:  ROBERT (acute kidney injury)    Metabolic acidosis

## 2024-05-28 NOTE — CONSULT NOTE ADULT - SUBJECTIVE AND OBJECTIVE BOX
Wound SURGERY CONSULT NOTE    HPI:  79-year-old female with past medical history of GERD, MI, chronic pancreatitis, HTN, incontinence, recent hip surgery and was recently discharged from outpatient rehab to home presented to the emergency department with increased lethargy, failure to thrive, decreased PO intake nausea/vomiting.    Upon presentation to ED patient noted to also have suprapubic pain but denied dysuria and hematuria. Per  who provided collateral--Pt was just discharged from rehab center after prolonged stay from R femur fracture. At home the first 1-2 days she was fine but then over last 48hrs appeared to be more lethargic and less interactive prompting ED visit. No one at home is sick, no recent travel. Unsure of medications. Patient did not have any fevers, chills, N/V/D.     ED course notable for lab with  Leukocytosis to 40 with urine appearing like pus metabolic acidemia, ph 7.13, bicarb 8. Started on bicarb gtt. Carrillo placed minimal output new ROBERT BUN/Cr 103/5.56. Nephrology consulted for urgent HD. Initially  unsure of GOC and deferred decision. Pt started on bicarb drip, received 3L NS and 1L LR, Cefepime 1g, Vanc 1g, Tylenol 1g.    Family ultimately agreeable to HD and patient accepted to MICU. Verbal consent obtained for Shiley catheter placement from .  (19 May 2024 22:40)      Wound consult requested by team to assist w/ management of skin changes.   Pt unable to c/o pain, drainage, odor, color change,  or worsening swelling. Offloading and pericare initiated upon admission as pt Increasingly sedentary 2/2 to illness. Pt is Incontinent of urine & stool. (+)primafit, (+) NGT    Current Diet: Diet, NPO with Tube Feed:   Tube Feeding Modality: Nasogastric  Nepro with Carb Steady (NEPRORTH)  Total Volume for 24 Hours (mL): 900  Intermittent  Starting Tube Feed Rate mL per Hour: 10  Increase Tube Feed Rate by (mL): 10    Every 4 hours  Until Goal Tube Feed Rate (mL per Hour): 50  Tube Feeding Hours ON: 18  Tube Feeding OFF (Hours): 6  Tube Feed Start Time: 11:00 (05-24-24 @ 17:33)      PAST MEDICAL & SURGICAL HISTORY:  Hypertension      Anxiety and depression      GERD (gastroesophageal reflux disease)      History of pancreatitis  2018 taking creon been stable      Stress incontinence, female  S/P Sling      Heart attack      H/O left inguinal hernia repair      Hx of tonsillectomy      Hx of appendectomy      H/O dilation and curettage      Pacemaker    REVIEW OF SYSTEMS: Pt unable to offer      MEDICATIONS  (STANDING):  ascorbic acid 250 milliGRAM(s) Oral daily  cefTRIAXone   IVPB 2000 milliGRAM(s) IV Intermittent every 24 hours  chlorhexidine 2% Cloths 1 Application(s) Topical daily  heparin   Injectable 5000 Unit(s) SubCutaneous every 12 hours  hydrocortisone sodium succinate Injectable 25 milliGRAM(s) IV Push every 12 hours  lidocaine   4% Patch 1 Patch Transdermal every 24 hours  multivitamin 1 Tablet(s) Oral daily  pantoprazole  Injectable 40 milliGRAM(s) IV Push daily    MEDICATIONS  (PRN):  acetaminophen   Oral Liquid .. 470 milliGRAM(s) Oral every 6 hours PRN Mild Pain (1 - 3), Moderate Pain (4 - 6)      Allergies    penicillin (Swelling)    Intolerances    epinephrine (Other)      SOCIAL HISTORY:   lives at home    FAMILY HISTORY:   No pertinent family hx among first degree relatives.    PHYSICAL EXAM:  Vital Signs Last 24 Hrs  T(C): 36.3 (28 May 2024 12:04), Max: 36.5 (28 May 2024 04:42)  T(F): 97.4 (28 May 2024 12:04), Max: 97.7 (28 May 2024 04:42)  HR: 61 (28 May 2024 12:04) (61 - 91)  BP: 146/91 (28 May 2024 12:04) (107/72 - 146/91)  BP(mean): --  RR: 18 (28 May 2024 12:04) (18 - 18)  SpO2: 95% (28 May 2024 12:04) (95% - 99%)    Parameters below as of 28 May 2024 12:04  Patient On (Oxygen Delivery Method): room air        NAD,somnolent, / thin/frail,   Versa Care P500   HEENT:   sclera clear, mucosa moist, throat clear, trachea midline, neck supple  Respiratory: nonlabored w/ equal chest rise  Gastrointestinal: soft NT/ND (+)NGT  : (+)purewick  Neurology:  nonverbal, can not follow commands  Psych: difficult to assess  Musculoskeletal:  no deformities/ contractures  Vascular: BLE equally warm, no cyanosis, clubbing, edema nor acute ischemia                 Feet not assessed see POD note              Skin:    thin, dry, pale, frail, ecchymosis without hematoma     Sacral region / BL buttocks blanchable erythema   LLE ISTAP 2 skin tear 10.0cm x 2.0cm x 0.1cm reapproximated 90%    LUE ISTAP 3 skin tear 1.0cm x 1.0cm x 0.1cm      scant serosanguinous drainage,        No odor, no increased warmth,  induration, fluctuance, nor crepitus    LABS/ CULTURES/ RADIOLOGY:                        8.4    12.04 )-----------( 200      ( 27 May 2024 07:14 )             28.7       138  |  103  |  46  ----------------------------<  150      [05-28-24 @ 10:33]  4.3   |  22  |  2.29        Ca     8.1     [05-28-24 @ 10:33]      Phos  3.4     [05-26-24 @ 07:26]          CK 21      [05-28-24 @ 10:33]        [05-27-24 @ 10:48]

## 2024-05-28 NOTE — PROGRESS NOTE ADULT - ASSESSMENT
79-year-old female with past medical history of GERD, MI, chronic pancreatitis, HTN, incontinence, recent hip surgery who presented w/ increased lethargy, failure to thrive, decreased PO intake nausea/vomiting. Patient admitted to ICU for metabolic acidosis and ROBERT requiring HD found to have E Coli bacteremia 2/2 UTI.    E. coli bacteremia due to UTI  blood cx 5/19 w/ E.coli  urine cx w/ E.coli & E. faecalis  - low CFU of E. faecalis so unlikely to be contributing  repeat blood cx 5/21- NGTD    ROBERT requiring HD  per nephrology 2/2 ATN    Recommendations  Continue Ceftriaxone 2g daily  complete 10 day course of antibiotics from date of negative blood cultures w/ last day 5/30    Aisha Mcdowell M.D.  [facility], Division of Infectious Diseases  [phone]  After 5pm on weekdays and all day on weekends - please call [phone]

## 2024-05-28 NOTE — PROGRESS NOTE ADULT - SUBJECTIVE AND OBJECTIVE BOX
\[facility]\"" HEMATOLOGY/ONCOLOGY INPATIENT PROGRESS NOTE     Interval Hx:   05-28-24: Ms. Yee was seen at bedside today.    Meds:   MEDICATIONS  (STANDING):  ascorbic acid 250 milliGRAM(s) Oral daily  cefTRIAXone   IVPB 2000 milliGRAM(s) IV Intermittent every 24 hours  chlorhexidine 2% Cloths 1 Application(s) Topical daily  heparin   Injectable 5000 Unit(s) SubCutaneous every 12 hours  hydrocortisone sodium succinate Injectable 25 milliGRAM(s) IV Push every 12 hours  lidocaine   4% Patch 1 Patch Transdermal every 24 hours  multivitamin 1 Tablet(s) Oral daily  pantoprazole  Injectable 40 milliGRAM(s) IV Push daily    MEDICATIONS  (PRN):  acetaminophen   Oral Liquid .. 470 milliGRAM(s) Oral every 6 hours PRN Mild Pain (1 - 3), Moderate Pain (4 - 6)    Vital Signs Last 24 Hrs  T(C): 36.5 (28 May 2024 04:42), Max: 36.6 (27 May 2024 12:09)  T(F): 97.7 (28 May 2024 04:42), Max: 97.9 (27 May 2024 12:09)  HR: 83 (28 May 2024 04:42) (68 - 91)  BP: 126/76 (28 May 2024 04:42) (105/70 - 126/76)  BP(mean): --  RR: 18 (28 May 2024 04:42) (18 - 18)  SpO2: 99% (28 May 2024 04:42) (96% - 99%)    Parameters below as of 28 May 2024 04:42  Patient On (Oxygen Delivery Method): room air    Physical Exam:  Gen: NAD, shiley and NG in place  HEENT: MMM  Chest: Equal chest rise  Cardiac: irregular  Abd: Nondistended  Neuro: AAOx0    Labs:                        8.4    12.04 )-----------( 200      ( 27 May 2024 07:14 )             28.7     CBC Full  -  ( 27 May 2024 07:14 )  WBC Count : 12.04 K/uL  RBC Count : 2.75 M/uL  Hemoglobin : 8.4 g/dL  Hematocrit : 28.7 %  Platelet Count - Automated : 200 K/uL  Mean Cell Volume : 104.4 fl  Mean Cell Hemoglobin : 30.5 pg  Mean Cell Hemoglobin Concentration : 29.3 gm/dL    05-27    139  |  106  |  56<H>  ----------------------------<  163<H>  3.4<L>   |  19<L>  |  3.16<H>    Ca    8.4      27 May 2024 07:15  Phos  3.4     05-26  Mg     2.1     05-26    TPro  4.4<L>  /  Alb  2.1<L>  /  TBili  0.1<L>  /  DBili  x   /  AST  10  /  ALT  7<L>  /  AlkPhos  90  05-26    PT/INR - ( 26 May 2024 07:26 )   PT: 14.0 sec;   INR: 1.34 ratio         PTT - ( 26 May 2024 07:26 )  PTT:31.8 sec  Vitamin B12, Serum: 1172 pg/mL (05-27-24 @ 10:48)  Folate, Serum: >20.0 ng/mL (05-27-24 @ 10:48)   [facility] HEMATOLOGY/ONCOLOGY INPATIENT PROGRESS NOTE     Interval Hx:   05-28-24: Ms. Yee was seen at bedside today, no overnight events noted, labs noted so far negative for paraproteinemia nutritionally appropriate, no signs of bleeding     Meds:   MEDICATIONS  (STANDING):  ascorbic acid 250 milliGRAM(s) Oral daily  cefTRIAXone   IVPB 2000 milliGRAM(s) IV Intermittent every 24 hours  chlorhexidine 2% Cloths 1 Application(s) Topical daily  heparin   Injectable 5000 Unit(s) SubCutaneous every 12 hours  hydrocortisone sodium succinate Injectable 25 milliGRAM(s) IV Push every 12 hours  lidocaine   4% Patch 1 Patch Transdermal every 24 hours  multivitamin 1 Tablet(s) Oral daily  pantoprazole  Injectable 40 milliGRAM(s) IV Push daily    MEDICATIONS  (PRN):  acetaminophen   Oral Liquid .. 470 milliGRAM(s) Oral every 6 hours PRN Mild Pain (1 - 3), Moderate Pain (4 - 6)    Vital Signs Last 24 Hrs  T(C): 36.5 (28 May 2024 04:42), Max: 36.6 (27 May 2024 12:09)  T(F): 97.7 (28 May 2024 04:42), Max: 97.9 (27 May 2024 12:09)  HR: 83 (28 May 2024 04:42) (68 - 91)  BP: 126/76 (28 May 2024 04:42) (105/70 - 126/76)  BP(mean): --  RR: 18 (28 May 2024 04:42) (18 - 18)  SpO2: 99% (28 May 2024 04:42) (96% - 99%)    Parameters below as of 28 May 2024 04:42  Patient On (Oxygen Delivery Method): room air    Physical Exam:  Gen: NAD, shiley and NG in place  HEENT: MMM  Chest: Equal chest rise  Cardiac: irregular  Abd: Nondistended  Neuro: AAOx0    Labs:                        8.4    12.04 )-----------( 200      ( 27 May 2024 07:14 )             28.7     CBC Full  -  ( 27 May 2024 07:14 )  WBC Count : 12.04 K/uL  RBC Count : 2.75 M/uL  Hemoglobin : 8.4 g/dL  Hematocrit : 28.7 %  Platelet Count - Automated : 200 K/uL  Mean Cell Volume : 104.4 fl  Mean Cell Hemoglobin : 30.5 pg  Mean Cell Hemoglobin Concentration : 29.3 gm/dL    05-27    139  |  106  |  56<H>  ----------------------------<  163<H>  3.4<L>   |  19<L>  |  3.16<H>    Ca    8.4      27 May 2024 07:15  Phos  3.4     05-26  Mg     2.1     05-26    TPro  4.4<L>  /  Alb  2.1<L>  /  TBili  0.1<L>  /  DBili  x   /  AST  10  /  ALT  7<L>  /  AlkPhos  90  05-26    PT/INR - ( 26 May 2024 07:26 )   PT: 14.0 sec;   INR: 1.34 ratio         PTT - ( 26 May 2024 07:26 )  PTT:31.8 sec  Vitamin B12, Serum: 1172 pg/mL (05-27-24 @ 10:48)  Folate, Serum: >20.0 ng/mL (05-27-24 @ 10:48)

## 2024-05-28 NOTE — CONSULT NOTE ADULT - TIME BILLING
Chart review, exam, counseling, coordination of care
Total time spent including the following  [] Physical chart review and documentation   An extensive review of the physical chart, electronic health record, and documentation was conducted to obtain collateral information including but not limited to:   - Current inpatient records (ED, H&P, primary team, and consultants [ Nephro  ])   - Inpatient values/results (CBC, CMP, Imaging)   - Social work assessments x   - Current or proposed treatment plans    - Pharmacotherapy review   [x]review of medical literature related to pathogenesis, disease/illness progress, treatment and/or prognosis  [x]care coordination  [x]discussion with floor staff (patient's RN)  [x]discussion with the patient  [x]Physical Exam and/or review of systems   [x]Formulation of assessment and plan

## 2024-05-28 NOTE — PROGRESS NOTE ADULT - SUBJECTIVE AND OBJECTIVE BOX
[facility] DIVISION OF KIDNEY DISEASES AND HYPERTENSION   FOLLOW UP NOTE  --------------------------------------------------------------------------------  Chief Complaint: Pt with oliguric/anuric ROBERT requiring renal replacement therapy    24 hour events/subjective: Pt. was seen and examined. Pt is awake but not answering questions. Last HD on 5/27/24.     PAST HISTORY  --------------------------------------------------------------------------------  No significant changes to PMH, PSH, FHx, SHx, unless otherwise noted    ALLERGIES & MEDICATIONS  --------------------------------------------------------------------------------  Allergies  penicillin (Swelling)    Intolerances  epinephrine (Other)    Standing Inpatient Medications  ascorbic acid 250 milliGRAM(s) Oral daily  cefTRIAXone   IVPB 2000 milliGRAM(s) IV Intermittent every 24 hours  chlorhexidine 2% Cloths 1 Application(s) Topical daily  heparin   Injectable 5000 Unit(s) SubCutaneous every 12 hours  hydrocortisone sodium succinate Injectable 25 milliGRAM(s) IV Push every 12 hours  lidocaine   4% Patch 1 Patch Transdermal every 24 hours  multivitamin 1 Tablet(s) Oral daily  pantoprazole  Injectable 40 milliGRAM(s) IV Push daily    PRN Inpatient Medications  acetaminophen   Oral Liquid .. 470 milliGRAM(s) Oral every 6 hours PRN    REVIEW OF SYSTEMS  --------------------------------------------------------------------------------  Unable to obtain ROS    VITALS/PHYSICAL EXAM  --------------------------------------------------------------------------------  T(C): 36.3 (05-28-24 @ 12:04), Max: 36.5 (05-28-24 @ 04:42)  HR: 61 (05-28-24 @ 12:04) (61 - 91)  BP: 146/91 (05-28-24 @ 12:04) (107/72 - 146/91)  RR: 18 (05-28-24 @ 12:04) (18 - 18)  SpO2: 95% (05-28-24 @ 12:04) (95% - 99%)  Wt(kg): --    05-27-24 @ 07:01  -  05-28-24 @ 07:00  --------------------------------------------------------  IN: 670 mL / OUT: 3 mL / NET: 667 mL    Physical Exam:  Gen: Ill appearing   HEENT: Dry MM. Anicteric  Pulm: CTA B/L  CV: S1S2+  Abd: Soft, +BS   Ext: No LE edema B/L  Neuro: Somnolent  Skin: Warm and dry  Dialysis access: R IJ non tunneled HD catheter     LABS/STUDIES  --------------------------------------------------------------------------------              8.4    12.04 >-----------<  200      [05-27-24 @ 07:14]              28.7     138  |  103  |  46  ----------------------------<  150      [05-28-24 @ 10:33]  4.3   |  22  |  2.29        Ca     8.1     [05-28-24 @ 10:33]    CK 21      [05-28-24 @ 10:33]        [05-27-24 @ 10:48]    Creatinine Trend:  SCr 2.29 [05-28 @ 10:33]  SCr 3.16 [05-27 @ 07:15]  SCr 2.83 [05-26 @ 07:26]  SCr 2.24 [05-25 @ 00:28]  SCr 3.33 [05-24 @ 00:28]    HBsAg Nonreact      [05-19-24 @ 23:37]  HCV 0.09, Nonreact      [05-19-24 @ 23:37]  HIV Nonreact      [05-19-24 @ 23:37]    KIKE: titer Negative, pattern --      [05-19-24 @ 23:37]  dsDNA <1      [05-19-24 @ 23:37]  C3 Complement 75      [05-19-24 @ 23:37]  C4 Complement 28      [05-19-24 @ 23:37]  ANCA: cANCA Negative, pANCA Negative, atypical ANCA Negative      [05-22-24 @ 00:39]  Syphilis Screen (Treponema Pallidum Ab) Negative      [05-19-24 @ 23:37]  PLA2R: JUANJO <1.8, IFA --      [05-19-24 @ 23:37]  Free Light Chains: kappa 13.90, lambda 16.26, ratio = 0.85      [05-27 @ 10:48]  Immunofixation Serum: IgM Lambda Band Identified    Reference Range: None Detected      [05-19-24 @ 23:37]  SPEP Interpretation: Beta-Migrating Paraprotein Identified      [05-19-24 @ 23:37]

## 2024-05-28 NOTE — CONSULT NOTE ADULT - ASSESSMENT
A/P:79-year-old female with past medical history of GERD, MI, chronic pancreatitis, HTN, incontinence, recent hip surgery and was recently discharged from outpatient rehab to home presented to the emergency department with increased lethargy, failure to thrive, decreased PO intake nausea/vomiting.        Wound Consult requested to assist w/ management of     Sacral region / BL buttocks blanchable erythema   LLE ISTAP 2 skin tear    LUE ISTAP 3 skin tear    Recommendation:   Sacral region/BL buttocks SERINA BID and PRN  Skin tears: Cavilon, adaptic, DSD secure with wendi  Moisturize intact skin w/ SWEEN cream BID  Nutrition Consult for optimization in pt w/Increased nutritional needs            encourage high quality protein, paige/ prosource, MVI & Vit C to promote wound healing  Continue turning and positioning w/ offloading assistive devices as per protocol       Continue w/ attends under pads and Pericare w/  purewick care as per protocol  Waffle Cushion to chair when oob to chair  Continue w/ low air loss pressure redistribution bed surface   Care as per medicine, will follow w/ you  Upon discharge f/u as outpatient at [facility] Center [address] [place] [address] [phone]  Seen w/  RN and D/w team  Thank you for this consult,  NEIDA Moreland-BC, [facility]  [phone]  Nights/ Weekends/ Holidays please call:  General Surgery Consult pager ([phone]) for emergencies  Wound PT for multilayer leg wrapping or VAC issues (x [phone])

## 2024-05-28 NOTE — PROGRESS NOTE ADULT - SUBJECTIVE AND OBJECTIVE BOX
[facility] DIVISION OF INFECTIOUS DISEASES  RIGO Tyler Y. Patel, S. Shah, G. Casimir  [phone]  ([phone] - weekdays after 5pm and weekends)    Name: SRINI VALDOVINOS  Age/Gender: 79y Female  MRN: [id number]    Interval History:  Patient seen and examined this morning.   Patient awake, unable to obtain ROS.   Notes reviewed  No concerning overnight events  Afebrile   Allergies: penicillin (Swelling)      Objective:  Vitals:   T(F): 97.4 (05-28-24 @ 12:04), Max: 97.7 (05-28-24 @ 04:42)  HR: 61 (05-28-24 @ 12:04) (61 - 91)  BP: 146/91 (05-28-24 @ 12:04) (107/72 - 146/91)  RR: 18 (05-28-24 @ 12:04) (18 - 18)  SpO2: 95% (05-28-24 @ 12:04) (95% - 99%)  Physical Examination:  General: no acute distress  HEENT: NC/AT, anicteric, EOMI  Respiratory: decreased breath sounds b/l  Cardiovascular: S1 and S2 present  Gastrointestinal: soft, nondistended  Neuro: AAOx0, not answering/following   Extremities: no cyanosis  Skin: no visible rash    Laboratory Studies:  CBC:                       8.4    12.04 )-----------( 200      ( 27 May 2024 07:14 )             28.7     WBC Trend:  12.04 05-27-24 @ 07:14  10.01 05-26-24 @ 07:26  7.89 05-25-24 @ 00:28  12.57 05-24-24 @ 00:28  18.15 05-23-24 @ 00:42  22.77 05-22-24 @ 00:39    CMP: 05-28    138  |  103  |  46<H>  ----------------------------<  150<H>  4.3   |  22  |  2.29<H>    Ca    8.1<L>      28 May 2024 10:33      Creatinine: 2.29 mg/dL (05-28-24 @ 10:33)  Creatinine: 3.16 mg/dL (05-27-24 @ 07:15)  Creatinine: 2.83 mg/dL (05-26-24 @ 07:26)  Creatinine: 2.24 mg/dL (05-25-24 @ 00:28)  Creatinine: 3.33 mg/dL (05-24-24 @ 00:28)  Creatinine: 2.94 mg/dL (05-23-24 @ 00:42)  Creatinine: 3.71 mg/dL (05-22-24 @ 00:39)    Microbiology: reviewed   Culture - Blood (collected 05-21-24 @ 10:30)  Source: .Blood Blood-Peripheral  Final Report (05-26-24 @ 16:00):    No growth at 5 days    Culture - Blood (collected 05-21-24 @ 10:00)  Source: .Blood Blood-Peripheral  Final Report (05-26-24 @ 16:00):    No growth at 5 days    Culture - Nose (collected 05-20-24 @ 05:25)  Source: .Nose Nose  Final Report (05-21-24 @ 14:16):    Staphylococcus aureus isolated    No Methicillin Resistant Staphylococcus aureus    Culture - Blood (collected 05-19-24 @ 16:25)  Source: .Blood Blood-Peripheral  Gram Stain (05-20-24 @ 08:01):    Growth in anaerobic bottle: Gram Negative Rods    Growth in aerobic bottle: Gram Negative Rods  Final Report (05-22-24 @ 07:47):    Growth in aerobic and anaerobic bottles: Escherichia coli    See previous culture [id number]    Culture - Urine (collected 05-19-24 @ 16:23)  Source: Clean Catch Clean Catch (Midstream)  Final Report (05-23-24 @ 16:39):    >100,000 CFU/ml Escherichia coli    10,000 - 49,000 CFU/mL Enterococcus faecalis    <10,000 CFU/ml Normal Urogenital magalys present  Organism: Escherichia coli  Enterococcus faecalis (05-23-24 @ 16:39)  Organism: Enterococcus faecalis (05-23-24 @ 16:39)      Method Type: BENTON      -  Ampicillin: S <=2 Predicts results to ampicillin/sulbactam, amoxacillin-clavulanate and  piperacillin-tazobactam.      -  Ciprofloxacin: S <=1      -  Levofloxacin: S 1      -  Nitrofurantoin: S <=32 Should not be used to treat pyelonephritis.      -  Tetracycline: R >8      -  Vancomycin: S 2  Organism: Escherichia coli (05-23-24 @ 16:39)      Method Type: BENTON      -  Amoxicillin/Clavulanic Acid: S <=8/4 Consider reserving for cystitis when ampicillin/sulbactam is resistant      -  Ampicillin: R >16 These ampicillin results predict results for amoxicillin      -  Ampicillin/Sulbactam: R >16/8      -  Aztreonam: S <=4      -  Cefazolin: S <=2 For uncomplicated UTI with K. pneumoniae, E. coli, or P. mirablis: BENTON <=16 is sensitive and BENTON >=32 is resistant. This also predicts results for oral agents cefaclor, cefdinir, cefpodoxime, cefprozil, cefuroxime axetil, cephalexin and locarbef for uncomplicated UTI. Note that some isolates may be susceptible to these agents while testing resistant to cefazolin.      -  Cefepime: S <=2      -  Cefoxitin: S <=8      -  Ceftriaxone: S <=1      -  Cefuroxime: S <=4      -  Ciprofloxacin: S <=0.25      -  Ertapenem: S <=0.5      -  Gentamicin: S <=2      -  Imipenem: S <=1      -  Levofloxacin: S <=0.5      -  Meropenem: S <=1      -  Nitrofurantoin: S <=32 Should not be used to treat pyelonephritis      -  Piperacillin/Tazobactam: S <=8      -  Tobramycin: S <=2      -  Trimethoprim/Sulfamethoxazole: S <=0.5/9.5    Culture - Blood (collected 05-19-24 @ 16:10)  Source: .Blood Blood-Peripheral  Gram Stain (05-20-24 @ 09:48):    Growth in aerobic bottle: Gram Negative Rods    Growth in anaerobic bottle: Gram Negative Rods  Final Report (05-22-24 @ 07:47):    Growth in aerobic and anaerobic bottles: Escherichia coli    Direct identification is available within approximately 3-5    hours either by Blood Panel Multiplexed PCR or Direct    MALDI-TOF. Details: [email or web address]  Organism: Blood Culture PCR  Escherichia coli (05-22-24 @ 07:47)  Organism: Escherichia coli (05-22-24 @ 07:47)      Method Type: BENTON      -  Ampicillin: R >16 These ampicillin results predict results for amoxicillin      -  Ampicillin/Sulbactam: R >16/8      -  Aztreonam: S <=4      -  Cefazolin: I 4      -  Cefepime: S <=2      -  Cefoxitin: S <=8      -  Ceftriaxone: S <=1      -  Ciprofloxacin: S <=0.25      -  Ertapenem: S <=0.5      -  Gentamicin: S <=2      -  Imipenem: S <=1      -  Levofloxacin: S <=0.5      -  Meropenem: S <=1      -  Piperacillin/Tazobactam: S <=8      -  Tobramycin: S <=2      -  Trimethoprim/Sulfamethoxazole: S <=0.5/9.5  Organism: Blood Culture PCR (05-22-24 @ 07:47)      Method Type: PCR      -  Escherichia coli: Detec    SARS-CoV-2 Result: Mela (19 May 2024 16:22)    Radiology: reviewed     Medications:  acetaminophen   Oral Liquid .. 470 milliGRAM(s) Oral every 6 hours PRN  ascorbic acid 250 milliGRAM(s) Oral daily  cefTRIAXone   IVPB 2000 milliGRAM(s) IV Intermittent every 24 hours  chlorhexidine 2% Cloths 1 Application(s) Topical daily  heparin   Injectable 5000 Unit(s) SubCutaneous every 12 hours  hydrocortisone sodium succinate Injectable 25 milliGRAM(s) IV Push every 12 hours  lidocaine   4% Patch 1 Patch Transdermal every 24 hours  multivitamin 1 Tablet(s) Oral daily  pantoprazole  Injectable 40 milliGRAM(s) IV Push daily    Current Antimicrobials:  cefTRIAXone   IVPB 2000 milliGRAM(s) IV Intermittent every 24 hours    Prior/Completed Antimicrobials:  cefepime   IVPB  vancomycin  IVPB.

## 2024-05-28 NOTE — PROGRESS NOTE ADULT - PROBLEM SELECTOR PLAN 1
Pt with oliguric/anuric ROBERT requiring renal replacement therapy in the setting of GN bacteremia, septic shock, ?medication use (Benicar) and diarrhea. Per Kath/THALIA review, pt has had increasing SCr since 3/14/24 with a SCr of 1.8, trended up to 1.96 on 3/23/24, then 2.19 on 4/30/24 (most recent). Prior to March 2024, pt had normal kidney function dating back to October 2017. SCr on admission of 5.56. UA significant for infectious etiology. Pt initiated on IV abx. CT abd/pel with R hydronephrosis noted. Urology notes reviewed (no intervention recommended). Pt with persistent metabolic acidosis despite IV bicarb infusion thus was initiated on HD on 5/19.     Serologies thus far as above reviewed. ?IgM lambda band  Recommend UPCR (urine protein-creatinine ratio).   Recommend repeat Kidney US (last US on 5/22, need to re-evaluate hydronephrosis).   Last HD treatment was on 5/27/24.   No plans for HD today.   Monitor labs and urine output. Avoid NSAIDs, ACEI/ARBS, RCA and nephrotoxins. Dose medications as per eGFR.

## 2024-05-28 NOTE — PROGRESS NOTE ADULT - ASSESSMENT
79F with PMH GERD, MI, chronic pancreatitis/insufficiency, HTN, incontinence, recent R hip fx s/p repair (1/2024) and was recently discharged from outpatient rehab to home initially presenting for increased lethargy in setting of poor PO intake, admitted to MICU for urosepsis c/b E. coli bacteremia requiring vasopressors and acute renal failure requiring urgent HD for acidosis. Now s/p HD x1 with improving renal function.    # acute renal failure and acidemia from hypotension and sepsis, ATN  - s/p RIJ shiley 5/19 and pressor assisted HD  - last HD 5/24, Remains anuric, HD per nephrology  - eduardo to be exchanged if still requires HD    # urosepsis c/b E. coli bacteremia   # septic shock, resolved  # r hydro  - e. coli bacteremia. Pt with E coli UTI. Mild hydronephrosis on R. evaluated by urology, low suspicion for true obstruction  - continue CTX 2g daily, plan for 14 day course per ID  - initiated on stress dosed steroids 5/21, now weaned off pressors, taper as ordered  - TTE LVEF 46%     # Toxic metabolic encephalopathy   # depression  - Patient is baseline is AOx4, here nonverbal, not following commands, very flat affect  - per pt's brother, who is also a cardiologist, pt with worsening depression, appetite/decreased PO intake since hip fracture in Jan. slow progressive decline over past several months. Prior to this, pt was a practicing dentist  - neuro following, recommend MRI and EEG if no improvement  - CT head NC no acute change  - sp NGT  - hold home mirtazapine and seroquel iso lethargy, was evaluated by   - palliative care consult for GOC    # HTN  - holding home atenolol and olmesartan given shock     # H/o Chronic pancreatitis/insufficiency   - Continue home Creon TID premeal (held for now bc pt too lethargic to take po and cannot be crushed)    # H/o GERD  - Continue home med Protonix 40mg PO QD    # Femur facture Jan 2024 s/p repair   - Bed bound since discharge from rehab requiring full adl support    # anemia  - baseline hgb 7-8s, s/p 1u pRBC 5/21 for hgb <7, heme following    DVT PPX Hep subq    FULL CODE    Please call [facility] with questions [phone].   79F with PMH GERD, MI, chronic pancreatitis/insufficiency, HTN, incontinence, recent R hip fx s/p repair (1/2024) and was recently discharged from outpatient rehab to home initially presenting for increased lethargy in setting of poor PO intake, admitted to MICU for urosepsis c/b E. coli bacteremia requiring vasopressors and acute renal failure requiring urgent HD for acidosis. Now s/p HD x1 with improving renal function.    # acute renal failure and acidemia from hypotension and sepsis, ATN  - s/p RIJ shiley 5/19 and pressor assisted HD  - Remains anuric and HD dependent, HD per nephrology  - eduardo to be exchanged, IR consult    # urosepsis c/b E. coli bacteremia   # septic shock, resolved  # r hydro  - e. coli bacteremia. Pt with E coli UTI. Mild hydronephrosis on R. evaluated by urology, low suspicion for true obstruction  - continue CTX 2g daily, plan for 14 day course per ID  - initiated on stress dosed steroids 5/21, now weaned off pressors, taper as ordered  - TTE LVEF 46%     # Toxic metabolic encephalopathy   # depression  - Patient is baseline is AOx4, here nonverbal, not following commands, very flat affect  - per pt's brother, who is also a cardiologist, pt with worsening depression, appetite/decreased PO intake since hip fracture in Jan. slow progressive decline over past several months. Prior to this, pt was a practicing dentist  - neuro following, recommend MRI and EEG if no improvement  - CT head NC no acute change  - sp NGT  - hold home mirtazapine and seroquel iso lethargy, was evaluated by   - palliative care consult for GOC    # HTN  - holding home atenolol and olmesartan given shock     # H/o Chronic pancreatitis/insufficiency   - Continue home Creon TID premeal (held for now bc pt too lethargic to take po and cannot be crushed)    # H/o GERD  - Continue home med Protonix 40mg PO QD    # Femur facture Jan 2024 s/p repair   - Bed bound since discharge from rehab requiring full adl support    # anemia  - baseline hgb 7-8s, s/p 1u pRBC 5/21 for hgb <7, heme following    DVT PPX Hep subq    FULL CODE    Dr Lamb will be covering me starting tomorrow.  Please contact with any questions or concerns [phone].

## 2024-05-28 NOTE — CONSULT NOTE ADULT - ASSESSMENT
79 year old patient with Bilateral lower extremity DTI's  - Patient seen and evaluated  - DTIs noted with no open lesions, no signs of infection in feet  - Recommend z flow boots in bed at all times  - Recommend Cavilon to DTIs and covering with allevyn foam, change every other day  - Will monitor periodically during this admission, reconsult sooner as needed

## 2024-05-28 NOTE — PROGRESS NOTE ADULT - ASSESSMENT
79y old Female with history of MI, Chronic pancreatitis, HTN, Incontinence, GERD, Hip fracture here with septic shock secondary to E. Coli bacteremia.     1. Sepsis  2. E. Coli bacteremia  - remains hemodynamically stable  - Continue with steroid taper- Hydrocortisone 25 mg IV Q8H for 1 day, Hydrocortisone 25 m IV Q12H for 1 day, then prednisone 10 mg for 1 day and prednisone 5 mg one day  - Monitor vital signs     3. Steroid induced hyperglycemia  - No history of diabetes previously  - FS are within goal  - No need for sliding scale  - Monitor FS Q6H since she's on tube feeds    4. Acute renal failure on HD  - monitor for hypoglycemia  - nephrology following    Will continue to follow.    Deborah Jeff MD  [facility]- Division of Endocrinology    [address] [place] [address]    T [phone]  F [phone]

## 2024-05-28 NOTE — PROGRESS NOTE ADULT - PROBLEM SELECTOR PLAN 2
Pt with severe high anion gap metabolic acidosis in setting of ROBERT, infectious process and hypotension. Improved with HD. SCO2 of 22. Monitor SCO2.

## 2024-05-28 NOTE — CONSULT NOTE ADULT - PROBLEM SELECTOR RECOMMENDATION 5
Will continue to f/u for GOC and supportive care        Mio Blanchard MD  Associate Chief Geriatrics and Palliative Care (GaP) [facility]   GaP Consult Service   , [employer] at [facility]      Please contact me via Teams from Monday through Friday between 9am-5pm. If not answering, please call the palliative care pager [phone]    After 5pm and on weekends, please see the contact information below:    In the event of newly developing, evolving, or worsening symptoms, please contact the Palliative Medicine team via pager (if the patient is at [facility] #[phone] or if the patient is at [facility] #[phone]) The Geriatric and Palliative Medicine service has coverage 24 hours a day/ 7 days a week to provide medical recommendations regarding symptom management needs via telephone

## 2024-05-28 NOTE — CONSULT NOTE ADULT - CONVERSATION DETAILS
Her  (Elfego [phone]) is her surrogate. He was able to gave verbalized understanding regarding the patient's  medical state. 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 She spent “100 days in [place]” and soon after DC, she was  re-admitted for FTT, sepsis (E Coli bacteremia) and renal failure (on HD). All this c/b encephalopathy (currently unresponsive) and bed bound status. He has seen little improvement and rather a progressive decline. He is a psychologist who has a company that manages [employer], so he has a sense of "how things are going." However, he is not a “doctor,” so he wants the patient’s brother, Dr. Otis Wasserman, who is a cardiologist, to weight in on decision making. Her  was going to give the patient’s brother the PCU number, so he was able to call me to f/u on GOC; however, I did not get any messages about the patient's brother calling the unit. Hence, I will ask for one of the palliative care team members covering tomorrow (I will be on admin duties until Friday) to f/u with her  to then connect with the brother, so GOC and ACP (she is still full code) can be defined.

## 2024-05-28 NOTE — CONSULT NOTE ADULT - ASSESSMENT
full note to f/u.   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 She spent “100 days in [place]” and then developed FTT, sepsis (E Coli bacteremia) and ROBERT (on HD). All this c/b encephalopathy (yesterday mainly unresponsive) and bed bound status. We were called for GOC.     GOC: Her  (Law [phone]) is her surrogate. I was able to touch base with him yesterday evening. He is a psychologist who has a company that manages residential, so he has a sense of how things are going. However, he is not a “doctor,” so he wants the patient’s brother,  Otis Wasserman, who is a cardiologist to weight in on decision making. Her  was going to give the patient’s brother the PCU number, so he was able to call me to f/u on GOC; however, I did not get any messages about the patient's borther calling the unit. Hence, today, one of the palliative care team members covering (I will be on admin duties until Friday) will f/u with her  to then connect with the brother, so GOC and ACP (she is still full code) can be defined.         Mio Blanchard MD  Associate Chief Geriatrics and Palliative Care (GaP) [facility] Director [place] Consult Service   , [employer] at [facility]      Please contact me via Teams from Monday through Friday between 9am-5pm. If not answering, please call the palliative care pager [phone]    After 5pm and on weekends, please see the contact information below:    In the event of newly developing, evolving, or worsening symptoms, please contact the Palliative Medicine team via pager (if the patient is at [facility] #[phone] or if the patient is at [facility] #[phone]) The Geriatric and Palliative Medicine service has coverage 24 hours a day/ 7 days a week to provide medical recommendations regarding symptom management needs via telephone 80 yo F with pancreatic insufficiency, hip Fx and FTT. Hospitalization c/b sepsis (E Coli bacteremia), encephalopathy (today not arousable), and ROBERT (on HD). Functionality is severely compromised. Geriatrics and Palliative Medicine (GaP) Team was called for GOC.

## 2024-05-28 NOTE — CONSULT NOTE ADULT - PROBLEM SELECTOR RECOMMENDATION 9
Multifactorial (Infection, metabolic issues, malnutrition, hospitalization)  Work up and Rx as per the primary team.   Will also recommend:   -Frequent reassurance and verbal orientation   -Family members or other familiar persons by his bedside.   -Delusions and hallucinations should be neither endorsed nor challenged.   -Physical restraints should be avoided. Alternatives to restraint use, such as constant observation (preferably by someone familiar to the patient such as a family member), may be more effective.  -PT eval and early ambulation if possible

## 2024-05-28 NOTE — CHART NOTE - NSCHARTNOTEFT_GEN_A_CORE
IR previously placed a Shiley catheter on 5/19 for this 79F admitted for urosepsis and acute renal failure on pressors and with acidosis requiring emergent HD. Now s/p HD with improving renal function, off pressors, downgraded from MICU to floor, with negative blood cultures. Patient has leukocytosis but remains afebrile on ceftriaxone. IR consulted for exchange of NT catheter today 5/28.     - no need to exchange the NT catheter at this time as patient is improving on the floor, blood cultures are negative, and patient remains afebrile.   -case discussed with Dr. Mcneil

## 2024-05-29 NOTE — PROGRESS NOTE ADULT - SUBJECTIVE AND OBJECTIVE BOX
[facility] DIVISION OF KIDNEY DISEASES AND HYPERTENSION   FOLLOW UP NOTE  --------------------------------------------------------------------------------  Chief Complaint: Pt with oliguric/anuric ROBERT requiring renal replacement therapy    24 hour events/subjective: Pt. was seen and examined. Pt has an NGT in place. Pt is somnolent, not answering questions or following commands. Last HD on 5/27/24.     PAST HISTORY  --------------------------------------------------------------------------------  No significant changes to PMH, PSH, FHx, SHx, unless otherwise noted    ALLERGIES & MEDICATIONS  --------------------------------------------------------------------------------  Allergies  penicillin (Swelling)    Intolerances  epinephrine (Other)    Standing Inpatient Medications  ascorbic acid 250 milliGRAM(s) Oral daily  cefTRIAXone   IVPB 2000 milliGRAM(s) IV Intermittent every 24 hours  chlorhexidine 2% Cloths 1 Application(s) Topical daily  heparin   Injectable 5000 Unit(s) SubCutaneous every 12 hours  lidocaine   4% Patch 1 Patch Transdermal every 24 hours  multivitamin 1 Tablet(s) Oral daily  pantoprazole  Injectable 40 milliGRAM(s) IV Push daily    PRN Inpatient Medications  acetaminophen   Oral Liquid .. 470 milliGRAM(s) Oral every 6 hours PRN    REVIEW OF SYSTEMS  --------------------------------------------------------------------------------  Unable to obtain ROS    VITALS/PHYSICAL EXAM  --------------------------------------------------------------------------------  T(C): 34.6 (05-29-24 @ 12:04), Max: 36.2 (05-28-24 @ 19:37)  HR: 81 (05-29-24 @ 12:04) (63 - 81)  BP: 142/84 (05-29-24 @ 12:04) (119/67 - 142/84)  RR: 18 (05-29-24 @ 12:04) (18 - 18)  SpO2: 99% (05-29-24 @ 12:04) (95% - 99%)  Wt(kg): --    05-28-24 @ 07:01  -  05-29-24 @ 07:00  --------------------------------------------------------  IN: 880 mL / OUT: 250 mL / NET: 630 mL    Physical Exam:  Gen: Ill appearing   HEENT: Dry MM. Anicteric. +NGT  Pulm: Diminished B/L  CV: S1S2+  Abd: Soft, +BS   Ext: No LE edema B/L  Neuro: Somnolent  Skin: Warm and dry  Dialysis access: R IJ non tunneled HD catheter     LABS/STUDIES  --------------------------------------------------------------------------------              8.4    15.86 >-----------<  245      [05-29-24 @ 09:05]              28.9     140  |  106  |  58  ----------------------------<  156      [05-29-24 @ 09:05]  3.6   |  18  |  2.54        Ca     8.4     [05-29-24 @ 09:05]    CK 21      [05-28-24 @ 10:33]    Creatinine Trend:  SCr 2.54 [05-29 @ 09:05]  SCr 2.29 [05-28 @ 10:33]  SCr 3.16 [05-27 @ 07:15]  SCr 2.83 [05-26 @ 07:26]  SCr 2.24 [05-25 @ 00:28]    HBsAg Nonreact      [05-19-24 @ 23:37]  HCV 0.09, Nonreact      [05-19-24 @ 23:37]  HIV Nonreact      [05-19-24 @ 23:37]    KIKE: titer Negative, pattern --      [05-19-24 @ 23:37]  dsDNA <1      [05-19-24 @ 23:37]  C3 Complement 75      [05-19-24 @ 23:37]  C4 Complement 28      [05-19-24 @ 23:37]  ANCA: cANCA Negative, pANCA Negative, atypical ANCA Negative      [05-22-24 @ 00:39]  Syphilis Screen (Treponema Pallidum Ab) Negative      [05-19-24 @ 23:37]  PLA2R: JUANJO <1.8, IFA --      [05-19-24 @ 23:37]  Free Light Chains: kappa 13.90, lambda 16.26, ratio = 0.85      [05-27 @ 10:48]  Immunofixation Serum: One IgM Lambda Band and One Weak  IgG Kappa Band and One Weak IgG Lambda  Band Identified    Reference Range: None Detected      [05-27-24 @ 10:48]  SPEP Interpretation: Co- Migrating Three Gamma-Migrating Paraproteins Identified      [05-19-24 @ 23:37]  Immunofixation Urine: One Bence Babb protein Kappa type, One IgM Lambda Band, One IgG Lambda  Band, One IgG Kappa Band Identified  Reference Range: None Detected      [05-20-24 @ 05:22]

## 2024-05-29 NOTE — PROGRESS NOTE ADULT - PROBLEM SELECTOR PLAN 4
Goals are established. Will sign off. Reconsult as needed.   Primary team ACP notified.    Amee Markham MD  Geriatrics and Palliative Medicine Attending  [facility] pager: [phone]

## 2024-05-29 NOTE — PROGRESS NOTE ADULT - ASSESSMENT
Ms. Yee is a 79y Female with PMHx of HTN, pA.fibm HTN, macrocytic anemia, pancreatic insufficiency hypertriglyceridemia, aortic valve stenosis, R hip fracture s/p ORIF 01/2024, depression who is admitted since 05/19/2024 with septic shock due to UTI and E.coli bacteremia, R hydronephrosis with acute renal failure now on HD, toxic metabolic encephalopathy. Pt is non-verbal this morning so history is obtained from chart and coordinating team members. Now on floor from MICU. CTH negative for acute changes.     Pt received 1u PRBC on 05/21/2024 with appropriate response. Labs on my initial evaluation show Hb 7.9 Hct 26.2 .3 with neutrophilia and lymphopenia. Normal bilirubin, liver function. Serum FLC ratio normal. SPEP with 0.6 Mspike without ALEKSEY. UPEP with K/L elevated and ratio 4 in setting of ARF. Additionally CT A/P with reported cirrhosis.       # Macrocytic anemia  # B12 deficiency  - B12 last year was 192  - Repeat B12 1172  - folate >20, previously low   - Paraproteinemia workup so far negative  - Also may have underlying BM disorder such as MDS, outpatient labs with persistent macrocytosis and anemia Hb around 10, at this time further workup such as BmBx would need to be considered as outpatient given overall clinical context   - f/u palliative care recommendations   - Transfuse to maintain Hb > 7     # MGUS  - Obtain full panel for complete workup  - f/u SPEP/ALEKSEY, Serum immunoglobulins increased IgM 526, Serum FLCs ratio normal, LDH normal, B2MCG   - See above     # Acute renal failure  - HD per nephrology    # E.coli UTI and bacteremia  - Antibiotics per primary team and ID       Thank you for allowing me to participate in the care of Ms. Yee, please do not hesitate to call or text me if you have further questions or concerns.     Rajeev Shon Mcdowell MD  [facility] [place]   Division of Hematology/Oncology  [address], [address]  [place] [address]  P: [phone]  F: [phone]    Attestation:    ----Pt evaluated including face-to-face interaction in addition to chart review, reviewing treatment plan, and managing the patient’s chronic diagnoses as listed in the assessment----    Ms. Yee is a 79y Female with PMHx of HTN, pA.fibm HTN, macrocytic anemia, pancreatic insufficiency hypertriglyceridemia, aortic valve stenosis, R hip fracture s/p ORIF 01/2024, depression who is admitted since 05/19/2024 with septic shock due to UTI and E.coli bacteremia, R hydronephrosis with acute renal failure now on HD, toxic metabolic encephalopathy. Pt is non-verbal this morning so history is obtained from chart and coordinating team members. Now on floor from MICU. CTH negative for acute changes.     Pt received 1u PRBC on 05/21/2024 with appropriate response. Labs on my initial evaluation show Hb 7.9 Hct 26.2 .3 with neutrophilia and lymphopenia. Normal bilirubin, liver function. Serum FLC ratio normal. SPEP with 0.6 Mspike without ALEKSEY. UPEP with K/L elevated and ratio 4 in setting of ARF. Additionally CT A/P with reported cirrhosis.       # Macrocytic anemia  # B12 deficiency  # MGUS  - B12 last year was 192  - Repeat B12 1172  - folate >20, previously low  - Also may have underlying BM disorder such as MDS, outpatient labs with persistent macrocytosis and anemia Hb around 10, at this time further workup such as BmBx would need to be considered as outpatient given overall clinical context   - f/u palliative care recommendations   - Transfuse to maintain Hb > 7     # MGUS  -  SPEP/ALEKSEY with  with M-spike 0.5 3 Co-Migrating IgM Lambda Band, IgG Lambda Band, and  IgG Kappa Band, confirmed on UPEP as well  -  Serum immunoglobulins increased IgM 526, Serum FLCs ratio normal, LDH normal, B2MCG   - Overall not likely driving pts current clinical context and can be followed up outpatient depending on pts clinical outcome and GOCs   - See above     # Acute renal failure  - HD per nephrology    # E.coli UTI and bacteremia  - Antibiotics per primary team and ID       Thank you for allowing me to participate in the care of Ms. Yee, please do not hesitate to call or text me if you have further questions or concerns.     Rajeev Mcdowell MD  [facility] [place]   Division of Hematology/Oncology  [address], [address]  [place] [address]  P: [phone]  F: [phone]    Attestation:    ----Pt evaluated including face-to-face interaction in addition to chart review, reviewing treatment plan, and managing the patient’s chronic diagnoses as listed in the assessment----

## 2024-05-29 NOTE — PROGRESS NOTE ADULT - ASSESSMENT
79y old Female with history of MI, Chronic pancreatitis, HTN, Incontinence, GERD, Hip fracture here with septic shock secondary to E. Coli bacteremia.     1. Sepsis  2. E. Coli bacteremia  - remains hemodynamically stable  - Continue with steroid taper-last day tomorrow with prednisone 5 mg once  - Monitor vital signs     3. Steroid induced hyperglycemia  - No history of diabetes previously  - FS are within goal (140-180)  - No need for sliding scale  - Monitor FS Q6H since she's on tube feeds    4. Acute renal failure on HD  - monitor for hypoglycemia  - nephrology following    Will continue to follow.    Deborah Jeff MD  [facility] of End[facility]in[facility]    [address] [place] [address]    T [phone]  F [phone]

## 2024-05-29 NOTE — PROGRESS NOTE ADULT - SUBJECTIVE AND OBJECTIVE BOX
[facility] DIVISION OF INFECTIOUS DISEASES  RIGO Tyler Y. Patel, S. Shah, G. Casimir  [phone]  ([phone] - weekdays after 5pm and weekends)    Name: SRINI VALDOVINOS  Age/Gender: 79y Female  MRN: [id number]    Interval History:  Patient seen and examined this morning.   Unable to obtain ROS.   Notes reviewed  No concerning overnight events  Afebrile   Allergies: penicillin (Swelling)      Objective:  Vitals:   T(F): 97.2 (05-29-24 @ 04:45), Max: 97.4 (05-28-24 @ 12:04)  HR: 79 (05-29-24 @ 04:45) (61 - 79)  BP: 120/74 (05-29-24 @ 04:45) (119/67 - 146/91)  RR: 18 (05-29-24 @ 04:45) (18 - 18)  SpO2: 98% (05-29-24 @ 04:45) (95% - 98%)  Physical Examination:  General: no acute distress, nonverbal  HEENT: NC/AT, anicteric, NGT  Respiratory: decreased breath sounds b/l  Cardiovascular: S1 and S2 present, normal rate   Gastrointestinal: soft, NT, ND  Extremities: no edema, no cyanosis  Skin: no visible rash    Laboratory Studies:  CBC:                       8.4    15.86 )-----------( 245      ( 29 May 2024 09:05 )             28.9     WBC Trend:  15.86 05-29-24 @ 09:05  12.04 05-27-24 @ 07:14  10.01 05-26-24 @ 07:26  7.89 05-25-24 @ 00:28  12.57 05-24-24 @ 00:28  18.15 05-23-24 @ 00:42    CMP: 05-29    140  |  106  |  58<H>  ----------------------------<  156<H>  3.6   |  18<L>  |  2.54<H>    Ca    8.4      29 May 2024 09:05      Creatinine: 2.54 mg/dL (05-29-24 @ 09:05)  Creatinine: 2.29 mg/dL (05-28-24 @ 10:33)  Creatinine: 3.16 mg/dL (05-27-24 @ 07:15)  Creatinine: 2.83 mg/dL (05-26-24 @ 07:26)  Creatinine: 2.24 mg/dL (05-25-24 @ 00:28)  Creatinine: 3.33 mg/dL (05-24-24 @ 00:28)  Creatinine: 2.94 mg/dL (05-23-24 @ 00:42)    Microbiology: reviewed   Culture - Blood (collected 05-21-24 @ 10:30)  Source: .Blood Blood-Peripheral  Final Report (05-26-24 @ 16:00):    No growth at 5 days    Culture - Blood (collected 05-21-24 @ 10:00)  Source: .Blood Blood-Peripheral  Final Report (05-26-24 @ 16:00):    No growth at 5 days    Culture - Nose (collected 05-20-24 @ 05:25)  Source: .Nose Nose  Final Report (05-21-24 @ 14:16):    Staphylococcus aureus isolated    No Methicillin Resistant Staphylococcus aureus    Culture - Blood (collected 05-19-24 @ 16:25)  Source: .Blood Blood-Peripheral  Gram Stain (05-20-24 @ 08:01):    Growth in anaerobic bottle: Gram Negative Rods    Growth in aerobic bottle: Gram Negative Rods  Final Report (05-22-24 @ 07:47):    Growth in aerobic and anaerobic bottles: Escherichia coli    See previous culture [id number]    Culture - Urine (collected 05-19-24 @ 16:23)  Source: Clean Catch Clean Catch (Midstream)  Final Report (05-23-24 @ 16:39):    >100,000 CFU/ml Escherichia coli    10,000 - 49,000 CFU/mL Enterococcus faecalis    <10,000 CFU/ml Normal Urogenital magalys present  Organism: Escherichia coli  Enterococcus faecalis (05-23-24 @ 16:39)  Organism: Enterococcus faecalis (05-23-24 @ 16:39)      -  Levofloxacin: S 1      -  Nitrofurantoin: S <=32 Should not be used to treat pyelonephritis.      -  Vancomycin: S 2      -  Ciprofloxacin: S <=1      -  Ampicillin: S <=2 Predicts results to ampicillin/sulbactam, amoxacillin-clavulanate and  piperacillin-tazobactam.      -  Tetracycline: R >8      Method Type: BENTON  Organism: Escherichia coli (05-23-24 @ 16:39)      -  Levofloxacin: S <=0.5      -  Tobramycin: S <=2      -  Nitrofurantoin: S <=32 Should not be used to treat pyelonephritis      -  Aztreonam: S <=4      -  Gentamicin: S <=2      -  Cefazolin: S <=2 For uncomplicated UTI with K. pneumoniae, E. coli, or P. mirablis: BENTON <=16 is sensitive and BENTON >=32 is resistant. This also predicts results for oral agents cefaclor, cefdinir, cefpodoxime, cefprozil, cefuroxime axetil, cephalexin and locarbef for uncomplicated UTI. Note that some isolates may be susceptible to these agents while testing resistant to cefazolin.      -  Cefepime: S <=2      -  Piperacillin/Tazobactam: S <=8      -  Ciprofloxacin: S <=0.25      -  Imipenem: S <=1      -  Ceftriaxone: S <=1      -  Ampicillin: R >16 These ampicillin results predict results for amoxicillin      Method Type: BENTON      -  Meropenem: S <=1      -  Ampicillin/Sulbactam: R >16/8      -  Cefoxitin: S <=8      -  Cefuroxime: S <=4      -  Amoxicillin/Clavulanic Acid: S <=8/4 Consider reserving for cystitis when ampicillin/sulbactam is resistant      -  Trimethoprim/Sulfamethoxazole: S <=0.5/9.5      -  Ertapenem: S <=0.5    Culture - Blood (collected 05-19-24 @ 16:10)  Source: .Blood Blood-Peripheral  Gram Stain (05-20-24 @ 09:48):    Growth in aerobic bottle: Gram Negative Rods    Growth in anaerobic bottle: Gram Negative Rods  Final Report (05-22-24 @ 07:47):    Growth in aerobic and anaerobic bottles: Escherichia coli    Direct identification is available within approximately 3-5    hours either by Blood Panel Multiplexed PCR or Direct    MALDI-TOF. Details: [email or web address]  Organism: Blood Culture PCR  Escherichia coli (05-22-24 @ 07:47)  Organism: Escherichia coli (05-22-24 @ 07:47)      -  Levofloxacin: S <=0.5      -  Tobramycin: S <=2      -  Aztreonam: S <=4      -  Gentamicin: S <=2      -  Cefazolin: I 4      -  Cefepime: S <=2      -  Piperacillin/Tazobactam: S <=8      -  Ciprofloxacin: S <=0.25      -  Imipenem: S <=1      -  Ceftriaxone: S <=1      -  Ampicillin: R >16 These ampicillin results predict results for amoxicillin      Method Type: BENTON      -  Meropenem: S <=1      -  Ampicillin/Sulbactam: R >16/8      -  Cefoxitin: S <=8      -  Trimethoprim/Sulfamethoxazole: S <=0.5/9.5      -  Ertapenem: S <=0.5  Organism: Blood Culture PCR (05-22-24 @ 07:47)      -  Escherichia coli: Detec      Method Type: PCR    SARS-CoV-2 Result: Mela (19 May 2024 16:22)    Radiology: reviewed     Medications:  acetaminophen   Oral Liquid .. 470 milliGRAM(s) Oral every 6 hours PRN  ascorbic acid 250 milliGRAM(s) Oral daily  cefTRIAXone   IVPB 2000 milliGRAM(s) IV Intermittent every 24 hours  chlorhexidine 2% Cloths 1 Application(s) Topical daily  heparin   Injectable 5000 Unit(s) SubCutaneous every 12 hours  lidocaine   4% Patch 1 Patch Transdermal every 24 hours  multivitamin 1 Tablet(s) Oral daily  pantoprazole  Injectable 40 milliGRAM(s) IV Push daily    Current Antimicrobials:  cefTRIAXone   IVPB 2000 milliGRAM(s) IV Intermittent every 24 hours    Prior/Completed Antimicrobials:  cefepime   IVPB  vancomycin  IVPB.

## 2024-05-29 NOTE — PROGRESS NOTE ADULT - ASSESSMENT
79-year-old female with past medical history of GERD, MI, chronic pancreatitis, HTN, incontinence, recent hip surgery who presented w/ increased lethargy, failure to thrive, decreased PO intake nausea/vomiting. Patient admitted to ICU for metabolic acidosis and ROBERT requiring HD found to have E Coli bacteremia 2/2 UTI.    E. coli bacteremia due to UTI  5/19 Bcx with E.coli  5/19 Ucx with E.coli & E. faecalis  - low CFU of E. faecalis so unlikely to be contributing  repeat blood cx 5/21 negative   leukocytosis had resolved, now noted with uptrend  -remains nonverbal, no new findings on exam, may be aspirating    ROBERT due to ATN, Nephrology following, on HD    Recommendations  Continue Ceftriaxone 2g daily  complete 10 day course of antibiotics from date of negative blood cultures w/ last day 5/30  Monitor temps/WBC - if febrile, send Bcx x2, UA/Ucx, CXR and can broaden to meropenem  Aspiration precautions   Palliative care following      Aisha Mcdowell M.D.  [facility], Division of Infectious Diseases  [phone]  After 5pm on weekdays and all day on weekends - please call [phone]

## 2024-05-29 NOTE — PROGRESS NOTE ADULT - SUBJECTIVE AND OBJECTIVE BOX
[facility] HEMATOLOGY/ONCOLOGY INPATIENT PROGRESS NOTE     Interval Hx:   05-29-24: Ms. Yee was seen at bedside today.    Meds:   MEDICATIONS  (STANDING):  ascorbic acid 250 milliGRAM(s) Oral daily  cefTRIAXone   IVPB 2000 milliGRAM(s) IV Intermittent every 24 hours  chlorhexidine 2% Cloths 1 Application(s) Topical daily  heparin   Injectable 5000 Unit(s) SubCutaneous every 12 hours  lidocaine   4% Patch 1 Patch Transdermal every 24 hours  multivitamin 1 Tablet(s) Oral daily  pantoprazole  Injectable 40 milliGRAM(s) IV Push daily    MEDICATIONS  (PRN):  acetaminophen   Oral Liquid .. 470 milliGRAM(s) Oral every 6 hours PRN Mild Pain (1 - 3), Moderate Pain (4 - 6)    Vital Signs Last 24 Hrs  T(C): 36.2 (29 May 2024 04:45), Max: 36.3 (28 May 2024 12:04)  T(F): 97.2 (29 May 2024 04:45), Max: 97.4 (28 May 2024 12:04)  HR: 79 (29 May 2024 04:45) (61 - 79)  BP: 120/74 (29 May 2024 04:45) (119/67 - 146/91)  BP(mean): --  RR: 18 (29 May 2024 04:45) (18 - 18)  SpO2: 98% (29 May 2024 04:45) (95% - 98%)    Parameters below as of 29 May 2024 04:45  Patient On (Oxygen Delivery Method): room air    Physical Exam:  Gen: NAD, shiley and NG in place  HEENT: MMM  Chest: Equal chest rise  Cardiac: irregular  Abd: Nondistended  Neuro: AAOx0    Labs:                        8.4    12.04 )-----------( 200      ( 27 May 2024 07:14 )             28.7     CBC Full  -  ( 27 May 2024 07:14 )  WBC Count : 12.04 K/uL  RBC Count : 2.75 M/uL  Hemoglobin : 8.4 g/dL  Hematocrit : 28.7 %  Platelet Count - Automated : 200 K/uL  Mean Cell Volume : 104.4 fl  Mean Cell Hemoglobin : 30.5 pg  Mean Cell Hemoglobin Concentration : 29.3 gm/dL    05-28    138  |  103  |  46<H>  ----------------------------<  150<H>  4.3   |  22  |  2.29<H>    Ca    8.1<L>      28 May 2024 10:33         [facility] HEMATOLOGY/ONCOLOGY INPATIENT PROGRESS NOTE     Interval Hx:   05-29-24: Ms. Yee was seen at bedside today, non verbal, no overnight events noted, labs reviewed pt with MGUS     Meds:   MEDICATIONS  (STANDING):  ascorbic acid 250 milliGRAM(s) Oral daily  cefTRIAXone   IVPB 2000 milliGRAM(s) IV Intermittent every 24 hours  chlorhexidine 2% Cloths 1 Application(s) Topical daily  heparin   Injectable 5000 Unit(s) SubCutaneous every 12 hours  lidocaine   4% Patch 1 Patch Transdermal every 24 hours  multivitamin 1 Tablet(s) Oral daily  pantoprazole  Injectable 40 milliGRAM(s) IV Push daily    MEDICATIONS  (PRN):  acetaminophen   Oral Liquid .. 470 milliGRAM(s) Oral every 6 hours PRN Mild Pain (1 - 3), Moderate Pain (4 - 6)    Vital Signs Last 24 Hrs  T(C): 36.2 (29 May 2024 04:45), Max: 36.3 (28 May 2024 12:04)  T(F): 97.2 (29 May 2024 04:45), Max: 97.4 (28 May 2024 12:04)  HR: 79 (29 May 2024 04:45) (61 - 79)  BP: 120/74 (29 May 2024 04:45) (119/67 - 146/91)  BP(mean): --  RR: 18 (29 May 2024 04:45) (18 - 18)  SpO2: 98% (29 May 2024 04:45) (95% - 98%)    Parameters below as of 29 May 2024 04:45  Patient On (Oxygen Delivery Method): room air    Physical Exam:  Gen: NAD, shiley and NG in place  HEENT: MMM  Chest: Equal chest rise  Cardiac: irregular  Abd: Nondistended  Neuro: AAOx0    Labs:                        8.4    12.04 )-----------( 200      ( 27 May 2024 07:14 )             28.7     CBC Full  -  ( 27 May 2024 07:14 )  WBC Count : 12.04 K/uL  RBC Count : 2.75 M/uL  Hemoglobin : 8.4 g/dL  Hematocrit : 28.7 %  Platelet Count - Automated : 200 K/uL  Mean Cell Volume : 104.4 fl  Mean Cell Hemoglobin : 30.5 pg  Mean Cell Hemoglobin Concentration : 29.3 gm/dL    05-28    138  |  103  |  46<H>  ----------------------------<  150<H>  4.3   |  22  |  2.29<H>    Ca    8.1<L>      28 May 2024 10:33

## 2024-05-29 NOTE — PROGRESS NOTE ADULT - PROBLEM SELECTOR PLAN 2
Pt with severe high anion gap metabolic acidosis in setting of ROBERT, infectious process and hypotension. Improved with HD. SCO2 of 18. Monitor SCO2.

## 2024-05-29 NOTE — PROGRESS NOTE ADULT - PROBLEM SELECTOR PLAN 3
Pt remains full code with no limitations to interventions  See above GO note for details  Surrogate is  Elfego who defers to patient's brother Otis Wasserman for decision making

## 2024-05-29 NOTE — PROGRESS NOTE ADULT - ASSESSMENT
79F with PMH GERD, MI, chronic pancreatitis/insufficiency, HTN, incontinence, recent R hip fx s/p repair (1/2024) and was recently discharged from outpatient rehab to home initially presenting for increased lethargy in setting of poor PO intake, admitted to MICU for urosepsis c/b E. coli bacteremia requiring vasopressors and acute renal failure requiring urgent HD for acidosis. Now s/p HD x1 with improving renal function.    # acute renal failure and acidemia from hypotension and sepsis, ATN  - s/p RIJ shiley 5/19 and pressor assisted HD  - Remains anuric and HD dependent, HD per nephrology  - eduardo to be exchanged, IR consult    # urosepsis c/b E. coli bacteremia   # septic shock, resolved  # r hydro  - e. coli bacteremia. Pt with E coli UTI. Mild hydronephrosis on R. evaluated by urology, low suspicion for true obstruction  - continue CTX 2g daily, plan for 14 day course per ID  - initiated on stress dosed steroids 5/21, now weaned off pressors, taper as ordered  - TTE LVEF 46%     # Toxic metabolic encephalopathy   # depression  - Patient is baseline is AOx4, here nonverbal, not following commands, very flat affect  - per pt's brother, who is also a cardiologist, pt with worsening depression, appetite/decreased PO intake since hip fracture in Jan. slow progressive decline over past several months. Prior to this, pt was a practicing dentist  - neuro following  - recommend MRI and vEEG , given pt is aaox 0  - CT head NC no acute change  - sp NGT feed  - hold home mirtazapine and seroquel iso lethargy, was evaluated by   - palliative care consult for GOC    # HTN  - holding home atenolol and olmesartan given shock     # H/o Chronic pancreatitis/insufficiency   - Continue home Creon TID premeal (held for now bc pt too lethargic to take po and cannot be crushed)    # H/o GERD  - Continue home med Protonix 40mg PO QD    # Femur facture Jan 2024 s/p repair   - Bed bound since discharge from rehab requiring full adl support    # anemia  - baseline hgb 7-8s, s/p 1u pRBC 5/21 for hgb <7, heme following    DVT PPX Hep subq    FULL CODE    lease contact with any questions or concerns [phone].

## 2024-05-29 NOTE — PROGRESS NOTE ADULT - SUBJECTIVE AND OBJECTIVE BOX
[facility] Progress Note    MAR, chart notes, fingersticks and labs reviewed    Subjective: plans for MRI and vEEG    Objective  Vital Signs  T(C): 34.6 (05-29-24 @ 12:04), Max: 36.2 (05-28-24 @ 19:37)  HR: 81 (05-29-24 @ 12:04) (63 - 81)  BP: 142/84 (05-29-24 @ 12:04) (119/67 - 142/84)  RR: 18 (05-29-24 @ 12:04) (18 - 18)  SpO2: 99% (05-29-24 @ 12:04) (95% - 99%)    Physical Exam  Gen: NAD, unresponsive  HEENT: NC/AT  Chest: breathing comfortably  Heart: +s1 +s2    Medications  acetaminophen   Oral Liquid .. 470 milliGRAM(s) Oral every 6 hours PRN  ascorbic acid 250 milliGRAM(s) Oral daily  cefTRIAXone   IVPB 2000 milliGRAM(s) IV Intermittent every 24 hours  chlorhexidine 2% Cloths 1 Application(s) Topical daily  heparin   Injectable 5000 Unit(s) SubCutaneous every 12 hours  lidocaine   4% Patch 1 Patch Transdermal every 24 hours  multivitamin 1 Tablet(s) Oral daily  pantoprazole  Injectable 40 milliGRAM(s) IV Push daily    Pertinent labs/Imaging  POCT Blood Glucose.: 174 mg/dL (05-29-24 @ 12:27)  POCT Blood Glucose.: 173 mg/dL (05-29-24 @ 06:09)  POCT Blood Glucose.: 163 mg/dL (05-29-24 @ 00:00)  POCT Blood Glucose.: 138 mg/dL (05-28-24 @ 17:10)    eGFR: 19 mL/min/1.73m2 (05-29-24 @ 09:05)  eGFR: 21 mL/min/1.73m2 (05-28-24 @ 10:33)       [facility] Progress Note    MAR, chart notes, fingersticks and labs reviewed    Subjective: plans for MRI and vEEG    Objective  Vital Signs  T(C): 34.6 (05-29-24 @ 12:04), Max: 36.2 (05-28-24 @ 19:37)  HR: 81 (05-29-24 @ 12:04) (63 - 81)  BP: 142/84 (05-29-24 @ 12:04) (119/67 - 142/84)  RR: 18 (05-29-24 @ 12:04) (18 - 18)  SpO2: 99% (05-29-24 @ 12:04) (95% - 99%)    Physical Exam  Gen: NAD, eyes open but not responsive  HEENT: NC/AT  Chest: breathing comfortably  Heart: +s1 +s2    Medications  acetaminophen   Oral Liquid .. 470 milliGRAM(s) Oral every 6 hours PRN  ascorbic acid 250 milliGRAM(s) Oral daily  cefTRIAXone   IVPB 2000 milliGRAM(s) IV Intermittent every 24 hours  chlorhexidine 2% Cloths 1 Application(s) Topical daily  heparin   Injectable 5000 Unit(s) SubCutaneous every 12 hours  lidocaine   4% Patch 1 Patch Transdermal every 24 hours  multivitamin 1 Tablet(s) Oral daily  pantoprazole  Injectable 40 milliGRAM(s) IV Push daily    Pertinent labs/Imaging  POCT Blood Glucose.: 174 mg/dL (05-29-24 @ 12:27)  POCT Blood Glucose.: 173 mg/dL (05-29-24 @ 06:09)  POCT Blood Glucose.: 163 mg/dL (05-29-24 @ 00:00)  POCT Blood Glucose.: 138 mg/dL (05-28-24 @ 17:10)    eGFR: 19 mL/min/1.73m2 (05-29-24 @ 09:05)  eGFR: 21 mL/min/1.73m2 (05-28-24 @ 10:33)

## 2024-05-29 NOTE — PROGRESS NOTE ADULT - SUBJECTIVE AND OBJECTIVE BOX
Indication for Geriatrics and Palliative Care Services/INTERVAL HPI: GOC  SUBJECTIVE AND OBJECTIVE: Pt seen at bedside. Lethargic and unable to provide history.     OVERNIGHT EVENTS: No acute events reported.     DNR on chart:  Allergies    penicillin (Swelling)    Intolerances    epinephrine (Other)  MEDICATIONS  (STANDING):  ascorbic acid 250 milliGRAM(s) Oral daily  cefTRIAXone   IVPB 2000 milliGRAM(s) IV Intermittent every 24 hours  chlorhexidine 2% Cloths 1 Application(s) Topical daily  heparin   Injectable 5000 Unit(s) SubCutaneous every 12 hours  lidocaine   4% Patch 1 Patch Transdermal every 24 hours  multivitamin 1 Tablet(s) Oral daily  pantoprazole  Injectable 40 milliGRAM(s) IV Push daily    MEDICATIONS  (PRN):  acetaminophen   Oral Liquid .. 470 milliGRAM(s) Oral every 6 hours PRN Mild Pain (1 - 3), Moderate Pain (4 - 6)      ITEMS UNCHECKED ARE NOT PRESENT    PRESENT SYMPTOMS: [x ]Unable to self-report - see [ ] CPOT [ ] PAINADS [ ] RDOS  Source if other than patient:  [ ]Family   [x ]Team     Pain:  [ ]yes [ ]no  QOL impact -   Location -                    Aggravating factors -  Quality -  Radiation -  Timing-  Severity (0-10 scale):  Minimal acceptable level (0-10 scale):     CPOT:    https://www.[facility].org/getattachment/qtj74u35-0s1p-3e9f-5t9j-[email or web address]/Critical-Care-Pain-Observation-Tool-(CPOT)    Dyspnea:                           [ ]Mild [ ]Moderate [ ]Severe  Anxiety:                             [ ]Mild [ ]Moderate [ ]Severe  Fatigue:                             [ ]Mild [ ]Moderate [ ]Severe  Nausea:                             [ ]Mild [ ]Moderate [ ]Severe  Loss of appetite:              [ ]Mild [ ]Moderate [ ]Severe  Constipation:                    [ ]Mild [ ]Moderate [ ]Severe    PCSSQ[Palliative Care Spiritual Screening Question]   Severity (0-10):  Score of 4 or > indicate consideration of Chaplaincy referral.  Chaplaincy Referral: [ ] yes [ ] refused [ ] following [x ] Deferred     Caregiver [place]? : [ ] yes [x ] no [ ] Deferred [ ] Declined             Social work referral [ ] Patient & Family Centered Care Referral [ ]     Anticipatory Grief present?:  [ ] yes [ ] no  [x ] Deferred                  Social work referral [ ] Chaplaincy Referral[ ]      Other Symptoms:  [ ]All other review of systems negative   [x ]Unable to obtain due to poor mentation    Palliative Performance Status Version 2:      30   %      http://[facility].org/files/news/palliative_performance_scale_ppsv2.pdf  PHYSICAL EXAM:  Vital Signs Last 24 Hrs  T(C): 34.6 (29 May 2024 12:04), Max: 36.2 (28 May 2024 19:37)  T(F): 94.3 (29 May 2024 12:04), Max: 97.2 (28 May 2024 19:37)  HR: 81 (29 May 2024 12:04) (63 - 81)  BP: 142/84 (29 May 2024 12:04) (119/67 - 142/84)  BP(mean): --  RR: 18 (29 May 2024 12:04) (18 - 18)  SpO2: 99% (29 May 2024 12:04) (95% - 99%)    Parameters below as of 29 May 2024 12:04  Patient On (Oxygen Delivery Method): room air     I&O's Summary    28 May 2024 07:01  -  29 May 2024 07:00  --------------------------------------------------------  IN: 880 mL / OUT: 250 mL / NET: 630 mL       GENERAL: [ ]Cachexia    [ ]Alert  [ ]Oriented x   [x ]Lethargic  [ ]Unarousable  [ ]Verbal  [ ]Non-Verbal  Behavioral:   [ ]Anxiety  [ ]Delirium [ ]Agitation [ ]Other  HEENT:  +shiley  [x ]Normal   [ ]Dry mouth   [ ]ET Tube/Trach  [ ]Oral lesions  PULMONARY:   [x ]Clear [ ]Tachypnea  [ ]Audible excessive secretions   [ ]Rhonchi        [ ]Right [ ]Left [ ]Bilateral  [ ]Crackles        [ ]Right [ ]Left [ ]Bilateral  [ ]Wheezing     [ ]Right [ ]Left [ ]Bilateral  [ ]Diminished BS [ ] Right [ ]Left [ ]Bilateral  CARDIOVASCULAR:    [x ]Regular [ ]Irregular [ ]Tachy  [ ]Pato [ ]Murmur [ ]Other  GASTROINTESTINAL:  [ ]Soft  [ ]Distended   [ ]+BS  [ ]Non tender [ ]Tender  [ ]Other [ ]PEG [x ]OGT/ NGT   Last BM:   GENITOURINARY:  [ ]Normal [ ]Incontinent   [x]Oliguria/Anuria   [ ]Carrillo  MUSCULOSKELETAL:   [ ]Normal   [x ]Weakness  [ ]Bed/Wheelchair bound [ ]Edema  NEUROLOGIC:   [ ]No focal deficits  [ x] Cognitive impairment  [ ] Dysphagia [ ]Dysarthria [ ] Paresis [ ]Other   SKIN:   [ ]Normal  [ ]Rash  [ ]Other  [ ]Pressure ulcer(s) [ ]y [ ]n present on admission    CRITICAL CARE:  [ ]Shock Present  [ ]Septic [ ]Cardiogenic [ ]Neurologic [ ]Hypovolemic  [ ]Vasopressors [ ]Inotropes  [ ]Respiratory failure present [ ]Mechanical Ventilation [ ]Non-invasive ventilatory support [ ]High-Flow   [ ]Acute  [ ]Chronic [ ]Hypoxic  [ ]Hypercarbic [ ]Other  [x ]Other organ failure - renal    LABS:                        8.4    15.86 )-----------( 245      ( 29 May 2024 09:05 )             28.9   05-29    140  |  106  |  58<H>  ----------------------------<  156<H>  3.6   |  18<L>  |  2.54<H>    Ca    8.4      29 May 2024 09:05        Urinalysis Basic - ( 29 May 2024 09:05 )    Color: x / Appearance: x / SG: x / pH: x  Gluc: 156 mg/dL / Ketone: x  / Bili: x / Urobili: x   Blood: x / Protein: x / Nitrite: x   Leuk Esterase: x / RBC: x / WBC x   Sq Epi: x / Non Sq Epi: x / Bacteria: x      RADIOLOGY & ADDITIONAL STUDIES:  < from: CT Head No Cont (05.26.24 @ 10:10) >    ACC: [id number] EXAM:  CT BRAIN   ORDERED BY: RAYMOND WILKINSON     PROCEDURE DATE:  05/26/2024          INTERPRETATION:  PROCEDURE: CT head without intravenous contrast    CLINICAL INDICATION: Bacteremia, shock, ROBERT. Now AMS, nonverbal.    TECHNIQUE: CT imaging of the brain is performed with multiplanar images   reviewed and displayed with both bone and soft tissue algorithm. No   contrast given.    COMPARISON: 01/20/2024    FINDINGS:    Initial scan limited by motion, with repeat images acquired.    Noacute hemorrhage. Ventricles are stable. No hydrocephalus, mass effect   or midline shift. No extra-axial collection. Approximately 9 x 4 mm   meningioma the left lateral frontoparietal convexity is stable (6;5,   9;20).    Gray to white differentiation appears preserved, without CT evidence of   recent transcortical or territorial infarct.    Bone algorithm images show no calvarial fracture or acute abnormality of   the calvarium or skull base. Paranasal sinuses and mastoids included on   this scan show no acute disease. Right sided nasogastric tube partially   seen.      IMPRESSION:  No acute intracranial findings. No change compared to 01/20/2024.    --- End of Report ---      CONSTANTIN CISSE MD; Attending Radiologist  This documenthas been electronically signed. May 26 2024 12:32PM    < end of copied text >    Protein Calorie Malnutrition Present: [ ]mild [ ]moderate [ ]severe [ ]underweight [ ]morbid obesity  https://www.andeal.org/vault/2440/web/files/ONC/Table_Clinical%20Characteristics%20to%20Document%20Malnutrition-White%20JV%20et%20al%202012.pdf    Height (cm): 154.9 (05-19-24 @ 14:27), 162.6 (01-20-24 @ 14:53)  Weight (kg): 46.8 (05-19-24 @ 23:00), 54.4 (01-20-24 @ 14:53)  BMI (kg/m2): 19.5 (05-19-24 @ 23:00), 22.7 (05-19-24 @ 14:27), 20.6 (01-20-24 @ 14:53)    [ ]PPSV2 < or = 30%  [ ]significant weight loss [ ]poor nutritional intake [ ]anasarca[ ]Artificial Nutrition    Other REFERRALS:  [ ]Hospice  [ ]Child Life  [ ]Social Work  [ ]Case management [ ]Holistic Therapy     Goals of Care Document: BACK PAIN

## 2024-05-29 NOTE — PROGRESS NOTE ADULT - ATTENDING COMMENTS
79-year-old female with past medical history of GERD, MI, chronic pancreatitis, HTN, incontinence, recent hip surgery and was recently discharged from outpatient rehab to home presented to the emergency department with increased lethargy, initially labs concerning for severe ROBERT,  sepsis with metabolic acidemia.     ROBERT on CKD with Right Hydronephrosis/Pyelonephritis. G-ve bacteremia present.( E Coli)/UTI  Pt has had increasing SCr since 3/14/24 with a SCr of 1.8, trended up to 1.96 on 3/23/24, then 2.19 on 4/30/24 (most recent). Prior to March 2024, pt had normal kidney function dating back to October 2017.  Underwent urgent HD 5/19-5/20 ( overnight) for severe lactic acidosis  No osmolar gap present. Alc/Salicylates level normal. Urine tox screen negative. Hep B/C /Light chain/pla2r/ANCA/C4  negative,. C3 low  Lactate normalized. beta hydroxy butarate normal. SIFE with Co-Migrating IgM Lambda Band, IgG Lambda Band, and  IgG Kappa Band. Urine with Bence Babb  Renal sono: Mild rt hydro       Underwent HD on 5/27  AM labs reviewed. Monitor without dialysis today  Maintain on Antibiotics.    Please repeat renal sono, Check Urine Protein/creat ( U/A with blood and WBC)  pRBC transfusion per primary team  ?MGUS: Management per Heme     Rest as per Dr. Eleuterio Lozano MD  O: [phone]  Contact me on teams

## 2024-05-29 NOTE — PROGRESS NOTE ADULT - ASSESSMENT
79F with PMH GERD, MI, chronic pancreatitis/insufficiency, HTN, incontinence, recent R hip fx s/p repair (1/2024) and was recently discharged from outpatient rehab to home initially presenting for increased lethargy in setting of poor PO intake, admitted to MICU for urosepsis c/b E. coli bacteremia requiring vasopressors and acute renal failure requiring urgent HD for acidosis. Now s/p HD x1 with improving renal function. Palliative consulted for GOC.

## 2024-05-29 NOTE — PROGRESS NOTE ADULT - PROBLEM SELECTOR PLAN 1
Pt with oliguric/anuric ROBERT requiring renal replacement therapy in the setting of GN bacteremia, septic shock, ?medication use (Benicar) and diarrhea. Per Kath/THALIA review, pt has had increasing SCr since 3/14/24 with a SCr of 1.8, trended up to 1.96 on 3/23/24, then 2.19 on 4/30/24 (most recent). Prior to March 2024, pt had normal kidney function dating back to October 2017. SCr on admission of 5.56. UA significant for infectious etiology. Pt initiated on IV abx. CT abd/pel with R hydronephrosis noted. Urology notes reviewed (no intervention recommended). Pt with persistent metabolic acidosis despite IV bicarb infusion thus was initiated on HD on 5/19.     Serologies thus far as above reviewed. ?IgM lambda band  Recommend UPCR (urine protein-creatinine ratio).   Recommend repeat Kidney US (last US on 5/22, need to re-evaluate hydronephrosis).   Last HD treatment was on 5/27/24.   No plans for HD today.   Palliative following, overall prognosis is guarded.   Monitor labs and urine output. Avoid NSAIDs, ACEI/ARBS, RCA and nephrotoxins. Dose medications as per eGFR.

## 2024-05-29 NOTE — PROGRESS NOTE ADULT - SUBJECTIVE AND OBJECTIVE BOX
Patient is a 79y old  Female who presents with a chief complaint of Weakness (29 May 2024 07:53)      SUBJECTIVE / OVERNIGHT EVENTS:  Patient seen and examined.   AAO x 0.       Vital Signs Last 24 Hrs  T(C): 34.6 (29 May 2024 12:04), Max: 36.2 (28 May 2024 19:37)  T(F): 94.3 (29 May 2024 12:04), Max: 97.2 (28 May 2024 19:37)  HR: 81 (29 May 2024 12:04) (63 - 81)  BP: 142/84 (29 May 2024 12:04) (119/67 - 142/84)  BP(mean): --  RR: 18 (29 May 2024 12:04) (18 - 18)  SpO2: 99% (29 May 2024 12:04) (95% - 99%)    Parameters below as of 29 May 2024 12:04  Patient On (Oxygen Delivery Method): room air      I&O's Summary    28 May 2024 07:01  -  29 May 2024 07:00  --------------------------------------------------------  IN: 880 mL / OUT: 250 mL / NET: 630 mL        PE:  GENERAL: NAD, AAOx 0, frail  CHEST/LUNG: CTABL, No wheeze  HEART: Regular rate and rhythm; no murmur  ABDOMEN: Soft, Nontender, Nondistended; Bowel sounds present  EXTREMITIES:  2+ Peripheral Pulses, +1 le edema  NEURO: No focal deficits  LABS:                        8.4    15.86 )-----------( 245      ( 29 May 2024 09:05 )             28.9     05-29    140  |  106  |  58<H>  ----------------------------<  156<H>  3.6   |  18<L>  |  2.54<H>    Ca    8.4      29 May 2024 09:05        CAPILLARY BLOOD GLUCOSE      POCT Blood Glucose.: 173 mg/dL (29 May 2024 06:09)  POCT Blood Glucose.: 163 mg/dL (29 May 2024 00:00)  POCT Blood Glucose.: 138 mg/dL (28 May 2024 17:10)    CARDIAC MARKERS ( 28 May 2024 10:33 )  x     / x     / 21 U/L / x     / x          Urinalysis Basic - ( 29 May 2024 09:05 )    Color: x / Appearance: x / SG: x / pH: x  Gluc: 156 mg/dL / Ketone: x  / Bili: x / Urobili: x   Blood: x / Protein: x / Nitrite: x   Leuk Esterase: x / RBC: x / WBC x   Sq Epi: x / Non Sq Epi: x / Bacteria: x        RADIOLOGY & ADDITIONAL TESTS:    Imaging Personally Reviewed:  [x] YES  [ ] NO    Consultant(s) Notes Reviewed:  [x] YES  [ ] NO      MEDICATIONS  (STANDING):  ascorbic acid 250 milliGRAM(s) Oral daily  cefTRIAXone   IVPB 2000 milliGRAM(s) IV Intermittent every 24 hours  chlorhexidine 2% Cloths 1 Application(s) Topical daily  heparin   Injectable 5000 Unit(s) SubCutaneous every 12 hours  lidocaine   4% Patch 1 Patch Transdermal every 24 hours  multivitamin 1 Tablet(s) Oral daily  pantoprazole  Injectable 40 milliGRAM(s) IV Push daily    MEDICATIONS  (PRN):  acetaminophen   Oral Liquid .. 470 milliGRAM(s) Oral every 6 hours PRN Mild Pain (1 - 3), Moderate Pain (4 - 6)      Care Discussed with Consultants/Other Providers [x] YES  [ ] NO    HEALTH ISSUES - PROBLEM Dx:  ROBERT (acute kidney injury)    Metabolic acidosis

## 2024-05-30 NOTE — PROGRESS NOTE ADULT - ASSESSMENT
79F with PMH GERD, MI, chronic pancreatitis/insufficiency, HTN, incontinence, recent R hip fx s/p repair (1/2024) and was recently discharged from outpatient rehab to home initially presenting for increased lethargy in setting of poor PO intake, admitted to MICU for urosepsis c/b E. coli bacteremia requiring vasopressors and acute renal failure requiring urgent HD for acidosis. Now s/p HD x1 with improving renal function.    # acute renal failure and acidemia from hypotension and sepsis, ATN  - s/p RIJ eduardo 5/19 and pressor assisted HD  - HD dependent, HD per nephrology  - eduardo to be exchanged, IR consult    # urosepsis c/b E. coli bacteremia   # septic shock, resolved  # r hydro  - e. coli bacteremia. Pt with E coli UTI. Mild hydronephrosis on R. evaluated by urology, low suspicion for true obstruction  - continue CTX 2g daily, plan for 14 day course per ID  - initiated on stress dosed steroids 5/21, now weaned off pressors, taper as ordered  - TTE LVEF 46%     # Toxic metabolic encephalopathy   # depression  - Patient is baseline is AOx4, here nonverbal, not following commands  - per pt's brother, who is also a cardiologist, pt with worsening depression, appetite/decreased PO intake since hip fracture in Jan. slow progressive decline over past several months. Prior to this, pt was a practicing dentist  - neuro following  - recommend MRI and vEEG , given pt is aaox 0  - CT head NC no acute change  - sp NGT feed ; may need peg at this point; gi consulted   - hold home mirtazapine and seroquel iso lethargy, was evaluated by   - palliative care consult for GOC    # HTN  - holding home atenolol and olmesartan given shock     # H/o Chronic pancreatitis/insufficiency   - Continue home Creon TID premeal (held for now bc pt too lethargic to take po and cannot be crushed)    # H/o GERD  - Continue home med Protonix 40mg PO QD    # Femur facture Jan 2024 s/p repair   - Bed bound since discharge from rehab requiring full adl support    # anemia  - baseline hgb 7-8s, s/p 1u pRBC 5/21 for hgb <7, heme following    DVT PPX Hep subq    FULL CODE    lease contact with any questions or concerns [phone].

## 2024-05-30 NOTE — PROGRESS NOTE ADULT - ATTENDING COMMENTS
79-year-old female with past medical history of GERD, MI, chronic pancreatitis, HTN, incontinence, recent hip surgery and was recently discharged from outpatient rehab to home presented to the emergency department with increased lethargy, initially labs concerning for severe ROBERT,  sepsis with metabolic acidemia.     ROBERT on CKD with Right Hydronephrosis/Pyelonephritis. G-ve bacteremia present.( E Coli)/UTI  Pt has had increasing SCr since 3/14/24 with a SCr of 1.8, trended up to 1.96 on 3/23/24, then 2.19 on 4/30/24 (most recent). Prior to March 2024, pt had normal kidney function dating back to October 2017.  Underwent urgent HD 5/19-5/20 ( overnight) for severe lactic acidosis  No osmolar gap present. Alc/Salicylates level normal. Urine tox screen negative. Hep B/C /Light chain/pla2r/ANCA/C4  negative,. C3 low  Lactate normalized. beta hydroxy butarate normal. SIFE with Co-Migrating IgM Lambda Band, IgG Lambda Band, and  IgG Kappa Band. Urine with Bence Babb  Renal sono: Mild rt hydro       Last HD on 5/27  AM labs reviewed. Monitor without dialysis today  Maintain on Antibiotics.    Please repeat renal sono, Check Urine Protein/creat ( U/A with blood and WBC)  pRBC transfusion per primary team  ?MGUS: Management per Heme     Rest as per Dr. Eleuterio Lozano MD  O: [phone]  Contact me on teams

## 2024-05-30 NOTE — PROGRESS NOTE ADULT - ASSESSMENT
79-year-old female with past medical history of GERD, MI, chronic pancreatitis, HTN, incontinence, recent hip surgery who presented w/ increased lethargy, failure to thrive, decreased PO intake nausea/vomiting. Patient admitted to ICU for metabolic acidosis and ROBERT requiring HD found to have E Coli bacteremia 2/2 UTI.    E. coli bacteremia due to UTI  5/19 Bcx with E.coli  5/19 Ucx with E.coli & E. faecalis  - low CFU of E. faecalis so unlikely to be contributing  repeat blood cx 5/21 negative   leukocytosis had resolved, now noted with uptrend  -remains nonverbal, no new findings on exam, may be aspirating    ROBERT due to ATN  Nephrology following, on HD    Recommendations  Continue Ceftriaxone 2g daily  complete 10 day course of antibiotics from date of negative blood cultures w/ last day 5/30  Monitor temps/WBC - if febrile, send Bcx x2, UA/Ucx, CXR and can broaden to meropenem  Aspiration precautions   Palliative care following      Aisha Mcdowell M.D.  [facility], Division of Infectious Diseases  [phone]  After 5pm on weekdays and all day on weekends - please call [phone]

## 2024-05-30 NOTE — PROGRESS NOTE ADULT - ASSESSMENT
79y old Female with history of MI, Chronic pancreatitis, HTN, Incontinence, GERD, Hip fracture here with septic shock secondary to E. Coli bacteremia.     1. Sepsis  2. E. Coli bacteremia  - remains hemodynamically stable  - Completed steroid taper today    3. Steroid induced hyperglycemia  - No history of diabetes previously  - FS are within goal (140-180)  - No need for sliding scale  - Frequency of FS monitoring reduced to Q12H    4. Acute renal failure on HD  - monitor for hypoglycemia  - nephrology following    Please call with questions.    Deborah Jeff MD  [facility]- Division of Endocrinology    [address] [place] [address]    T [phone]  F [phone]   79y old Female with history of MI, Chronic pancreatitis, HTN, Incontinence, GERD, Hip fracture here with septic shock secondary to E. Coli bacteremia.     1. Sepsis  2. E. Coli bacteremia  - remains hemodynamically stable  - Completed steroid taper today    3. Steroid induced hyperglycemia  - No history of diabetes previously  - FS are within goal   - No need for sliding scale  - Frequency of FS monitoring reduced to Q12H    4. Acute renal failure on HD  - monitor for hypoglycemia  - nephrology following    Will sign off at this point. Please call with questions.    Deborah Jeff MD  [facility]- Division of Endocrinology    [address] [place] [address]    T [phone]  F [phone]

## 2024-05-30 NOTE — PROGRESS NOTE ADULT - SUBJECTIVE AND OBJECTIVE BOX
[facility] INFECTIOUS DISEASES  RIGO Tyler Y. Patel, S. Shah, G. Casimir  [phone]  ([phone] - weekdays after 5pm and weekends)    Name: SRINI VALDOVINOS  Age/Gender: 79y Female  MRN: [id number]    Interval History:  Patient seen and examined this morning.   Unable to obtain ROS  Notes reviewed  No concerning overnight events  Afebrile   Allergies: penicillin (Swelling)      Objective:  Vitals:   T(F): 97.4 (05-30-24 @ 04:28), Max: 97.4 (05-30-24 @ 04:28)  HR: 84 (05-30-24 @ 04:28) (78 - 84)  BP: 126/85 (05-30-24 @ 04:28) (126/85 - 137/76)  RR: 18 (05-30-24 @ 04:28) (18 - 18)  SpO2: 98% (05-30-24 @ 04:28) (98% - 99%)  Physical Examination:  General: no acute distress, nonverbal  HEENT: NC/AT, anicteric, NGT  Respiratory: decreased breath sounds b/l  Cardiovascular: S1 S2 present, normal rate   Gastrointestinal: soft, NT, ND  Extremities: no edema, no cyanosis  Skin: no visible rash, ecchymosis    Laboratory Studies:  CBC:                       8.4    15.86 )-----------( 245      ( 29 May 2024 09:05 )             28.9     WBC Trend:  15.86 05-29-24 @ 09:05  12.04 05-27-24 @ 07:14  10.01 05-26-24 @ 07:26  7.89 05-25-24 @ 00:28  12.57 05-24-24 @ 00:28    CMP: 05-29    140  |  106  |  58<H>  ----------------------------<  156<H>  3.6   |  18<L>  |  2.54<H>    Ca    8.4      29 May 2024 09:05      Creatinine: 2.54 mg/dL (05-29-24 @ 09:05)  Creatinine: 2.29 mg/dL (05-28-24 @ 10:33)  Creatinine: 3.16 mg/dL (05-27-24 @ 07:15)  Creatinine: 2.83 mg/dL (05-26-24 @ 07:26)  Creatinine: 2.24 mg/dL (05-25-24 @ 00:28)  Creatinine: 3.33 mg/dL (05-24-24 @ 00:28)    Microbiology: reviewed   Culture - Blood (collected 05-21-24 @ 10:30)  Source: .Blood Blood-Peripheral  Final Report (05-26-24 @ 16:00):    No growth at 5 days    Culture - Blood (collected 05-21-24 @ 10:00)  Source: .Blood Blood-Peripheral  Final Report (05-26-24 @ 16:00):    No growth at 5 days    Culture - Nose (collected 05-20-24 @ 05:25)  Source: .Nose Nose  Final Report (05-21-24 @ 14:16):    Staphylococcus aureus isolated    No Methicillin Resistant Staphylococcus aureus    Culture - Blood (collected 05-19-24 @ 16:25)  Source: .Blood Blood-Peripheral  Gram Stain (05-20-24 @ 08:01):    Growth in anaerobic bottle: Gram Negative Rods    Growth in aerobic bottle: Gram Negative Rods  Final Report (05-22-24 @ 07:47):    Growth in aerobic and anaerobic bottles: Escherichia coli    See previous culture [id number]    Culture - Urine (collected 05-19-24 @ 16:23)  Source: Clean Catch Clean Catch (Midstream)  Final Report (05-23-24 @ 16:39):    >100,000 CFU/ml Escherichia coli    10,000 - 49,000 CFU/mL Enterococcus faecalis    <10,000 CFU/ml Normal Urogenital magalys present  Organism: Escherichia coli  Enterococcus faecalis (05-23-24 @ 16:39)  Organism: Enterococcus faecalis (05-23-24 @ 16:39)      Method Type: BENTON      -  Ampicillin: S <=2 Predicts results to ampicillin/sulbactam, amoxacillin-clavulanate and  piperacillin-tazobactam.      -  Ciprofloxacin: S <=1      -  Levofloxacin: S 1      -  Nitrofurantoin: S <=32 Should not be used to treat pyelonephritis.      -  Tetracycline: R >8      -  Vancomycin: S 2  Organism: Escherichia coli (05-23-24 @ 16:39)      Method Type: BENTON      -  Amoxicillin/Clavulanic Acid: S <=8/4 Consider reserving for cystitis when ampicillin/sulbactam is resistant      -  Ampicillin: R >16 These ampicillin results predict results for amoxicillin      -  Ampicillin/Sulbactam: R >16/8      -  Aztreonam: S <=4      -  Cefazolin: S <=2 For uncomplicated UTI with K. pneumoniae, E. coli, or P. mirablis: BENTON <=16 is sensitive and BENTON >=32 is resistant. This also predicts results for oral agents cefaclor, cefdinir, cefpodoxime, cefprozil, cefuroxime axetil, cephalexin and locarbef for uncomplicated UTI. Note that some isolates may be susceptible to these agents while testing resistant to cefazolin.      -  Cefepime: S <=2      -  Cefoxitin: S <=8      -  Ceftriaxone: S <=1      -  Cefuroxime: S <=4      -  Ciprofloxacin: S <=0.25      -  Ertapenem: S <=0.5      -  Gentamicin: S <=2      -  Imipenem: S <=1      -  Levofloxacin: S <=0.5      -  Meropenem: S <=1      -  Nitrofurantoin: S <=32 Should not be used to treat pyelonephritis      -  Piperacillin/Tazobactam: S <=8      -  Tobramycin: S <=2      -  Trimethoprim/Sulfamethoxazole: S <=0.5/9.5    Culture - Blood (collected 05-19-24 @ 16:10)  Source: .Blood Blood-Peripheral  Gram Stain (05-20-24 @ 09:48):    Growth in aerobic bottle: Gram Negative Rods    Growth in anaerobic bottle: Gram Negative Rods  Final Report (05-22-24 @ 07:47):    Growth in aerobic and anaerobic bottles: Escherichia coli    Direct identification is available within approximately 3-5    hours either by Blood Panel Multiplexed PCR or Direct    MALDI-TOF. Details: [email or web address]  Organism: Blood Culture PCR  Escherichia coli (05-22-24 @ 07:47)  Organism: Escherichia coli (05-22-24 @ 07:47)      Method Type: BENTON      -  Ampicillin: R >16 These ampicillin results predict results for amoxicillin      -  Ampicillin/Sulbactam: R >16/8      -  Aztreonam: S <=4      -  Cefazolin: I 4      -  Cefepime: S <=2      -  Cefoxitin: S <=8      -  Ceftriaxone: S <=1      -  Ciprofloxacin: S <=0.25      -  Ertapenem: S <=0.5      -  Gentamicin: S <=2      -  Imipenem: S <=1      -  Levofloxacin: S <=0.5      -  Meropenem: S <=1      -  Piperacillin/Tazobactam: S <=8      -  Tobramycin: S <=2      -  Trimethoprim/Sulfamethoxazole: S <=0.5/9.5  Organism: Blood Culture PCR (05-22-24 @ 07:47)      Method Type: PCR      -  Escherichia coli: Detec    SARS-CoV-2 [facility]: [facility] (19 May 2024 16:22)    Radiology: reviewed     Medications:  acetaminophen   Oral Liquid .. 470 milliGRAM(s) Oral every 6 hours PRN  ascorbic acid 250 milliGRAM(s) Oral daily  cefTRIAXone   IVPB 2000 milliGRAM(s) IV Intermittent every 24 hours  chlorhexidine 2% Cloths 1 Application(s) Topical daily  heparin   Injectable 5000 Unit(s) SubCutaneous every 12 hours  lidocaine   4% Patch 1 Patch Transdermal every 24 hours  multivitamin 1 Tablet(s) Oral daily  pantoprazole  Injectable 40 milliGRAM(s) IV Push daily    Current Antimicrobials:  cefTRIAXone   IVPB 2000 milliGRAM(s) IV Intermittent every 24 hours    Prior/Completed Antimicrobials:  cefepime   IVPB  vancomycin  IVPB.

## 2024-05-30 NOTE — CONSULT NOTE ADULT - SUBJECTIVE AND OBJECTIVE BOX
Chief Complaint:  Patient is a 79y old  Female who presents with a chief complaint of Weakness (30 May 2024 14:26)      Date of service: 24 @ 19:28    HPI:    The patient is an 80 yo female with hx of hip fracture s/p repair presenting with weakness. She was found to have e coli bacteremia and acute renal failure. She required ICU care and HD.    The patient remains confused, she cannot participate in the history.    Allergies:  epinephrine (Other)  penicillin (Swelling)      Home Medications:    Hospital Medications:  acetaminophen   Oral Liquid .. 470 milliGRAM(s) Oral every 6 hours PRN  ascorbic acid 250 milliGRAM(s) Oral daily  chlorhexidine 2% Cloths 1 Application(s) Topical daily  heparin   Injectable 5000 Unit(s) SubCutaneous every 12 hours  lidocaine   4% Patch 1 Patch Transdermal every 24 hours  meropenem  IVPB 500 milliGRAM(s) IV Intermittent every 12 hours  multivitamin 1 Tablet(s) Oral daily  pantoprazole  Injectable 40 milliGRAM(s) IV Push daily      PMHX/PSHX:  Hypertension    Anxiety and depression    Rosacea    Stress incontinence, female    GERD (gastroesophageal reflux disease)    History of pancreatitis    Stress incontinence, female    Heart attack    2019 novel coronavirus disease (COVID-19)    H/O left inguinal hernia repair    Hx of tonsillectomy    Hx of appendectomy    H/O dilation and curettage    Pacemaker        Family history:  No pertinent family history in first degree relatives        Social History:   Denies ethanol use.  Denies illicit drug use.    ROS:           PHYSICAL EXAM:     GENERAL:  Appears stated age, well-groomed, well-nourished, no distress  HEENT:  NC/AT,  conjunctivae anicteric, clear and pink,   NECK: supple, trachea midline  CHEST:  Full & symmetric excursion, no increased effort, breath sounds clear  HEART:  Regular rhythm, no JVD  ABDOMEN:  Soft, non-tender, non-distended, normoactive bowel sounds,  no masses , no hepatosplenomegaly  EXTREMITIES:  no cyanosis,clubbing or edema  SKIN:  No rash, erythema, or, ecchymoses, no jaundice  NEURO:  obtunded, no asterixis    RECTAL: Deferred      Vital Signs:  Vital Signs Last 24 Hrs  T(C): 36.3 (30 May 2024 19:23), Max: 36.3 (30 May 2024 04:28)  T(F): 97.4 (30 May 2024 19:23), Max: 97.4 (30 May 2024 04:28)  HR: 93 (30 May 2024 19:23) (78 - 93)  BP: 122/81 (30 May 2024 19:23) (122/81 - 141/88)  BP(mean): --  RR: 18 (30 May 2024 19:23) (18 - 18)  SpO2: 100% (30 May 2024 19:23) (98% - 100%)    Parameters below as of 30 May 2024 19:23  Patient On (Oxygen Delivery Method): room air      Daily     Daily     LABS: Labs personally reviewed by me:                        10.0    )-----------( 347      ( 30 May 2024 13:01 )             31.3     05-30    141  |  104  |  71<H>  ----------------------------<  133<H>  3.4<L>   |  21<L>  |  2.79<H>    Ca    8.9      30 May 2024 13:01          Urinalysis Basic - ( 30 May 2024 14:31 )    Color: Yellow / Appearance: Cloudy / S.018 / pH: x  Gluc: x / Ketone: Negative mg/dL  / Bili: Negative / Urobili: 0.2 mg/dL   Blood: x / Protein: 100 mg/dL / Nitrite: Negative   Leuk Esterase: Large / RBC: 10 /HPF / WBC 69 /HPF   Sq Epi: x / Non Sq Epi: 0 /HPF / Bacteria: Moderate /HPF          Imaging personally reviewed by me:

## 2024-05-30 NOTE — PROGRESS NOTE ADULT - SUBJECTIVE AND OBJECTIVE BOX
[facility] HEMATOLOGY/ONCOLOGY INPATIENT PROGRESS NOTE     Interval Hx:   05-30-24: Ms. Yee was seen at bedside today.    Meds:   MEDICATIONS  (STANDING):  ascorbic acid 250 milliGRAM(s) Oral daily  cefTRIAXone   IVPB 2000 milliGRAM(s) IV Intermittent every 24 hours  chlorhexidine 2% Cloths 1 Application(s) Topical daily  heparin   Injectable 5000 Unit(s) SubCutaneous every 12 hours  lidocaine   4% Patch 1 Patch Transdermal every 24 hours  multivitamin 1 Tablet(s) Oral daily  pantoprazole  Injectable 40 milliGRAM(s) IV Push daily  predniSONE  Solution 5 milliGRAM(s) Oral daily    MEDICATIONS  (PRN):  acetaminophen   Oral Liquid .. 470 milliGRAM(s) Oral every 6 hours PRN Mild Pain (1 - 3), Moderate Pain (4 - 6)    Vital Signs Last 24 Hrs  T(C): 36.3 (30 May 2024 04:28), Max: 36.3 (30 May 2024 04:28)  T(F): 97.4 (30 May 2024 04:28), Max: 97.4 (30 May 2024 04:28)  HR: 84 (30 May 2024 04:28) (78 - 84)  BP: 126/85 (30 May 2024 04:28) (126/85 - 142/84)  BP(mean): --  RR: 18 (30 May 2024 04:28) (18 - 18)  SpO2: 98% (30 May 2024 04:28) (98% - 99%)    Parameters below as of 30 May 2024 04:28  Patient On (Oxygen Delivery Method): room air    Physical Exam:  Gen: NAD, shiley and NG in place  HEENT: MMM  Chest: Equal chest rise  Cardiac: irregular  Abd: Nondistended  Neuro: AAOx0    Labs:                        8.4    15.86 )-----------( 245      ( 29 May 2024 09:05 )             28.9     CBC Full  -  ( 29 May 2024 09:05 )  WBC Count : 15.86 K/uL  RBC Count : 2.75 M/uL  Hemoglobin : 8.4 g/dL  Hematocrit : 28.9 %  Platelet Count - Automated : 245 K/uL  Mean Cell Volume : 105.1 fl  Mean Cell Hemoglobin : 30.5 pg  Mean Cell Hemoglobin Concentration : 29.1 gm/dL    05-29    140  |  106  |  58<H>  ----------------------------<  156<H>  3.6   |  18<L>  |  2.54<H>    Ca    8.4      29 May 2024 09:05         [facility] HEMATOLOGY/ONCOLOGY INPATIENT PROGRESS NOTE     Interval Hx:   05-30-24: Ms. Yee was seen at bedside today, continues to be non-verbal and non-alert, noted GOC and palliative discussion, MRI and vEEG pending, no signs of bleeding     Meds:   MEDICATIONS  (STANDING):  ascorbic acid 250 milliGRAM(s) Oral daily  cefTRIAXone   IVPB 2000 milliGRAM(s) IV Intermittent every 24 hours  chlorhexidine 2% Cloths 1 Application(s) Topical daily  heparin   Injectable 5000 Unit(s) SubCutaneous every 12 hours  lidocaine   4% Patch 1 Patch Transdermal every 24 hours  multivitamin 1 Tablet(s) Oral daily  pantoprazole  Injectable 40 milliGRAM(s) IV Push daily  predniSONE  Solution 5 milliGRAM(s) Oral daily    MEDICATIONS  (PRN):  acetaminophen   Oral Liquid .. 470 milliGRAM(s) Oral every 6 hours PRN Mild Pain (1 - 3), Moderate Pain (4 - 6)    Vital Signs Last 24 Hrs  T(C): 36.3 (30 May 2024 04:28), Max: 36.3 (30 May 2024 04:28)  T(F): 97.4 (30 May 2024 04:28), Max: 97.4 (30 May 2024 04:28)  HR: 84 (30 May 2024 04:28) (78 - 84)  BP: 126/85 (30 May 2024 04:28) (126/85 - 142/84)  BP(mean): --  RR: 18 (30 May 2024 04:28) (18 - 18)  SpO2: 98% (30 May 2024 04:28) (98% - 99%)    Parameters below as of 30 May 2024 04:28  Patient On (Oxygen Delivery Method): room air    Physical Exam:  Gen: NAD, NG in place  HEENT: MMM  Chest: Equal chest rise  Cardiac: irregular  Abd: Nondistended  Neuro: AAOx0    Labs:                        8.4    15.86 )-----------( 245      ( 29 May 2024 09:05 )             28.9     CBC Full  -  ( 29 May 2024 09:05 )  WBC Count : 15.86 K/uL  RBC Count : 2.75 M/uL  Hemoglobin : 8.4 g/dL  Hematocrit : 28.9 %  Platelet Count - Automated : 245 K/uL  Mean Cell Volume : 105.1 fl  Mean Cell Hemoglobin : 30.5 pg  Mean Cell Hemoglobin Concentration : 29.1 gm/dL    05-29    140  |  106  |  58<H>  ----------------------------<  156<H>  3.6   |  18<L>  |  2.54<H>    Ca    8.4      29 May 2024 09:05

## 2024-05-30 NOTE — CONSULT NOTE ADULT - ASSESSMENT
79 year old female who presented with septic shock due to E coli bacteremia, now with persistent altered mental status    1. Altered mental status  pending MRI brain, EEG  possible also recurrent sepsis given uptrending WBC count with now broadening abx coverage  would hold off on PEG placement until patient is definitely medically stable as she appears tenuous at present  Also, if her current mental state is permanent, her family may want to reconsider goals of care, will follow  discussed at length with patient's     2. Leukocytosis    3. ROBERT on intermittent HD    4. Stercoral proctitis  miralax dialy  Advanced care planning forms were discussed. Code status including forceful chest compressions, defibrillation and intubation were discussed. The risks benefits and alternatives to pertinent gastrointestinal procedures and interventions were discussed in detail and all questions were answered. Duration: 15 Minutes.    [facility]  Lavell Matthews M.D.   [address] [place], [place]  Office: [phone]

## 2024-05-30 NOTE — PROGRESS NOTE ADULT - PROBLEM SELECTOR PLAN 1
Pt with ROBERT requiring renal replacement therapy in the setting of GN bacteremia, septic shock, ?medication use (Benicar) and diarrhea. Per Kath/THALIA review, pt has had increasing SCr since 3/14/24 with a SCr of 1.8, trended up to 1.96 on 3/23/24, then 2.19 on 4/30/24 (most recent). Prior to March 2024, pt had normal kidney function dating back to October 2017. SCr on admission of 5.56. UA significant for infectious etiology. Pt initiated on IV abx. CT abd/pel with R hydronephrosis noted. Urology notes reviewed (no intervention recommended). Pt with persistent metabolic acidosis despite IV bicarb infusion thus was initiated on HD on 5/19.     Serologies thus far as above reviewed. ?IgM lambda band  Recommend UPCR (urine protein-creatinine ratio, ordered).   Recommend repeat Kidney US (last US on 5/22, need to re-evaluate hydronephrosis).   Last HD treatment was on 5/27/24.   BUN/Cr remain elevated but appears UOP is improving.   No plans for HD today.   Palliative following, overall prognosis is guarded.   Monitor labs and urine output. Avoid NSAIDs, ACEI/ARBS, RCA and nephrotoxins. Dose medications as per eGFR.

## 2024-05-30 NOTE — PROGRESS NOTE ADULT - SUBJECTIVE AND OBJECTIVE BOX
[facility]-- WOUND TEAM -- FOLLOW UP NOTE  --------------------------------------------------------------------------------    24 hour events/subjective:    Afebrile  Last temp 36.3 C  Tolerating TF w/o n/v  Chart reviewed  Hx of    79-year-old female with past medical history of GERD, MI, chronic pancreatitis, HTN, incontinence, recent hip surgery and was recently discharged from outpatient rehab to home presented to the emergency department with increased lethargy, failure to thrive, decreased PO intake nausea/vomiting.  dc planning ongoing        Diet:  Diet, NPO with Tube Feed:   Tube Feeding Modality: Nasogastric  Nepro with Carb Steady (NEPRORTH)  Total Volume for 24 Hours (mL): 900  Intermittent  Starting Tube Feed Rate mL per Hour: 10  Increase Tube Feed Rate by (mL): 10    Every 4 hours  Until Goal Tube Feed Rate (mL per Hour): 50  Tube Feeding Hours ON: 18  Tube Feeding OFF (Hours): 6  Tube Feed Start Time: 11:00 (05-24-24 @ 17:33)      ROS:  pt unable to participate    ALLERGIES & MEDICATIONS  --------------------------------------------------------------------------------  Allergies    penicillin (Swelling)    Intolerances    epinephrine (Other)        STANDING INPATIENT MEDICATIONS    ascorbic acid 250 milliGRAM(s) Oral daily  chlorhexidine 2% Cloths 1 Application(s) Topical daily  heparin   Injectable 5000 Unit(s) SubCutaneous every 12 hours  lidocaine   4% Patch 1 Patch Transdermal every 24 hours  meropenem  IVPB 500 milliGRAM(s) IV Intermittent every 12 hours  multivitamin 1 Tablet(s) Oral daily  pantoprazole  Injectable 40 milliGRAM(s) IV Push daily      PRN INPATIENT MEDICATION  acetaminophen   Oral Liquid .. 470 milliGRAM(s) Oral every 6 hours PRN        VITALS/PHYSICAL EXAM  --------------------------------------------------------------------------------  T(C): 36.3 (05-30-24 @ 12:26), Max: 36.3 (05-30-24 @ 04:28)  HR: 83 (05-30-24 @ 12:26) (78 - 84)  BP: 141/88 (05-30-24 @ 12:26) (126/85 - 141/88)  RR: 18 (05-30-24 @ 12:26) (18 - 18)  SpO2: 98% (05-30-24 @ 12:26) (98% - 99%)  Wt(kg): --    NAD,somnolent, / thin/frail,  Versa Care P500  HEENT: sclera clear, mucosa moist, throat clear, trachea midline, neck supple  Respiratory: nonlabored w/ equal chest rise  Gastrointestinal: soft NT/ND (+)NGT  : (+)purewick  Neurology:  nonverbal, can not follow commands  Psych: difficult to assess  Musculoskeletal:  no deformities/ contractures  Vascular: BLE equally warm, no cyanosis, clubbing, edema nor acute ischemia                BL feet blanchable erythema             Skin:  thin, dry, pale, frail, ecchymosis without hematoma     Sacral region / BL buttocks blanchable erythema   LLE ISTAP 2 skin tear 10.0cm x 2.0cm x 0.1cm reapproximated 90%    LUE ISTAP 3 skin tear 1.0cm x 1.0cm x 0.1cm      scant serosanguinous drainage,        No odor, no increased warmth,  induration, fluctuance, nor crepitus            LABS/ CULTURES/ RADIOLOGY:                  10.0   19.02 >-----------<  347      [05-30-24 @ 13:01]              31.3     141  |  104  |  71  ----------------------------<  133      [05-30-24 @ 13:01]  3.4   |  21  |  2.79        Ca     8.9     [05-30-24 @ 13:01]                CAPILLARY BLOOD GLUCOSE      POCT Blood Glucose.: 178 mg/dL (30 May 2024 05:56)  POCT Blood Glucose.: 148 mg/dL (30 May 2024 00:09)            Vitamin D, 25-Hydroxy: 11.2 ng/mL (05-28-24 @ 10:33)                >>> <<<

## 2024-05-30 NOTE — PROGRESS NOTE ADULT - ASSESSMENT
Ms. Yee is a 79y Female with PMHx of HTN, pA.fibm HTN, macrocytic anemia, pancreatic insufficiency hypertriglyceridemia, aortic valve stenosis, R hip fracture s/p ORIF 01/2024, depression who is admitted since 05/19/2024 with septic shock due to UTI and E.coli bacteremia, R hydronephrosis with acute renal failure now on HD, toxic metabolic encephalopathy. Pt is non-verbal this morning so history is obtained from chart and coordinating team members. Now on floor from MICU. CTH negative for acute changes.     Pt received 1u PRBC on 05/21/2024 with appropriate response. Labs on my initial evaluation show Hb 7.9 Hct 26.2 .3 with neutrophilia and lymphopenia. Normal bilirubin, liver function. Serum FLC ratio normal. SPEP with 0.6 Mspike without ALEKSEY. UPEP with K/L elevated and ratio 4 in setting of ARF. Additionally CT A/P with reported cirrhosis.       # Macrocytic anemia  # B12 deficiency  # MGUS  - B12 last year was 192  - Repeat B12 1172  - folate >20, previously low  - Also may have underlying BM disorder such as MDS, outpatient labs with persistent macrocytosis and anemia Hb around 10, at this time further workup such as BmBx would need to be considered as outpatient given overall clinical context   - f/u palliative care recommendations   - Transfuse to maintain Hb > 7     # MGUS  -  SPEP/ALEKSEY with  with M-spike 0.5 3 Co-Migrating IgM Lambda Band, IgG Lambda Band, and  IgG Kappa Band, confirmed on UPEP as well  -  Serum immunoglobulins increased IgM 526, Serum FLCs ratio normal, LDH normal, B2MCG   - Overall not likely driving pts current clinical context and can be followed up outpatient depending on pts clinical outcome and GOCs   - See above     # Acute renal failure  - HD per nephrology    # E.coli UTI and bacteremia  - Antibiotics per primary team and ID       Thank you for allowing me to participate in the care of Ms. Yee, please do not hesitate to call or text me if you have further questions or concerns.     Rajeev Mcdowell MD  [facility] [place]   Division of Hematology/Oncology  [address], [address]  [place] [address]  P: [phone]  F: [phone]    Attestation:    ----Pt evaluated including face-to-face interaction in addition to chart review, reviewing treatment plan, and managing the patient’s chronic diagnoses as listed in the assessment----    Ms. Yee is a 79y Female with PMHx of HTN, pA.fibm HTN, macrocytic anemia, pancreatic insufficiency hypertriglyceridemia, aortic valve stenosis, R hip fracture s/p ORIF 01/2024, depression who is admitted since 05/19/2024 with septic shock due to UTI and E.coli bacteremia, R hydronephrosis with acute renal failure now on HD, toxic metabolic encephalopathy. Pt is non-verbal this morning so history is obtained from chart and coordinating team members. Now on floor from MICU. CTH negative for acute changes.     Pt received 1u PRBC on 05/21/2024 with appropriate response. Labs on my initial evaluation show Hb 7.9 Hct 26.2 .3 with neutrophilia and lymphopenia. Normal bilirubin, liver function. Serum FLC ratio normal. SPEP with 0.6 Mspike without ALEKSEY. UPEP with K/L elevated and ratio 4 in setting of ARF. Additionally CT A/P with reported cirrhosis. Continues to be non-verbal and non-alert, noted GOC and palliative discussion        # Macrocytic anemia  # B12 deficiency  - B12 last year was 192  - Repeat B12 1172  - folate >20, previously low  - Also may have underlying BM disorder such as MDS, outpatient labs with persistent macrocytosis and anemia Hb around 10, at this time further workup such as BmBx would need to be considered as outpatient given overall clinical context   - No signs of bleeding   - Transfuse to maintain Hb > 7     # MGUS  -  SPEP/ALEKSEY with  with M-spike 0.5 3 Co-Migrating IgM Lambda Band, IgG Lambda Band, and  IgG Kappa Band, confirmed on UPEP as well  -  Serum immunoglobulins increased IgM 526, Serum FLCs ratio normal, LDH normal, B2MCG   - Overall not likely driving pts current clinical context and can be followed up outpatient depending on pts clinical outcome and GOCs   - See above     # Acute renal failure  - HD per nephrology    # E.coli UTI and bacteremia  - Antibiotics per primary team and ID       Thank you for allowing me to participate in the care of Ms. Yee, please do not hesitate to call or text me if you have further questions or concerns.     Rajeev Mcdowell MD  [facility] [place]   Division of Hematology/Oncology  [address], [address]  [place] [address]  P: [phone]  F: [phone]    Attestation:    ----Pt evaluated including face-to-face interaction in addition to chart review, reviewing treatment plan, and managing the patient’s chronic diagnoses as listed in the assessment----

## 2024-05-30 NOTE — PROGRESS NOTE ADULT - ASSESSMENT
Assessment and Recommendation:   Assessment    A/P:79-year-old female with past medical history of GERD, MI, chronic pancreatitis, HTN, incontinence, recent hip surgery and was recently discharged from outpatient rehab to home presented to the emergency department with increased lethargy, failure to thrive, decreased PO intake nausea/vomiting.    Wound Consult requested to assist w/ management of     Sacral region / BL buttocks blanchable erythema   LLE ISTAP 2 skin tear    LUE ISTAP 3 skin tear  BL heels blanchable erythema    Recommendation:   Sacral region/BL buttocks SERINA BID and PRN  Skin tears: Cavilon, adaptic, DSD secure with wendi  BL heels cavilon daily heel boots  Moisturize intact skin w/ SWEEN cream BID  Nutrition Consult for optimization in pt w/Increased nutritional needs            encourage high quality protein, paige/ prosource, MVI & Vit C to promote wound healing  Continue turning and positioning w/ offloading assistive devices as per protocol       Continue w/ attends under pads and Pericare w/  purewick care as per protocol  Waffle Cushion to chair when oob to chair  Continue w/ low air loss pressure redistribution bed surface   Care as per medicine, will follow w/ you  Upon discharge f/u as outpatient at [facility] [address] [place] [address] [phone]  Seen w/  RN  Thank you for this consult,  OFE MorelandBC, [facility]  [phone]  Nights/ Weekends/ Holidays please call:  General Surgery Consult pager ([phone]) for emergencies  Wound PT for multilayer leg wrapping or VAC issues ([phone])

## 2024-05-30 NOTE — PROGRESS NOTE ADULT - SUBJECTIVE AND OBJECTIVE BOX
[facility] DIVISION OF KIDNEY DISEASES AND HYPERTENSION   FOLLOW UP NOTE  --------------------------------------------------------------------------------  Chief Complaint: Pt with oliguric/anuric ROBERT requiring renal replacement therapy    24 hour events/subjective: Pt. was seen and examined. Pt remains somnolent. NGT in place. Last HD on 5/27/24. Pt with ~500cc UOP in primafit.     PAST HISTORY  --------------------------------------------------------------------------------  No significant changes to PMH, PSH, FHx, SHx, unless otherwise noted    ALLERGIES & MEDICATIONS  --------------------------------------------------------------------------------  Allergies  penicillin (Swelling)    Intolerances  epinephrine (Other)    Standing Inpatient Medications  ascorbic acid 250 milliGRAM(s) Oral daily  chlorhexidine 2% Cloths 1 Application(s) Topical daily  heparin   Injectable 5000 Unit(s) SubCutaneous every 12 hours  lidocaine   4% Patch 1 Patch Transdermal every 24 hours  meropenem  IVPB 500 milliGRAM(s) IV Intermittent every 12 hours  multivitamin 1 Tablet(s) Oral daily  pantoprazole  Injectable 40 milliGRAM(s) IV Push daily    PRN Inpatient Medications  acetaminophen   Oral Liquid .. 470 milliGRAM(s) Oral every 6 hours PRN    REVIEW OF SYSTEMS  --------------------------------------------------------------------------------  Unable to obtain ROS    VITALS/PHYSICAL EXAM  --------------------------------------------------------------------------------  T(C): 36.3 (05-30-24 @ 12:26), Max: 36.3 (05-30-24 @ 04:28)  HR: 83 (05-30-24 @ 12:26) (78 - 84)  BP: 141/88 (05-30-24 @ 12:26) (126/85 - 141/88)  RR: 18 (05-30-24 @ 12:26) (18 - 18)  SpO2: 98% (05-30-24 @ 12:26) (98% - 99%)  Wt(kg): --    Physical Exam:  Gen: Ill appearing   HEENT: Dry MM. Anicteric. +NGT  Pulm: Diminished B/L  CV: S1S2+  Abd: Soft, +BS   Ext: No LE edema B/L  Neuro: Somnolent  Skin: Warm and dry  Dialysis access: R IJ non tunneled HD catheter     LABS/STUDIES  --------------------------------------------------------------------------------              10.0   19.02 >-----------<  347      [05-30-24 @ 13:01]              31.3     141  |  104  |  71  ----------------------------<  133      [05-30-24 @ 13:01]  3.4   |  21  |  2.79        Ca     8.9     [05-30-24 @ 13:01]    Creatinine Trend:  SCr 2.79 [05-30 @ 13:01]  SCr 2.54 [05-29 @ 09:05]  SCr 2.29 [05-28 @ 10:33]  SCr 3.16 [05-27 @ 07:15]  SCr 2.83 [05-26 @ 07:26]    HBsAg Nonreact      [05-19-24 @ 23:37]  HCV 0.09, Nonreact      [05-19-24 @ 23:37]  HIV Nonreact      [05-19-24 @ 23:37]    KIKE: titer Negative, pattern --      [05-19-24 @ 23:37]  dsDNA <1      [05-19-24 @ 23:37]  C3 Complement 75      [05-19-24 @ 23:37]  C4 Complement 28      [05-19-24 @ 23:37]  ANCA: cANCA Negative, pANCA Negative, atypical ANCA Negative      [05-22-24 @ 00:39]  Syphilis Screen (Treponema Pallidum Ab) Negative      [05-19-24 @ 23:37]  PLA2R: JUANJO <1.8, IFA --      [05-19-24 @ 23:37]  Free Light Chains: kappa 13.90, lambda 16.26, ratio = 0.85      [05-27 @ 10:48]  Immunofixation Serum:   One IgM Lambda Band and One Weak  IgG Kappa Band and One Weak IgG Lambda  Band Identified    Reference Range: None Detected      [05-27-24 @ 10:48]  SPEP Interpretation: Co- Migrating Three Gamma-Migrating Paraproteins Identified      [05-19-24 @ 23:37]  Immunofixation Urine: One Bence Babb protein Kappa type, One IgM Lambda Band, One IgG Lambda  Band, One IgG Kappa Band Identified  Reference Range: None Detected      [05-20-24 @ 05:22]

## 2024-05-30 NOTE — PROGRESS NOTE ADULT - SUBJECTIVE AND OBJECTIVE BOX
Patient is a 79y old  Female who presents with a chief complaint of Weakness (30 May 2024 12:47)      SUBJECTIVE / OVERNIGHT EVENTS:  Patient seen and examined.   AAO x 0. Obtunded.      Vital Signs Last 24 Hrs  T(C): 36.3 (30 May 2024 12:26), Max: 36.3 (30 May 2024 04:28)  T(F): 97.4 (30 May 2024 12:26), Max: 97.4 (30 May 2024 04:28)  HR: 83 (30 May 2024 12:26) (78 - 84)  BP: 141/88 (30 May 2024 12:26) (126/85 - 141/88)  BP(mean): --  RR: 18 (30 May 2024 12:26) (18 - 18)  SpO2: 98% (30 May 2024 12:26) (98% - 99%)    Parameters below as of 30 May 2024 12:26  Patient On (Oxygen Delivery Method): room air      I&O's Summary      PE:  GENERAL: NAD, AAOx 0, frail; obtunded   CHEST/LUNG: CTABL, No wheeze  HEART: Regular rate and rhythm; no murmur  ABDOMEN: Soft, Nontender, Nondistended; Bowel sounds present  EXTREMITIES:  2+ Peripheral Pulses, +1 le edema  NEURO: No focal deficits    LABS:                        10.0   19.02 )-----------( 347      ( 30 May 2024 13:01 )             31.3     05-29    140  |  106  |  58<H>  ----------------------------<  156<H>  3.6   |  18<L>  |  2.54<H>    Ca    8.4      29 May 2024 09:05        CAPILLARY BLOOD GLUCOSE      POCT Blood Glucose.: 178 mg/dL (30 May 2024 05:56)  POCT Blood Glucose.: 148 mg/dL (30 May 2024 00:09)        Urinalysis Basic - ( 29 May 2024 09:05 )    Color: x / Appearance: x / SG: x / pH: x  Gluc: 156 mg/dL / Ketone: x  / Bili: x / Urobili: x   Blood: x / Protein: x / Nitrite: x   Leuk Esterase: x / RBC: x / WBC x   Sq Epi: x / Non Sq Epi: x / Bacteria: x        RADIOLOGY & ADDITIONAL TESTS:    Imaging Personally Reviewed:  [x] YES  [ ] NO    Consultant(s) Notes Reviewed:  [x] YES  [ ] NO      MEDICATIONS  (STANDING):  ascorbic acid 250 milliGRAM(s) Oral daily  cefTRIAXone   IVPB 2000 milliGRAM(s) IV Intermittent every 24 hours  chlorhexidine 2% Cloths 1 Application(s) Topical daily  heparin   Injectable 5000 Unit(s) SubCutaneous every 12 hours  lidocaine   4% Patch 1 Patch Transdermal every 24 hours  multivitamin 1 Tablet(s) Oral daily  pantoprazole  Injectable 40 milliGRAM(s) IV Push daily    MEDICATIONS  (PRN):  acetaminophen   Oral Liquid .. 470 milliGRAM(s) Oral every 6 hours PRN Mild Pain (1 - 3), Moderate Pain (4 - 6)      Care Discussed with Consultants/Other Providers [x] YES  [ ] NO    HEALTH ISSUES - PROBLEM Dx:  ROBERT (acute kidney injury)    Metabolic acidosis    E coli bacteremia    ACP (advance care planning)    Encounter for palliative care    Acute encephalopathy    Functional quadriplegia

## 2024-05-31 NOTE — PROGRESS NOTE ADULT - PROBLEM SELECTOR PLAN 2
Pt with severe high anion gap metabolic acidosis in setting of ROBERT, infectious process and hypotension. Improved with HD. SCO2 of 19. Monitor SCO2.      Addendum: Pt only tolerated 2 hours of HD, discontinued due to hypotension. Pt will need HD catheter exchanged if goal is to continue HD. ?if pt can tolerate HD moving forward, will need to continue to monitor. Continue goals of care discussions, prognosis is poor.

## 2024-05-31 NOTE — PROGRESS NOTE ADULT - SUBJECTIVE AND OBJECTIVE BOX
Chief Complaint:  Patient is a 79y old  Female who presents with a chief complaint of Weakness (31 May 2024 04:59)      Date of service 24 @ 11:01      Interval Events:   no GI events    Hospital Medications:  acetaminophen   Oral Liquid .. 470 milliGRAM(s) Oral every 6 hours PRN  ascorbic acid 250 milliGRAM(s) Oral daily  chlorhexidine 2% Cloths 1 Application(s) Topical daily  heparin   Injectable 5000 Unit(s) SubCutaneous every 12 hours  lidocaine   4% Patch 1 Patch Transdermal every 24 hours  meropenem  IVPB 500 milliGRAM(s) IV Intermittent every 12 hours  multivitamin 1 Tablet(s) Oral daily  pantoprazole  Injectable 40 milliGRAM(s) IV Push daily  polyethylene glycol 3350 17 Gram(s) Oral daily        Review of Systems:  General:  No wt loss, fevers, chills, night sweats, fatigue,   Eyes:  Good vision, no reported pain  ENT:  No sore throat, pain, runny nose, dysphagia  CV:  No pain, palpitations, hypo/hypertension  Resp:  No dyspnea, cough, tachypnea, wheezing  GI:  See HPI  :  No pain, bleeding, incontinence, nocturia  Muscle:  No pain, weakness  Neuro:  No weakness, tingling, memory problems  Psych:  No fatigue, insomnia, mood problems, depression  Endocrine:  No polyuria, polydipsia, cold/heat intolerance  Heme:  No petechiae, ecchymosis, easy bruisability  Integumentary:  No rash, edema    PHYSICAL EXAM:   Vital Signs:  Vital Signs Last 24 Hrs  T(C): 36.3 (31 May 2024 10:10), Max: 36.4 (31 May 2024 08:05)  T(F): 97.3 (31 May 2024 10:10), Max: 97.5 (31 May 2024 08:05)  HR: 91 (31 May 2024 10:10) (83 - 93)  BP: 155/74 (31 May 2024 10:10) (122/81 - 155/74)  BP(mean): --  RR: 16 (31 May 2024 10:10) (16 - 18)  SpO2: 99% (31 May 2024 10:10) (96% - 100%)    Parameters below as of 31 May 2024 10:10  Patient On (Oxygen Delivery Method): room air      Daily     Daily Weight in k.4 (31 May 2024 10:10)      PHYSICAL EXAM:     GENERAL:  Appears stated age, well-groomed, well-nourished, no distress  HEENT:  NC/AT,  conjunctivae anicteric, clear and pink,   NECK: supple, trachea midline  CHEST:  Full & symmetric excursion, no increased effort, breath sounds clear  HEART:  Regular rhythm, no JVD  ABDOMEN:  Soft, non-tender, non-distended, normoactive bowel sounds,  no masses , no hepatosplenomegaly  EXTREMITIES:  no cyanosis,clubbing or edema  SKIN:  No rash, erythema, or, ecchymoses, no jaundice  NEURO:  Alert, non-focal, no asterixis  PSYCH: Appropriate affect, oriented to place and time  RECTAL: Deferred      LABS Personally reviewed by me:                        9.6    19.05 )-----------( 333      ( 31 May 2024 10:00 )             30.5     Mean Cell Volume: 97.1 fl (24 @ 10:00)        142  |  107  |  84<H>  ----------------------------<  123<H>  3.7   |  19<L>  |  2.86<H>    Ca    8.7      31 May 2024 09:59          Urinalysis Basic - ( 31 May 2024 09:59 )    Color: x / Appearance: x / SG: x / pH: x  Gluc: 123 mg/dL / Ketone: x  / Bili: x / Urobili: x   Blood: x / Protein: x / Nitrite: x   Leuk Esterase: x / RBC: x / WBC x   Sq Epi: x / Non Sq Epi: x / Bacteria: x                              9.6    19.05 )-----------( 333      ( 31 May 2024 10:00 )             30.5                         10.0   19.02 )-----------( 347      ( 30 May 2024 13:01 )             31.3                         8.4    15.86 )-----------( 245      ( 29 May 2024 09:05 )             28.9       Imaging personally reviewed by me:

## 2024-05-31 NOTE — PROGRESS NOTE ADULT - ASSESSMENT
1. Altered mental status, poor PO intake  pending clinical course will decide re peg    2. Leukocytosis    3. ROBERT on intermittent HD    4. Stercoral proctitis  miralax dialy

## 2024-05-31 NOTE — PROGRESS NOTE ADULT - ASSESSMENT
79F with PMH GERD, MI, chronic pancreatitis/insufficiency, HTN, incontinence, recent R hip fx s/p repair (1/2024) and was recently discharged from outpatient rehab to home initially presenting for increased lethargy in setting of poor PO intake, admitted to MICU for urosepsis c/b E. coli bacteremia requiring vasopressors and acute renal failure requiring urgent HD for acidosis. Now s/p HD x1 with improving renal function.    # acute renal failure and acidemia from hypotension and sepsis, ATN  - s/p RIJ eduardo 5/19 and pressor assisted HD  - HD dependent, HD per nephrology  - eduardo to be exchanged, IR consult    # urosepsis c/b E. coli bacteremia   # septic shock, resolved  # r hydro  - e. coli bacteremia. Pt with E coli UTI. Mild hydronephrosis on R. evaluated by urology, low suspicion for true obstruction  - continue CTX 2g daily, plan for 14 day course per ID  - initiated on stress dosed steroids 5/21, now weaned off pressors, taper as ordered  - TTE LVEF 46%     # Toxic metabolic encephalopathy   # depression  - Patient is baseline is AOx4, here nonverbal, not following commands  - per pt's brother, who is also a cardiologist, pt with worsening depression, appetite/decreased PO intake since hip fracture in Jan. slow progressive decline over past several months. Prior to this, pt was a practicing dentist  - neuro following  - recommend MRI and vEEG , given pt is aaox 0  - CT head NC no acute change  - sp NGT feed ;; gi consulted   - hold home mirtazapine and seroquel iso lethargy, was evaluated by   - palliative care consult for GOC    # HTN  - holding home atenolol and olmesartan given shock     # H/o Chronic pancreatitis/insufficiency   - Continue home Creon TID premeal (held for now bc pt too lethargic to take po and cannot be crushed)    # H/o GERD  - Continue home med Protonix 40mg PO QD    # Femur facture Jan 2024 s/p repair   - Bed bound since discharge from rehab requiring full adl support    # anemia  - baseline hgb 7-8s, s/p 1u pRBC 5/21 for hgb <7, heme following    DVT PPX Hep subq    FULL CODE    lease contact with any questions or concerns [phone].

## 2024-05-31 NOTE — PROGRESS NOTE ADULT - ATTENDING COMMENTS
79-year-old female with past medical history of GERD, MI, chronic pancreatitis, HTN, incontinence, recent hip surgery and was recently discharged from outpatient rehab to home presented to the emergency department with increased lethargy, initially labs concerning for severe ROBERT,  sepsis with metabolic acidemia.     ROBERT on CKD with Right Hydronephrosis/Pyelonephritis. G-ve bacteremia present.( E Coli)/UTI  Pt has had increasing SCr since 3/14/24 with a SCr of 1.8, trended up to 1.96 on 3/23/24, then 2.19 on 4/30/24 (most recent). Prior to March 2024, pt had normal kidney function dating back to October 2017.  Underwent urgent HD 5/19-5/20 ( overnight) for severe lactic acidosis  No osmolar gap present. Alc/Salicylates level normal. Urine tox screen negative. Hep B/C /Light chain/pla2r/ANCA/C4  negative,. C3 low  Lactate normalized. beta hydroxy butarate normal. SIFE with Co-Migrating IgM Lambda Band, IgG Lambda Band, and  IgG Kappa Band. Urine with Bence Babb  Renal sono: Mild rt hydro       HD today. Only able tolerate 2 hrs. Treatment terminated due to hypotension. Patient needs a new catheter for HD.    Maintain on Antibiotics.    Please repeat renal sono, Check Urine Protein/creat ( U/A with blood and WBC)  pRBC transfusion per primary team  ?MGUS: Management per Heme   Overall with a poor prognosis.     Rest as per Dr. Eleuterio Lozano MD  O: [phone]  Contact me on teams

## 2024-05-31 NOTE — PROGRESS NOTE ADULT - SUBJECTIVE AND OBJECTIVE BOX
[facility] HEMATOLOGY/ONCOLOGY INPATIENT PROGRESS NOTE     Interval Hx:   05-31-24: Ms. Yee was seen at bedside today.    Meds:   MEDICATIONS  (STANDING):  ascorbic acid 250 milliGRAM(s) Oral daily  chlorhexidine 2% Cloths 1 Application(s) Topical daily  heparin   Injectable 5000 Unit(s) SubCutaneous every 12 hours  lidocaine   4% Patch 1 Patch Transdermal every 24 hours  meropenem  IVPB 500 milliGRAM(s) IV Intermittent every 12 hours  multivitamin 1 Tablet(s) Oral daily  pantoprazole  Injectable 40 milliGRAM(s) IV Push daily  polyethylene glycol 3350 17 Gram(s) Oral daily    MEDICATIONS  (PRN):  acetaminophen   Oral Liquid .. 470 milliGRAM(s) Oral every 6 hours PRN Mild Pain (1 - 3), Moderate Pain (4 - 6)    Vital Signs Last 24 Hrs  T(C): 36.3 (31 May 2024 04:26), Max: 36.3 (30 May 2024 12:26)  T(F): 97.3 (31 May 2024 04:26), Max: 97.4 (30 May 2024 12:26)  HR: 92 (31 May 2024 04:26) (83 - 93)  BP: 125/81 (31 May 2024 04:26) (122/81 - 141/88)  BP(mean): --  RR: 18 (31 May 2024 04:26) (18 - 18)  SpO2: 96% (31 May 2024 04:26) (96% - 100%)    Parameters below as of 31 May 2024 04:26  Patient On (Oxygen Delivery Method): room air    Physical Exam:  Gen: NAD, NG in place  HEENT: MMM  Chest: Equal chest rise  Cardiac: irregular  Abd: Nondistended  Neuro: AAOx0    Labs:                        10.0   19.02 )-----------( 347      ( 30 May 2024 13:01 )             31.3     CBC Full  -  ( 30 May 2024 13:01 )  WBC Count : 19.02 K/uL  RBC Count : 3.24 M/uL  Hemoglobin : 10.0 g/dL  Hematocrit : 31.3 %  Platelet Count - Automated : 347 K/uL  Mean Cell Volume : 96.6 fl  Mean Cell Hemoglobin : 30.9 pg  Mean Cell Hemoglobin Concentration : 31.9 gm/dL    05-30    141  |  104  |  71<H>  ----------------------------<  133<H>  3.4<L>   |  21<L>  |  2.79<H>    Ca    8.9      30 May 2024 13:01         [facility] HEMATOLOGY/ONCOLOGY INPATIENT PROGRESS NOTE     Interval Hx:   05-31-24: Ms. Yee was seen at bedside today, continues to be non-alert and nonverbal, no overnight events, noted leukocytosis workup ongoing per primary team and ID, GI and Nephrology recs noted     Meds:   MEDICATIONS  (STANDING):  ascorbic acid 250 milliGRAM(s) Oral daily  chlorhexidine 2% Cloths 1 Application(s) Topical daily  heparin   Injectable 5000 Unit(s) SubCutaneous every 12 hours  lidocaine   4% Patch 1 Patch Transdermal every 24 hours  meropenem  IVPB 500 milliGRAM(s) IV Intermittent every 12 hours  multivitamin 1 Tablet(s) Oral daily  pantoprazole  Injectable 40 milliGRAM(s) IV Push daily  polyethylene glycol 3350 17 Gram(s) Oral daily    MEDICATIONS  (PRN):  acetaminophen   Oral Liquid .. 470 milliGRAM(s) Oral every 6 hours PRN Mild Pain (1 - 3), Moderate Pain (4 - 6)    Vital Signs Last 24 Hrs  T(C): 36.3 (31 May 2024 04:26), Max: 36.3 (30 May 2024 12:26)  T(F): 97.3 (31 May 2024 04:26), Max: 97.4 (30 May 2024 12:26)  HR: 92 (31 May 2024 04:26) (83 - 93)  BP: 125/81 (31 May 2024 04:26) (122/81 - 141/88)  BP(mean): --  RR: 18 (31 May 2024 04:26) (18 - 18)  SpO2: 96% (31 May 2024 04:26) (96% - 100%)    Parameters below as of 31 May 2024 04:26  Patient On (Oxygen Delivery Method): room air    Physical Exam:  Gen: NAD, NG in place  HEENT: MMM  Chest: Equal chest rise  Cardiac: irregular  Abd: Nondistended  Neuro: AAOx0    Labs:                        10.0   19.02 )-----------( 347      ( 30 May 2024 13:01 )             31.3     CBC Full  -  ( 30 May 2024 13:01 )  WBC Count : 19.02 K/uL  RBC Count : 3.24 M/uL  Hemoglobin : 10.0 g/dL  Hematocrit : 31.3 %  Platelet Count - Automated : 347 K/uL  Mean Cell Volume : 96.6 fl  Mean Cell Hemoglobin : 30.9 pg  Mean Cell Hemoglobin Concentration : 31.9 gm/dL    05-30    141  |  104  |  71<H>  ----------------------------<  133<H>  3.4<L>   |  21<L>  |  2.79<H>    Ca    8.9      30 May 2024 13:01

## 2024-05-31 NOTE — PROGRESS NOTE ADULT - PROBLEM SELECTOR PLAN 1
Pt with ROBERT requiring renal replacement therapy in the setting of GN bacteremia, septic shock, ?medication use (Benicar) and diarrhea. Per Kath/THALIA review, pt has had increasing SCr since 3/14/24 with a SCr of 1.8, trended up to 1.96 on 3/23/24, then 2.19 on 4/30/24 (most recent). Prior to March 2024, pt had normal kidney function dating back to October 2017. SCr on admission of 5.56. UA significant for infectious etiology. Pt initiated on IV abx. CT abd/pel with R hydronephrosis noted. Urology notes reviewed (no intervention recommended). Pt with persistent metabolic acidosis despite IV bicarb infusion thus was initiated on HD on 5/19.     Serologies thus far as above reviewed. ?IgM lambda band  Recommend UPCR (urine protein-creatinine ratio, ordered).   Recommend repeat Kidney US (last US on 5/22, need to re-evaluate hydronephrosis, ordered).   BUN/Cr elevated.   Last HD treatment was on 5/27/24. Plan for HD today.   Palliative following, overall prognosis is guarded.   Monitor labs and urine output. Avoid NSAIDs, ACEI/ARBS, RCA and nephrotoxins. Dose medications as per eGFR.

## 2024-05-31 NOTE — PROGRESS NOTE ADULT - SUBJECTIVE AND OBJECTIVE BOX
[facility] DIVISION OF KIDNEY DISEASES AND HYPERTENSION   FOLLOW UP NOTE  --------------------------------------------------------------------------------  Chief Complaint: Pt with oliguric/anuric ROBERT requiring renal replacement therapy    24 hour events/subjective: Pt. was seen and examined in the HD unit, preparing to start HD. Pt is somnolent and not answering questions or following commands.     PAST HISTORY  --------------------------------------------------------------------------------  No significant changes to PMH, PSH, FHx, SHx, unless otherwise noted    ALLERGIES & MEDICATIONS  --------------------------------------------------------------------------------  Allergies  penicillin (Swelling)    Intolerances  epinephrine (Other)    Standing Inpatient Medications  ascorbic acid 250 milliGRAM(s) Oral daily  chlorhexidine 2% Cloths 1 Application(s) Topical daily  heparin   Injectable 5000 Unit(s) SubCutaneous every 12 hours  lidocaine   4% Patch 1 Patch Transdermal every 24 hours  meropenem  IVPB 500 milliGRAM(s) IV Intermittent every 12 hours  multivitamin 1 Tablet(s) Oral daily  pantoprazole  Injectable 40 milliGRAM(s) IV Push daily  polyethylene glycol 3350 17 Gram(s) Oral daily    PRN Inpatient Medications  acetaminophen   Oral Liquid .. 470 milliGRAM(s) Oral every 6 hours PRN    REVIEW OF SYSTEMS  --------------------------------------------------------------------------------  Unable to obtain ROS     VITALS/PHYSICAL EXAM  --------------------------------------------------------------------------------  T(C): 36.6 (05-31-24 @ 12:10), Max: 36.6 (05-31-24 @ 12:10)  HR: 106 (05-31-24 @ 12:10) (91 - 106)  BP: 95/53 (05-31-24 @ 12:10) (95/53 - 155/74)  RR: 17 (05-31-24 @ 12:10) (16 - 18)  SpO2: 99% (05-31-24 @ 10:10) (96% - 100%)  Wt(kg): --    05-30-24 @ 07:01  -  05-31-24 @ 07:00  --------------------------------------------------------  IN: 450 mL / OUT: 500 mL / NET: -50 mL    Physical Exam:  Gen: Ill appearing   HEENT: Dry MM. Anicteric. +NGT  Pulm: Diminished B/L  CV: S1S2+  Abd: Soft, +BS   Ext: No LE edema B/L  Neuro: Somnolent, not answering questions or following commands  Skin: Warm and dry  Dialysis access: R IJ non tunneled HD catheter     LABS/STUDIES  --------------------------------------------------------------------------------              9.6    19.05 >-----------<  333      [05-31-24 @ 10:00]              30.5     142  |  107  |  84  ----------------------------<  123      [05-31-24 @ 09:59]  3.7   |  19  |  2.86        Ca     8.7     [05-31-24 @ 09:59]    Creatinine Trend:  SCr 2.86 [05-31 @ 09:59]  SCr 2.79 [05-30 @ 13:01]  SCr 2.54 [05-29 @ 09:05]  SCr 2.29 [05-28 @ 10:33]  SCr 3.16 [05-27 @ 07:15]    HBsAg Nonreact      [05-19-24 @ 23:37]  HCV 0.09, Nonreact      [05-19-24 @ 23:37]  HIV Nonreact      [05-19-24 @ 23:37]    KIKE: titer Negative, pattern --      [05-19-24 @ 23:37]  dsDNA <1      [05-19-24 @ 23:37]  C3 Complement 75      [05-19-24 @ 23:37]  C4 Complement 28      [05-19-24 @ 23:37]  ANCA: cANCA Negative, pANCA Negative, atypical ANCA Negative      [05-22-24 @ 00:39]  Syphilis Screen (Treponema Pallidum Ab) Negative      [05-19-24 @ 23:37]  PLA2R: JUANJO <1.8, IFA --      [05-19-24 @ 23:37]  Free Light Chains: kappa 13.90, lambda 16.26, ratio = 0.85      [05-27 @ 10:48]  Immunofixation Serum: One IgM Lambda Band and One Weak  IgG Kappa Band and One Weak IgG Lambda  Band Identified    Reference Range: None Detected      [05-27-24 @ 10:48]  SPEP Interpretation: Co- Migrating Three Gamma-Migrating Paraproteins Identified      [05-19-24 @ 23:37]  Immunofixation Urine: One Bence Babb protein Kappa type, One IgM Lambda Band, One IgG Lambda  Band, One IgG Kappa Band Identified  Reference Range: None Detected      [05-20-24 @ 05:22]

## 2024-05-31 NOTE — PROGRESS NOTE ADULT - SUBJECTIVE AND OBJECTIVE BOX
Patient is a 79y old  Female who presents with a chief complaint of Weakness (31 May 2024 12:41)      SUBJECTIVE / OVERNIGHT EVENTS:  Dropped her BP after HD.       Vital Signs Last 24 Hrs  T(C): 36.2 (31 May 2024 15:59), Max: 36.6 (31 May 2024 12:10)  T(F): 97.1 (31 May 2024 15:59), Max: 97.9 (31 May 2024 12:10)  HR: 91 (31 May 2024 15:59) (91 - 106)  BP: 112/71 (31 May 2024 15:59) (85/61 - 155/74)  BP(mean): --  RR: 17 (31 May 2024 15:59) (16 - 18)  SpO2: 95% (31 May 2024 15:59) (95% - 100%)    Parameters below as of 31 May 2024 15:59  Patient On (Oxygen Delivery Method): room air      I&O's Summary    30 May 2024 07:01  -  31 May 2024 07:00  --------------------------------------------------------  IN: 450 mL / OUT: 500 mL / NET: -50 mL        PE:  GENERAL: NAD, AAOx 0, frail; obtunded   CHEST/LUNG: CTABL, No wheeze  HEART: Regular rate and rhythm; no murmur  ABDOMEN: Soft, Nontender, Nondistended; Bowel sounds present  EXTREMITIES:  2+ Peripheral Pulses, +1 le edema  NEURO: No focal deficits  LABS:                        9.6    19.05 )-----------( 333      ( 31 May 2024 10:00 )             30.5     05-31    142  |  107  |  84<H>  ----------------------------<  123<H>  3.7   |  19<L>  |  2.86<H>    Ca    8.7      31 May 2024 09:59        CAPILLARY BLOOD GLUCOSE      POCT Blood Glucose.: 159 mg/dL (31 May 2024 06:33)  POCT Blood Glucose.: 113 mg/dL (30 May 2024 17:24)        Urinalysis Basic - ( 31 May 2024 09:59 )    Color: x / Appearance: x / SG: x / pH: x  Gluc: 123 mg/dL / Ketone: x  / Bili: x / Urobili: x   Blood: x / Protein: x / Nitrite: x   Leuk Esterase: x / RBC: x / WBC x   Sq Epi: x / Non Sq Epi: x / Bacteria: x        RADIOLOGY & ADDITIONAL TESTS:    Imaging Personally Reviewed:  [x] YES  [ ] NO    Consultant(s) Notes Reviewed:  [x] YES  [ ] NO      MEDICATIONS  (STANDING):  ascorbic acid 250 milliGRAM(s) Oral daily  chlorhexidine 2% Cloths 1 Application(s) Topical daily  heparin   Injectable 5000 Unit(s) SubCutaneous every 12 hours  lidocaine   4% Patch 1 Patch Transdermal every 24 hours  meropenem  IVPB 500 milliGRAM(s) IV Intermittent every 12 hours  multivitamin 1 Tablet(s) Oral daily  pantoprazole  Injectable 40 milliGRAM(s) IV Push daily  polyethylene glycol 3350 17 Gram(s) Oral daily  sodium chloride 0.9%. 1000 milliLiter(s) (75 mL/Hr) IV Continuous <Continuous>    MEDICATIONS  (PRN):  acetaminophen   Oral Liquid .. 470 milliGRAM(s) Oral every 6 hours PRN Mild Pain (1 - 3), Moderate Pain (4 - 6)      Care Discussed with Consultants/Other Providers [x] YES  [ ] NO    HEALTH ISSUES - PROBLEM Dx:  ROBERT (acute kidney injury)    Metabolic acidosis    E coli bacteremia    ACP (advance care planning)    Encounter for palliative care    Acute encephalopathy    Functional quadriplegia

## 2024-05-31 NOTE — PROGRESS NOTE ADULT - ASSESSMENT
79-year-old female with past medical history of GERD, MI, chronic pancreatitis, HTN, incontinence, recent hip surgery who presented w/ increased lethargy, failure to thrive, decreased PO intake nausea/vomiting. Patient admitted to ICU for metabolic acidosis and ROBERT requiring HD found to have E Coli bacteremia 2/2 UTI.    E. coli bacteremia due to UTI  5/19 Bcx with E.coli -- 5/21 Bcx cleared   5/19 Ucx with E.coli & E. faecalis  - low CFU of E. faecalis so unlikely to be contributing  completed 10d course with ceftriaxone     now with sepsis/sirs 5/29  Leukocytosis had resolved, now noted with uptrend to ~19k -stable  Had episode of hypothermia 5/29 -- temps now wnl x24h  Remains nonverbal, NGT in place, may be aspirating  RIJ HD catheter with no sign of infection   s/p ceftriaxone 5/20-5/30 > broadened to meropenem 5/30    ROBERT due to ATN  Nephrology following, on HD    Recommendations  Follow Bcx and Ucx from 5/30 - in process   Follow 5/30 official CXR report   Continue meropenem pending above   Monitor temps/WBC   Aspiration precautions   Palliative care following    Over the weekend Dr. Celia Wu will be covering for our group. If you have any questions, concerns or new micro data, please reach out to them at [phone].    Aisha Mcdowell M.D.  [facility], Division of Infectious Diseases  [phone]  After 5pm on weekdays and all day on weekends - please call [phone]  79-year-old female with past medical history of GERD, MI, chronic pancreatitis, HTN, incontinence, recent hip surgery who presented w/ increased lethargy, failure to thrive, decreased PO intake nausea/vomiting. Patient admitted to ICU for metabolic acidosis and ROBERT requiring HD found to have E Coli bacteremia 2/2 UTI.    E. coli bacteremia due to UTI  5/19 Bcx with E.coli -- 5/21 Bcx cleared   5/19 Ucx with E.coli & E. faecalis  - low CFU of E. faecalis so unlikely to be contributing    now with sepsis/sirs 5/29  Leukocytosis had resolved, now noted with uptrend to ~19k -stable  Had episode of hypothermia 5/29 -- temps now wnl x24h  UA with pyuria - less than prior UA  Remains nonverbal, NGT in place, may be aspirating  RIJ HD catheter with no sign of infection   s/p ceftriaxone 5/20-5/30 > broadened to meropenem 5/30    ROBERT due to ATN  Nephrology following, on HD    Recommendations  Follow Bcx and Ucx from 5/30 - in process   Follow 5/30 official CXR report   Continue meropenem pending above   Monitor temps/WBC   Aspiration precautions   Palliative care following    Over the weekend Dr. Celia Wu will be covering for our group. If you have any questions, concerns or new micro data, please reach out to them at [phone].    Aisha Mcdowell M.D.  [facility], Division of Infectious Diseases  [phone]  After 5pm on weekdays and all day on weekends - please call [phone]

## 2024-05-31 NOTE — PROGRESS NOTE ADULT - SUBJECTIVE AND OBJECTIVE BOX
[facility] DIVISION OF INFECTIOUS DISEASES  RIGO Tyler Y. Patel, S. Shah, G. Casimir  [phone]  ([phone] - weekdays after 5pm and weekends)    Name: SRINI VALDOVINOS  Age/Gender: 79y Female  MRN: [id number]    Interval History:  Patient seen and examined this morning.   Unable to obtain.  Notes reviewed. Afebrile   Allergies: penicillin (Swelling)      Objective:  Vitals:   T(F): 97.3 (05-31-24 @ 10:10), Max: 97.5 (05-31-24 @ 08:05)  HR: 91 (05-31-24 @ 10:10) (91 - 93)  BP: 155/74 (05-31-24 @ 10:10) (122/81 - 155/74)  RR: 16 (05-31-24 @ 10:10) (16 - 18)  SpO2: 99% (05-31-24 @ 10:10) (96% - 100%)  Physical Examination:  General: no acute distress, nonverbal  HEENT: NC/AT, anicteric, NGT  Respiratory: decreased breath sounds b/l  Cardiovascular: S1 S2 present, normal rate   Gastrointestinal: soft, no grimacing on palpation, ND  Extremities: UE edema, no LE edema  Skin: no visible rash, ecchymosis  RIJ HD catheter with no erythema     Laboratory Studies:  CBC:                       9.6    19.05 )-----------( 333      ( 31 May 2024 10:00 )             30.5     WBC Trend:  19.05 05-31-24 @ 10:00  19.02 05-30-24 @ 13:01  15.86 05-29-24 @ 09:05  12.04 05-27-24 @ 07:14  10.01 05-26-24 @ 07:26  7.89 05-25-24 @ 00:28    CMP: 05-31    142  |  107  |  84<H>  ----------------------------<  123<H>  3.7   |  19<L>  |  2.86<H>    Ca    8.7      31 May 2024 09:59      Creatinine: 2.86 mg/dL (05-31-24 @ 09:59)  Creatinine: 2.79 mg/dL (05-30-24 @ 13:01)  Creatinine: 2.54 mg/dL (05-29-24 @ 09:05)  Creatinine: 2.29 mg/dL (05-28-24 @ 10:33)  Creatinine: 3.16 mg/dL (05-27-24 @ 07:15)  Creatinine: 2.83 mg/dL (05-26-24 @ 07:26)  Creatinine: 2.24 mg/dL (05-25-24 @ 00:28)    Urinalysis + Microscopic Examination (05.30.24 @ 14:31)    pH Urine: 5.5   Urine Appearance: Cloudy   Color: Yellow   Specific Gravity: 1.018   Protein, Urine: 100 mg/dL   Glucose Qualitative, Urine: Negative mg/dL   Ketone - Urine: Negative mg/dL   Blood, Urine: Moderate   Bilirubin: Negative   Urobilinogen: 0.2 mg/dL   Leukocyte Esterase Concentration: Large   Nitrite: Negative   Review: Reviewed   White Blood Cell - Urine: 69 /HPF   Red Blood Cell - Urine: 10 /HPF   Bacteria: Moderate /HPF   Cast: 0 /LPF   Epithelial Cells: 0 /HPF   Yeast-like Cells: Present    Microbiology: reviewed   Culture - Blood (collected 05-21-24 @ 10:30)  Source: .Blood Blood-Peripheral  Final Report (05-26-24 @ 16:00):    No growth at 5 days    Culture - Blood (collected 05-21-24 @ 10:00)  Source: .Blood Blood-Peripheral  Final Report (05-26-24 @ 16:00):    No growth at 5 days    Culture - Nose (collected 05-20-24 @ 05:25)  Source: .Nose Nose  Final Report (05-21-24 @ 14:16):    Staphylococcus aureus isolated    No Methicillin Resistant Staphylococcus aureus    Culture - Blood (collected 05-19-24 @ 16:25)  Source: .Blood Blood-Peripheral  Gram Stain (05-20-24 @ 08:01):    Growth in anaerobic bottle: Gram Negative Rods    Growth in aerobic bottle: Gram Negative Rods  Final Report (05-22-24 @ 07:47):    Growth in aerobic and anaerobic bottles: Escherichia coli    See previous culture [id number]    Culture - Urine (collected 05-19-24 @ 16:23)  Source: Clean Catch Clean Catch (Midstream)  Final Report (05-23-24 @ 16:39):    >100,000 CFU/ml Escherichia coli    10,000 - 49,000 CFU/mL Enterococcus faecalis    <10,000 CFU/ml Normal Urogenital magalys present  Organism: Escherichia coli  Enterococcus faecalis (05-23-24 @ 16:39)  Organism: Enterococcus faecalis (05-23-24 @ 16:39)      Method Type: BENTON      -  Ampicillin: S <=2 Predicts results to ampicillin/sulbactam, amoxacillin-clavulanate and  piperacillin-tazobactam.      -  Ciprofloxacin: S <=1      -  Levofloxacin: S 1      -  Nitrofurantoin: S <=32 Should not be used to treat pyelonephritis.      -  Tetracycline: R >8      -  Vancomycin: S 2  Organism: Escherichia coli (05-23-24 @ 16:39)      Method Type: BENTON      -  Amoxicillin/Clavulanic Acid: S <=8/4 Consider reserving for cystitis when ampicillin/sulbactam is resistant      -  Ampicillin: R >16 These ampicillin results predict results for amoxicillin      -  Ampicillin/Sulbactam: R >16/8      -  Aztreonam: S <=4      -  Cefazolin: S <=2 For uncomplicated UTI with K. pneumoniae, E. coli, or P. mirablis: BENTON <=16 is sensitive and BENTON >=32 is resistant. This also predicts results for oral agents cefaclor, cefdinir, cefpodoxime, cefprozil, cefuroxime axetil, cephalexin and locarbef for uncomplicated UTI. Note that some isolates may be susceptible to these agents while testing resistant to cefazolin.      -  Cefepime: S <=2      -  Cefoxitin: S <=8      -  Ceftriaxone: S <=1      -  Cefuroxime: S <=4      -  Ciprofloxacin: S <=0.25      -  Ertapenem: S <=0.5      -  Gentamicin: S <=2      -  Imipenem: S <=1      -  Levofloxacin: S <=0.5      -  Meropenem: S <=1      -  Nitrofurantoin: S <=32 Should not be used to treat pyelonephritis      -  Piperacillin/Tazobactam: S <=8      -  Tobramycin: S <=2      -  Trimethoprim/Sulfamethoxazole: S <=0.5/9.5    Culture - Blood (collected 05-19-24 @ 16:10)  Source: .Blood Blood-Peripheral  Gram Stain (05-20-24 @ 09:48):    Growth in aerobic bottle: Gram Negative Rods    Growth in anaerobic bottle: Gram Negative Rods  Final Report (05-22-24 @ 07:47):    Growth in aerobic and anaerobic bottles: Escherichia coli    Direct identification is available within approximately 3-5    hours either by Blood Panel Multiplexed PCR or Direct    MALDI-TOF. Details: [email or web address]  Organism: Blood Culture PCR  Escherichia coli (05-22-24 @ 07:47)  Organism: Escherichia coli (05-22-24 @ 07:47)      Method Type: BENTON      -  Ampicillin: R >16 These ampicillin results predict results for amoxicillin      -  Ampicillin/Sulbactam: R >16/8      -  Aztreonam: S <=4      -  Cefazolin: I 4      -  Cefepime: S <=2      -  Cefoxitin: S <=8      -  Ceftriaxone: S <=1      -  Ciprofloxacin: S <=0.25      -  Ertapenem: S <=0.5      -  Gentamicin: S <=2      -  Imipenem: S <=1      -  Levofloxacin: S <=0.5      -  Meropenem: S <=1      -  Piperacillin/Tazobactam: S <=8      -  Tobramycin: S <=2      -  Trimethoprim/Sulfamethoxazole: S <=0.5/9.5  Organism: Blood Culture PCR (05-22-24 @ 07:47)      Method Type: PCR      -  Escherichia coli: Detec    SARS-CoV-2 Result: [facility] (19 May 2024 16:22)    Radiology: reviewed     Medications:  acetaminophen   Oral Liquid .. 470 milliGRAM(s) Oral every 6 hours PRN  ascorbic acid 250 milliGRAM(s) Oral daily  chlorhexidine 2% Cloths 1 Application(s) Topical daily  heparin   Injectable 5000 Unit(s) SubCutaneous every 12 hours  lidocaine   4% Patch 1 Patch Transdermal every 24 hours  meropenem  IVPB 500 milliGRAM(s) IV Intermittent every 12 hours  multivitamin 1 Tablet(s) Oral daily  pantoprazole  Injectable 40 milliGRAM(s) IV Push daily  polyethylene glycol 3350 17 Gram(s) Oral daily    Current Antimicrobials:  meropenem  IVPB 500 milliGRAM(s) IV Intermittent every 12 hours    Prior/Completed Antimicrobials:  cefepime   IVPB  vancomycin  IVPB.

## 2024-05-31 NOTE — PROGRESS NOTE ADULT - ASSESSMENT
Ms. Yee is a 79y Female with PMHx of HTN, pA.fibm HTN, macrocytic anemia, pancreatic insufficiency hypertriglyceridemia, aortic valve stenosis, R hip fracture s/p ORIF 01/2024, depression who is admitted since 05/19/2024 with septic shock due to UTI and E.coli bacteremia, R hydronephrosis with acute renal failure now on HD, toxic metabolic encephalopathy. Pt is non-verbal this morning so history is obtained from chart and coordinating team members. Now on floor from MICU. CTH negative for acute changes.     Pt received 1u PRBC on 05/21/2024 with appropriate response. Labs on my initial evaluation show Hb 7.9 Hct 26.2 .3 with neutrophilia and lymphopenia. Normal bilirubin, liver function. Serum FLC ratio normal. SPEP with 0.6 Mspike without ALEKSEY. UPEP with K/L elevated and ratio 4 in setting of ARF. Additionally CT A/P with reported cirrhosis. Continues to be non-verbal and non-alert, noted GOC and palliative discussion        # Macrocytic anemia  # B12 deficiency  - B12 last year was 192  - Repeat B12 1172  - folate >20, previously low  - Also may have underlying BM disorder such as MDS, outpatient labs with persistent macrocytosis and anemia Hb around 10, at this time further workup such as BmBx would need to be considered as outpatient given overall clinical context   - No signs of bleeding   - Transfuse to maintain Hb > 7     # MGUS  -  SPEP/ALEKSEY with  with M-spike 0.5 3 Co-Migrating IgM Lambda Band, IgG Lambda Band, and  IgG Kappa Band, confirmed on UPEP as well  -  Serum immunoglobulins increased IgM 526, Serum FLCs ratio normal, LDH normal, B2MCG   - Overall not likely driving pts current clinical context and can be followed up outpatient depending on pts clinical outcome and GOCs   - See above     # Acute renal failure  - HD per nephrology    # E.coli UTI and bacteremia  - Antibiotics per primary team and ID       Thank you for allowing me to participate in the care of Ms. Yee, please do not hesitate to call or text me if you have further questions or concerns.     Rajeev Mcdowell MD  [facility] [place]   Division of Hematology/Oncology  [address], [address]  [place] [address]  P: [phone]  F: [phone]    Attestation:    ----Pt evaluated including face-to-face interaction in addition to chart review, reviewing treatment plan, and managing the patient’s chronic diagnoses as listed in the assessment----

## 2024-06-01 NOTE — PROGRESS NOTE ADULT - SUBJECTIVE AND OBJECTIVE BOX
Chief Complaint:  Patient is a 79y old  Female who presents with a chief complaint of Weakness (31 May 2024 04:59)      Date of service      Interval Events:   no GI events         acetaminophen   Oral Liquid .. 470 milliGRAM(s) Oral every 6 hours PRN  ascorbic acid 250 milliGRAM(s) Oral daily  chlorhexidine 2% Cloths 1 Application(s) Topical daily  heparin   Injectable 5000 Unit(s) SubCutaneous every 12 hours  lidocaine   4% Patch 1 Patch Transdermal every 24 hours  meropenem  IVPB 500 milliGRAM(s) IV Intermittent every 12 hours  multivitamin 1 Tablet(s) Oral daily  pantoprazole  Injectable 40 milliGRAM(s) IV Push daily  polyethylene glycol 3350 17 Gram(s) Oral daily  sodium chloride 0.9%. 1000 milliLiter(s) IV Continuous <Continuous>                            8.2    29.88 )-----------( 313      ( 2024 10:32 )             26.3       Hemoglobin: 8.2 g/dL ( @ 10:32)  Hemoglobin: 9.6 g/dL ( @ 10:00)  Hemoglobin: 10.0 g/dL ( @ 13:01)  Hemoglobin: 8.4 g/dL ( @ 09:05)          141  |  107  |  63<H>  ----------------------------<  177<H>  4.2   |  16<L>  |  2.40<H>    Ca    8.4      2024 10:32      Creatinine Trend: 2.40<--, 2.86<--, 2.79<--, 2.54<--, 2.29<--, 3.16<--    COAGS:           T(C): 36.4 (24 @ 08:03), Max: 36.6 (24 @ 04:55)  HR: 87 (24 @ 08:03) (67 - 108)  BP: 99/62 (24 @ 08:03) (85/61 - 112/71)  RR: 16 (24 @ 08:03) (16 - 17)  SpO2: 99% (24 @ 08:03) (95% - 100%)  Wt(kg): --    I&O's Summary    31 May 2024 07:01  -  2024 07:00  --------------------------------------------------------  IN: 875 mL / OUT: 2 mL / NET: 873 mL      Review of Systems:  General:  No wt loss, fevers, chills, night sweats, fatigue,   Eyes:  Good vision, no reported pain  ENT:  No sore throat, pain, runny nose, dysphagia  CV:  No pain, palpitations, hypo/hypertension  Resp:  No dyspnea, cough, tachypnea, wheezing  GI:  See HPI  :  No pain, bleeding, incontinence, nocturia  Muscle:  No pain, weakness  Neuro:  No weakness, tingling, memory problems  Psych:  No fatigue, insomnia, mood problems, depression  Endocrine:  No polyuria, polydipsia, cold/heat intolerance  Heme:  No petechiae, ecchymosis, easy bruisability  Integumentary:  No rash, edema       Daily     Daily Weight in k.4 (31 May 2024 10:10)      PHYSICAL EXAM:     GENERAL:  Appears stated age, well-groomed, well-nourished, no distress  HEENT:  NC/AT,  conjunctivae anicteric, clear and pink,   NECK: supple, trachea midline  CHEST:  Full & symmetric excursion, no increased effort, breath sounds clear  HEART:  Regular rhythm, no JVD  ABDOMEN:  Soft, non-tender, non-distended, normoactive bowel sounds,  no masses , no hepatosplenomegaly  EXTREMITIES:  no cyanosis,clubbing or edema  SKIN:  No rash, erythema, or, ecchymoses, no jaundice  NEURO:  Alert, non-focal, no asterixis  PSYCH: Appropriate affect, oriented to place and time  RECTAL: Deferred

## 2024-06-01 NOTE — PROGRESS NOTE ADULT - SUBJECTIVE AND OBJECTIVE BOX
Patient is a 79y old  Female who presents with a chief complaint of Weakness (01 Jun 2024 13:00)      SUBJECTIVE / OVERNIGHT EVENTS:  Patient seen and examined.   Eyes open.   AAO x 1 .  Not responding much.       Vital Signs Last 24 Hrs  T(C): 36.4 (01 Jun 2024 08:03), Max: 36.6 (01 Jun 2024 04:55)  T(F): 97.6 (01 Jun 2024 08:03), Max: 97.9 (01 Jun 2024 04:55)  HR: 87 (01 Jun 2024 08:03) (67 - 108)  BP: 99/62 (01 Jun 2024 08:03) (91/57 - 112/71)  BP(mean): --  RR: 16 (01 Jun 2024 08:03) (16 - 17)  SpO2: 99% (01 Jun 2024 08:03) (95% - 100%)    Parameters below as of 01 Jun 2024 08:03  Patient On (Oxygen Delivery Method): room air      I&O's Summary    31 May 2024 07:01  -  01 Jun 2024 07:00  --------------------------------------------------------  IN: 875 mL / OUT: 2 mL / NET: 873 mL        PE:  GENERAL: NAD, AAOx 0, frail; obtunded   CHEST/LUNG: CTABL, No wheeze  HEART: Regular rate and rhythm; no murmur  ABDOMEN: Soft, Nontender, Nondistended; Bowel sounds present  EXTREMITIES:  2+ Peripheral Pulses, +1 le edema  NEURO: No focal deficits    LABS:                        8.2    29.88 )-----------( 313      ( 01 Jun 2024 10:32 )             26.3     06-01    141  |  107  |  63<H>  ----------------------------<  177<H>  4.2   |  16<L>  |  2.40<H>    Ca    8.4      01 Jun 2024 10:32        CAPILLARY BLOOD GLUCOSE      POCT Blood Glucose.: 178 mg/dL (01 Jun 2024 06:45)  POCT Blood Glucose.: 124 mg/dL (31 May 2024 17:31)        Urinalysis Basic - ( 01 Jun 2024 10:32 )    Color: x / Appearance: x / SG: x / pH: x  Gluc: 177 mg/dL / Ketone: x  / Bili: x / Urobili: x   Blood: x / Protein: x / Nitrite: x   Leuk Esterase: x / RBC: x / WBC x   Sq Epi: x / Non Sq Epi: x / Bacteria: x        RADIOLOGY & ADDITIONAL TESTS:    Imaging Personally Reviewed:  [x] YES  [ ] NO    Consultant(s) Notes Reviewed:  [x] YES  [ ] NO      MEDICATIONS  (STANDING):  ascorbic acid 250 milliGRAM(s) Oral daily  chlorhexidine 2% Cloths 1 Application(s) Topical daily  heparin   Injectable 5000 Unit(s) SubCutaneous every 12 hours  lidocaine   4% Patch 1 Patch Transdermal every 24 hours  meropenem  IVPB 500 milliGRAM(s) IV Intermittent every 12 hours  multivitamin 1 Tablet(s) Oral daily  pantoprazole  Injectable 40 milliGRAM(s) IV Push daily  polyethylene glycol 3350 17 Gram(s) Oral daily  sodium chloride 0.9%. 1000 milliLiter(s) (75 mL/Hr) IV Continuous <Continuous>    MEDICATIONS  (PRN):  acetaminophen   Oral Liquid .. 470 milliGRAM(s) Oral every 6 hours PRN Mild Pain (1 - 3), Moderate Pain (4 - 6)      Care Discussed with Consultants/Other Providers [x] YES  [ ] NO    HEALTH ISSUES - PROBLEM Dx:  ROBERT (acute kidney injury)    Metabolic acidosis    E coli bacteremia    ACP (advance care planning)    Encounter for palliative care    Acute encephalopathy    Functional quadriplegia

## 2024-06-01 NOTE — PROGRESS NOTE ADULT - SUBJECTIVE AND OBJECTIVE BOX
[facility] HEMATOLOGY/ONCOLOGY INPATIENT PROGRESS NOTE     Interval Hx:   06-01-24: Ms. Yee was seen at bedside today.    Meds:   MEDICATIONS  (STANDING):  ascorbic acid 250 milliGRAM(s) Oral daily  chlorhexidine 2% Cloths 1 Application(s) Topical daily  heparin   Injectable 5000 Unit(s) SubCutaneous every 12 hours  lidocaine   4% Patch 1 Patch Transdermal every 24 hours  meropenem  IVPB 500 milliGRAM(s) IV Intermittent every 12 hours  multivitamin 1 Tablet(s) Oral daily  pantoprazole  Injectable 40 milliGRAM(s) IV Push daily  polyethylene glycol 3350 17 Gram(s) Oral daily  sodium chloride 0.9%. 1000 milliLiter(s) (75 mL/Hr) IV Continuous <Continuous>    MEDICATIONS  (PRN):  acetaminophen   Oral Liquid .. 470 milliGRAM(s) Oral every 6 hours PRN Mild Pain (1 - 3), Moderate Pain (4 - 6)    Vital Signs Last 24 Hrs  T(C): 36.6 (01 Jun 2024 04:55), Max: 36.6 (31 May 2024 12:10)  T(F): 97.9 (01 Jun 2024 04:55), Max: 97.9 (31 May 2024 12:10)  HR: 67 (01 Jun 2024 04:55) (67 - 108)  BP: 103/74 (01 Jun 2024 04:55) (85/61 - 155/74)  BP(mean): --  RR: 16 (01 Jun 2024 04:55) (16 - 18)  SpO2: 99% (01 Jun 2024 04:55) (95% - 100%)    Parameters below as of 01 Jun 2024 04:55  Patient On (Oxygen Delivery Method): room air    Physical Exam:  Gen: NAD, NG in place  HEENT: MMM  Chest: Equal chest rise  Cardiac: irregular  Abd: Nondistended  Neuro: AAOx0    Labs:                        9.6    19.05 )-----------( 333      ( 31 May 2024 10:00 )             30.5     CBC Full  -  ( 31 May 2024 10:00 )  WBC Count : 19.05 K/uL  RBC Count : 3.14 M/uL  Hemoglobin : 9.6 g/dL  Hematocrit : 30.5 %  Platelet Count - Automated : 333 K/uL  Mean Cell Volume : 97.1 fl  Mean Cell Hemoglobin : 30.6 pg  Mean Cell Hemoglobin Concentration : 31.5 gm/dL    05-31    142  |  107  |  84<H>  ----------------------------<  123<H>  3.7   |  19<L>  |  2.86<H>    Ca    8.7      31 May 2024 09:59         [facility] HEMATOLOGY/ONCOLOGY INPATIENT PROGRESS NOTE     Interval Hx:   06-01-24: Ms. Yee was seen at bedside today, continues to be non-verbal, non-alert, noted GOC by primary team, US renal with layering echogenic material in the bladder lumen may represent blood products, sterile debris, and/or cystitis    Meds:   MEDICATIONS  (STANDING):  ascorbic acid 250 milliGRAM(s) Oral daily  chlorhexidine 2% Cloths 1 Application(s) Topical daily  heparin   Injectable 5000 Unit(s) SubCutaneous every 12 hours  lidocaine   4% Patch 1 Patch Transdermal every 24 hours  meropenem  IVPB 500 milliGRAM(s) IV Intermittent every 12 hours  multivitamin 1 Tablet(s) Oral daily  pantoprazole  Injectable 40 milliGRAM(s) IV Push daily  polyethylene glycol 3350 17 Gram(s) Oral daily  sodium chloride 0.9%. 1000 milliLiter(s) (75 mL/Hr) IV Continuous <Continuous>    MEDICATIONS  (PRN):  acetaminophen   Oral Liquid .. 470 milliGRAM(s) Oral every 6 hours PRN Mild Pain (1 - 3), Moderate Pain (4 - 6)    Vital Signs Last 24 Hrs  T(C): 36.6 (01 Jun 2024 04:55), Max: 36.6 (31 May 2024 12:10)  T(F): 97.9 (01 Jun 2024 04:55), Max: 97.9 (31 May 2024 12:10)  HR: 67 (01 Jun 2024 04:55) (67 - 108)  BP: 103/74 (01 Jun 2024 04:55) (85/61 - 155/74)  BP(mean): --  RR: 16 (01 Jun 2024 04:55) (16 - 18)  SpO2: 99% (01 Jun 2024 04:55) (95% - 100%)    Parameters below as of 01 Jun 2024 04:55  Patient On (Oxygen Delivery Method): room air    Physical Exam:  Gen: NAD, NG in place  HEENT: MMM  Chest: Equal chest rise  Cardiac: irregular  Abd: Nondistended  Neuro: AAOx0    Labs:                        9.6    19.05 )-----------( 333      ( 31 May 2024 10:00 )             30.5     CBC Full  -  ( 31 May 2024 10:00 )  WBC Count : 19.05 K/uL  RBC Count : 3.14 M/uL  Hemoglobin : 9.6 g/dL  Hematocrit : 30.5 %  Platelet Count - Automated : 333 K/uL  Mean Cell Volume : 97.1 fl  Mean Cell Hemoglobin : 30.6 pg  Mean Cell Hemoglobin Concentration : 31.5 gm/dL    05-31    142  |  107  |  84<H>  ----------------------------<  123<H>  3.7   |  19<L>  |  2.86<H>    Ca    8.7      31 May 2024 09:59

## 2024-06-01 NOTE — PROGRESS NOTE ADULT - ASSESSMENT
79-year-old female with past medical history of GERD, MI, chronic pancreatitis, HTN, incontinence, recent hip surgery who presented w/ increased lethargy, failure to thrive, decreased PO intake nausea/vomiting. Patient admitted to ICU for metabolic acidosis and ROBERT requiring HD found to have E Coli bacteremia 2/2 UTI.    E. coli bacteremia due to UTI  5/19 Bcx with E.coli -- 5/21 Bcx cleared   5/19 Ucx with E.coli & E. faecalis  - low CFU of E. faecalis so unlikely to be contributing    now with sepsis/sirs 5/29  Leukocytosis had resolved, now noted with uptrend to ~19k -stable  Had episode of hypothermia 5/29 -- temps now wnl x24h  UA with pyuria - less than prior UA  Remains nonverbal, NGT in place, may be aspirating  RIJ HD catheter with no sign of infection   s/p ceftriaxone 5/20-5/30 > broadened to meropenem 5/30 5/30 CXR negative  5/30 BCx NGTD  5/30 UCx   10,000 - 49,000 CFU/mL Gram positive organisms    ROBERT due to ATN  Nephrology following, on HD    Recommendations  Follow  Ucx from 5/30 - in process   Continue meropenem pending above   Monitor temps/WBC   Aspiration precautions   Palliative care following  Additional care per primary team    Dr. Mcdowell to resume care 6/3  Infectious Diseases will follow. Please call with any questions.  Celia Wu M.D.  [facility] Division of Infectious Diseases [phone]  For after 5 P.M. and weekends, please call [phone] 79-year-old female with past medical history of GERD, MI, chronic pancreatitis, HTN, incontinence, recent hip surgery who presented w/ increased lethargy, failure to thrive, decreased PO intake nausea/vomiting. Patient admitted to ICU for metabolic acidosis and ROBERT requiring HD found to have E Coli bacteremia 2/2 UTI.    E. coli bacteremia due to UTI  5/19 Bcx with E.coli -- 5/21 Bcx cleared   5/19 Ucx with E.coli & E. faecalis  - low CFU of E. faecalis so unlikely to be contributing    now with sepsis/sirs 5/29  Leukocytosis had resolved, now noted with uptrend to ~19k -stable  Had episode of hypothermia 5/29 -- temps now wnl x24h  UA with pyuria - less than prior UA  Remains nonverbal, NGT in place, may be aspirating  RIJ HD catheter with no sign of infection   s/p ceftriaxone 5/20-5/30 > broadened to meropenem 5/30 5/30 CXR negative  5/30 BCx NGTD  5/30 UCx   10,000 - 49,000 CFU/mL Gram positive organisms    ROBERT due to ATN  Nephrology following, on HD    Recommendations  Pt clinically worse, persistently hypotension and now w/ worsening leukocytosis  --no diarrhea per nurse  Continue meropenem  Adding vancomycin x1 dose  F/u pending cx  Monitor temps/WBC   Monitor renal fxn  Aspiration precautions   Palliative care following  GOC and additional care per primary team    D/w Dr. Lamb  Infectious Diseases will follow. Please call with any questions.  Celia Wu M.D.  [facility] Division of Infectious Diseases [phone]  For after 5 P.M. and weekends, please call [phone]

## 2024-06-01 NOTE — CONSULT NOTE ADULT - ATTENDING COMMENTS
79 F with PMH of GERD, MI, chronic pancreatitis/pancreatic insufficiency, HTN, incontinence, fall s/p femur fracture 1/2024  initially presenting for increased lethargy and admitted to MICU for urosepsis c/b E. coli bacteremia requiring vasopressors and acute renal failure requiring urgent HD for acidosis. Course c/b acute metabolic encephalopathy without improvement during hospital stay. Also with ARF requiring ongoing HD. MICU consulted for hypotension. Suspect yet another epsiode of sepsis.  She is at baseline exam now and maps are >60.  would start droxydopa 200q8 now with hold parameteres of systolic >120.   Reconsult if deteriorating exam or worsening hypotension  - consider infectious work up and restarting abx. No Patient seen and interviewed at bedside. she presents very tearful, stating that she feels lonely in the hospital and would like to go home. She understands that need to continue her treatment and reports motivation to participate in physical therapy. No SI/HI. No confusion noted.

## 2024-06-01 NOTE — PROGRESS NOTE ADULT - ASSESSMENT
Ms. Yee is a 79y Female with PMHx of HTN, pA.fibm HTN, macrocytic anemia, pancreatic insufficiency hypertriglyceridemia, aortic valve stenosis, R hip fracture s/p ORIF 01/2024, depression who is admitted since 05/19/2024 with septic shock due to UTI and E.coli bacteremia, R hydronephrosis with acute renal failure now on HD, toxic metabolic encephalopathy. Pt is non-verbal this morning so history is obtained from chart and coordinating team members. Now on floor from MICU. CTH negative for acute changes.     Pt received 1u PRBC on 05/21/2024 with appropriate response. Labs on my initial evaluation show Hb 7.9 Hct 26.2 .3 with neutrophilia and lymphopenia. Normal bilirubin, liver function. Serum FLC ratio normal. SPEP with 0.6 Mspike without ALEKSEY. UPEP with K/L elevated and ratio 4 in setting of ARF. Additionally CT A/P with reported cirrhosis. Continues to be non-verbal and non-alert, noted GOC and palliative discussion        # Macrocytic anemia  # B12 deficiency  - B12 last year was 192  - Repeat B12 1172  - folate >20, previously low  - Also may have underlying BM disorder such as MDS, outpatient labs with persistent macrocytosis and anemia Hb around 10, at this time further workup such as BmBx would need to be considered as outpatient given overall clinical context   - No signs of bleeding   - Transfuse to maintain Hb > 7     # MGUS  -  SPEP/ALEKSEY with  with M-spike 0.5 3 Co-Migrating IgM Lambda Band, IgG Lambda Band, and  IgG Kappa Band, confirmed on UPEP as well  -  Serum immunoglobulins increased IgM 526, Serum FLCs ratio normal, LDH normal, B2MCG   - Overall not likely driving pts current clinical context and can be followed up outpatient depending on pts clinical outcome and GOCs   - See above     # Acute renal failure  - HD per nephrology    # E.coli UTI and bacteremia  - Antibiotics per primary team and ID       Thank you for allowing me to participate in the care of Ms. Yee, please do not hesitate to call or text me if you have further questions or concerns.     Rajeev Mcdowell MD  [facility] [place]   Division of Hematology/Oncology  [address], [address]  [place] [address]  P: [phone]  F: [phone]    Attestation:    ----Pt evaluated including face-to-face interaction in addition to chart review, reviewing treatment plan, and managing the patient’s chronic diagnoses as listed in the assessment----

## 2024-06-01 NOTE — CONSULT NOTE ADULT - CONSULT REQUESTED BY NAME
Primary Team
Dr. Stephy Song
Dr. Song
Primary Team
Primary Team
ED
Dr. Song
RN
Dr. Lamb
primary
ED
Dr. Song

## 2024-06-01 NOTE — CONSULT NOTE ADULT - PROVIDER SPECIALTY LIST ADULT
Endocrinology
Gastroenterology
Palliative Care
Urology
MICU
Heme/Onc
Neurology
Podiatry
MICU
Infectious Disease
Wound Care
Nephrology

## 2024-06-01 NOTE — PROGRESS NOTE ADULT - ASSESSMENT
79F with PMH GERD, MI, chronic pancreatitis/insufficiency, HTN, incontinence, recent R hip fx s/p repair (1/2024) and was recently discharged from outpatient rehab to home initially presenting for increased lethargy in setting of poor PO intake, admitted to MICU for urosepsis c/b E. coli bacteremia requiring vasopressors and acute renal failure requiring urgent HD for acidosis. Now s/p HD x1 with improving renal function.    # acute renal failure and acidemia from hypotension and sepsis, ATN  - s/p RIJ eduardo 5/19 and pressor assisted HD  - HD dependent, HD per nephrology  - eduardo to be exchanged, IR consult    # urosepsis c/b E. coli bacteremia   # septic shock, resolved  # r hydro  - e. coli bacteremia. Pt with E coli UTI. Mild hydronephrosis on R. evaluated by urology, low suspicion for true obstruction  - continue CTX 2g daily, plan for 14 day course per ID  - initiated on stress dosed steroids 5/21, now weaned off pressors, taper as ordered  - TTE LVEF 46%     # Toxic metabolic encephalopathy   # depression  - Patient is baseline is AOx4, here nonverbal, not following commands  - per pt's brother, who is also a cardiologist, pt with worsening depression, appetite/decreased PO intake since hip fracture in Jan. slow progressive decline over past several months. Prior to this, pt was a practicing dentist  - neuro following  - recommend MRI and vEEG , given pt is aaox 0  - CT head NC no acute change  - sp NGT feed ;; gi consulted   - hold home mirtazapine and seroquel iso lethargy, was evaluated by   - palliative care consult for GOC    # HTN  - holding home atenolol and olmesartan given shock     # H/o Chronic pancreatitis/insufficiency   - Continue home Creon TID premeal (held for now bc pt too lethargic to take po and cannot be crushed)    # H/o GERD  - Continue home med Protonix 40mg PO QD    # Femur facture Jan 2024 s/p repair   - Bed bound since discharge from rehab requiring full adl support    # anemia  - baseline hgb 7-8s, s/p 1u pRBC 5/21 for hgb <7, heme following    DVT PPX Hep subq    FULL CODE    lease contact with any questions or concerns [phone].

## 2024-06-01 NOTE — PROGRESS NOTE ADULT - SUBJECTIVE AND OBJECTIVE BOX
[facility], Division of Infectious Diseases  RIGO Tyler Y. Patel, S. Shah, G. [facility]  [phone]    Name: SRINI VALDOVINOS  Age: 79y  Gender: Female  MRN: [id number]    Interval History:  Patient seen and examined at bedside   No acute overnight events. Afebrile  Notes reviewed    Antibiotics:  meropenem  IVPB 500 milliGRAM(s) IV Intermittent every 12 hours      Medications:  acetaminophen   Oral Liquid .. 470 milliGRAM(s) Oral every 6 hours PRN  ascorbic acid 250 milliGRAM(s) Oral daily  chlorhexidine 2% Cloths 1 Application(s) Topical daily  heparin   Injectable 5000 Unit(s) SubCutaneous every 12 hours  lidocaine   4% Patch 1 Patch Transdermal every 24 hours  meropenem  IVPB 500 milliGRAM(s) IV Intermittent every 12 hours  multivitamin 1 Tablet(s) Oral daily  pantoprazole  Injectable 40 milliGRAM(s) IV Push daily  polyethylene glycol 3350 17 Gram(s) Oral daily  sodium chloride 0.9%. 1000 milliLiter(s) IV Continuous <Continuous>      Review of Systems:  unable to obtain    Allergies: penicillin (Swelling)    For details regarding the patient's past medical history, social history, family history, and other miscellaneous elements, please refer the initial infectious diseases consultation and/or the admitting history and physical examination for this admission.    Objective:  Vitals:   T(C): 36.4 (06-01-24 @ 08:03), Max: 36.6 (05-31-24 @ 12:10)  HR: 87 (06-01-24 @ 08:03) (67 - 108)  BP: 99/62 (06-01-24 @ 08:03) (85/61 - 112/71)  RR: 16 (06-01-24 @ 08:03) (16 - 17)  SpO2: 99% (06-01-24 @ 08:03) (95% - 100%)    Physical Examination:  General: no acute distress, nonverbal  HEENT: NC/AT, anicteric, NGT  Respiratory: decreased breath sounds b/l  Cardiovascular: S1 S2 present, normal rate   Gastrointestinal: soft, no grimacing on palpation, ND  Extremities: UE edema, no LE edema  Skin: no visible rash, ecchymosis  RIJ HD catheter with no erythema       Laboratory Studies:  CBC:                       9.6    19.05 )-----------( 333      ( 31 May 2024 10:00 )             30.5     CMP: 05-31    142  |  107  |  84<H>  ----------------------------<  123<H>  3.7   |  19<L>  |  2.86<H>    Ca    8.7      31 May 2024 09:59        Urinalysis Basic - ( 31 May 2024 09:59 )    Color: x / Appearance: x / SG: x / pH: x  Gluc: 123 mg/dL / Ketone: x  / Bili: x / Urobili: x   Blood: x / Protein: x / Nitrite: x   Leuk Esterase: x / RBC: x / WBC x   Sq Epi: x / Non Sq Epi: x / Bacteria: x        Microbiology: reviewed    Culture - Urine (collected 05-30-24 @ 14:31)  Source: Clean Catch Clean Catch (Midstream)  Preliminary Report (05-31-24 @ 18:17):    10,000 - 49,000 CFU/mL Gram positive organisms    Culture - Blood (collected 05-30-24 @ 14:30)  Source: .Blood Blood-Peripheral  Preliminary Report (05-31-24 @ 18:02):    No growth at 24 hours    Culture - Blood (collected 05-30-24 @ 14:18)  Source: .Blood Blood-Peripheral  Preliminary Report (05-31-24 @ 18:02):    No growth at 24 hours    Culture - Blood (collected 05-21-24 @ 10:30)  Source: .Blood Blood-Peripheral  Final Report (05-26-24 @ 16:00):    No growth at 5 days    Culture - Blood (collected 05-21-24 @ 10:00)  Source: .Blood Blood-Peripheral  Final Report (05-26-24 @ 16:00):    No growth at 5 days    Culture - Nose (collected 05-20-24 @ 05:25)  Source: .Nose Nose  Final Report (05-21-24 @ 14:16):    Staphylococcus aureus isolated    No Methicillin Resistant Staphylococcus aureus    Culture - Blood (collected 05-19-24 @ 16:25)  Source: .Blood Blood-Peripheral  Gram Stain (05-20-24 @ 08:01):    Growth in anaerobic bottle: Gram Negative Rods    Growth in aerobic bottle: Gram Negative Rods  Final Report (05-22-24 @ 07:47):    Growth in aerobic and anaerobic bottles: Escherichia coli    See previous culture [id number]    Culture - Urine (collected 05-19-24 @ 16:23)  Source: Clean Catch Clean Catch (Midstream)  Final Report (05-23-24 @ 16:39):    >100,000 CFU/ml Escherichia coli    10,000 - 49,000 CFU/mL Enterococcus faecalis    <10,000 CFU/ml Normal Urogenital magalys present  Organism: Escherichia coli  Enterococcus faecalis (05-23-24 @ 16:39)  Organism: Enterococcus faecalis (05-23-24 @ 16:39)      Method Type: BENTON      -  Ampicillin: S <=2 Predicts results to ampicillin/sulbactam, amoxacillin-clavulanate and  piperacillin-tazobactam.      -  Ciprofloxacin: S <=1      -  Levofloxacin: S 1      -  Nitrofurantoin: S <=32 Should not be used to treat pyelonephritis.      -  Tetracycline: R >8      -  Vancomycin: S 2  Organism: Escherichia coli (05-23-24 @ 16:39)      Method Type: BENTON      -  Amoxicillin/Clavulanic Acid: S <=8/4 Consider reserving for cystitis when ampicillin/sulbactam is resistant      -  Ampicillin: R >16 These ampicillin results predict results for amoxicillin      -  Ampicillin/Sulbactam: R >16/8      -  Aztreonam: S <=4      -  Cefazolin: S <=2 For uncomplicated UTI with K. pneumoniae, E. coli, or P. mirablis: BENTON <=16 is sensitive and BENTON >=32 is resistant. This also predicts results for oral agents cefaclor, cefdinir, cefpodoxime, cefprozil, cefuroxime axetil, cephalexin and locarbef for uncomplicated UTI. Note that some isolates may be susceptible to these agents while testing resistant to cefazolin.      -  Cefepime: S <=2      -  Cefoxitin: S <=8      -  Ceftriaxone: S <=1      -  Cefuroxime: S <=4      -  Ciprofloxacin: S <=0.25      -  Ertapenem: S <=0.5      -  Gentamicin: S <=2      -  Imipenem: S <=1      -  Levofloxacin: S <=0.5      -  Meropenem: S <=1      -  Nitrofurantoin: S <=32 Should not be used to treat pyelonephritis      -  Piperacillin/Tazobactam: S <=8      -  Tobramycin: S <=2      -  Trimethoprim/Sulfamethoxazole: S <=0.5/9.5    Culture - Blood (collected 05-19-24 @ 16:10)  Source: .Blood Blood-Peripheral  Gram Stain (05-20-24 @ 09:48):    Growth in aerobic bottle: Gram Negative Rods    Growth in anaerobic bottle: Gram Negative Rods  Final Report (05-22-24 @ 07:47):    Growth in aerobic and anaerobic bottles: Escherichia coli    Direct identification is available within approximately 3-5    hours either by Blood Panel Multiplexed PCR or Direct    MALDI-TOF. Details: [email or web address]  Organism: Blood Culture PCR  Escherichia coli (05-22-24 @ 07:47)  Organism: Escherichia coli (05-22-24 @ 07:47)      Method Type: BENTON      -  Ampicillin: R >16 These ampicillin results predict results for amoxicillin      -  Ampicillin/Sulbactam: R >16/8      -  Aztreonam: S <=4      -  Cefazolin: I 4      -  Cefepime: S <=2      -  Cefoxitin: S <=8      -  Ceftriaxone: S <=1      -  Ciprofloxacin: S <=0.25      -  Ertapenem: S <=0.5      -  Gentamicin: S <=2      -  Imipenem: S <=1      -  Levofloxacin: S <=0.5      -  Meropenem: S <=1      -  Piperacillin/Tazobactam: S <=8      -  Tobramycin: S <=2      -  Trimethoprim/Sulfamethoxazole: S <=0.5/9.5  Organism: Blood Culture PCR (05-22-24 @ 07:47)      Method Type: PCR      -  Escherichia coli: Detec          Radiology: reviewed    < from: Xray Chest 1 View- PORTABLE-Urgent (Xray Chest 1 View- PORTABLE-Urgent .) (05.30.24 @ 14:29) >    ACC: [id number] EXAM:  XR CHEST PORTABLE URGENT 1V   ORDERED BY: LAZARA CARRILLO     PROCEDURE DATE:  05/30/2024          INTERPRETATION:  EXAMINATION: XR CHEST URGENT    CLINICAL INDICATION: leukocytossi    TECHNIQUE: Single frontal portable view ofthe chest from 5/30/2024 2:29   PM    COMPARISON: Chest x-ray 5/24/2024.    FINDINGS:  Left chest wall pacemaker. Enteric tube terminates in the stomach.  The heart size is not accurately assessed on this projection.  The lungs are clear.  No pneumothorax. Likely bilateral pleural effusions.    IMPRESSION:    1.  Dual-lead pacemaker in place.  2.  No fractured or abandoned leads.    --- End of Report ---          ADRIAN HERNANDEZ MD; Resident Radiologist  This document has been electronically signed.  QUIQUE LEDESMA MD; Attending Radiologist  This document has been electronically signed. May 31 2024  1:04PM    < end of copied text >  < from: US Kidney and Bladder (05.31.24 @ 20:09) >    ACC: [id number] EXAM:  US KIDNEYS AND BLADDER   ORDERED BY:  MANDIE BELTRÁN     PROCEDURE DATE:  05/31/2024          INTERPRETATION:  CLINICAL INFORMATION: Acute kidney injury.  Serum   creatinine 2.79.    COMPARISON: 05/22/2024    TECHNIQUE: Sonography of the kidneys and bladder.    FINDINGS:  Right kidney: 9.5 cm. No renal mass, hydronephrosis or calculi.    Left kidney: 10.0 cm. No renal mass, hydronephrosis or calculi.    Urinary bladder: Underdistended.  Layering echogenic material in the   bladder lumen.    IMPRESSION:  No renal mass, hydronephrosis or calculus is visualized sonographically.  Layering echogenic material in the bladder lumen may represent blood   products, sterile debris, and/or cystitis.  Correlate with clinical   symptoms and urinalysis.        --- End of Report ---            BIB ESTEVEZ MD; Attending Radiologist  This document has been electronically signed. May 31 2024  8:54PM    < end of copied text >

## 2024-06-01 NOTE — CONSULT NOTE ADULT - CONSULT REASON
Altered mental Status
steroid taper
Acidosis
E Coli bacteremia
ROBERT, Metabolic Acidosis
Right kidney hydronephrosis
Bilateral lower extremity DTI's
Enteral nutrition
Anemia
hypotension
Wound Consult
goc   slow progressive decline over past several months.

## 2024-06-01 NOTE — CONSULT NOTE ADULT - ASSESSMENT
79 F with PMH of GERD, MI, chronic pancreatitis/pancreatic insufficiency, HTN, incontinence, fall s/p femur fracture 1/2024  initially presenting for increased lethargy in setting of poor PO intake, admitted to MICU for urosepsis c/b E. coli bacteremia requiring vasopressors and acute renal failure requiring urgent HD for acidosis. Now s/p HD x1 with improving renal function. 79 F with PMH of GERD, MI, chronic pancreatitis/pancreatic insufficiency, HTN, incontinence, fall s/p femur fracture 1/2024  initially presenting for increased lethargy and admitted to MICU for urosepsis c/b E. coli bacteremia requiring vasopressors and acute renal failure requiring urgent HD for acidosis.  79 F with PMH of GERD, MI, chronic pancreatitis/pancreatic insufficiency, HTN, incontinence, fall s/p femur fracture 1/2024  initially presenting for increased lethargy and admitted to MICU for urosepsis c/b E. coli bacteremia requiring vasopressors and acute renal failure requiring urgent HD for acidosis. Course c/b acute metabolic encephalopathy without improvement during hospital stay. Also with ARF requiring ongoing HD. MICU consulted for hypotension.    #Shock   - s/p  cc, albumin 5% 250 cc and midodrine 10 mg during RRT with improvement in BP  - on eval, MAP > 60 HR 80s SpO2 100% and without any changes in mental status  - recommend infectious workup to evaluate if there is a sepsis component to shock process - blood cultures, CXR and urine cultures (if patient produces urine), full RVP, MRSA  - can consider empirically treating for infection if high suspicion for sepsis process   - recommend trialing droxidopa 200 q8 for MAP > 60; can titrate down as tolerated  - appreciate code status discussions during RRT - DNR/Intubate; would continue to address given mental status is not improving    Patient is not a MICU candidate at this time. Please call back with questions if needed.

## 2024-06-01 NOTE — CHART NOTE - NSCHARTNOTEFT_GEN_A_CORE
RRT called for hypotension. Patient is at baseline AxO x0, only responsive to noxious stimulis. Baseline BP obtained at 75 RRT called for hypotension. Patient is at baseline AxO x0, only responsive to noxious stimuli. Baseline BP obtained at systolic in 70s. During RRT patient was given total of 750 LR, midodrine 10mg  and  250cc  albumin 5% with minimal improvement in BP, however MAP of >60 was achieved. Evaluated by MICU during RRT, recommending droxidopa 200 q8. Patient's mental status remains unchanged from before.     GOC discussed with  Law Sweeney at bedside, who is aware of her prognosis. Patient was made DNR, with intubation and continued medical management as needed. Molst form completed and placed in chart    Dr. Lamb made aware

## 2024-06-01 NOTE — CONSULT NOTE ADULT - SUBJECTIVE AND OBJECTIVE BOX
CHIEF COMPLAINT:    HPI:  HPI:  79-year-old female with past medical history of GERD, MI, chronic pancreatitis, HTN, incontinence, recent hip surgery and was recently discharged from outpatient rehab to home presented to the emergency department with increased lethargy, failure to thrive, decreased PO intake nausea/vomiting.    Upon presentation to ED patient noted to also have suprapubic pain but denied dysuria and hematuria. Per  who provided collateral--Pt was just discharged from rehab center after prolonged stay from R femur fracture. At home the first 1-2 days she was fine but then over last 48hrs appeared to be more lethargic and less interactive prompting ED visit. No one at home is sick, no recent travel. Unsure of medications. Patient did not have any fevers, chills, N/V/D.     ED course notable for lab with  Leukocytosis to 40 with urine appearing like pus metabolic acidemia, ph 7.13, bicarb 8. Started on bicarb gtt. Carrillo placed minimal output new ROBERT BUN/Cr 103/5.56. Nephrology consulted for urgent HD. Initially  unsure of GOC and deferred decision. Pt started on bicarb drip, received 3L NS and 1L LR, Cefepime 1g, Vanc 1g, Tylenol 1g.    Family ultimately agreeable to HD and patient accepted to MICU. Verbal consent obtained for Shiley catheter placement from .  (19 May 2024 22:40)    MICU course was notable for septic shock iso E coli bacteremia 2/2 UTI. Patient was treated with ceftriaxone x 14 days with improvement. Also briefly required stress dose steroids which were discontinued. IV pressor medications were weaned off. Additionally, patient also with ARF 2/2 hypotension and sepsis and has remained anuric during her hospitalization. Receiving HD and now pending exchange of shiley catheter. Course further c/b toxic metabolic encephalopathy with no improvement during hospital course.     On 6/1, RRT called for hypotension with SBP initially 70-80s. During RRT, patient was resuscitated with  cc, midodrine 10 and albumin 5% 250 cc with improvement in MAP > 60. On evaluation, patients mental status remains unchanged from baseline noted during hospital stay.       PAST MEDICAL & SURGICAL HISTORY:  Hypertension      Anxiety and depression      GERD (gastroesophageal reflux disease)      History of pancreatitis  2018 taking creon been stable      Stress incontinence, female  S/P Sling      Heart attack      H/O left inguinal hernia repair      Hx of tonsillectomy      Hx of appendectomy      H/O dilation and curettage      Pacemaker          FAMILY HISTORY:      SOCIAL HISTORY:  Social History:  ETOH Wine for Dinner daily last drink 4 months ago before hip fracture  Never Smoking  Denies drugs of abuse  Lives at home with   No pets at home  Uses wheelchair since discharge from rehab, has home attendant for assistance in ADLS, Bedbound since hip fracture in Jan 2024 (19 May 2024 22:40)      Allergies    penicillin (Swelling)    Intolerances    epinephrine (Other)      HOME MEDICATIONS:  Home Medications:  ascorbic acid 500 mg oral tablet: 1 tab(s) orally 2 times a day (19 May 2024 23:07)  atenolol 50 mg oral tablet: 1 tab(s) orally once a day (20 May 2024 13:33)  Benicar 40 mg oral tablet: 1 tab(s) orally once a day Hold if SBP&lt;110, HR&lt;60 (20 May 2024 13:33)  Creon 24,000 units oral delayed release capsule: 1 cap(s) orally 3 times a day with food (19 May 2024 23:07)  folic acid 1 mg oral tablet: 1 tab(s) orally once a day (20 May 2024 13:33)  magnesium hydroxide 8% oral suspension: 30 milliliter(s) orally once a day As needed Constipation (20 May 2024 13:33)  mirtazapine 15 mg oral tablet: 1 tab(s) orally once a day (at bedtime) (20 May 2024 13:33)  Multiple Vitamins oral tablet: 1 tab(s) orally once a day (19 May 2024 23:07)  pantoprazole 40 mg oral delayed release tablet: 1 tab(s) orally once a day (before a meal) (19 May 2024 23:07)      REVIEW OF SYSTEMS:  Constitutional: No fevers, chills, weight loss, weight gain  HEENT: No vision problems, eye pain, nasal congestion, rhinorrhea, sore throat, dysphagia  CV: No chest pain, orthopnea, palpitations  Resp: No cough, dyspnea, wheezing, hemoptysis  GI: No nausea, vomiting, diarrhea, constipation, abdominal pain  : [ ] dysuria [ ] nocturia [ ] hematuria [ ] increased urinary frequency  Musculoskeletal: [ ] back pain [ ] myalgias [ ] arthralgias [ ] fracture  Skin: [ ] rash [ ] itch  Neurological: [ ] headache [ ] dizziness [ ] syncope [ ] weakness [ ] numbness  Psychiatric: [ ] anxiety [ ] depression  Endocrine: [ ] diabetes [ ] thyroid problem  Hematologic/Lymphatic: [ ] anemia [ ] bleeding problem  Allergic/Immunologic: [ ] itchy eyes [ ] nasal discharge [ ] hives [ ] angioedema  [ ] All other systems negative  [ x ] Unable to assess ROS because altered mental status     OBJECTIVE:  ICU Vital Signs Last 24 Hrs  T(C): 36.4 (01 Jun 2024 08:03), Max: 36.6 (01 Jun 2024 04:55)  T(F): 97.6 (01 Jun 2024 08:03), Max: 97.9 (01 Jun 2024 04:55)  HR: 87 (01 Jun 2024 08:03) (67 - 108)  BP: 99/62 (01 Jun 2024 08:03) (98/62 - 103/74)  BP(mean): --  ABP: --  ABP(mean): --  RR: 16 (01 Jun 2024 08:03) (16 - 16)  SpO2: 99% (01 Jun 2024 08:03) (96% - 100%)    O2 Parameters below as of 01 Jun 2024 08:03  Patient On (Oxygen Delivery Method): room air              05-31 @ 07:01  -  06-01 @ 07:00  --------------------------------------------------------  IN: 875 mL / OUT: 2 mL / NET: 873 mL    06-01 @ 07:01  -  06-01 @ 19:06  --------------------------------------------------------  IN: 1350 mL / OUT: 0 mL / NET: 1350 mL      CAPILLARY BLOOD GLUCOSE      POCT Blood Glucose.: 273 mg/dL (01 Jun 2024 16:34)      PHYSICAL EXAM:  General: laying in bed and tracking with eyes however, not making significant movements   HEENT: Pupils constricted, ERRL. No scleral icterus or conjunctival pallor.  Chest/Lungs: CTAB, no wheezes, rhonchi, or rales. remains rotated in bed   Heart: Regular rate and regular rhythm.   Abdomen: Soft, non tender to palpation. No distension.   Extremities/skin: No significant extremity edema. has generalized bruising across skin  Neuro: laying in bed but without meaningful movements   Psych: AAO x 0      HOSPITAL MEDICATIONS:  MEDICATIONS  (STANDING):  ascorbic acid 250 milliGRAM(s) Oral daily  chlorhexidine 2% Cloths 1 Application(s) Topical daily  droxidopa 200 milliGRAM(s) Oral three times a day  heparin   Injectable 5000 Unit(s) SubCutaneous every 12 hours  lidocaine   4% Patch 1 Patch Transdermal every 24 hours  meropenem  IVPB 500 milliGRAM(s) IV Intermittent every 12 hours  multivitamin 1 Tablet(s) Oral daily  pantoprazole  Injectable 40 milliGRAM(s) IV Push daily  polyethylene glycol 3350 17 Gram(s) Oral daily  sodium chloride 0.9%. 1000 milliLiter(s) (75 mL/Hr) IV Continuous <Continuous>    MEDICATIONS  (PRN):  acetaminophen   Oral Liquid .. 470 milliGRAM(s) Oral every 6 hours PRN Mild Pain (1 - 3), Moderate Pain (4 - 6)      LABS:                        8.2    29.88 )-----------( 313      ( 01 Jun 2024 10:32 )             26.3     06-01    141  |  107  |  63<H>  ----------------------------<  177<H>  4.2   |  16<L>  |  2.40<H>    Ca    8.4      01 Jun 2024 10:32        Urinalysis Basic - ( 01 Jun 2024 10:32 )    Color: x / Appearance: x / SG: x / pH: x  Gluc: 177 mg/dL / Ketone: x  / Bili: x / Urobili: x   Blood: x / Protein: x / Nitrite: x   Leuk Esterase: x / RBC: x / WBC x   Sq Epi: x / Non Sq Epi: x / Bacteria: x            MICROBIOLOGY:     RADIOLOGY:  [ ] Reviewed and interpreted by me    EKG:

## 2024-06-01 NOTE — CHART NOTE - NSCHARTNOTEFT_GEN_A_CORE
Informed by RN that patient had an episode of bright red blood per rectum. Patient HDS, /80, HR 90, RR 20, SpO2 >94% on 2L NC. Patient with generalized anasarca and labs (stat CBC, type & screen) obtained using US guidance. Patient made NPO, IV protonix changed to 40mg BID. Informed by RN that patient had an episode of bright red blood per rectum. Patient HDS, /80, HR 90, RR 20, SpO2 >94% on 2L NC. Patient with generalized anasarca and labs (stat CBC, type & screen) obtained using US guidance. Patient made NPO, IV protonix changed to 40mg BID.    Addendum: Hgb resulted 6.6. Plan to transfuse 1U pRBC. Informed by RN that patient had an episode of bright red blood per rectum. Patient HDS, /80, HR 90, RR 20, SpO2 >94% on 2L NC. Patient with generalized anasarca and labs (stat CBC, type & screen) obtained using US guidance. Patient made NPO, IV protonix changed to 40mg BID.     Addendum: Hgb resulted 6.6. Plan to transfuse 1U pRBC.   Addendum: Patient hypothermic to 93. Warming blanket placed. Labs including post-transfusion CBC with bcx obtained.

## 2024-06-01 NOTE — CONSULT NOTE ADULT - CONSULT REQUESTED DATE/TIME
19-May-2024
19-May-2024 18:24
28-May-2024 10:11
01-Jun-2024 17:05
26-May-2024 11:27
26-May-2024 12:30
27-May-2024 06:22
28-May-2024 15:37
28-May-2024 17:02
30-May-2024 19:28
19-May-2024 18:38
27-May-2024 19:31

## 2024-06-02 NOTE — PROGRESS NOTE ADULT - ASSESSMENT
Ms. Yee is a 79y Female with PMHx of HTN, pA.fibm HTN, macrocytic anemia, pancreatic insufficiency hypertriglyceridemia, aortic valve stenosis, R hip fracture s/p ORIF 01/2024, depression who is admitted since 05/19/2024 with septic shock due to UTI and E.coli bacteremia, R hydronephrosis with acute renal failure now on HD, toxic metabolic encephalopathy. Pt is non-verbal this morning so history is obtained from chart and coordinating team members. Now on floor from MICU. CTH negative for acute changes.     Pt received 1u PRBC on 05/21/2024 with appropriate response. Labs on my initial evaluation show Hb 7.9 Hct 26.2 .3 with neutrophilia and lymphopenia. Normal bilirubin, liver function. Serum FLC ratio normal. SPEP with 0.6 Mspike without ALEKSEY. UPEP with K/L elevated and ratio 4 in setting of ARF. Additionally CT A/P with reported cirrhosis. Continues to be non-verbal and non-alert, noted GOC and palliative discussion        # Macrocytic anemia  # B12 deficiency  - B12 last year was 192  - Repeat B12 1172  - folate >20, previously low  - Also may have underlying BM disorder such as MDS, outpatient labs with persistent macrocytosis and anemia Hb around 10, at this time further workup such as BmBx would need to be considered as outpatient given overall clinical context   - No signs of bleeding   - Transfuse to maintain Hb > 7     # MGUS  -  SPEP/ALEKSEY with  with M-spike 0.5 3 Co-Migrating IgM Lambda Band, IgG Lambda Band, and  IgG Kappa Band, confirmed on UPEP as well  -  Serum immunoglobulins increased IgM 526, Serum FLCs ratio normal, LDH normal, B2MCG   - Overall not likely driving pts current clinical context and can be followed up outpatient depending on pts clinical outcome and GOCs   - See above     # Acute renal failure  - HD per nephrology    # E.coli UTI and bacteremia  - Antibiotics per primary team and ID       Thank you for allowing me to participate in the care of Ms. Yee, please do not hesitate to call or text me if you have further questions or concerns.     Rajeev Mcdowell MD  [facility] [place]   Division of Hematology/Oncology  [address], [address]  [place] [address]  P: [phone]  F: [phone]    Attestation:    ----Pt evaluated including face-to-face interaction in addition to chart review, reviewing treatment plan, and managing the patient’s chronic diagnoses as listed in the assessment----    Ms. Yee is a 79y Female with PMHx of HTN, pA.fibm HTN, macrocytic anemia, pancreatic insufficiency hypertriglyceridemia, aortic valve stenosis, R hip fracture s/p ORIF 01/2024, depression who is admitted since 05/19/2024 with septic shock due to UTI and E.coli bacteremia, R hydronephrosis with acute renal failure now on HD, toxic metabolic encephalopathy. Pt is non-verbal this morning so history is obtained from chart and coordinating team members. Now on floor from MICU. CTH negative for acute changes.     Pt received 1u PRBC on 05/21/2024 with appropriate response. Labs on my initial evaluation show Hb 7.9 Hct 26.2 .3 with neutrophilia and lymphopenia. Normal bilirubin, liver function. Serum FLC ratio normal. SPEP with 0.6 Mspike without ALEKSEY. UPEP with K/L elevated and ratio 4 in setting of ARF. Additionally CT A/P with reported cirrhosis. Continues to be non-verbal and non-alert, noted GOC and palliative discussion    06/02/2024: noted RRT overnight, discussed with nursing staff, multiple large bloody BMs, s/p 1u PRBC overnight, poor prognosis, GOC on going with primary team, transfuse to goal     # Macrocytic anemia  # B12 deficiency  - B12 last year was 192  - Repeat B12 1172  - folate >20, previously low  - Also may have underlying BM disorder such as MDS, outpatient labs with persistent macrocytosis and anemia Hb around 10, at this time further workup such as BmBx would need to be considered as outpatient given overall clinical context   - Active GI hemorrhage 06/01/2024-06/02/2024   - s/p 2u PRBC this admission last 06/01/2024 PM   - Transfuse to maintain Hb > 7     # MGUS  -  SPEP/ALEKSEY with  with M-spike 0.5 3 Co-Migrating IgM Lambda Band, IgG Lambda Band, and  IgG Kappa Band, confirmed on UPEP as well  -  Serum immunoglobulins increased IgM 526, Serum FLCs ratio normal, LDH normal, B2MCG   - Overall not likely driving pts current clinical context and can be followed up outpatient depending on pts clinical outcome and GOCs   - See above     # Acute renal failure  - HD per nephrology    # E.coli UTI and bacteremia  - Antibiotics per primary team and ID     # Acute gastrointestinal hemorrhage  - Symptomatic management for now  - GI consult if in line with GOC       Thank you for allowing me to participate in the care of Ms. Yee, please do not hesitate to call or text me if you have further questions or concerns.     Rajeev Mcdowell MD  [facility] [place]   Division of Hematology/Oncology  [address] [place] [address], [address]  [place] [address]  P: [phone]  F: [phone]    Attestation:    ----Pt evaluated including face-to-face interaction in addition to chart review, reviewing treatment plan, and managing the patient’s chronic diagnoses as listed in the assessment----

## 2024-06-02 NOTE — PROGRESS NOTE ADULT - CONVERSATION DETAILS
Spoke with  Law.   Explained overall poor prognosis as patient still with poor mental status despite abx and hd.   Currently  would like to do everything but agrees that his wife has been suffering.   Further goals to be discussed once  discusses everything with his family.
Ongoing GOC discussions held with family as patient is lethargic and unable to participate. Patient's  defers to patient's brother Otis Wasserman, who is a cardiologist. Dr. Wasserman has excellent understanding of patient's current condition and hospital course. He understands patient was admitted with septic shock and is hopeful for ongoing improvement as patient is now off of pressor support and bacteremia is improving with IV abx. At this time he wishes for patient to remain full code with no limitations to intervention.
Met with spouse on this date, reviewed current clinical course along with ICU team  reviewed that patient appears to be dying, even with aggressive interventions such as previous HD and now artificial BP support.  Discussed options for care which including ongoing medical management (but without HD, as she isnt able to tolerated due to hypotension) vs. transition in focus of care. Explained that based on multiorgan dysfunction, poor mental status and overall decompensated picture, focusing and privatizing her comfort may be an appropriate next step, especially if spouse feels as though she is suffering. He states that he doesn't want her to suffer and wants her to be comfortable. he agrees with no CPR or life support machines. he agrees and understands why patient cannot tolerate further dialysis or the harm it could have if performed. he agreed to management of pain, sob, anxiety, agitation. he mentions that he and his daughter have been working with  homes and are aware of the terminal nature of her condition. they have been  56 years.   much emotional support provided. contact information given and ongoing availability assured.

## 2024-06-02 NOTE — PROGRESS NOTE ADULT - PROBLEM SELECTOR PLAN 2
would not tolerate HD well due to hypotension and would need shilley exchange   verbalized understanding and wishes to prioritize comfort

## 2024-06-02 NOTE — CHART NOTE - NSCHARTNOTEFT_GEN_A_CORE
MICU ACCEPT NOTE    CHIEF COMPLAINT: Patient is a 79y old  Female who presents with a chief complaint of Weakness (02 Jun 2024 09:39)      HPI:  79-year-old female with past medical history of GERD, MI, chronic pancreatitis, HTN, incontinence, recent hip surgery and was recently discharged from outpatient rehab to home presented to the emergency department with increased lethargy, failure to thrive, decreased PO intake nausea/vomiting.    Upon presentation to ED patient noted to also have suprapubic pain but denied dysuria and hematuria. Per  who provided collateral--Pt was just discharged from rehab center after prolonged stay from R femur fracture. At home the first 1-2 days she was fine but then over last 48hrs appeared to be more lethargic and less interactive prompting ED visit. No one at home is sick, no recent travel. Unsure of medications. Patient did not have any fevers, chills, N/V/D.     ED course notable for lab with  Leukocytosis to 40 with urine appearing like pus metabolic acidemia, ph 7.13, bicarb 8. Started on bicarb gtt. Carrillo placed minimal output new ROBERT BUN/Cr 103/5.56. Nephrology consulted for urgent HD. Initially  unsure of GOC and deferred decision. Pt started on bicarb drip, received 3L NS and 1L LR, Cefepime 1g, Vanc 1g, Tylenol 1g.    Family ultimately agreeable to HD and patient accepted to MICU. Verbal consent obtained for Shiley catheter placement from .  (19 May 2024 22:40)      PAST MEDICAL & SURGICAL HISTORY:  Hypertension      Anxiety and depression      GERD (gastroesophageal reflux disease)      History of pancreatitis  2018 taking creon been stable      Stress incontinence, female  S/P Sling      Heart attack      H/O left inguinal hernia repair      Hx of tonsillectomy      Hx of appendectomy      H/O dilation and curettage      Pacemaker          FAMILY HISTORY:      SOCIAL HISTORY:  Smoking:   Substance Use:   EtOH Use:   Advance Directives:     MEDICATIONS  (STANDING):  albumin human  5% IVPB 250 milliLiter(s) IV Intermittent once  ascorbic acid 250 milliGRAM(s) Oral daily  chlorhexidine 2% Cloths 1 Application(s) Topical daily  droxidopa 200 milliGRAM(s) Oral three times a day  hydrocortisone sodium succinate Injectable 100 milliGRAM(s) IV Push every 8 hours  lidocaine   4% Patch 1 Patch Transdermal every 24 hours  meropenem  IVPB 500 milliGRAM(s) IV Intermittent every 12 hours  midodrine. 10 milliGRAM(s) Oral three times a day  multivitamin 1 Tablet(s) Oral daily  mupirocin 2% Nasal 1 Application(s) Both Nostrils two times a day  norepinephrine Infusion 0.05 MICROgram(s)/kG/Min (4.39 mL/Hr) IV Continuous <Continuous>  pantoprazole  Injectable 40 milliGRAM(s) IV Push two times a day    MEDICATIONS  (PRN):  acetaminophen   Oral Liquid .. 470 milliGRAM(s) Oral every 6 hours PRN Mild Pain (1 - 3), Moderate Pain (4 - 6)      Allergies    penicillin (Swelling)    Intolerances    epinephrine (Other)      REVIEW OF SYSTEMS:  CONSTITUTIONAL: No weakness, fevers or chills  EYES/ENT: No visual changes;  No vertigo or throat pain   NECK: No pain or stiffness  RESPIRATORY: No cough, wheezing, hemoptysis; No shortness of breath  CARDIOVASCULAR: No chest pain or palpitations  GASTROINTESTINAL: No abdominal or epigastric pain. No nausea, vomiting, or hematemesis; No diarrhea or constipation. No melena or hematochezia.  GENITOURINARY: No dysuria, frequency or hematuria  NEUROLOGICAL: No numbness or weakness  SKIN: No itching, rashes  [ ] All other systems negative  [ ] Unable to assess ROS because ________    OBJECTIVE:  ICU Vital Signs Last 24 Hrs  T(C): 36.3 (02 Jun 2024 07:50), Max: 36.3 (01 Jun 2024 23:30)  T(F): 97.3 (02 Jun 2024 07:50), Max: 97.3 (01 Jun 2024 23:30)  HR: 146 (02 Jun 2024 07:50) (86 - 146)  BP: 54/41 (02 Jun 2024 07:50) (54/41 - 105/62)  BP(mean): --  ABP: --  ABP(mean): --  RR: 17 (02 Jun 2024 07:50) (17 - 20)  SpO2: 92% (02 Jun 2024 07:50) (92% - 100%)    O2 Parameters below as of 02 Jun 2024 07:50  Patient On (Oxygen Delivery Method): nasal cannula              06-01 @ 07:01  -  06-02 @ 07:00  --------------------------------------------------------  IN: 1350 mL / OUT: 1 mL / NET: 1349 mL      CAPILLARY BLOOD GLUCOSE      POCT Blood Glucose.: 136 mg/dL (02 Jun 2024 09:26)      PHYSICAL EXAM:  GENERAL: NAD, lying in bed comfortably  HEAD:  Atraumatic, normocephalic  EYES: EOMI, PERRLA, conjunctiva and sclera clear  ENT: Moist mucous membranes  NECK: Supple, no JVD  HEART: S1, S2, regular rate and rhythm, no murmurs, rubs, or gallops  LUNGS: Unlabored respirations.  Clear to auscultation bilaterally, no crackles, wheezing, or rhonchi  ABDOMEN: Soft, nontender, nondistended, +BS  EXTREMITIES: 2+ peripheral pulses bilaterally. No clubbing, cyanosis, or edema  NERVOUS SYSTEM:  A&Ox3, no focal deficits   SKIN: No rashes or lesions  LINES:     LABS:                        8.9    32.35 )-----------( 228      ( 02 Jun 2024 06:53 )             27.4     Hgb Trend: 8.9<--, 6.6<--, 8.2<--, 9.6<--, 10.0<--  06-02    141  |  106  |  64<H>  ----------------------------<  118<H>  4.0   |  12<L>  |  2.57<H>    Ca    8.7      02 Jun 2024 06:53    TPro  4.8<L>  /  Alb  2.3<L>  /  TBili  0.7  /  DBili  x   /  AST  64<H>  /  ALT  36  /  AlkPhos  131<H>  06-02    Creatinine Trend: 2.57<--, 2.40<--, 2.86<--, 2.79<--, 2.54<--, 2.29<--    Urinalysis Basic - ( 02 Jun 2024 06:53 )    Color: x / Appearance: x / SG: x / pH: x  Gluc: 118 mg/dL / Ketone: x  / Bili: x / Urobili: x   Blood: x / Protein: x / Nitrite: x   Leuk Esterase: x / RBC: x / WBC x   Sq Epi: x / Non Sq Epi: x / Bacteria: x        Venous Blood Gas:  06-02 @ 06:45  7.32/27/179/14/97.5  VBG Lactate: 3.8      MICROBIOLOGY:     RADIOLOGY & ADDITIONAL TESTS:    ASSESSMENT  CHRISTY VALDOVINOS is a 79y female with PMH *** in the hospital for 14d transferred to the MICU for ***.    PLAN  =====Neurologic=====  Patient is AOx4    =====Pulmonary=====  Patient breathing comfortably on room air    =====Cardiovascular=====  Patient does not currently require vasopressors and is normotensive    =====GI=====  #GERD  - Protonix 40mg IV QD    #Diet  - Full diet    =====Renal/=====  #Electrolytes  - Maintain K>4, Phos>3, Mag>2, iCal>1    =====Endocrine=====  #DM2  - While NPO, FSBG and AMARILYS q6h    =====Infectious Disease=====  Patient is afebrile and is not currently being treated for any infection.    =====Heme/Onc=====  #DVT Ppx  - Lovenox 40mg SQ qd    =====Ethics=====  FULL CODE MICU ACCEPT NOTE    CHIEF COMPLAINT: Patient is a 79y old  Female who presents with a chief complaint of Weakness       HPI:  79-year-old female with past medical history of GERD, MI, chronic pancreatitis, HTN, incontinence, recent hip surgery and was recently discharged from outpatient rehab to home presented to the emergency department with increased lethargy, failure to thrive, decreased PO intake nausea/vomiting.    Upon presentation to ED patient noted to also have suprapubic pain but denied dysuria and hematuria. Per  who provided collateral--Pt was just discharged from rehab center after prolonged stay from R femur fracture. At home the first 1-2 days she was fine but then over last 48hrs appeared to be more lethargic and less interactive prompting ED visit. No one at home is sick, no recent travel. Unsure of medications. Patient did not have any fevers, chills, N/V/D.     ED course notable for lab with  Leukocytosis to 40 with urine appearing like pus metabolic acidemia, ph 7.13, bicarb 8. Started on bicarb gtt. Carrillo placed minimal output new ROBERT BUN/Cr 103/5.56. Nephrology consulted for urgent HD. Initially  unsure of GOC and deferred decision. Pt started on bicarb drip, received 3L NS and 1L LR, Cefepime 1g, Vanc 1g, Tylenol 1g.    Family ultimately agreeable to HD and patient accepted to MICU. Verbal consent obtained for Shiley catheter placement from .     MICU course was notable for septic shock iso E coli bacteremia 2/2 UTI. Patient was treated with ceftriaxone x 14 days with improvement. Also briefly required stress dose steroids which were discontinued. IV pressor medications were weaned off. Additionally, patient also with ARF 2/2 hypotension and sepsis and has remained anuric during her hospitalization. Receiving HD and now pending exchange of shiley catheter. Course further c/b toxic metabolic encephalopathy with no improvement during hospital course.     On 6/1, RRT called for hypotension with SBP initially 70-80s. During RRT, patient was resuscitated with  cc, midodrine 10 and albumin 5% 250 cc with improvement in MAP > 60. On evaluation, patients mental status remains unchanged from baseline noted during hospital stay.       PAST MEDICAL & SURGICAL HISTORY:  Hypertension      Anxiety and depression      GERD (gastroesophageal reflux disease)      History of pancreatitis  2018 taking creon been stable      Stress incontinence, female  S/P Sling      Heart attack      H/O left inguinal hernia repair      Hx of tonsillectomy      Hx of appendectomy      H/O dilation and curettage      Pacemaker          FAMILY HISTORY:      SOCIAL HISTORY:  Smoking:   Substance Use:   EtOH Use:   Advance Directives:     MEDICATIONS  (STANDING):  albumin human  5% IVPB 250 milliLiter(s) IV Intermittent once  ascorbic acid 250 milliGRAM(s) Oral daily  chlorhexidine 2% Cloths 1 Application(s) Topical daily  droxidopa 200 milliGRAM(s) Oral three times a day  hydrocortisone sodium succinate Injectable 100 milliGRAM(s) IV Push every 8 hours  lidocaine   4% Patch 1 Patch Transdermal every 24 hours  meropenem  IVPB 500 milliGRAM(s) IV Intermittent every 12 hours  midodrine. 10 milliGRAM(s) Oral three times a day  multivitamin 1 Tablet(s) Oral daily  mupirocin 2% Nasal 1 Application(s) Both Nostrils two times a day  norepinephrine Infusion 0.05 MICROgram(s)/kG/Min (4.39 mL/Hr) IV Continuous <Continuous>  pantoprazole  Injectable 40 milliGRAM(s) IV Push two times a day    MEDICATIONS  (PRN):  acetaminophen   Oral Liquid .. 470 milliGRAM(s) Oral every 6 hours PRN Mild Pain (1 - 3), Moderate Pain (4 - 6)      Allergies    penicillin (Swelling)    Intolerances    epinephrine (Other)      REVIEW OF SYSTEMS:  CONSTITUTIONAL: No weakness, fevers or chills  EYES/ENT: No visual changes;  No vertigo or throat pain   NECK: No pain or stiffness  RESPIRATORY: No cough, wheezing, hemoptysis; No shortness of breath  CARDIOVASCULAR: No chest pain or palpitations  GASTROINTESTINAL: No abdominal or epigastric pain. No nausea, vomiting, or hematemesis; No diarrhea or constipation. No melena or hematochezia.  GENITOURINARY: No dysuria, frequency or hematuria  NEUROLOGICAL: No numbness or weakness  SKIN: No itching, rashes  [ ] All other systems negative  [ ] Unable to assess ROS because ________    OBJECTIVE:  ICU Vital Signs Last 24 Hrs  T(C): 36.3 (02 Jun 2024 07:50), Max: 36.3 (01 Jun 2024 23:30)  T(F): 97.3 (02 Jun 2024 07:50), Max: 97.3 (01 Jun 2024 23:30)  HR: 146 (02 Jun 2024 07:50) (86 - 146)  BP: 54/41 (02 Jun 2024 07:50) (54/41 - 105/62)  BP(mean): --  ABP: --  ABP(mean): --  RR: 17 (02 Jun 2024 07:50) (17 - 20)  SpO2: 92% (02 Jun 2024 07:50) (92% - 100%)    O2 Parameters below as of 02 Jun 2024 07:50  Patient On (Oxygen Delivery Method): nasal cannula              06-01 @ 07:01  -  06-02 @ 07:00  --------------------------------------------------------  IN: 1350 mL / OUT: 1 mL / NET: 1349 mL      CAPILLARY BLOOD GLUCOSE      POCT Blood Glucose.: 136 mg/dL (02 Jun 2024 09:26)      PHYSICAL EXAM:  GENERAL: NAD, lying in bed comfortably  HEAD:  Atraumatic, normocephalic  EYES: EOMI, PERRLA, conjunctiva and sclera clear  ENT: Moist mucous membranes  NECK: Supple, no JVD  HEART: S1, S2, regular rate and rhythm, no murmurs, rubs, or gallops  LUNGS:  Clear to auscultation bilaterally, no crackles, wheezing, or rhonchi  ABDOMEN: Soft, nontender, nondistended,  EXTREMITIES: 2+ peripheral pulses bilaterally. No clubbing, cyanosis, or edema  NERVOUS SYSTEM:  AO X0-1, does not follow commands  SKIN: No rashes or lesions  LINES:     LABS:                        8.9    32.35 )-----------( 228      ( 02 Jun 2024 06:53 )             27.4     Hgb Trend: 8.9<--, 6.6<--, 8.2<--, 9.6<--, 10.0<--  06-02    141  |  106  |  64<H>  ----------------------------<  118<H>  4.0   |  12<L>  |  2.57<H>    Ca    8.7      02 Jun 2024 06:53    TPro  4.8<L>  /  Alb  2.3<L>  /  TBili  0.7  /  DBili  x   /  AST  64<H>  /  ALT  36  /  AlkPhos  131<H>  06-02    Creatinine Trend: 2.57<--, 2.40<--, 2.86<--, 2.79<--, 2.54<--, 2.29<--    Urinalysis Basic - ( 02 Jun 2024 06:53 )    Color: x / Appearance: x / SG: x / pH: x  Gluc: 118 mg/dL / Ketone: x  / Bili: x / Urobili: x   Blood: x / Protein: x / Nitrite: x   Leuk Esterase: x / RBC: x / WBC x   Sq Epi: x / Non Sq Epi: x / Bacteria: x        Venous Blood Gas:  06-02 @ 06:45  7.32/27/179/14/97.5  VBG Lactate: 3.8      MICROBIOLOGY:     RADIOLOGY & ADDITIONAL TESTS:    ASSESSMENT  CHRISTY VALDOVINOS is a 79y female with PMH GERD, MI, chronic pancreatitis/insufficiency, HTN, incontinence, recent R hip fx s/p repair (1/2024) transferred to the MICU for hemodynamic instability iso of hemorrhagic vs. septic shock. On 6/1, RRT called for hypotension with SBP initially 70-80s. During RRT, patient was resuscitated with  cc, midodrine 10 and albumin 5% 250 cc with improvement in MAP > 60. On evaluation, patients mental status remains unchanged from baseline noted during hospital stay.     PLAN  =====Neurologic=====  #Septic encephalopathy +/- uremic encephalopathy  -AOX0-1    =====Pulmonary=====  saturating well on NC 4L 96%    #Left sided pleural effusion    =====Cardiovascular=====  #Septic vs. Hemorrhagic Shock:  -0.55 Levo for Septic shock vs. hemorrhagic shock 2/2 GI bleed  -Midodrine and droxidopa via NG tube   -Wean off pressors as tolerated    #HTN  - holding home atenolol and olmesartan given shock     =====GI====  #H/o Chronic pancreatitis/insufficiency   - No concern for active flare   - Continue home Creon TID premeal (held for now bc pt too lethargic to take po and cannot be crushed)    #GI prophylaxis:  -Pantoprazole 40 BID IV push    #Diet  - 6/2: hold tube feeds for possible GI bleed    =====Renal/=====  #ROBERT on CKD iso sepsis and blood loss  -Required intermittent HD in the past   -F/u nephro on exchange in [facility] and possible HD now     #R hydronephrosis   suspect reactive secondary to pyelonephritis. CT without obvious obstruction/stone  - likely not contributing significantly to sepsis/ARF. Was evaluated by urology, low suspicion for true obstruction, no indications for acute interventions for now but can consider ureteral stent if not improving with medical management   - renal US 5/22 with mild R hydro, mild pelvic free fluid     =====Endocrine=====  -No active issues    =====Infectious Disease=====  -Meropenem and vanc dosed by trough  -f/u blood cultures, urine cultures. (-)MRSA (+)Staph aureus PCR  -Appreciate ID recs    =====Heme/Onc=====  #DVT Ppx  - SCDs, no AC for acute blood loss    #Acute GI bleed  -Elevated INR, most likely 2/2 malnutrition. Will give administer Vitamin K for 3days  -Transfuse for Hgb <7    =====Ethics=====  DNR with intubation MICU ACCEPT NOTE    CHIEF COMPLAINT: Patient is a 79y old  Female who presents with a chief complaint of Weakness       HPI:  79-year-old female with past medical history of GERD, MI, chronic pancreatitis, HTN, incontinence, recent hip surgery and was recently discharged from outpatient rehab to home presented to the emergency department with increased lethargy, failure to thrive, decreased PO intake nausea/vomiting.    Upon presentation to ED patient noted to also have suprapubic pain but denied dysuria and hematuria. Per  who provided collateral--Pt was just discharged from rehab center after prolonged stay from R femur fracture. At home the first 1-2 days she was fine but then over last 48hrs appeared to be more lethargic and less interactive prompting ED visit. No one at home is sick, no recent travel. Unsure of medications. Patient did not have any fevers, chills, N/V/D.     ED course notable for lab with  Leukocytosis to 40 with urine appearing like pus metabolic acidemia, ph 7.13, bicarb 8. Started on bicarb gtt. Carrillo placed minimal output new ROBERT BUN/Cr 103/5.56. Nephrology consulted for urgent HD. Initially  unsure of GOC and deferred decision. Pt started on bicarb drip, received 3L NS and 1L LR, Cefepime 1g, Vanc 1g, Tylenol 1g.    Family ultimately agreeable to HD and patient accepted to MICU. Verbal consent obtained for Shiley catheter placement from .     MICU course was notable for septic shock iso E coli bacteremia 2/2 UTI. Patient was treated with ceftriaxone x 14 days with improvement. Also briefly required stress dose steroids which were discontinued. IV pressor medications were weaned off. Additionally, patient also with ARF 2/2 hypotension and sepsis and has remained anuric during her hospitalization. Receiving HD and now pending exchange of shiley catheter. Course further c/b toxic metabolic encephalopathy with no improvement during hospital course.     On 6/1, RRT called for hypotension with SBP initially 70-80s. During RRT, patient was resuscitated with  cc, midodrine 10 and albumin 5% 250 cc with improvement in MAP > 60. On evaluation, patients mental status remains unchanged from baseline noted during hospital stay.       PAST MEDICAL & SURGICAL HISTORY:  Hypertension      Anxiety and depression      GERD (gastroesophageal reflux disease)      History of pancreatitis  2018 taking creon been stable      Stress incontinence, female  S/P Sling      Heart attack      H/O left inguinal hernia repair      Hx of tonsillectomy      Hx of appendectomy      H/O dilation and curettage      Pacemaker          FAMILY HISTORY:      SOCIAL HISTORY:  Smoking:   Substance Use:   EtOH Use:   Advance Directives:     MEDICATIONS  (STANDING):  albumin human  5% IVPB 250 milliLiter(s) IV Intermittent once  ascorbic acid 250 milliGRAM(s) Oral daily  chlorhexidine 2% Cloths 1 Application(s) Topical daily  droxidopa 200 milliGRAM(s) Oral three times a day  hydrocortisone sodium succinate Injectable 100 milliGRAM(s) IV Push every 8 hours  lidocaine   4% Patch 1 Patch Transdermal every 24 hours  meropenem  IVPB 500 milliGRAM(s) IV Intermittent every 12 hours  midodrine. 10 milliGRAM(s) Oral three times a day  multivitamin 1 Tablet(s) Oral daily  mupirocin 2% Nasal 1 Application(s) Both Nostrils two times a day  norepinephrine Infusion 0.05 MICROgram(s)/kG/Min (4.39 mL/Hr) IV Continuous <Continuous>  pantoprazole  Injectable 40 milliGRAM(s) IV Push two times a day    MEDICATIONS  (PRN):  acetaminophen   Oral Liquid .. 470 milliGRAM(s) Oral every 6 hours PRN Mild Pain (1 - 3), Moderate Pain (4 - 6)      Allergies    penicillin (Swelling)    Intolerances    epinephrine (Other)      REVIEW OF SYSTEMS:  CONSTITUTIONAL: No weakness, fevers or chills  EYES/ENT: No visual changes;  No vertigo or throat pain   NECK: No pain or stiffness  RESPIRATORY: No cough, wheezing, hemoptysis; No shortness of breath  CARDIOVASCULAR: No chest pain or palpitations  GASTROINTESTINAL: No abdominal or epigastric pain. No nausea, vomiting, or hematemesis; No diarrhea or constipation. No melena or hematochezia.  GENITOURINARY: No dysuria, frequency or hematuria  NEUROLOGICAL: No numbness or weakness  SKIN: No itching, rashes  [ ] All other systems negative  [ ] Unable to assess ROS because ________    OBJECTIVE:  ICU Vital Signs Last 24 Hrs  T(C): 36.3 (02 Jun 2024 07:50), Max: 36.3 (01 Jun 2024 23:30)  T(F): 97.3 (02 Jun 2024 07:50), Max: 97.3 (01 Jun 2024 23:30)  HR: 146 (02 Jun 2024 07:50) (86 - 146)  BP: 54/41 (02 Jun 2024 07:50) (54/41 - 105/62)  BP(mean): --  ABP: --  ABP(mean): --  RR: 17 (02 Jun 2024 07:50) (17 - 20)  SpO2: 92% (02 Jun 2024 07:50) (92% - 100%)    O2 Parameters below as of 02 Jun 2024 07:50  Patient On (Oxygen Delivery Method): nasal cannula              06-01 @ 07:01  -  06-02 @ 07:00  --------------------------------------------------------  IN: 1350 mL / OUT: 1 mL / NET: 1349 mL      CAPILLARY BLOOD GLUCOSE      POCT Blood Glucose.: 136 mg/dL (02 Jun 2024 09:26)      PHYSICAL EXAM:  GENERAL: NAD, lying in bed comfortably  HEAD:  Atraumatic, normocephalic  EYES: EOMI, PERRLA, conjunctiva and sclera clear  ENT: Moist mucous membranes  NECK: Supple, no JVD  HEART: S1, S2, regular rate and rhythm, no murmurs, rubs, or gallops  LUNGS:  Clear to auscultation bilaterally, no crackles, wheezing, or rhonchi  ABDOMEN: Soft, nontender, nondistended,  EXTREMITIES: 2+ peripheral pulses bilaterally. No clubbing, cyanosis, or edema  NERVOUS SYSTEM:  AO X0-1, does not follow commands  SKIN: No rashes or lesions  LINES:     LABS:                        8.9    32.35 )-----------( 228      ( 02 Jun 2024 06:53 )             27.4     Hgb Trend: 8.9<--, 6.6<--, 8.2<--, 9.6<--, 10.0<--  06-02    141  |  106  |  64<H>  ----------------------------<  118<H>  4.0   |  12<L>  |  2.57<H>    Ca    8.7      02 Jun 2024 06:53    TPro  4.8<L>  /  Alb  2.3<L>  /  TBili  0.7  /  DBili  x   /  AST  64<H>  /  ALT  36  /  AlkPhos  131<H>  06-02    Creatinine Trend: 2.57<--, 2.40<--, 2.86<--, 2.79<--, 2.54<--, 2.29<--    Urinalysis Basic - ( 02 Jun 2024 06:53 )    Color: x / Appearance: x / SG: x / pH: x  Gluc: 118 mg/dL / Ketone: x  / Bili: x / Urobili: x   Blood: x / Protein: x / Nitrite: x   Leuk Esterase: x / RBC: x / WBC x   Sq Epi: x / Non Sq Epi: x / Bacteria: x        Venous Blood Gas:  06-02 @ 06:45  7.32/27/179/14/97.5  VBG Lactate: 3.8      MICROBIOLOGY:     RADIOLOGY & ADDITIONAL TESTS:    ASSESSMENT  CHRISTY VALDOVINOS is a 79y female with PMH GERD, MI, chronic pancreatitis/insufficiency, HTN, incontinence, recent R hip fx s/p repair (1/2024) transferred to the MICU for hemodynamic instability iso of hemorrhagic vs. septic shock. On 6/1, RRT called for hypotension with SBP initially 70-80s. During RRT, patient was resuscitated with  cc, midodrine 10 and albumin 5% 250 cc with improvement in MAP > 60. On evaluation, patients mental status remains unchanged from baseline noted during hospital stay.     PLAN  =====Neurologic=====  #Septic encephalopathy +/- uremic encephalopathy  -AOX0-1    =====Pulmonary=====  saturating well on NC 4L 96%    #Left sided pleural effusion    =====Cardiovascular=====  #Septic vs. Hemorrhagic Shock:  -0.55 Levo for Septic shock vs. hemorrhagic shock 2/2 GI bleed  -Midodrine and droxidopa via NG tube   -Wean off pressors as tolerated    #HTN  - holding home atenolol and olmesartan given shock     =====GI====  #H/o Chronic pancreatitis/insufficiency   - No concern for active flare   - Continue home Creon TID premeal (held for now bc pt too lethargic to take po and cannot be crushed)    #GI prophylaxis:  -Pantoprazole 40 BID IV push    #Diet  - 6/2: hold tube feeds for possible GI bleed    =====Renal/=====  #ROBERT on CKD iso sepsis and blood loss  -Required intermittent HD in the past   -F/u nephro on exchange in [facility] and possible HD now     #R hydronephrosis   suspect reactive secondary to pyelonephritis. CT without obvious obstruction/stone  - likely not contributing significantly to sepsis/ARF. Was evaluated by urology, low suspicion for true obstruction, no indications for acute interventions for now but can consider ureteral stent if not improving with medical management   - renal US 5/22 with mild R hydro, mild pelvic free fluid     =====Endocrine=====  -No active issues    =====Infectious Disease=====  -Meropenem and vanc dosed by trough  -f/u blood cultures, urine cultures. (-)MRSA (+)Staph aureus PCR  -Appreciate ID recs    =====Heme/Onc=====  #DVT Ppx  - SCDs, no AC for acute blood loss    #Acute GI bleed  -Elevated INR, most likely 2/2 malnutrition. Will give administer Vitamin K for 3days  -Transfuse for Hgb <7    =====Ethics=====  DNR/ DNI, cap pressors 0.6. no tube feeds, no dialysis. continue abxs and fluids. Discussed GOC with  at bedside MICU ACCEPT NOTE    CHIEF COMPLAINT: Patient is a 79y old  Female who presents with a chief complaint of Weakness       HPI:  79-year-old female with past medical history of GERD, MI, chronic pancreatitis, HTN, incontinence, recent hip surgery and was recently discharged from outpatient rehab to home presented to the emergency department with increased lethargy, failure to thrive, decreased PO intake nausea/vomiting.    Upon presentation to ED patient noted to also have suprapubic pain but denied dysuria and hematuria. Per  who provided collateral--Pt was just discharged from rehab center after prolonged stay from R femur fracture. At home the first 1-2 days she was fine but then over last 48hrs appeared to be more lethargic and less interactive prompting ED visit. No one at home is sick, no recent travel. Unsure of medications. Patient did not have any fevers, chills, N/V/D.     ED course notable for lab with  Leukocytosis to 40 with urine appearing like pus metabolic acidemia, ph 7.13, bicarb 8. Started on bicarb gtt. Carrillo placed minimal output new ROBERT BUN/Cr 103/5.56. Nephrology consulted for urgent HD. Initially  unsure of GOC and deferred decision. Pt started on bicarb drip, received 3L NS and 1L LR, Cefepime 1g, Vanc 1g, Tylenol 1g.    Family ultimately agreeable to HD and patient accepted to MICU. Verbal consent obtained for Shiley catheter placement from .     MICU course was notable for septic shock iso E coli bacteremia 2/2 UTI. Patient was treated with ceftriaxone x 14 days with improvement. Also briefly required stress dose steroids which were discontinued. IV pressor medications were weaned off. Additionally, patient also with ARF 2/2 hypotension and sepsis and has remained anuric during her hospitalization. Receiving HD and now pending exchange of shiley catheter. Course further c/b toxic metabolic encephalopathy with no improvement during hospital course.     On 6/1, RRT called for hypotension with SBP initially 70-80s. During RRT, patient was resuscitated with  cc, midodrine 10 and albumin 5% 250 cc with improvement in MAP > 60. On evaluation, patients mental status remains unchanged from baseline noted during hospital stay.       PAST MEDICAL & SURGICAL HISTORY:  Hypertension      Anxiety and depression      GERD (gastroesophageal reflux disease)      History of pancreatitis  2018 taking creon been stable      Stress incontinence, female  S/P Sling      Heart attack      H/O left inguinal hernia repair      Hx of tonsillectomy      Hx of appendectomy      H/O dilation and curettage      Pacemaker          FAMILY HISTORY:      SOCIAL HISTORY:  Smoking:   Substance Use:   EtOH Use:   Advance Directives:     MEDICATIONS  (STANDING):  albumin human  5% IVPB 250 milliLiter(s) IV Intermittent once  ascorbic acid 250 milliGRAM(s) Oral daily  chlorhexidine 2% Cloths 1 Application(s) Topical daily  droxidopa 200 milliGRAM(s) Oral three times a day  hydrocortisone sodium succinate Injectable 100 milliGRAM(s) IV Push every 8 hours  lidocaine   4% Patch 1 Patch Transdermal every 24 hours  meropenem  IVPB 500 milliGRAM(s) IV Intermittent every 12 hours  midodrine. 10 milliGRAM(s) Oral three times a day  multivitamin 1 Tablet(s) Oral daily  mupirocin 2% Nasal 1 Application(s) Both Nostrils two times a day  norepinephrine Infusion 0.05 MICROgram(s)/kG/Min (4.39 mL/Hr) IV Continuous <Continuous>  pantoprazole  Injectable 40 milliGRAM(s) IV Push two times a day    MEDICATIONS  (PRN):  acetaminophen   Oral Liquid .. 470 milliGRAM(s) Oral every 6 hours PRN Mild Pain (1 - 3), Moderate Pain (4 - 6)      Allergies    penicillin (Swelling)    Intolerances    epinephrine (Other)      REVIEW OF SYSTEMS:  CONSTITUTIONAL: No weakness, fevers or chills  EYES/ENT: No visual changes;  No vertigo or throat pain   NECK: No pain or stiffness  RESPIRATORY: No cough, wheezing, hemoptysis; No shortness of breath  CARDIOVASCULAR: No chest pain or palpitations  GASTROINTESTINAL: No abdominal or epigastric pain. No nausea, vomiting, or hematemesis; No diarrhea or constipation. No melena or hematochezia.  GENITOURINARY: No dysuria, frequency or hematuria  NEUROLOGICAL: No numbness or weakness  SKIN: No itching, rashes  [ ] All other systems negative  [ ] Unable to assess ROS because ________    OBJECTIVE:  ICU Vital Signs Last 24 Hrs  T(C): 36.3 (02 Jun 2024 07:50), Max: 36.3 (01 Jun 2024 23:30)  T(F): 97.3 (02 Jun 2024 07:50), Max: 97.3 (01 Jun 2024 23:30)  HR: 146 (02 Jun 2024 07:50) (86 - 146)  BP: 54/41 (02 Jun 2024 07:50) (54/41 - 105/62)  BP(mean): --  ABP: --  ABP(mean): --  RR: 17 (02 Jun 2024 07:50) (17 - 20)  SpO2: 92% (02 Jun 2024 07:50) (92% - 100%)    O2 Parameters below as of 02 Jun 2024 07:50  Patient On (Oxygen Delivery Method): nasal cannula              06-01 @ 07:01  -  06-02 @ 07:00  --------------------------------------------------------  IN: 1350 mL / OUT: 1 mL / NET: 1349 mL      CAPILLARY BLOOD GLUCOSE      POCT Blood Glucose.: 136 mg/dL (02 Jun 2024 09:26)      PHYSICAL EXAM:  GENERAL: NAD, lying in bed comfortably  HEAD:  Atraumatic, normocephalic  EYES: EOMI, PERRLA, conjunctiva and sclera clear  ENT: Moist mucous membranes  NECK: Supple, no JVD  HEART: S1, S2, regular rate and rhythm, no murmurs, rubs, or gallops  LUNGS:  Clear to auscultation bilaterally, no crackles, wheezing, or rhonchi  ABDOMEN: Soft, nontender, nondistended,  EXTREMITIES: 2+ peripheral pulses bilaterally. No clubbing, cyanosis, or edema  NERVOUS SYSTEM:  AO X0-1, does not follow commands  SKIN: No rashes or lesions  LINES:     LABS:                        8.9    32.35 )-----------( 228      ( 02 Jun 2024 06:53 )             27.4     Hgb Trend: 8.9<--, 6.6<--, 8.2<--, 9.6<--, 10.0<--  06-02    141  |  106  |  64<H>  ----------------------------<  118<H>  4.0   |  12<L>  |  2.57<H>    Ca    8.7      02 Jun 2024 06:53    TPro  4.8<L>  /  Alb  2.3<L>  /  TBili  0.7  /  DBili  x   /  AST  64<H>  /  ALT  36  /  AlkPhos  131<H>  06-02    Creatinine Trend: 2.57<--, 2.40<--, 2.86<--, 2.79<--, 2.54<--, 2.29<--    Urinalysis Basic - ( 02 Jun 2024 06:53 )    Color: x / Appearance: x / SG: x / pH: x  Gluc: 118 mg/dL / Ketone: x  / Bili: x / Urobili: x   Blood: x / Protein: x / Nitrite: x   Leuk Esterase: x / RBC: x / WBC x   Sq Epi: x / Non Sq Epi: x / Bacteria: x        Venous Blood Gas:  06-02 @ 06:45  7.32/27/179/14/97.5  VBG Lactate: 3.8      MICROBIOLOGY:     RADIOLOGY & ADDITIONAL TESTS:    ASSESSMENT  CHRISTY VALDOVINOS is a 79y female with PMH GERD, MI, chronic pancreatitis/insufficiency, HTN, incontinence, recent R hip fx s/p repair (1/2024) transferred to the MICU for hemodynamic instability iso of hemorrhagic vs. septic shock. On 6/1, RRT called for hypotension with SBP initially 70-80s. During RRT, patient was resuscitated with  cc, midodrine 10 and albumin 5% 250 cc with improvement in MAP > 60. On evaluation, patients mental status remains unchanged from baseline noted during hospital stay.     PLAN  =====Neurologic=====  #Septic encephalopathy +/- uremic encephalopathy  -AOX0-1    =====Pulmonary=====  saturating well on NC 4L 96%    #Left sided pleural effusion    =====Cardiovascular=====  #Septic vs. Hemorrhagic Shock:  -0.55 Levo for Septic shock vs. hemorrhagic shock 2/2 GI bleed  -Midodrine and droxidopa via NG tube   -Wean off pressors as tolerated    #HTN  - holding home atenolol and olmesartan given shock     =====GI====  #H/o Chronic pancreatitis/insufficiency   - No concern for active flare   - Continue home Creon TID premeal (held for now bc pt too lethargic to take po and cannot be crushed)    #GI prophylaxis:  -Pantoprazole 40 BID IV push    #Diet  - 6/2: hold tube feeds for possible GI bleed    =====Renal/=====  #ROBERT on CKD iso sepsis and blood loss  -Required intermittent HD in the past   -F/u nephro on exchange in [facility] and possible HD now     #R hydronephrosis   suspect reactive secondary to pyelonephritis. CT without obvious obstruction/stone  - likely not contributing significantly to sepsis/ARF. Was evaluated by urology, low suspicion for true obstruction, no indications for acute interventions for now but can consider ureteral stent if not improving with medical management   - renal US 5/22 with mild R hydro, mild pelvic free fluid     =====Endocrine=====  -No active issues    =====Infectious Disease=====  -Meropenem and vanc dosed by trough  -f/u blood cultures, urine cultures. (-)MRSA (+)Staph aureus PCR  -Appreciate ID recs    =====Heme/Onc=====  #DVT Ppx  - SCDs, no AC for acute blood loss    #Acute GI bleed  -Elevated INR, most likely 2/2 malnutrition. Will give administer Vitamin K for 3days  -Transfuse for Hgb <7    =====Ethics=====  DNR/ DNI, cap pressors 0.6. no tube feeds, no dialysis. continue abxs and fluids. Discussed GOC with  at bedside      Attending attestation   I have personally seen and examined the patient.  I fully participated in the care of this patient.  I have made amendments to the documentation where necessary, and agree with the history, physical exam, and plan as documented by the Resident.       79y female with PMH GERD, MI, chronic pancreatitis/insufficiency, HTN, incontinence, recent R hip fx s/p repair (1/2024) transferred to the MICU initially for E.coli bacteremia, metabolic acidosis, shock, on pressors, ROBERT requiring CRRT and RRT. Patient was transferred to the floors. While being worked up on the floors. The patient was found to by hypotensive, septic and with BRBPR concerning for GIB. Multiple rapids, now accepted to MICU on pressors.     Patient has be declining functional outpatient, has not been tolerating HD during this admissions. Admitted to MICU for shock, abx broadened, stress dose steroids, and PRBC for low BP. Multidisciplinary meeting held at bedside with palliative care team. After discussions about the patient's prognosis, decline and husbands wishes for the patient she was made comfort measures. Pressors are capped, no HD. Prognosis is poor. Will attempt to get family to come see her.     Patient is critically ill, requiring critical care services.     Attending: I have personally and independently provided 40 minutes of critical care services.  This excludes any time spent on separate procedures or teaching.

## 2024-06-02 NOTE — PROGRESS NOTE ADULT - SUBJECTIVE AND OBJECTIVE BOX
Chief Complaint:  Patient is a 79y old  Female who presents with a chief complaint of Weakness (31 May 2024 04:59)        Interval Events:   RRT         acetaminophen   Oral Liquid .. 470 milliGRAM(s) Oral every 6 hours PRN  ascorbic acid 250 milliGRAM(s) Oral daily  chlorhexidine 2% Cloths 1 Application(s) Topical daily  heparin   Injectable 5000 Unit(s) SubCutaneous every 12 hours  lidocaine   4% Patch 1 Patch Transdermal every 24 hours  meropenem  IVPB 500 milliGRAM(s) IV Intermittent every 12 hours  multivitamin 1 Tablet(s) Oral daily  pantoprazole  Injectable 40 milliGRAM(s) IV Push daily  polyethylene glycol 3350 17 Gram(s) Oral daily  sodium chloride 0.9%. 1000 milliLiter(s) IV Continuous <Continuous>                            8.2    29.88 )-----------( 313      ( 2024 10:32 )             26.3       Hemoglobin: 8.2 g/dL ( @ 10:32)  Hemoglobin: 9.6 g/dL ( @ 10:00)  Hemoglobin: 10.0 g/dL ( @ 13:01)  Hemoglobin: 8.4 g/dL ( @ 09:05)          141  |  107  |  63<H>  ----------------------------<  177<H>  4.2   |  16<L>  |  2.40<H>    Ca    8.4      2024 10:32      Creatinine Trend: 2.40<--, 2.86<--, 2.79<--, 2.54<--, 2.29<--, 3.16<--    COAGS:           T(C): 36.4 (24 @ 08:03), Max: 36.6 (24 @ 04:55)  HR: 87 (24 @ 08:03) (67 - 108)  BP: 99/62 (24 @ 08:03) (85/61 - 112/71)  RR: 16 (24 @ 08:03) (16 - 17)  SpO2: 99% (24 @ 08:03) (95% - 100%)  Wt(kg): --    I&O's Summary    31 May 2024 07:01  -  2024 07:00  --------------------------------------------------------  IN: 875 mL / OUT: 2 mL / NET: 873 mL      Review of Systems:  General:  No wt loss, fevers, chills, night sweats, fatigue,   Eyes:  Good vision, no reported pain  ENT:  No sore throat, pain, runny nose, dysphagia  CV:  No pain, palpitations, hypo/hypertension  Resp:  No dyspnea, cough, tachypnea, wheezing  GI:  See HPI  :  No pain, bleeding, incontinence, nocturia  Muscle:  No pain, weakness  Neuro:  No weakness, tingling, memory problems  Psych:  No fatigue, insomnia, mood problems, depression  Endocrine:  No polyuria, polydipsia, cold/heat intolerance  Heme:  No petechiae, ecchymosis, easy bruisability  Integumentary:  No rash, edema       Daily     Daily Weight in k.4 (31 May 2024 10:10)      PHYSICAL EXAM:     GENERAL:  Appears stated age, well-groomed, well-nourished, no distress  HEENT:  NC/AT,  conjunctivae anicteric, clear and pink,   NECK: supple, trachea midline  CHEST:  Full & symmetric excursion, no increased effort, breath sounds clear  HEART:  Regular rhythm, no JVD  ABDOMEN:  Soft, non-tender, non-distended, normoactive bowel sounds,  no masses , no hepatosplenomegaly  EXTREMITIES:  no cyanosis,clubbing or edema  SKIN:  No rash, erythema, or, ecchymoses, no jaundice  NEURO:  Alert, non-focal, no asterixis  PSYCH: Appropriate affect, oriented to place and time  RECTAL: Deferred

## 2024-06-02 NOTE — RAPID RESPONSE TEAM SUMMARY - NSADDTLFINDINGSRRT_GEN_ALL_CORE
Upon arrival MD recognized pt from rapid yesterday w/ recurrent hypotension on midodrine and droxidopa. Had significant skin tear w/ insensible volume loss. Decided to give albumin x2 w/o response. Gave stress dose steroid. Started levophed. Asked primary NP to clarify GOC for pt as she has a poor prognosis even with pressors and ICU. She is unable to reach family and neither is her attending. Therefore decided to keep pressor and call MICU who accepted patient as a SICU boarder.

## 2024-06-02 NOTE — PROGRESS NOTE ADULT - PROBLEM SELECTOR PLAN 4
[facility] narrative above, new MOLST completed for DNR/DNI/CMO  transition to symptom directed care  cap pressors  no further blood draws, finger sticks  management of distressing symptoms at end of life      Given organ dysfunction (renal +/- hepatic), would avoid morphine.  IV dilaudid 0.2mg q1h prn for moderate pain  IV dilaudid 0.5mg q1h prn for severe pain  IV dilaudid 0.5mg q1h prn for dyspnea- goal RR <24  IV ativan 0.5mg q1h prn for anxiety, agitation, refractory dyspnea  IV glycopyrrolate 0.2mg q6h prn for secretions  dulcolax DC PRN constipation, daily

## 2024-06-02 NOTE — PROGRESS NOTE ADULT - PROBLEM SELECTOR PLAN 1
likely multifactorial with multiorgan dysfunction  RRT today now transferred to ICU with shock, peripheral pressors  lethargic

## 2024-06-02 NOTE — RAPID RESPONSE TEAM SUMMARY - NSSITUATIONBACKGROUNDRRT_GEN_ALL_CORE
79-year-old female with past medical history of GERD, MI, chronic pancreatitis, HTN, incontinence, recent hip surgery and was recently discharged from outpatient rehab to home presented to the emergency department with increased lethargy, failure to thrive, decreased PO intake nausea/vomiting. [place] called for hypotension.
79F with PMH GERD, MI, chronic pancreatitis/insufficiency, HTN, incontinence, recent R hip fx s/p repair (1/2024) and was recently discharged from outpatient rehab to home initially presenting for increased lethargy in setting of poor PO intake, admitted to MICU for urosepsis c/b E. coli bacteremia requiring vasopressors and acute renal failure requiring urgent HD for acidosis. Now with downgraded to medicine with intermittent HD. RRT called for hypotension. Per primary team the patient had an episode of hypotension to 80s and was unable to tolerate dialysis. She received 500cc bolus of fluid and her BP improved. BP earlier today was 100s. However nurse was unable to obtain a BP and called RRT. Upon arrival patient was at baseline A&0 XO and nonverbal. BP initially 129/98 however she had two subsequent BPs of 63/48 and 61/41. She was given LR bolus 500cc and midodrine 10mg. Rectal Temp 97.5. O2 sat: 100% on room air. SBP improved to 90s but hen dropped to 80s with MAP in 60s. Patient was also given Albumin 5%. MICU was at the bedside and recommended Droxidopa 200 q 8.     Hypotension is likely 2/2 sepsis with E. Coli bacteremia and UTI. Increased WBC from 19k to 29K. Currently on Meropenem and received Vancomycin x1 and solu cortef.     Poor prognosis. Primary team had GOC with  at the bedside and made the patient DNR.

## 2024-06-02 NOTE — PROGRESS NOTE ADULT - ASSESSMENT
1. Altered mental status, poor PO intake  pending clinical course will decide re peg    2. Leukocytosis    3. ROBERT on intermittent HD    4. Stercoral proctitis  miralax dialy 1. pt actively dying  will sign off    2. Leukocytosis    3. ROBERT on intermittent HD    4. Stercoral proctitis  miralax dialy 1.failure to thrive  patinet is critically ill, hold off on PEG    2. Leukocytosis    3. ROBERT on intermittent HD    4. Stercoral proctitis  miralax dialy

## 2024-06-02 NOTE — PROGRESS NOTE ADULT - ASSESSMENT
79-year-old female with past medical history of GERD, MI, chronic pancreatitis, HTN, incontinence, recent hip surgery who presented w/ increased lethargy, failure to thrive, decreased PO intake nausea/vomiting. Patient admitted to ICU for metabolic acidosis and ROBERT requiring HD found to have E Coli bacteremia 2/2 UTI.    E. coli bacteremia due to UTI  5/19 Bcx with E.coli -- 5/21 Bcx cleared   5/19 Ucx with E.coli & E. faecalis  - low CFU of E. faecalis so unlikely to be contributing    now with sepsis/sirs 5/29  Leukocytosis had resolved, now noted with uptrend to ~19k -stable  Had episode of hypothermia 5/29 -- temps now wnl x24h  UA with pyuria - less than prior UA  Remains nonverbal, NGT in place, may be aspirating  RIJ HD catheter with no sign of infection   s/p ceftriaxone 5/20-5/30 > broadened to meropenem 5/30 5/30 CXR negative  5/30 BCx NGTD  5/30 UCx   10,000 - 49,000 CFU/mL Gram positive organisms    ROBERT due to ATN  Nephrology following, on HD    6/1, 6/2 Multiple RRT called d/t hypotension  Txferred to SICU on pressors 6/2 AM. Labs notable for persistent anemia and worsening leukocytosis    Recommendations  Continue meropenem  Continue vancomycin, dose by level  F/u pending cx  Monitor temps/WBC   Monitor renal fxn  Monitor H/H, transfusion prn protocol  Aspiration precautions   Palliative care following  GOC and additional care per primary team    Dr. Mcdowell to resume care 6/3  Infectious Diseases will follow. Please call with any questions.  Celia Wu M.D.  [facility] Division of Infectious Diseases [phone]  For after 5 P.M. and weekends, please call [phone]

## 2024-06-02 NOTE — PROGRESS NOTE ADULT - SUBJECTIVE AND OBJECTIVE BOX
Indication for Geriatrics and Palliative Care Services/INTERVAL HPI:  SUBJECTIVE AND OBJECTIVE:    OVERNIGHT EVENTS:    DNR on chart:Intubate  Intubate      Allergies    penicillin (Swelling)    Intolerances    epinephrine (Other)  MEDICATIONS  (STANDING):  albumin human  5% IVPB 250 milliLiter(s) IV Intermittent once  ascorbic acid 250 milliGRAM(s) Oral daily  chlorhexidine 2% Cloths 1 Application(s) Topical daily  droxidopa 200 milliGRAM(s) Oral three times a day  hydrocortisone sodium succinate Injectable 100 milliGRAM(s) IV Push every 8 hours  lidocaine   4% Patch 1 Patch Transdermal every 24 hours  meropenem  IVPB 500 milliGRAM(s) IV Intermittent every 12 hours  midodrine. 10 milliGRAM(s) Oral three times a day  multivitamin 1 Tablet(s) Oral daily  mupirocin 2% Nasal 1 Application(s) Both Nostrils two times a day  norepinephrine Infusion 0.05 MICROgram(s)/kG/Min (4.39 mL/Hr) IV Continuous <Continuous>  pantoprazole  Injectable 40 milliGRAM(s) IV Push two times a day    MEDICATIONS  (PRN):  acetaminophen   Oral Liquid .. 470 milliGRAM(s) Oral every 6 hours PRN Mild Pain (1 - 3), Moderate Pain (4 - 6)      ITEMS UNCHECKED ARE NOT PRESENT    PRESENT SYMPTOMS: [ ]Unable to self-report - see [ ] CPOT [ ] PAINADS [ ] RDOS  Source if other than patient:  [ ]Family   [ ]Team     Pain:  [ ]yes [ ]no  QOL impact -   Location -                    Aggravating factors -  Quality -  Radiation -  Timing-  Severity (0-10 scale):  Minimal acceptable level (0-10 scale):     CPOT:    https://www.sccm.org/getattachment/atw11j13-9s3c-8j5j-2w2y-8867a9854d0o/Critical-Care-Pain-Observation-Tool-(CPOT)    Dyspnea:                           [ ]Mild [ ]Moderate [ ]Severe  Anxiety:                             [ ]Mild [ ]Moderate [ ]Severe  Fatigue:                             [ ]Mild [ ]Moderate [ ]Severe  Nausea:                             [ ]Mild [ ]Moderate [ ]Severe  Loss of appetite:              [ ]Mild [ ]Moderate [ ]Severe  Constipation:                    [ ]Mild [ ]Moderate [ ]Severe    PCSSQ[Palliative Care Spiritual Screening Question]   Severity (0-10):  Score of 4 or > indicate consideration of Chaplaincy referral.  Chaplaincy Referral: [ ] yes [ ] refused [ ] following [ ] Deferred     Caregiver [place]? : [ ] yes [ ] no [ ] Deferred [ ] Declined             Social work referral [ ] Patient & Family Centered Care Referral [ ]     Anticipatory Grief present?:  [ ] yes [ ] no  [ ] Deferred                  Social work referral [ ] Chaplaincy Referral[ ]      Other Symptoms:  [ ]All other review of systems negative   [ ]Unable to obtain due to poor mentation    Palliative Performance Status Version 2:         %      http://npcrc.org/files/news/palliative_performance_scale_[email or web address].pdf  PHYSICAL EXAM:  Vital Signs Last 24 Hrs  T(C): 35.6 (02 Jun 2024 11:00), Max: 36.3 (01 Jun 2024 23:30)  T(F): 96 (02 Jun 2024 11:00), Max: 97.3 (01 Jun 2024 23:30)  HR: 83 (02 Jun 2024 11:00) (78 - 146)  BP: 83/49 (02 Jun 2024 11:00) (54/41 - 105/62)  BP(mean): 60 (02 Jun 2024 11:00) (49 - 60)  RR: 13 (02 Jun 2024 11:00) (13 - 20)  SpO2: 100% (02 Jun 2024 11:00) (92% - 100%)    Parameters below as of 02 Jun 2024 11:00  Patient On (Oxygen Delivery Method): nasal cannula  O2 Flow (L/min): 2   I&O's Summary    01 Jun 2024 07:01  -  02 Jun 2024 07:00  --------------------------------------------------------  IN: 1350 mL / OUT: 1 mL / NET: 1349 mL       GENERAL: [ ]Cachexia    [ ]Alert  [ ]Oriented x   [ ]Lethargic  [ ]Unarousable  [ ]Verbal  [ ]Non-Verbal  Behavioral:   [ ]Anxiety  [ ]Delirium [ ]Agitation [ ]Other  HEENT:  [ ]Normal   [ ]Dry mouth   [ ]ET Tube/Trach  [ ]Oral lesions  PULMONARY:   [ ]Clear [ ]Tachypnea  [ ]Audible excessive secretions   [ ]Rhonchi        [ ]Right [ ]Left [ ]Bilateral  [ ]Crackles        [ ]Right [ ]Left [ ]Bilateral  [ ]Wheezing     [ ]Right [ ]Left [ ]Bilateral  [ ]Diminished BS [ ] Right [ ]Left [ ]Bilateral  CARDIOVASCULAR:    [ ]Regular [ ]Irregular [ ]Tachy  [ ]Pato [ ]Murmur [ ]Other  GASTROINTESTINAL:  [ ]Soft  [ ]Distended   [ ]+BS  [ ]Non tender [ ]Tender  [ ]Other [ ]PEG [ ]OGT/ NGT   Last BM:   GENITOURINARY:  [ ]Normal [ ]Incontinent   [ ]Oliguria/Anuria   [ ]Carrillo  MUSCULOSKELETAL:   [ ]Normal   [ ]Weakness  [ ]Bed/Wheelchair bound [ ]Edema  NEUROLOGIC:   [ ]No focal deficits  [ ] Cognitive impairment  [ ] Dysphagia [ ]Dysarthria [ ] Paresis [ ]Other   SKIN:   [ ]Normal  [ ]Rash  [ ]Other  [ ]Pressure ulcer(s) [ ]y [ ]n present on admission    CRITICAL CARE:  [ ]Shock Present  [ ]Septic [ ]Cardiogenic [ ]Neurologic [ ]Hypovolemic  [ ]Vasopressors [ ]Inotropes  [ ]Respiratory failure present [ ]Mechanical Ventilation [ ]Non-invasive ventilatory support [ ]High-Flow   [ ]Acute  [ ]Chronic [ ]Hypoxic  [ ]Hypercarbic [ ]Other  [ ]Other organ failure     LABS:                        8.9    32.35 )-----------( 228      ( 02 Jun 2024 06:53 )             27.4   06-02    141  |  106  |  64<H>  ----------------------------<  118<H>  4.0   |  12<L>  |  2.57<H>    Ca    8.7      02 Jun 2024 06:53    TPro  4.8<L>  /  Alb  2.3<L>  /  TBili  0.7  /  DBili  x   /  AST  64<H>  /  ALT  36  /  AlkPhos  131<H>  06-02      Urinalysis Basic - ( 02 Jun 2024 06:53 )    Color: x / Appearance: x / SG: x / pH: x  Gluc: 118 mg/dL / Ketone: x  / Bili: x / Urobili: x   Blood: x / Protein: x / Nitrite: x   Leuk Esterase: x / RBC: x / WBC x   Sq Epi: x / Non Sq Epi: x / Bacteria: x      RADIOLOGY & ADDITIONAL STUDIES:    Protein Calorie Malnutrition Present: [ ]mild [ ]moderate [ ]severe [ ]underweight [ ]morbid obesity  https://www.andeal.org/vault/2440/web/files/ONC/Table_Clinical%20Characteristics%20to%20Document%20Malnutrition-White%20JV%20et%20al%201567.pdf    Height (cm): 154.9 (05-19-24 @ 14:27), 162.6 (01-20-24 @ 14:53)  Weight (kg): 46.8 (05-19-24 @ 23:00), 54.4 (01-20-24 @ 14:53)  BMI (kg/m2): 19.5 (05-19-24 @ 23:00), 22.7 (05-19-24 @ 14:27), 20.6 (01-20-24 @ 14:53)    [ ]PPSV2 < or = 30%  [ ]significant weight loss [ ]poor nutritional intake [ ]anasarca[ ]Artificial Nutrition    Other REFERRALS:  [ ]Hospice  [ ]Child Life  [ ]Social Work  [ ]Case management [ ]Holistic Therapy     Goals of Care Document: Indication for Geriatrics and Palliative Care Services/INTERVAL HPI: goals of care  SUBJECTIVE AND OBJECTIVE: Patient seen and examined; lethargic, mottled; s/p RRT for hypotension started on peripheral pressors    OVERNIGHT EVENTS: RRT, now transferred to ICU    DNR on chart:Intubate  Intubate      Allergies    penicillin (Swelling)    Intolerances    epinephrine (Other)  MEDICATIONS  (STANDING):  albumin human  5% IVPB 250 milliLiter(s) IV Intermittent once  ascorbic acid 250 milliGRAM(s) Oral daily  chlorhexidine 2% Cloths 1 Application(s) Topical daily  droxidopa 200 milliGRAM(s) Oral three times a day  hydrocortisone sodium succinate Injectable 100 milliGRAM(s) IV Push every 8 hours  lidocaine   4% Patch 1 Patch Transdermal every 24 hours  meropenem  IVPB 500 milliGRAM(s) IV Intermittent every 12 hours  midodrine. 10 milliGRAM(s) Oral three times a day  multivitamin 1 Tablet(s) Oral daily  mupirocin 2% Nasal 1 Application(s) Both Nostrils two times a day  norepinephrine Infusion 0.05 MICROgram(s)/kG/Min (4.39 mL/Hr) IV Continuous <Continuous>  pantoprazole  Injectable 40 milliGRAM(s) IV Push two times a day    MEDICATIONS  (PRN):  acetaminophen   Oral Liquid .. 470 milliGRAM(s) Oral every 6 hours PRN Mild Pain (1 - 3), Moderate Pain (4 - 6)      ITEMS UNCHECKED ARE NOT PRESENT    PRESENT SYMPTOMS: [x ]Unable to self-report - see [ ] CPOT [ x] PAINADS [x ] RDOS  Source if other than patient:  [ ]Family   [ ]Team     Pain:  [ ]yes [ ]no  QOL impact -   Location -                    Aggravating factors -  Quality -  Radiation -  Timing-  Severity (0-10 scale):  Minimal acceptable level (0-10 scale):     CPOT:    https://www.sccm.org/getattachment/tku14t04-7g9n-0m6l-[email or web address]/Critical-Care-Pain-Observation-Tool-(CPOT)    Dyspnea:                           [ ]Mild [ ]Moderate [ ]Severe  Anxiety:                             [ ]Mild [ ]Moderate [ ]Severe  Fatigue:                             [ ]Mild [ ]Moderate [ ]Severe  Nausea:                             [ ]Mild [ ]Moderate [ ]Severe  Loss of appetite:              [ ]Mild [ ]Moderate [ ]Severe  Constipation:                    [ ]Mild [ ]Moderate [ ]Severe    PCSSQ[Palliative Care Spiritual Screening Question]   Severity (0-10):  Score of 4 or > indicate consideration of Chaplaincy referral.  Chaplaincy Referral: [ ] yes [ ] refused [ ] following [x ] Deferred     Caregiver [place]? : [ ] yes [x ] no [ ] Deferred [ ] Declined             Social work referral [ ] Patient & Family Centered Care Referral [ ]     Anticipatory Grief present?:  [ ] yes [ x] no  [ ] Deferred                  Social work referral [ ] Chaplaincy Referral[ ]      Other Symptoms:  [ ]All other review of systems negative   [ x]Unable to obtain due to poor mentation    Palliative Performance Status Version 2:     10-20    %      http://npcrc.org/files/news/palliative_performance_scale_ppsv2.pdf  PHYSICAL EXAM:  Vital Signs Last 24 Hrs  T(C): 35.6 (02 Jun 2024 11:00), Max: 36.3 (01 Jun 2024 23:30)  T(F): 96 (02 Jun 2024 11:00), Max: 97.3 (01 Jun 2024 23:30)  HR: 83 (02 Jun 2024 11:00) (78 - 146)  BP: 83/49 (02 Jun 2024 11:00) (54/41 - 105/62)  BP(mean): 60 (02 Jun 2024 11:00) (49 - 60)  RR: 13 (02 Jun 2024 11:00) (13 - 20)  SpO2: 100% (02 Jun 2024 11:00) (92% - 100%)    Parameters below as of 02 Jun 2024 11:00  Patient On (Oxygen Delivery Method): nasal cannula  O2 Flow (L/min): 2   I&O's Summary    01 Jun 2024 07:01  -  02 Jun 2024 07:00  --------------------------------------------------------  IN: 1350 mL / OUT: 1 mL / NET: 1349 mL       GENERAL: [ ]Cachexia    [ ]Alert  [ ]Oriented x   [x ]Lethargic  [ ]Unarousable  [ ]Verbal  [ ]Non-Verbal  Behavioral:   [ ]Anxiety  [ ]Delirium [ ]Agitation [ ]Other  HEENT:  [ ]Normal   [ x]Dry mouth   [ ]ET Tube/Trach  [ ]Oral lesions  PULMONARY:   [ ]Clear [ ]Tachypnea  [ ]Audible excessive secretions   [ ]Rhonchi        [ ]Right [ ]Left [ ]Bilateral  [ ]Crackles        [ ]Right [ ]Left [ ]Bilateral  [ ]Wheezing     [ ]Right [ ]Left [ ]Bilateral  [ x]Diminished BS [ ] Right [ ]Left [ ]Bilateral  CARDIOVASCULAR:    [x ]Regular [ ]Irregular [ ]Tachy  [ ]Pato [ ]Murmur [ ]Other  GASTROINTESTINAL:  [x ]Soft  [ ]Distended   [ ]+BS  [ x]Non tender [ ]Tender  [ ]Other [ ]PEG [x ]OGT/ NGT   Last BM:   GENITOURINARY:  [ ]Normal [x ]Incontinent   [ ]Oliguria/Anuria   [ ]Carrillo  MUSCULOSKELETAL:   [ ]Normal   [ ]Weakness  [ x]Bed/Wheelchair bound [ ]Edema  NEUROLOGIC:   [ ]No focal deficits  [ ] Cognitive impairment  [ ] Dysphagia [ ]Dysarthria [ ] Paresis [ ]Other   SKIN: mottled finger tips and toes  [ ]Normal  [ ]Rash  [ ]Other  [ ]Pressure ulcer(s) [ ]y [ ]n present on admission    CRITICAL CARE:  [x ]Shock Present  [x ]Septic [ ]Cardiogenic [ ]Neurologic [ ]Hypovolemic  [ x]Vasopressors [ ]Inotropes  [ ]Respiratory failure present [ ]Mechanical Ventilation [ ]Non-invasive ventilatory support [ ]High-Flow   [ ]Acute  [ ]Chronic [ ]Hypoxic  [ ]Hypercarbic [ ]Other  [x ]Other organ failure - renal failure    LABS:                        8.9    32.35 )-----------( 228      ( 02 Jun 2024 06:53 )             27.4   06-02    141  |  106  |  64<H>  ----------------------------<  118<H>  4.0   |  12<L>  |  2.57<H>    Ca    8.7      02 Jun 2024 06:53    TPro  4.8<L>  /  Alb  2.3<L>  /  TBili  0.7  /  DBili  x   /  AST  64<H>  /  ALT  36  /  AlkPhos  131<H>  06-02      Urinalysis Basic - ( 02 Jun 2024 06:53 )    Color: x / Appearance: x / SG: x / pH: x  Gluc: 118 mg/dL / Ketone: x  / Bili: x / Urobili: x   Blood: x / Protein: x / Nitrite: x   Leuk Esterase: x / RBC: x / WBC x   Sq Epi: x / Non Sq Epi: x / Bacteria: x      RADIOLOGY & ADDITIONAL STUDIES:  < from: CT Head No Cont (05.26.24 @ 10:10) >    ACC: [id number] EXAM:  CT BRAIN   ORDERED BY: RAYMOND WILKINSON     PROCEDURE DATE:  05/26/2024          INTERPRETATION:  PROCEDURE: CT head without intravenous contrast    CLINICAL INDICATION: Bacteremia, shock, ROBERT. Now AMS, nonverbal.    TECHNIQUE: CT imaging of the brain is performed with multiplanar images   reviewed and displayed with both bone and soft tissue algorithm. No   contrast given.    COMPARISON: 01/20/2024    FINDINGS:    Initial scan limited by motion, with repeat images acquired.    Noacute hemorrhage. Ventricles are stable. No hydrocephalus, mass effect   or midline shift. No extra-axial collection. Approximately 9 x 4 mm   meningioma the left lateral frontoparietal convexity is stable (6;5,   9;20).    Gray to white differentiation appears preserved, without CT evidence of   recent transcortical or territorial infarct.    Bone algorithm images show no calvarial fracture or acute abnormality of   the calvarium or skull base. Paranasal sinuses and mastoids included on   this scan show no acute disease. Right sided nasogastric tube partially   seen.      IMPRESSION:  No acute intracranial findings. No change compared to 01/20/2024.    --- End of Report ---            CONSTANTIN CISSE MD; Attending Radiologist  This documenthas been electronically signed. May 26 2024 12:32PM    < end of copied text >      Protein Calorie Malnutrition Present: [ ]mild [ ]moderate [ ]severe [ ]underweight [ ]morbid obesity  https://www.andeal.org/vault/2440/web/files/ONC/Table_Clinical%20Characteristics%20to%20Document%20Malnutrition-White%20JV%20et%20al%202012.pdf    Height (cm): 154.9 (05-19-24 @ 14:27), 162.6 (01-20-24 @ 14:53)  Weight (kg): 46.8 (05-19-24 @ 23:00), 54.4 (01-20-24 @ 14:53)  BMI (kg/m2): 19.5 (05-19-24 @ 23:00), 22.7 (05-19-24 @ 14:27), 20.6 (01-20-24 @ 14:53)    [x ]PPSV2 < or = 30%  [ ]significant weight loss [x ]poor nutritional intake [ ]anasarca[ ]Artificial Nutrition    Other REFERRALS:  [ ]Hospice  [ ]Child Life  [ x]Social Work  [ ]Case management [ ]Holistic Therapy     Goals of Care Document:

## 2024-06-02 NOTE — PROGRESS NOTE ADULT - SUBJECTIVE AND OBJECTIVE BOX
[facility], Division of Infectious Diseases  RIGO Tyler Y. Patel, S. Shah, G. [facility]  [phone]    Name: SRINI VALDOVINOS  Age: 79y  Gender: Female  MRN: [id number]    Interval History:  Patient seen in SICU  Notes reviewed, multiple RRTs noted for persistent hypotension    Antibiotics:  meropenem  IVPB 500 milliGRAM(s) IV Intermittent every 12 hours      Medications:  acetaminophen   Oral Liquid .. 470 milliGRAM(s) Oral every 6 hours PRN  albumin human  5% IVPB 250 milliLiter(s) IV Intermittent once  ascorbic acid 250 milliGRAM(s) Oral daily  chlorhexidine 2% Cloths 1 Application(s) Topical daily  droxidopa 200 milliGRAM(s) Oral three times a day  hydrocortisone sodium succinate Injectable 100 milliGRAM(s) IV Push every 8 hours  lidocaine   4% Patch 1 Patch Transdermal every 24 hours  meropenem  IVPB 500 milliGRAM(s) IV Intermittent every 12 hours  midodrine. 10 milliGRAM(s) Oral three times a day  multivitamin 1 Tablet(s) Oral daily  mupirocin 2% Nasal 1 Application(s) Both Nostrils two times a day  norepinephrine Infusion 0.05 MICROgram(s)/kG/Min IV Continuous <Continuous>  pantoprazole  Injectable 40 milliGRAM(s) IV Push two times a day      Review of Systems:  unable to obtain    Allergies: penicillin (Swelling)    For details regarding the patient's past medical history, social history, family history, and other miscellaneous elements, please refer the initial infectious diseases consultation and/or the admitting history and physical examination for this admission.    Objective:  Vitals:   T(C): 35.6 (06-02-24 @ 11:00), Max: 36.3 (06-01-24 @ 23:30)  HR: 83 (06-02-24 @ 11:00) (83 - 146)  BP: 83/49 (06-02-24 @ 11:00) (54/41 - 105/62)  RR: 13 (06-02-24 @ 11:00) (13 - 20)  SpO2: 100% (06-02-24 @ 11:00) (92% - 100%)    Physical Examination:  General: elderly W, ill appearing  NCAT  HEENT:   Cardio: RRR  Resp: decreased breath sounds  Abd: soft, NT, ND  Ext: no edema or cyanosis  Skin: warm, dry, no visible rash      Laboratory Studies:  CBC:                       8.9    32.35 )-----------( 228      ( 02 Jun 2024 06:53 )             27.4     CMP: 06-02    141  |  106  |  64<H>  ----------------------------<  118<H>  4.0   |  12<L>  |  2.57<H>    Ca    8.7      02 Jun 2024 06:53    TPro  4.8<L>  /  Alb  2.3<L>  /  TBili  0.7  /  DBili  x   /  AST  64<H>  /  ALT  36  /  AlkPhos  131<H>  06-02    LIVER FUNCTIONS - ( 02 Jun 2024 06:53 )  Alb: 2.3 g/dL / Pro: 4.8 g/dL / ALK PHOS: 131 U/L / ALT: 36 U/L / AST: 64 U/L / GGT: x           Urinalysis Basic - ( 02 Jun 2024 06:53 )    Color: x / Appearance: x / SG: x / pH: x  Gluc: 118 mg/dL / Ketone: x  / Bili: x / Urobili: x   Blood: x / Protein: x / Nitrite: x   Leuk Esterase: x / RBC: x / WBC x   Sq Epi: x / Non Sq Epi: x / Bacteria: x        Microbiology: reviewed    Culture - Urine (collected 05-30-24 @ 14:31)  Source: Clean Catch Clean Catch (Midstream)  Preliminary Report (05-31-24 @ 18:17):    10,000 - 49,000 CFU/mL Gram positive organisms    Culture - Blood (collected 05-30-24 @ 14:30)  Source: .Blood Blood-Peripheral  Preliminary Report (06-01-24 @ 18:02):    No growth at 48 Hours    Culture - Blood (collected 05-30-24 @ 14:18)  Source: .Blood Blood-Peripheral  Preliminary Report (06-01-24 @ 18:02):    No growth at 48 Hours    Culture - Blood (collected 05-21-24 @ 10:30)  Source: .Blood Blood-Peripheral  Final Report (05-26-24 @ 16:00):    No growth at 5 days    Culture - Blood (collected 05-21-24 @ 10:00)  Source: .Blood Blood-Peripheral  Final Report (05-26-24 @ 16:00):    No growth at 5 days    Culture - Nose (collected 05-20-24 @ 05:25)  Source: .Nose Nose  Final Report (05-21-24 @ 14:16):    Staphylococcus aureus isolated    No Methicillin Resistant Staphylococcus aureus    Culture - Blood (collected 05-19-24 @ 16:25)  Source: .Blood Blood-Peripheral  Gram Stain (05-20-24 @ 08:01):    Growth in anaerobic bottle: Gram Negative Rods    Growth in aerobic bottle: Gram Negative Rods  Final Report (05-22-24 @ 07:47):    Growth in aerobic and anaerobic bottles: Escherichia coli    See previous culture [id number]    Culture - Urine (collected 05-19-24 @ 16:23)  Source: Clean Catch Clean Catch (Midstream)  Final Report (05-23-24 @ 16:39):    >100,000 CFU/ml Escherichia coli    10,000 - 49,000 CFU/mL Enterococcus faecalis    <10,000 CFU/ml Normal Urogenital magalys present  Organism: Escherichia coli  Enterococcus faecalis (05-23-24 @ 16:39)  Organism: Enterococcus faecalis (05-23-24 @ 16:39)      Method Type: BENTON      -  Ampicillin: S <=2 Predicts results to ampicillin/sulbactam, amoxacillin-clavulanate and  piperacillin-tazobactam.      -  Ciprofloxacin: S <=1      -  Levofloxacin: S 1      -  Nitrofurantoin: S <=32 Should not be used to treat pyelonephritis.      -  Tetracycline: R >8      -  Vancomycin: S 2  Organism: Escherichia coli (05-23-24 @ 16:39)      Method Type: BENTON      -  Amoxicillin/Clavulanic Acid: S <=8/4 Consider reserving for cystitis when ampicillin/sulbactam is resistant      -  Ampicillin: R >16 These ampicillin results predict results for amoxicillin      -  Ampicillin/Sulbactam: R >16/8      -  Aztreonam: S <=4      -  Cefazolin: S <=2 For uncomplicated UTI with K. pneumoniae, E. coli, or P. mirablis: BENTON <=16 is sensitive and BENTON >=32 is resistant. This also predicts results for oral agents cefaclor, cefdinir, cefpodoxime, cefprozil, cefuroxime axetil, cephalexin and locarbef for uncomplicated UTI. Note that some isolates may be susceptible to these agents while testing resistant to cefazolin.      -  Cefepime: S <=2      -  Cefoxitin: S <=8      -  Ceftriaxone: S <=1      -  Cefuroxime: S <=4      -  Ciprofloxacin: S <=0.25      -  Ertapenem: S <=0.5      -  Gentamicin: S <=2      -  Imipenem: S <=1      -  Levofloxacin: S <=0.5      -  Meropenem: S <=1      -  Nitrofurantoin: S <=32 Should not be used to treat pyelonephritis      -  Piperacillin/Tazobactam: S <=8      -  Tobramycin: S <=2      -  Trimethoprim/Sulfamethoxazole: S <=0.5/9.5    Culture - Blood (collected 05-19-24 @ 16:10)  Source: .Blood Blood-Peripheral  Gram Stain (05-20-24 @ 09:48):    Growth in aerobic bottle: Gram Negative Rods    Growth in anaerobic bottle: Gram Negative Rods  Final Report (05-22-24 @ 07:47):    Growth in aerobic and anaerobic bottles: Escherichia coli    Direct identification is available within approximately 3-5    hours either by Blood Panel Multiplexed PCR or Direct    MALDI-TOF. Details: [email or web address]  Organism: Blood Culture PCR  Escherichia coli (05-22-24 @ 07:47)  Organism: Escherichia coli (05-22-24 @ 07:47)      Method Type: BENTON      -  Ampicillin: R >16 These ampicillin results predict results for amoxicillin      -  Ampicillin/Sulbactam: R >16/8      -  Aztreonam: S <=4      -  Cefazolin: I 4      -  Cefepime: S <=2      -  Cefoxitin: S <=8      -  Ceftriaxone: S <=1      -  Ciprofloxacin: S <=0.25      -  Ertapenem: S <=0.5      -  Gentamicin: S <=2      -  Imipenem: S <=1      -  Levofloxacin: S <=0.5      -  Meropenem: S <=1      -  Piperacillin/Tazobactam: S <=8      -  Tobramycin: S <=2      -  Trimethoprim/Sulfamethoxazole: S <=0.5/9.5  Organism: Blood Culture PCR (05-22-24 @ 07:47)      Method Type: PCR      -  Escherichia coli: Detec          Radiology: reviewed

## 2024-06-02 NOTE — PROGRESS NOTE ADULT - ASSESSMENT
78 yo F with pancreatic insufficiency, hip Fx and FTT. Hospitalization c/b sepsis (E Coli bacteremia), encephalopathy (today not arousable), and ROBERT (on HD). Functionality is severely compromised. Geriatrics and Palliative Medicine ([facility]) Team was called for GOC.

## 2024-06-02 NOTE — PROGRESS NOTE ADULT - SUBJECTIVE AND OBJECTIVE BOX
[facility] HEMATOLOGY/ONCOLOGY INPATIENT PROGRESS NOTE     Interval Hx:   06-02-24: Ms. Yee was seen at bedside today.    Meds:   MEDICATIONS  (STANDING):  ascorbic acid 250 milliGRAM(s) Oral daily  chlorhexidine 2% Cloths 1 Application(s) Topical daily  droxidopa 200 milliGRAM(s) Oral three times a day  lidocaine   4% Patch 1 Patch Transdermal every 24 hours  meropenem  IVPB 500 milliGRAM(s) IV Intermittent every 12 hours  multivitamin 1 Tablet(s) Oral daily  pantoprazole  Injectable 40 milliGRAM(s) IV Push two times a day  sodium chloride 0.9%. 1000 milliLiter(s) (75 mL/Hr) IV Continuous <Continuous>    MEDICATIONS  (PRN):  acetaminophen   Oral Liquid .. 470 milliGRAM(s) Oral every 6 hours PRN Mild Pain (1 - 3), Moderate Pain (4 - 6)    Vital Signs Last 24 Hrs  T(C): 35.1 (02 Jun 2024 04:49), Max: 36.4 (01 Jun 2024 08:03)  T(F): 95.1 (02 Jun 2024 04:49), Max: 97.6 (01 Jun 2024 08:03)  HR: 86 (02 Jun 2024 04:49) (86 - 90)  BP: 93/33 (02 Jun 2024 04:49) (84/43 - 105/62)  BP(mean): --  RR: 20 (02 Jun 2024 04:49) (16 - 20)  SpO2: 100% (02 Jun 2024 04:49) (99% - 100%)    Parameters below as of 02 Jun 2024 04:49  Patient On (Oxygen Delivery Method): room air    Physical Exam:  Gen: NAD, NG in place  HEENT: MMM  Chest: Equal chest rise  Cardiac: irregular  Abd: Nondistended  Neuro: AAOx0    Labs:                        6.6    31.87 )-----------( 278      ( 01 Jun 2024 21:44 )             21.6     CBC Full  -  ( 01 Jun 2024 21:44 )  WBC Count : 31.87 K/uL  RBC Count : 2.21 M/uL  Hemoglobin : 6.6 g/dL  Hematocrit : 21.6 %  Platelet Count - Automated : 278 K/uL  Mean Cell Volume : 97.7 fl  Mean Cell Hemoglobin : 29.9 pg  Mean Cell Hemoglobin Concentration : 30.6 gm/dL    06-01    141  |  107  |  63<H>  ----------------------------<  177<H>  4.2   |  16<L>  |  2.40<H>    Ca    8.4      01 Jun 2024 10:32         [facility] HEMATOLOGY/ONCOLOGY INPATIENT PROGRESS NOTE     Interval Hx:   06-02-24: Ms. Yee was seen at bedside today, noted RRT overnight, discussed with nursing staff, multiple large bloody BMs, s/p 1u PRBC overnight, poor prognosis, GOC on going with primary team, transfuse to goal     Meds:   MEDICATIONS  (STANDING):  ascorbic acid 250 milliGRAM(s) Oral daily  chlorhexidine 2% Cloths 1 Application(s) Topical daily  droxidopa 200 milliGRAM(s) Oral three times a day  lidocaine   4% Patch 1 Patch Transdermal every 24 hours  meropenem  IVPB 500 milliGRAM(s) IV Intermittent every 12 hours  multivitamin 1 Tablet(s) Oral daily  pantoprazole  Injectable 40 milliGRAM(s) IV Push two times a day  sodium chloride 0.9%. 1000 milliLiter(s) (75 mL/Hr) IV Continuous <Continuous>    MEDICATIONS  (PRN):  acetaminophen   Oral Liquid .. 470 milliGRAM(s) Oral every 6 hours PRN Mild Pain (1 - 3), Moderate Pain (4 - 6)    Vital Signs Last 24 Hrs  T(C): 35.1 (02 Jun 2024 04:49), Max: 36.4 (01 Jun 2024 08:03)  T(F): 95.1 (02 Jun 2024 04:49), Max: 97.6 (01 Jun 2024 08:03)  HR: 86 (02 Jun 2024 04:49) (86 - 90)  BP: 93/33 (02 Jun 2024 04:49) (84/43 - 105/62)  BP(mean): --  RR: 20 (02 Jun 2024 04:49) (16 - 20)  SpO2: 100% (02 Jun 2024 04:49) (99% - 100%)    Parameters below as of 02 Jun 2024 04:49  Patient On (Oxygen Delivery Method): room air    Physical Exam:  Gen: NAD, NG in place  HEENT: MMM  Chest: Equal chest rise  Cardiac: irregular  Abd: Nondistended  Neuro: AAOx0    Labs:                        6.6    31.87 )-----------( 278      ( 01 Jun 2024 21:44 )             21.6     CBC Full  -  ( 01 Jun 2024 21:44 )  WBC Count : 31.87 K/uL  RBC Count : 2.21 M/uL  Hemoglobin : 6.6 g/dL  Hematocrit : 21.6 %  Platelet Count - Automated : 278 K/uL  Mean Cell Volume : 97.7 fl  Mean Cell Hemoglobin : 29.9 pg  Mean Cell Hemoglobin Concentration : 30.6 gm/dL    06-01    141  |  107  |  63<H>  ----------------------------<  177<H>  4.2   |  16<L>  |  2.40<H>    Ca    8.4      01 Jun 2024 10:32

## 2024-06-03 NOTE — PROGRESS NOTE ADULT - PROBLEM SELECTOR PLAN 1
likely multifactorial with multiorgan dysfunction  RRT today now transferred to ICU with shock, peripheral pressors  lethargic likely multifactorial with multiorgan dysfunction  goals are now for comfort directed care

## 2024-06-03 NOTE — PROGRESS NOTE ADULT - PROBLEM SELECTOR PLAN 4
[facility] narrative above, new MOLST completed for DNR/DNI/CMO  transition to symptom directed care  cap pressors  no further blood draws, finger sticks  management of distressing symptoms at end of life      Given organ dysfunction (renal +/- hepatic), would avoid morphine.  IV dilaudid 0.2mg q1h prn for moderate pain  IV dilaudid 0.5mg q1h prn for severe pain  IV dilaudid 0.5mg q1h prn for dyspnea- goal RR <24  IV ativan 0.5mg q1h prn for anxiety, agitation, refractory dyspnea  IV glycopyrrolate 0.2mg q6h prn for secretions  dulcolax NV PRN constipation, daily See [facility] note 6/2. MOLST completed for DNR/DNI/CMO  transition to symptom directed care  cap pressors  no further blood draws, finger sticks  management of distressing symptoms at end of life      Given organ dysfunction (renal +/- hepatic), would avoid morphine.  IV dilaudid 0.2mg q1h prn for moderate pain  IV dilaudid 0.5mg q1h prn for severe pain  IV dilaudid 0.5mg q1h prn for dyspnea- goal RR <24  IV ativan 0.5mg q1h prn for anxiety, agitation, refractory dyspnea  IV glycopyrrolate 0.2mg q6h prn for secretions  dulcolax CA PRN constipation, daily

## 2024-06-03 NOTE — PROGRESS NOTE ADULT - PROBLEM SELECTOR PLAN 5
discussed with ICU team.  page on call if acute issues    [phone] Pt to be transferred for PCU for symptom management and end of life care when bed available.   Please remove NGT in favor of patient's comfort as it is currently not being utilized for medication or tube feeds.     Case discussed with MICU team.    For acute issues or uncontrolled symptoms please page palliative team.    Amee Markham MD  Geriatrics and Palliative Medicine Attending  [facility] pager: [phone]

## 2024-06-03 NOTE — PROGRESS NOTE ADULT - ASSESSMENT
Ms. Yee is a 79y Female with PMHx of HTN, pA.fibm HTN, macrocytic anemia, pancreatic insufficiency hypertriglyceridemia, aortic valve stenosis, R hip fracture s/p ORIF 01/2024, depression who is admitted since 05/19/2024 with septic shock due to UTI and E.coli bacteremia, R hydronephrosis with acute renal failure now on HD, toxic metabolic encephalopathy. Pt is non-verbal this morning so history is obtained from chart and coordinating team members. Now on floor from MICU. CTH negative for acute changes.     Pt received 1u PRBC on 05/21/2024 with appropriate response. Labs on my initial evaluation show Hb 7.9 Hct 26.2 .3 with neutrophilia and lymphopenia. Normal bilirubin, liver function. Serum FLC ratio normal. SPEP with 0.6 Mspike without ALEKSEY. UPEP with K/L elevated and ratio 4 in setting of ARF. Additionally CT A/P with reported cirrhosis. Continues to be non-verbal and non-alert, noted GOC and palliative discussion    06/02/2024: noted RRT overnight, discussed with nursing staff, multiple large bloody BMs, s/p 1u PRBC overnight, poor prognosis, GOC on going with primary team, transfuse to goal     # Macrocytic anemia  # B12 deficiency  - B12 last year was 192  - Repeat B12 1172  - folate >20, previously low  - Also may have underlying BM disorder such as MDS, outpatient labs with persistent macrocytosis and anemia Hb around 10, at this time further workup such as BmBx would need to be considered as outpatient given overall clinical context   - Active GI hemorrhage 06/01/2024-06/02/2024   - s/p 2u PRBC this admission last 06/01/2024 PM   - Transfuse to maintain Hb > 7     # MGUS  -  SPEP/ALEKSEY with  with M-spike 0.5 3 Co-Migrating IgM Lambda Band, IgG Lambda Band, and  IgG Kappa Band, confirmed on UPEP as well  -  Serum immunoglobulins increased IgM 526, Serum FLCs ratio normal, LDH normal, B2MCG   - Overall not likely driving pts current clinical context and can be followed up outpatient depending on pts clinical outcome and GOCs   - See above     # Acute renal failure  - HD per nephrology    # E.coli UTI and bacteremia  - Antibiotics per primary team and ID     # Acute gastrointestinal hemorrhage  - Symptomatic management for now  - GI consult if in line with GOC       Thank you for allowing me to participate in the care of Ms. Yee, please do not hesitate to call or text me if you have further questions or concerns.     Rajeev Mcdowell MD  [facility] [place]   Division of Hematology/Oncology  [address] [place] [address], [address]  [place] [address]  P: [phone]  F: [phone]    Attestation:    ----Pt evaluated including face-to-face interaction in addition to chart review, reviewing treatment plan, and managing the patient’s chronic diagnoses as listed in the assessment----    Ms. Yee is a 79y Female with PMHx of HTN, pA.fibm HTN, macrocytic anemia, pancreatic insufficiency hypertriglyceridemia, aortic valve stenosis, R hip fracture s/p ORIF 01/2024, depression who is admitted since 05/19/2024 with septic shock due to UTI and E.coli bacteremia, R hydronephrosis with acute renal failure now on HD, toxic metabolic encephalopathy. Pt is non-verbal this morning so history is obtained from chart and coordinating team members. Now on floor from MICU. CTH negative for acute changes.     Pt received 1u PRBC on 05/21/2024 with appropriate response. Labs on my initial evaluation show Hb 7.9 Hct 26.2 .3 with neutrophilia and lymphopenia. Normal bilirubin, liver function. Serum FLC ratio normal. SPEP with 0.6 Mspike without ALEKSEY. UPEP with K/L elevated and ratio 4 in setting of ARF. Additionally CT A/P with reported cirrhosis. Continues to be non-verbal and non-alert, noted GOC and palliative discussion    06/02/2024: noted RRT overnight, discussed with nursing staff, multiple large bloody BMs, s/p 1u PRBC overnight, poor prognosis, GOC on going with primary team, transfuse to goal     06/03/2024: SICU, noted extensive GOC discussions, pt is now comfort care       # Macrocytic anemia  # B12 deficiency  - B12 last year was 192  - Repeat B12 1172  - folate >20, previously low  - Also may have underlying BM disorder such as MDS, outpatient labs with persistent macrocytosis and anemia Hb around 10, at this time further workup such as BmBx would need to be considered as outpatient given overall clinical context   - Active GI hemorrhage 06/01/2024-06/02/2024   - s/p 2u PRBC this admission last 06/01/2024 PM   - Comfort care now     # MGUS  -  SPEP/ALEKSEY with  with M-spike 0.5 3 Co-Migrating IgM Lambda Band, IgG Lambda Band, and  IgG Kappa Band, confirmed on UPEP as well  -  Serum immunoglobulins increased IgM 526, Serum FLCs ratio normal, LDH normal, B2MCG   - Overall not likely driving pts current clinical context and can be followed up outpatient depending on pts clinical outcome and GOCs   - See above     # Acute renal failure  -Comfort care    # E.coli UTI and bacteremia  - Comfort care    # Acute gastrointestinal hemorrhage  - Symptomatic management for now  - Comfort care      Thank you for allowing me to participate in the care of Ms. Yee pt is now comfort care I will sign off the case, please do not hesitate to call or text me if you have further questions or concerns.     Rajeev Mcdowell MD  [facility]   Division of Hematology/Oncology  [address] [place] [address], [address]  [place] [address]  P: [phone]  F: [phone]    Attestation:    ----Pt evaluated including face-to-face interaction in addition to chart review, reviewing treatment plan, and managing the patient’s chronic diagnoses as listed in the assessment----

## 2024-06-03 NOTE — PROGRESS NOTE ADULT - SUBJECTIVE AND OBJECTIVE BOX
[facility] DIVISION OF INFECTIOUS DISEASES  RIGO Tyler Y. Patel, S. Shah, G. Casimir  [phone]  ([phone] - weekdays after 5pm and weekends)    Name: SRINI VALDOVINOS  Age/Gender: 79y Female  MRN: [id number]    Interval History:  Patient seen and examined this morning.   Notes reviewed, note now comfort care.   Allergies: penicillin (Swelling)      Objective:  Vitals:   T(F): 97.5 (06-03-24 @ 11:00), Max: 99 (06-02-24 @ 15:00)  HR: 86 (06-03-24 @ 11:15) (66 - 94)  BP: 130/63 (06-03-24 @ 11:15) (79/62 - 198/74)  RR: 14 (06-03-24 @ 11:15) (11 - 25)  SpO2: 100% (06-03-24 @ 11:15) (91% - 100%)  Physical Examination:  General: no acute distress, NC  HEENT: NC/AT, anicteric  Respiratory: decreased breath sounds b/l   Cardiovascular: S1 and S2 present, normal rate  Gastrointestinal: soft, nondistended  Extremities: no LE edema, no cyanosis  Skin: no visible rash, ecchymosis     Laboratory Studies:  CBC:                       7.7    30.46 )-----------( 241      ( 02 Jun 2024 11:51 )             22.9     WBC Trend:  30.46 06-02-24 @ 11:51  32.35 06-02-24 @ 06:53  31.87 06-01-24 @ 21:44  29.88 06-01-24 @ 10:32  19.05 05-31-24 @ 10:00  19.02 05-30-24 @ 13:01  15.86 05-29-24 @ 09:05    CMP: 06-02    140  |  107  |  65<H>  ----------------------------<  152<H>  3.9   |  13<L>  |  2.48<H>    Ca    8.6      02 Jun 2024 11:51  Phos  6.1     06-02  Mg     1.7     06-02    TPro  5.0<L>  /  Alb  2.7<L>  /  TBili  0.7  /  DBili  x   /  AST  50<H>  /  ALT  28  /  AlkPhos  106  06-02    Creatinine: 2.48 mg/dL (06-02-24 @ 11:51)  Creatinine: 2.57 mg/dL (06-02-24 @ 06:53)  Creatinine: 2.40 mg/dL (06-01-24 @ 10:32)  Creatinine: 2.86 mg/dL (05-31-24 @ 09:59)  Creatinine: 2.79 mg/dL (05-30-24 @ 13:01)  Creatinine: 2.54 mg/dL (05-29-24 @ 09:05)  Creatinine: 2.29 mg/dL (05-28-24 @ 10:33)    LIVER FUNCTIONS - ( 02 Jun 2024 11:51 )  Alb: 2.7 g/dL / Pro: 5.0 g/dL / ALK PHOS: 106 U/L / ALT: 28 U/L / AST: 50 U/L / GGT: x           Vancomycin Level, Random: 14.2 ug/mL (06-02-24 @ 06:53)    Microbiology: reviewed   Culture - Blood (collected 06-02-24 @ 06:53)  Source: .Blood Blood-Peripheral  Preliminary Report (06-03-24 @ 11:02):    No growth at 24 hours    Culture - Urine (collected 05-30-24 @ 14:31)  Source: Clean Catch Clean Catch (Midstream)  Final Report (06-03-24 @ 07:56):    10,000 - 49,000 CFU/mL Enterococcus faecium (vancomycin resistant)  Organism: Enterococcus faecium (vancomycin resistant) (06-03-24 @ 07:56)  Organism: Enterococcus faecium (vancomycin resistant) (06-03-24 @ 07:56)      Method Type: BENTON      -  Ampicillin: R >8 Predicts results to ampicillin/sulbactam, amoxacillin-clavulanate and  piperacillin-tazobactam.      -  Ciprofloxacin: R >2      -  Daptomycin: SDD 4 The breakpoint for SDD (susceptible dose dependent)is based on a dosage regimen of 8-12 mg/kg administered every 24 h in adults and is intended for serious infections due to E. faecium. Consultation with an infectious diseases specialist is recommended.      -  Levofloxacin: R >4      -  Linezolid: S 2      -  Nitrofurantoin: I 64 Should not be used to treat pyelonephritis.      -  Tetracycline: R >8      -  Vancomycin: R >16    Culture - Blood (collected 05-30-24 @ 14:30)  Source: .Blood Blood-Peripheral  Preliminary Report (06-02-24 @ 18:01):    No growth at 72 Hours    Culture - Blood (collected 05-30-24 @ 14:18)  Source: .Blood Blood-Peripheral  Preliminary Report (06-02-24 @ 18:01):    No growth at 72 Hours    Culture - Blood (collected 05-21-24 @ 10:30)  Source: .Blood Blood-Peripheral  Final Report (05-26-24 @ 16:00):    No growth at 5 days    Culture - Blood (collected 05-21-24 @ 10:00)  Source: .Blood Blood-Peripheral  Final Report (05-26-24 @ 16:00):    No growth at 5 days    SARS-CoV-2 Result: NotDetec (19 May 2024 16:22)    Radiology: reviewed     Medications:  acetaminophen   Oral Liquid .. 470 milliGRAM(s) Oral every 6 hours PRN  bisacodyl Suppository 10 milliGRAM(s) Rectal daily PRN  chlorhexidine 2% Cloths 1 Application(s) Topical daily  glycopyrrolate Injectable 0.2 milliGRAM(s) IV Push every 6 hours PRN  hydrocortisone sodium succinate Injectable 100 milliGRAM(s) IV Push every 8 hours  HYDROmorphone  Injectable 0.2 milliGRAM(s) IV Push every 1 hour PRN  HYDROmorphone  Injectable 0.5 milliGRAM(s) IV Push every 1 hour PRN  HYDROmorphone  Injectable 0.5 milliGRAM(s) IV Push every 1 hour PRN  lidocaine   4% Patch 1 Patch Transdermal every 24 hours  LORazepam   Injectable 0.5 milliGRAM(s) IV Push every 1 hour PRN  meropenem  IVPB 500 milliGRAM(s) IV Intermittent every 12 hours  mupirocin 2% Nasal 1 Application(s) Both Nostrils two times a day  norepinephrine Infusion 0.05 MICROgram(s)/kG/Min IV Continuous <Continuous>  pantoprazole  Injectable 40 milliGRAM(s) IV Push two times a day    Current Antimicrobials:  meropenem  IVPB 500 milliGRAM(s) IV Intermittent every 12 hours    Prior/Completed Antimicrobials:  cefepime   IVPB  vancomycin  IVPB  vancomycin  IVPB  vancomycin  IVPB.

## 2024-06-03 NOTE — PROGRESS NOTE ADULT - SUBJECTIVE AND OBJECTIVE BOX
Indication for Geriatrics and Palliative Care Services/INTERVAL HPI: goals of care  SUBJECTIVE AND OBJECTIVE: Patient seen and examined; lethargic, mottled.     OVERNIGHT EVENTS: Goals transitioned to comfort care yesterday afternoon. No PRN medications required. Pressors capped.    DNR on chart: Intubate    Allergies    penicillin (Swelling)    Intolerances    epinephrine (Other)  MEDICATIONS  (STANDING):  albumin human  5% IVPB 250 milliLiter(s) IV Intermittent once  ascorbic acid 250 milliGRAM(s) Oral daily  chlorhexidine 2% Cloths 1 Application(s) Topical daily  droxidopa 200 milliGRAM(s) Oral three times a day  hydrocortisone sodium succinate Injectable 100 milliGRAM(s) IV Push every 8 hours  lidocaine   4% Patch 1 Patch Transdermal every 24 hours  meropenem  IVPB 500 milliGRAM(s) IV Intermittent every 12 hours  midodrine. 10 milliGRAM(s) Oral three times a day  multivitamin 1 Tablet(s) Oral daily  mupirocin 2% Nasal 1 Application(s) Both Nostrils two times a day  norepinephrine Infusion 0.05 MICROgram(s)/kG/Min (4.39 mL/Hr) IV Continuous <Continuous>  pantoprazole  Injectable 40 milliGRAM(s) IV Push two times a day    MEDICATIONS  (PRN):  acetaminophen   Oral Liquid .. 470 milliGRAM(s) Oral every 6 hours PRN Mild Pain (1 - 3), Moderate Pain (4 - 6)      ITEMS UNCHECKED ARE NOT PRESENT    PRESENT SYMPTOMS: [x ]Unable to self-report - see [ ] CPOT [ x] PAINADS [x ] RDOS  Source if other than patient:  [ ]Family   [ ]Team     Pain:  [ ]yes [ ]no  QOL impact -   Location -                    Aggravating factors -  Quality -  Radiation -  Timing-  Severity (0-10 scale):  Minimal acceptable level (0-10 scale):     CPOT:    https://www.sccm.org/getattachment/npp55v06-7a3n-4u4m-3u4e-0840n5542t3t/Critical-Care-Pain-Observation-Tool-(CPOT)    Dyspnea:                           [ ]Mild [ ]Moderate [ ]Severe  Anxiety:                             [ ]Mild [ ]Moderate [ ]Severe  Fatigue:                             [ ]Mild [ ]Moderate [ ]Severe  Nausea:                             [ ]Mild [ ]Moderate [ ]Severe  Loss of appetite:              [ ]Mild [ ]Moderate [ ]Severe  Constipation:                    [ ]Mild [ ]Moderate [ ]Severe    PCSSQ[Palliative Care Spiritual Screening Question]   Severity (0-10):  Score of 4 or > indicate consideration of Chaplaincy referral.  Chaplaincy Referral: [ ] yes [ ] refused [ ] following [x ] Deferred     Caregiver [place]? : [ ] yes [x ] no [ ] Deferred [ ] Declined             Social work referral [ ] Patient & Family Centered Care Referral [ ]     Anticipatory Grief present?:  [ ] yes [ x] no  [ ] Deferred                  Social work referral [ ] Chaplaincy Referral[ ]      Other Symptoms:  [ ]All other review of systems negative   [ x]Unable to obtain due to poor mentation    Palliative Performance Status Version 2:     10-20    %      http://npcrc.org/files/news/palliative_performance_scale_ppsv2.pdf  PHYSICAL EXAM:  Vital Signs Last 24 Hrs  T(C): 35.6 (02 Jun 2024 11:00), Max: 36.3 (01 Jun 2024 23:30)  T(F): 96 (02 Jun 2024 11:00), Max: 97.3 (01 Jun 2024 23:30)  HR: 83 (02 Jun 2024 11:00) (78 - 146)  BP: 83/49 (02 Jun 2024 11:00) (54/41 - 105/62)  BP(mean): 60 (02 Jun 2024 11:00) (49 - 60)  RR: 13 (02 Jun 2024 11:00) (13 - 20)  SpO2: 100% (02 Jun 2024 11:00) (92% - 100%)    Parameters below as of 02 Jun 2024 11:00  Patient On (Oxygen Delivery Method): nasal cannula  O2 Flow (L/min): 2   I&O's Summary    01 Jun 2024 07:01  -  02 Jun 2024 07:00  --------------------------------------------------------  IN: 1350 mL / OUT: 1 mL / NET: 1349 mL       GENERAL: [ ]Cachexia    [ ]Alert  [ ]Oriented x   [x ]Lethargic  [ ]Unarousable  [ ]Verbal  [ ]Non-Verbal  Behavioral:   [ ]Anxiety  [ ]Delirium [ ]Agitation [ ]Other  HEENT:  [ ]Normal   [ x]Dry mouth   [ ]ET Tube/Trach  [ ]Oral lesions  PULMONARY:   [ ]Clear [ ]Tachypnea  [ ]Audible excessive secretions   [ ]Rhonchi        [ ]Right [ ]Left [ ]Bilateral  [ ]Crackles        [ ]Right [ ]Left [ ]Bilateral  [ ]Wheezing     [ ]Right [ ]Left [ ]Bilateral  [ x]Diminished BS [ ] Right [ ]Left [ ]Bilateral  CARDIOVASCULAR:    [x ]Regular [ ]Irregular [ ]Tachy  [ ]Pato [ ]Murmur [ ]Other  GASTROINTESTINAL:  [x ]Soft  [ ]Distended   [ ]+BS  [ x]Non tender [ ]Tender  [ ]Other [ ]PEG [x ]OGT/ NGT   Last BM:   GENITOURINARY:  [ ]Normal [x ]Incontinent   [ ]Oliguria/Anuria   [ ]Carrillo  MUSCULOSKELETAL:   [ ]Normal   [ ]Weakness  [ x]Bed/Wheelchair bound [ ]Edema  NEUROLOGIC:   [ ]No focal deficits  [ ] Cognitive impairment  [ ] Dysphagia [ ]Dysarthria [ ] Paresis [ ]Other   SKIN: mottled finger tips and toes  [ ]Normal  [ ]Rash  [ ]Other  [ ]Pressure ulcer(s) [ ]y [ ]n present on admission    CRITICAL CARE:  [x ]Shock Present  [x ]Septic [ ]Cardiogenic [ ]Neurologic [ ]Hypovolemic  [ x]Vasopressors [ ]Inotropes  [ ]Respiratory failure present [ ]Mechanical Ventilation [ ]Non-invasive ventilatory support [ ]High-Flow   [ ]Acute  [ ]Chronic [ ]Hypoxic  [ ]Hypercarbic [ ]Other  [x ]Other organ failure - renal failure    LABS:                        8.9    32.35 )-----------( 228      ( 02 Jun 2024 06:53 )             27.4   06-02    141  |  106  |  64<H>  ----------------------------<  118<H>  4.0   |  12<L>  |  2.57<H>    Ca    8.7      02 Jun 2024 06:53    TPro  4.8<L>  /  Alb  2.3<L>  /  TBili  0.7  /  DBili  x   /  AST  64<H>  /  ALT  36  /  AlkPhos  131<H>  06-02      Urinalysis Basic - ( 02 Jun 2024 06:53 )    Color: x / Appearance: x / SG: x / pH: x  Gluc: 118 mg/dL / Ketone: x  / Bili: x / Urobili: x   Blood: x / Protein: x / Nitrite: x   Leuk Esterase: x / RBC: x / WBC x   Sq Epi: x / Non Sq Epi: x / Bacteria: x      RADIOLOGY & ADDITIONAL STUDIES:  < from: CT Head No Cont (05.26.24 @ 10:10) >    ACC: [id number] EXAM:  CT BRAIN   ORDERED BY: RAYMOND WILKINSON     PROCEDURE DATE:  05/26/2024          INTERPRETATION:  PROCEDURE: CT head without intravenous contrast    CLINICAL INDICATION: Bacteremia, shock, ROBERT. Now AMS, nonverbal.    TECHNIQUE: CT imaging of the brain is performed with multiplanar images   reviewed and displayed with both bone and soft tissue algorithm. No   contrast given.    COMPARISON: 01/20/2024    FINDINGS:    Initial scan limited by motion, with repeat images acquired.    Noacute hemorrhage. Ventricles are stable. No hydrocephalus, mass effect   or midline shift. No extra-axial collection. Approximately 9 x 4 mm   meningioma the left lateral frontoparietal convexity is stable (6;5,   9;20).    Gray to white differentiation appears preserved, without CT evidence of   recent transcortical or territorial infarct.    Bone algorithm images show no calvarial fracture or acute abnormality of   the calvarium or skull base. Paranasal sinuses and mastoids included on   this scan show no acute disease. Right sided nasogastric tube partially   seen.      IMPRESSION:  No acute intracranial findings. No change compared to 01/20/2024.    --- End of Report ---            CONSTANTIN CISSE MD; Attending Radiologist  This documenthas been electronically signed. May 26 2024 12:32PM    < end of copied text >      Protein Calorie Malnutrition Present: [ ]mild [ ]moderate [ ]severe [ ]underweight [ ]morbid obesity  https://www.andeal.org/vault/2440/web/files/ONC/Table_Clinical%20Characteristics%20to%20Document%20Malnutrition-White%20JV%20et%20al%202012.pdf    Height (cm): 154.9 (05-19-24 @ 14:27), 162.6 (01-20-24 @ 14:53)  Weight (kg): 46.8 (05-19-24 @ 23:00), 54.4 (01-20-24 @ 14:53)  BMI (kg/m2): 19.5 (05-19-24 @ 23:00), 22.7 (05-19-24 @ 14:27), 20.6 (01-20-24 @ 14:53)    [x ]PPSV2 < or = 30%  [ ]significant weight loss [x ]poor nutritional intake [ ]anasarca[ ]Artificial Nutrition    Other REFERRALS:  [ ]Hospice  [ ]Child Life  [ x]Social Work  [ ]Case management [ ]Holistic Therapy     Goals of Care Document: Indication for Geriatrics and Palliative Care Services/INTERVAL HPI: goals of care  SUBJECTIVE AND OBJECTIVE: Patient seen and examined; lethargic, mottled.     OVERNIGHT EVENTS: Goals transitioned to comfort care yesterday afternoon. No PRN medications required. Pressors capped.    DNR on chart: Intubate    Allergies    penicillin (Swelling)    Intolerances    epinephrine (Other)  MEDICATIONS  (STANDING):  albumin human  5% IVPB 250 milliLiter(s) IV Intermittent once  ascorbic acid 250 milliGRAM(s) Oral daily  chlorhexidine 2% Cloths 1 Application(s) Topical daily  droxidopa 200 milliGRAM(s) Oral three times a day  hydrocortisone sodium succinate Injectable 100 milliGRAM(s) IV Push every 8 hours  lidocaine   4% Patch 1 Patch Transdermal every 24 hours  meropenem  IVPB 500 milliGRAM(s) IV Intermittent every 12 hours  midodrine. 10 milliGRAM(s) Oral three times a day  multivitamin 1 Tablet(s) Oral daily  mupirocin 2% Nasal 1 Application(s) Both Nostrils two times a day  norepinephrine Infusion 0.05 MICROgram(s)/kG/Min (4.39 mL/Hr) IV Continuous <Continuous>  pantoprazole  Injectable 40 milliGRAM(s) IV Push two times a day    MEDICATIONS  (PRN):  acetaminophen   Oral Liquid .. 470 milliGRAM(s) Oral every 6 hours PRN Mild Pain (1 - 3), Moderate Pain (4 - 6)      ITEMS UNCHECKED ARE NOT PRESENT    PRESENT SYMPTOMS: [x ]Unable to self-report - see [ ] CPOT [ x] PAINADS [x ] RDOS  Source if other than patient:  [ ]Family   [ ]Team     Pain:  [ ]yes [ ]no  QOL impact -   Location -                    Aggravating factors -  Quality -  Radiation -  Timing-  Severity (0-10 scale):  Minimal acceptable level (0-10 scale):     CPOT:    https://www.sccm.org/getattachment/hyi53u50-8g3h-0w1c-2d2z-2707e4271c3z/Critical-Care-Pain-Observation-Tool-(CPOT)    Dyspnea:                           [ ]Mild [ ]Moderate [ ]Severe  Anxiety:                             [ ]Mild [ ]Moderate [ ]Severe  Fatigue:                             [ ]Mild [ ]Moderate [ ]Severe  Nausea:                             [ ]Mild [ ]Moderate [ ]Severe  Loss of appetite:              [ ]Mild [ ]Moderate [ ]Severe  Constipation:                    [ ]Mild [ ]Moderate [ ]Severe    PCSSQ[Palliative Care Spiritual Screening Question]   Severity (0-10):  Score of 4 or > indicate consideration of Chaplaincy referral.  Chaplaincy Referral: [ ] yes [ ] refused [ ] following [x ] Deferred     Caregiver [place]? : [ ] yes [x ] no [ ] Deferred [ ] Declined             Social work referral [ ] Patient & Family Centered Care Referral [ ]     Anticipatory Grief present?:  [ ] yes [ x] no  [ ] Deferred                  Social work referral [ ] Chaplaincy Referral[ ]      Other Symptoms:  [ ]All other review of systems negative   [ x]Unable to obtain due to poor mentation    Palliative Performance Status Version 2:     10    %      http://npcrc.org/files/news/palliative_performance_scale_ppsv2.pdf  PHYSICAL EXAM:  Vital Signs Last 24 Hrs  T(C): 36.7 (03 Jun 2024 15:00), Max: 37.1 (02 Jun 2024 20:00)  T(F): 98 (03 Jun 2024 15:00), Max: 98.8 (02 Jun 2024 20:00)  HR: 93 (03 Jun 2024 15:00) (66 - 94)  BP: 114/42 (03 Jun 2024 15:00) (79/62 - 198/74)  BP(mean): 61 (03 Jun 2024 15:00) (42 - 122)  RR: 10 (03 Jun 2024 15:00) (10 - 25)  SpO2: 100% (03 Jun 2024 15:00) (98% - 100%)    Parameters below as of 03 Jun 2024 07:00  Patient On (Oxygen Delivery Method): nasal cannula  O2 Flow (L/min): 4       GENERAL: [ ]Cachexia    [ ]Alert  [ ]Oriented x   [x ]Lethargic  [ ]Unarousable  [ ]Verbal  [ ]Non-Verbal  Behavioral:   [ ]Anxiety  [ ]Delirium [ ]Agitation [ ]Other  HEENT:  [ ]Normal   [ x]Dry mouth   [ ]ET Tube/Trach  [ ]Oral lesions  PULMONARY:   [ ]Clear [ ]Tachypnea  [ ]Audible excessive secretions   [ ]Rhonchi        [ ]Right [ ]Left [ ]Bilateral  [ ]Crackles        [ ]Right [ ]Left [ ]Bilateral  [ ]Wheezing     [ ]Right [ ]Left [ ]Bilateral  [ x]Diminished BS [ ] Right [ ]Left [ ]Bilateral  CARDIOVASCULAR:    [x ]Regular [ ]Irregular [ ]Tachy  [ ]Pato [ ]Murmur [ ]Other  GASTROINTESTINAL:  [x ]Soft  [ ]Distended   [ ]+BS  [ x]Non tender [ ]Tender  [ ]Other [ ]PEG [x ]OGT/ NGT   Last BM:   GENITOURINARY:  [ ]Normal [x ]Incontinent   [ ]Oliguria/Anuria   [ ]Carrillo  MUSCULOSKELETAL:   [ ]Normal   [ ]Weakness  [ x]Bed/Wheelchair bound [ ]Edema  NEUROLOGIC:   [ ]No focal deficits  [ ] Cognitive impairment  [ ] Dysphagia [ ]Dysarthria [ ] Paresis [ ]Other   SKIN: mottled finger tips and toes  [ ]Normal  [ ]Rash  [ ]Other  [ ]Pressure ulcer(s) [ ]y [ ]n present on admission    CRITICAL CARE:  [x ]Shock Present  [x ]Septic [ ]Cardiogenic [ ]Neurologic [ ]Hypovolemic  [ x]Vasopressors [ ]Inotropes  [ ]Respiratory failure present [ ]Mechanical Ventilation [ ]Non-invasive ventilatory support [ ]High-Flow   [ ]Acute  [ ]Chronic [ ]Hypoxic  [ ]Hypercarbic [ ]Other  [x ]Other organ failure - renal failure    LABS:                        8.9    32.35 )-----------( 228      ( 02 Jun 2024 06:53 )             27.4   06-02    141  |  106  |  64<H>  ----------------------------<  118<H>  4.0   |  12<L>  |  2.57<H>    Ca    8.7      02 Jun 2024 06:53    TPro  4.8<L>  /  Alb  2.3<L>  /  TBili  0.7  /  DBili  x   /  AST  64<H>  /  ALT  36  /  AlkPhos  131<H>  06-02      Urinalysis Basic - ( 02 Jun 2024 06:53 )    Color: x / Appearance: x / SG: x / pH: x  Gluc: 118 mg/dL / Ketone: x  / Bili: x / Urobili: x   Blood: x / Protein: x / Nitrite: x   Leuk Esterase: x / RBC: x / WBC x   Sq Epi: x / Non Sq Epi: x / Bacteria: x      RADIOLOGY & ADDITIONAL STUDIES:  < from: CT Head No Cont (05.26.24 @ 10:10) >    ACC: [id number] EXAM:  CT BRAIN   ORDERED BY: RAYMOND WILKINSON     PROCEDURE DATE:  05/26/2024          INTERPRETATION:  PROCEDURE: CT head without intravenous contrast    CLINICAL INDICATION: Bacteremia, shock, ROBERT. Now AMS, nonverbal.    TECHNIQUE: CT imaging of the brain is performed with multiplanar images   reviewed and displayed with both bone and soft tissue algorithm. No   contrast given.    COMPARISON: 01/20/2024    FINDINGS:    Initial scan limited by motion, with repeat images acquired.    Noacute hemorrhage. Ventricles are stable. No hydrocephalus, mass effect   or midline shift. No extra-axial collection. Approximately 9 x 4 mm   meningioma the left lateral frontoparietal convexity is stable (6;5,   9;20).    Gray to white differentiation appears preserved, without CT evidence of   recent transcortical or territorial infarct.    Bone algorithm images show no calvarial fracture or acute abnormality of   the calvarium or skull base. Paranasal sinuses and mastoids included on   this scan show no acute disease. Right sided nasogastric tube partially   seen.      IMPRESSION:  No acute intracranial findings. No change compared to 01/20/2024.    --- End of Report ---            CONSTANTIN CISSE MD; Attending Radiologist  This documenthas been electronically signed. May 26 2024 12:32PM    < end of copied text >      Protein Calorie Malnutrition Present: [ ]mild [ ]moderate [ ]severe [ ]underweight [ ]morbid obesity  https://www.andeal.org/vault/2440/web/files/ONC/Table_Clinical%20Characteristics%20to%20Document%20Malnutrition-White%20JV%20et%20al%202012.pdf    Height (cm): 154.9 (05-19-24 @ 14:27), 162.6 (01-20-24 @ 14:53)  Weight (kg): 46.8 (05-19-24 @ 23:00), 54.4 (01-20-24 @ 14:53)  BMI (kg/m2): 19.5 (05-19-24 @ 23:00), 22.7 (05-19-24 @ 14:27), 20.6 (01-20-24 @ 14:53)    [x ]PPSV2 < or = 30%  [ ]significant weight loss [x ]poor nutritional intake [ ]anasarca[ ]Artificial Nutrition    Other REFERRALS:  [ ]Hospice  [ ]Child Life  [ x]Social Work  [ ]Case management [ ]Holistic Therapy     Goals of Care Document:

## 2024-06-03 NOTE — PROGRESS NOTE ADULT - ASSESSMENT
79y female with PMH GERD, MI, chronic pancreatitis/insufficiency, HTN, incontinence, recent R hip fx s/p repair (1/2024) transferred to the MICU for hemodynamic instability iso of hemorrhagic vs. septic shock. On 6/1, RRT called for hypotension with SBP initially 70-80s. During RRT, patient was resuscitated with  cc, midodrine 10 and albumin 5% 250 cc with improvement in MAP > 60. On evaluation, patients mental status remains unchanged from baseline noted during hospital stay.     PLAN  =====Neurologic=====  #Septic encephalopathy +/- uremic encephalopathy  -AOX0-1    =====Pulmonary=====  saturating well on NC 4L 96%    #Left sided pleural effusion    =====Cardiovascular=====  #Septic vs. Hemorrhagic Shock:  -0.55 Levo for Septic shock vs. hemorrhagic shock 2/2 GI bleed  -Midodrine and droxidopa via NG tube   -Wean off pressors as tolerated    #HTN  - holding home atenolol and olmesartan given shock     =====GI====  #H/o Chronic pancreatitis/insufficiency   - No concern for active flare   - Continue home Creon TID premeal (held for now bc pt too lethargic to take po and cannot be crushed)    #GI prophylaxis:  -Pantoprazole 40 BID IV push    #Diet  - 6/2: hold tube feeds for possible GI bleed    =====Renal/=====  #ROBERT on CKD iso sepsis and blood loss  -Required intermittent HD in the past   -F/u nephro on exchange in [facility] and possible HD now     #R hydronephrosis   suspect reactive secondary to pyelonephritis. CT without obvious obstruction/stone  - likely not contributing significantly to sepsis/ARF. Was evaluated by urology, low suspicion for true obstruction, no indications for acute interventions for now but can consider ureteral stent if not improving with medical management   - renal US 5/22 with mild R hydro, mild pelvic free fluid     =====Endocrine=====  -No active issues    =====Infectious Disease=====  -Meropenem and vanc dosed by trough  -f/u blood cultures, urine cultures. (-)MRSA (+)Staph aureus PCR  -Appreciate ID recs    =====Heme/Onc=====  #DVT Ppx  - SCDs, no AC for acute blood loss    #Acute GI bleed  -Elevated INR, most likely 2/2 malnutrition. Will give administer Vitamin K for 3days  -Transfuse for Hgb <7    =====Ethics=====  DNR/ DNI, cap pressors 0.6. no tube feeds, no dialysis. continue abxs and fluids. Discussed GOC with  at bedside 79y female with PMH GERD, MI, chronic pancreatitis/insufficiency, HTN, incontinence, recent R hip fx s/p repair (1/2024) transferred to the MICU for hemodynamic instability iso of hemorrhagic vs. septic shock. On 6/1, RRT called for hypotension with SBP initially 70-80s. During RRT, patient was resuscitated with  cc, midodrine 10 and albumin 5% 250 cc with improvement in MAP > 60. On evaluation, patients mental status remains unchanged from baseline noted during hospital stay.     PLAN  =====Neurologic=====  #Septic encephalopathy +/- uremic encephalopathy  -AOX0-1    =====Pulmonary=====  saturating well on NC 4L 96%    #Left sided pleural effusion  will monitor     =====Cardiovascular=====  #Septic vs. Hemorrhagic Shock:  -was on levo 0.6 but pressors currently capped and will continue to titrate off as able   -Wean off pressors as tolerated    #HTN  - holding home atenolol and olmesartan given shock     =====GI====  #Diet  #H/o Chronic pancreatitis/insufficiency   -no oral access at this time     #GI prophylaxis:  -Pantoprazole 40 BID IV push    =====Renal/=====  #ROBERT on CKD iso sepsis and blood loss  - now comfort measures     #R hydronephrosis   suspect reactive secondary to pyelonephritis. CT without obvious obstruction/stone  - likely not contributing significantly to sepsis/ARF.    =====Endocrine=====  -No active issues    =====Infectious Disease=====  -Meropenem and vanc dosed by trough  -f/u blood cultures, urine cultures. (-)MRSA (+)Staph aureus PCR  -Appreciate ID recs    =====Heme/Onc=====  #DVT Ppx  - SCDs, no AC for acute blood loss    #Acute GI bleed  -no additional blood draws    =====Ethics=====  DNR/ DNI, cap pressors 0.6. no tube feeds, no dialysis. continue abxs and fluids. Discussed GOC with  at bedside

## 2024-06-03 NOTE — PROGRESS NOTE ADULT - ASSESSMENT
80 yo F with pancreatic insufficiency, hip Fx and FTT. Hospitalization c/b sepsis (E Coli bacteremia), encephalopathy (today not arousable), and ROBERT (on HD). Functionality is severely compromised. Geriatrics and Palliative Medicine ([facility]) Team was called for GOC.    80 yo F with pancreatic insufficiency, hip Fx and FTT. Hospitalization c/b sepsis (E Coli bacteremia), encephalopathy (today not arousable), and ROBERT (on HD). Functionality also severely compromised. Hospital course complicated by inability to tolerate HD in setting of hypotension. Palliative team following for GOc. Goals are now for comfort directed care. Pressor support capped with down titration as tolerated. Pt to be transferred to PCU for symptom management and end of life care when bed available.

## 2024-06-03 NOTE — CHART NOTE - NSCHARTNOTEFT_GEN_A_CORE
MICU Transfer Note    Transfer from: MICU    Transfer to: (  ) Medicine    (  ) Telemetry     (   ) RCU        (  X  ) Palliative         (   ) Stroke Unit          (   ) __________________    Accepting physician: Amee Markham MD      HOSPITAL / MICU COURSE:  80 y/o female, PMH GERD, MI, chronic pancreatitis/insufficiency, HTN, incontinence, recent R hip fx s/p repair (1/2024), initially presented to ED on 5/19/24 c/o increased lethargy, failure to thrive, decreased PO intake nausea/vomiting. Upon presentation to ED patient noted to also have suprapubic pain but denied dysuria and hematuria. Per  who provided collateral, pt was discharged from rehab center after prolonged stay from R femur fracture. At home the first 1-2 days she was fine but then over last 48hrs appeared to be more lethargic and less interactive prompting ED visit. No one at home is sick, no recent travel. Unsure of medications. Patient did not have any fevers, chills, N/V/D.     ED course notable for lab with leukocytosis to 40, with urine appearing like pus, metabolic acidemia, pH 7.13, bicarb 8. Pt was started on bicarb gtt. Carrillo placed minimal output new ROBERT BUN/Cr 103/5.56. Nephrology consulted for urgent HD. Initially  unsure of GOC and deferred decision. Pt started on bicarb drip, received 3L NS and 1L LR, Cefepime 1g, Vanc 1g, Tylenol 1g.     Pt was admitted to MICU on 5/19/24 for urgent HD. Hospitalization c/b sepsis (E Coli bacteremia), encephalopathy, and ROBERT. Hospital course further complicated by inability to tolerate HD in setting of hypotension. Palliative team following for GOC Goals are now for comfort directed care. Pressors titrated off evening of 6/3/24. Pt to be transferred to PCU for symptom management and end of life care.      ASSESSMENT & PLAN:   80 y/o female, PMH GERD, MI, chronic pancreatitis/insufficiency, HTN, incontinence, recent R hip fx s/p repair (1/2024), initially presented to ED on 5/19/24 c/o increased lethargy, failure to thrive, decreased PO intake nausea/vomiting. Pt was admitted to MICU on 5/19/24 for urgent HD, course c/b sepsis (E Coli bacteremia), encephalopathy, and ROBERT. Hospital course further complicated by inability to tolerate HD in setting of hypotension. Palliative team following for GOC Goals are now for comfort directed care. Pressors titrated off evening of 6/3/24. Pt to be transferred to PCU for symptom management and end of life care.    PLAN  =====Neurologic=====  #Septic encephalopathy +/- uremic encephalopathy  -AOx0-1    =====Pulmonary=====  #Left sided pleural effusion  -CTM   -saturating well on NC 4L 96%    =====Cardiovascular=====  #Septic vs. Hemorrhagic Shock:  -pressors titrated off evening of 6/3/24    #HTN  - holding home atenolol and olmesartan    =====GI====  #Diet  #H/o Chronic pancreatitis/insufficiency   -NPO except meds    #GI prophylaxis:  -c/w Pantoprazole 40 BID IV push    =====Renal/=====  #ROBERT on CKD iso sepsis and blood loss  -no HD    #R hydronephrosis   -suspect reactive secondary to pyelonephritis  -CT without obvious obstruction/stone  -likely not contributing significantly to sepsis/ARF    =====Endocrine=====  -No active issues    =====Infectious Disease=====  -Meropenem and vanc dosed by trough  -blood cultures from 6/2 negative  -urine culture from 5/40 () E faecium  -(-)MRSA (+)Staph aureus PCR  -Appreciate ID recs    =====Heme/Onc=====  #DVT Ppx  - SCDs, no AC for acute blood loss    #Acute GI bleed  -no additional blood draws    =====Ethics=====  -DNR/ DNI  -comfort measures  -dilaudid and ativan PRN  -pressors titrated off on 6/3/24  -no tube feeds  -no dialysis  -continue abxs and fluids  -Discussed GOC with  at bedside                For Followup:  []   []   [] MICU Transfer Note    Transfer from: MICU    Transfer to: (  ) Medicine    (  ) Telemetry     (   ) RCU        (  X  ) Palliative         (   ) Stroke Unit          (   ) __________________    Accepting physician: Amee Markham MD      HOSPITAL / MICU COURSE:  80 y/o female, PMH GERD, MI, chronic pancreatitis/insufficiency, HTN, incontinence, recent R hip fx s/p repair (1/2024), initially presented to ED on 5/19/24 c/o increased lethargy, failure to thrive, decreased PO intake nausea/vomiting. Upon presentation to ED patient noted to also have suprapubic pain but denied dysuria and hematuria. Per  who provided collateral, pt was discharged from rehab center after prolonged stay from R femur fracture. At home the first 1-2 days she was fine but then over last 48hrs appeared to be more lethargic and less interactive prompting ED visit. No one at home is sick, no recent travel. Unsure of medications. Patient did not have any fevers, chills, N/V/D.     ED course notable for lab with leukocytosis to 40, with urine appearing like pus, metabolic acidemia, pH 7.13, bicarb 8. Pt was started on bicarb gtt. Carrillo placed minimal output new ROBERT BUN/Cr 103/5.56. Nephrology consulted for urgent HD. Initially  unsure of GOC and deferred decision. Pt started on bicarb drip, received 3L NS and 1L LR, Cefepime 1g, Vanc 1g, Tylenol 1g.     Pt was admitted to MICU on 5/19/24 for urgent HD. Hospitalization c/b sepsis (E Coli bacteremia), encephalopathy, and ROBERT. Hospital course further complicated by inability to tolerate HD in setting of hypotension. Palliative team following for GOC Goals are now for comfort directed care. Pressors titrated off evening of 6/3/24. Pt to be transferred to PCU for symptom management and end of life care.      ASSESSMENT & PLAN:   80 y/o female, PMH GERD, MI, chronic pancreatitis/insufficiency, HTN, incontinence, recent R hip fx s/p repair (1/2024), initially presented to ED on 5/19/24 c/o increased lethargy, failure to thrive, decreased PO intake nausea/vomiting. Pt was admitted to MICU on 5/19/24 for urgent HD, course c/b sepsis (E Coli bacteremia), encephalopathy, and ROBERT. Hospital course further complicated by inability to tolerate HD in setting of hypotension. Palliative team following for GOC Goals are now for comfort directed care. Pressors titrated off evening of 6/3/24. Pt to be transferred to PCU for symptom management and end of life care.    PLAN  =====Neurologic=====  #Septic encephalopathy +/- uremic encephalopathy  -AOx0-1    =====Pulmonary=====  #Left sided pleural effusion  -CTM   -saturating well on NC 4L 96%    =====Cardiovascular=====  #Septic vs. Hemorrhagic Shock:  -pressors titrated off evening of 6/3/24    #HTN  - holding home atenolol and olmesartan    =====GI====  #Diet  #H/o Chronic pancreatitis/insufficiency   -NPO except meds    #GI prophylaxis:  -c/w Pantoprazole 40 BID IV push    =====Renal/=====  #ROBERT on CKD iso sepsis and blood loss  -no HD    #R hydronephrosis   -suspect reactive secondary to pyelonephritis  -CT without obvious obstruction/stone  -likely not contributing significantly to sepsis/ARF    =====Endocrine=====  -No active issues    =====Infectious Disease=====  -c/w Meropenem   -(-) blood cultures from 6/2  -urine culture from 5/30 (+) E faecium  -(-)MRSA (+)Staph aureus PCR  -Appreciate ID recs    =====Heme/Onc=====  #DVT Ppx  - SCDs, no AC for acute blood loss    #Acute GI bleed  -no additional blood draws    =====Ethics=====  -DNR/ DNI  -comfort measures  -dilaudid and ativan PRN  -pressors titrated off on 6/3/24  -no tube feeds  -no dialysis  -continue abxs and fluids  -Discussed GOC with  at bedside

## 2024-06-03 NOTE — PROGRESS NOTE ADULT - ASSESSMENT
1. failure to thrive  patient is now comfort care, no plans for PEG     2. Leukocytosis    3. ROBERT

## 2024-06-03 NOTE — PROGRESS NOTE ADULT - SUBJECTIVE AND OBJECTIVE BOX
Patient is a 79y old  Female who presents with a chief complaint of Weakness (03 Jun 2024 05:53)      SUBJECTIVE / OVERNIGHT EVENTS:  Patient seen and evaluated at bedside.  Now comfort measures only with comfort orders placed.     Vital Signs Last 24 Hrs  T(C): 36.3 (03 Jun 2024 07:00), Max: 37.2 (02 Jun 2024 15:00)  T(F): 97.3 (03 Jun 2024 07:00), Max: 99 (02 Jun 2024 15:00)  HR: 66 (03 Jun 2024 08:00) (66 - 94)  BP: 104/55 (03 Jun 2024 08:00) (70/50 - 198/74)  BP(mean): 71 (03 Jun 2024 08:00) (42 - 122)  RR: 11 (03 Jun 2024 08:00) (11 - 25)  SpO2: 100% (03 Jun 2024 08:00) (91% - 100%)    Parameters below as of 03 Jun 2024 07:00  Patient On (Oxygen Delivery Method): nasal cannula  O2 Flow (L/min): 4      PHYSICAL EXAM:  General: laying in bed and tracking with eyes however, not making significant movements   HEENT: Pupils constricted, ERRL. No scleral icterus or conjunctival pallor.  Chest/Lungs: CTAB, no wheezes, rhonchi, or rales. remains rotated in bed   Heart: Regular rate and regular rhythm.   Abdomen: Soft, non tender to palpation. No distension.   Extremities/skin: No significant extremity edema. has generalized bruising across skin  Neuro: laying in bed but without meaningful movements   Psych: AAO x 0    LABS:                        7.7    30.46 )-----------( 241      ( 02 Jun 2024 11:51 )             22.9     Hgb Trend: 7.7<--, 8.9<--, 6.6<--, 8.2<--, 9.6<--  06-02    140  |  107  |  65<H>  ----------------------------<  152<H>  3.9   |  13<L>  |  2.48<H>    Ca    8.6      02 Jun 2024 11:51  Phos  6.1     06-02  Mg     1.7     06-02    TPro  5.0<L>  /  Alb  2.7<L>  /  TBili  0.7  /  DBili  x   /  AST  50<H>  /  ALT  28  /  AlkPhos  106  06-02    Creatinine Trend: 2.48<--, 2.57<--, 2.40<--, 2.86<--, 2.79<--, 2.54<--  LIVER FUNCTIONS - ( 02 Jun 2024 11:51 )  Alb: 2.7 g/dL / Pro: 5.0 g/dL / ALK PHOS: 106 U/L / ALT: 28 U/L / AST: 50 U/L / GGT: x           PT/INR - ( 02 Jun 2024 11:51 )   PT: 17.4 sec;   INR: 1.68 ratio         PTT - ( 02 Jun 2024 11:51 )  PTT:41.0 sec      Urinalysis Basic - ( 02 Jun 2024 11:51 )    Color: x / Appearance: x / SG: x / pH: x  Gluc: 152 mg/dL / Ketone: x  / Bili: x / Urobili: x   Blood: x / Protein: x / Nitrite: x   Leuk Esterase: x / RBC: x / WBC x   Sq Epi: x / Non Sq Epi: x / Bacteria: x     Patient is a 79y old  Female who presents with a chief complaint of Weakness (03 Jun 2024 05:53)      SUBJECTIVE / OVERNIGHT EVENTS:  Patient seen and evaluated at bedside.  Now comfort measures only with comfort orders placed. Patient appears comfortable laying in bed.    Vital Signs Last 24 Hrs  T(C): 36.3 (03 Jun 2024 07:00), Max: 37.2 (02 Jun 2024 15:00)  T(F): 97.3 (03 Jun 2024 07:00), Max: 99 (02 Jun 2024 15:00)  HR: 66 (03 Jun 2024 08:00) (66 - 94)  BP: 104/55 (03 Jun 2024 08:00) (70/50 - 198/74)  BP(mean): 71 (03 Jun 2024 08:00) (42 - 122)  RR: 11 (03 Jun 2024 08:00) (11 - 25)  SpO2: 100% (03 Jun 2024 08:00) (91% - 100%)    Parameters below as of 03 Jun 2024 07:00  Patient On (Oxygen Delivery Method): nasal cannula  O2 Flow (L/min): 4      PHYSICAL EXAM:  General: laying in bed and tracking with eyes however, not making significant movements   HEENT: Pupils constricted, ERRL. No scleral icterus or conjunctival pallor.  Chest/Lungs: CTAB, no wheezes, rhonchi, or rales. remains rotated in bed   Heart: Regular rate and regular rhythm.   Abdomen: Soft, non tender to palpation. No distension.   Extremities/skin: No significant extremity edema. has generalized bruising across skin  Neuro: laying in bed but without meaningful movements   Psych: AAO x 0    LABS:                        7.7    30.46 )-----------( 241      ( 02 Jun 2024 11:51 )             22.9     Hgb Trend: 7.7<--, 8.9<--, 6.6<--, 8.2<--, 9.6<--  06-02    140  |  107  |  65<H>  ----------------------------<  152<H>  3.9   |  13<L>  |  2.48<H>    Ca    8.6      02 Jun 2024 11:51  Phos  6.1     06-02  Mg     1.7     06-02    TPro  5.0<L>  /  Alb  2.7<L>  /  TBili  0.7  /  DBili  x   /  AST  50<H>  /  ALT  28  /  AlkPhos  106  06-02    Creatinine Trend: 2.48<--, 2.57<--, 2.40<--, 2.86<--, 2.79<--, 2.54<--  LIVER FUNCTIONS - ( 02 Jun 2024 11:51 )  Alb: 2.7 g/dL / Pro: 5.0 g/dL / ALK PHOS: 106 U/L / ALT: 28 U/L / AST: 50 U/L / GGT: x           PT/INR - ( 02 Jun 2024 11:51 )   PT: 17.4 sec;   INR: 1.68 ratio         PTT - ( 02 Jun 2024 11:51 )  PTT:41.0 sec      Urinalysis Basic - ( 02 Jun 2024 11:51 )    Color: x / Appearance: x / SG: x / pH: x  Gluc: 152 mg/dL / Ketone: x  / Bili: x / Urobili: x   Blood: x / Protein: x / Nitrite: x   Leuk Esterase: x / RBC: x / WBC x   Sq Epi: x / Non Sq Epi: x / Bacteria: x

## 2024-06-03 NOTE — PROGRESS NOTE ADULT - SUBJECTIVE AND OBJECTIVE BOX
Chief Complaint:  Patient is a 79y old  Female who presents with a chief complaint of Weakness (2024 14:04)      Date of service 24 @ 16:46      Interval Events:   no acute events     Hospital Medications:  acetaminophen   Oral Liquid .. 470 milliGRAM(s) Oral every 6 hours PRN  bisacodyl Suppository 10 milliGRAM(s) Rectal daily PRN  chlorhexidine 2% Cloths 1 Application(s) Topical daily  glycopyrrolate Injectable 0.2 milliGRAM(s) IV Push every 6 hours PRN  hydrocortisone sodium succinate Injectable 100 milliGRAM(s) IV Push every 8 hours  HYDROmorphone  Injectable 0.2 milliGRAM(s) IV Push every 1 hour PRN  HYDROmorphone  Injectable 0.5 milliGRAM(s) IV Push every 1 hour PRN  HYDROmorphone  Injectable 0.5 milliGRAM(s) IV Push every 1 hour PRN  lidocaine   4% Patch 1 Patch Transdermal every 24 hours  LORazepam   Injectable 0.5 milliGRAM(s) IV Push every 1 hour PRN  meropenem  IVPB 500 milliGRAM(s) IV Intermittent every 12 hours  mupirocin 2% Nasal 1 Application(s) Both Nostrils two times a day  norepinephrine Infusion 0.05 MICROgram(s)/kG/Min IV Continuous <Continuous>  pantoprazole  Injectable 40 milliGRAM(s) IV Push two times a day        Review of Systems:  General:  No wt loss, fevers, chills, night sweats, fatigue,   Eyes:  Good vision, no reported pain  ENT:  No sore throat, pain, runny nose, dysphagia  CV:  No pain, palpitations, hypo/hypertension  Resp:  No dyspnea, cough, tachypnea, wheezing  GI:  See HPI  :  No pain, bleeding, incontinence, nocturia  Muscle:  No pain, weakness  Neuro:  No weakness, tingling, memory problems  Psych:  No fatigue, insomnia, mood problems, depression  Endocrine:  No polyuria, polydipsia, cold/heat intolerance  Heme:  No petechiae, ecchymosis, easy bruisability  Integumentary:  No rash, edema    PHYSICAL EXAM:   Vital Signs:  Vital Signs Last 24 Hrs  T(C): 36.7 (2024 15:00), Max: 37.1 (2024 20:00)  T(F): 98 (2024 15:00), Max: 98.8 (2024 20:00)  HR: 82 (2024 16:00) (66 - 94)  BP: 100/51 (2024 16:00) (79/62 - 198/74)  BP(mean): 73 (2024 16:00) (42 - 122)  RR: 12 (2024 16:00) (10 - 25)  SpO2: 100% (2024 16:00) (99% - 100%)    Parameters below as of 2024 07:00  Patient On (Oxygen Delivery Method): nasal cannula  O2 Flow (L/min): 4    Daily     Daily Weight in k.3 (2024 06:15)      PHYSICAL EXAM:     GENERAL:  Appears stated age, well-groomed, well-nourished, no distress  HEENT:  NC/AT,  conjunctivae anicteric, clear and pink,   NECK: supple, trachea midline  CHEST:  Full & symmetric excursion, no increased effort, breath sounds clear  HEART:  Regular rhythm, no JVD  ABDOMEN:  Soft, non-tender, non-distended, normoactive bowel sounds,  no masses , no hepatosplenomegaly  EXTREMITIES:  no cyanosis,clubbing or edema  SKIN:  No rash, erythema, or, ecchymoses, no jaundice  NEURO:  Alert, non-focal, no asterixis  PSYCH: Appropriate affect, oriented to place and time  RECTAL: Deferred      LABS Personally reviewed by me:                        7.7    30.46 )-----------( 241      ( 2024 11:51 )             22.9       06-02    140  |  107  |  65<H>  ----------------------------<  152<H>  3.9   |  13<L>  |  2.48<H>    Ca    8.6      2024 11:51  Phos  6.1     06-02  Mg     1.7     06-02    TPro  5.0<L>  /  Alb  2.7<L>  /  TBili  0.7  /  DBili  x   /  AST  50<H>  /  ALT  28  /  AlkPhos  106  06-02    LIVER FUNCTIONS - ( 2024 11:51 )  Alb: 2.7 g/dL / Pro: 5.0 g/dL / ALK PHOS: 106 U/L / ALT: 28 U/L / AST: 50 U/L / GGT: x           PT/INR - ( 2024 11:51 )   PT: 17.4 sec;   INR: 1.68 ratio         PTT - ( 2024 11:51 )  PTT:41.0 sec  Urinalysis Basic - ( 2024 11:51 )    Color: x / Appearance: x / SG: x / pH: x  Gluc: 152 mg/dL / Ketone: x  / Bili: x / Urobili: x   Blood: x / Protein: x / Nitrite: x   Leuk Esterase: x / RBC: x / WBC x   Sq Epi: x / Non Sq Epi: x / Bacteria: x                              7.7    30.46 )-----------( 241      ( 2024 11:51 )             22.9                         8.9    32.35 )-----------( 228      ( 2024 06:53 )             27.4                         6.6    31.87 )-----------( 278      ( 2024 21:44 )             21.6                         8.2    29.88 )-----------( 313      ( 2024 10:32 )             26.3       Imaging personally reviewed by me:

## 2024-06-03 NOTE — CHART NOTE - NSCHARTNOTEFT_GEN_A_CORE
Patient now comfort care as per palliative [facility] note.    Nephrology team will sign off. Please re-call us as needed.    Erickson Helm  Nephrology Fellow  Feel free to contact me on TEAMS  After 4 pm please contact the on-call Fellow.

## 2024-06-03 NOTE — PROGRESS NOTE ADULT - ASSESSMENT
79-year-old female with past medical history of GERD, MI, chronic pancreatitis, HTN, incontinence, recent hip surgery who presented w/ increased lethargy, failure to thrive, decreased PO intake nausea/vomiting. Patient admitted to ICU for metabolic acidosis and ROBERT requiring HD found to have E Coli bacteremia 2/2 UTI.    E. coli bacteremia due to UTI  5/19 Bcx with E.coli -- 5/21 Bcx cleared   5/19 Ucx with E.coli & E. faecalis  - low CFU of E. faecalis so unlikely to be contributing    now with sepsis/sirs 5/29  Leukocytosis had resolved, now noted with uptrend to ~19k -stable  Had episode of hypothermia 5/29 -- temps now wnl x24h  UA with pyuria - less than prior UA  Remains nonverbal, NGT in place, may be aspirating  RIJ HD catheter with no sign of infection   s/p ceftriaxone 5/20-5/30 > broadened to meropenem 5/30 5/30 CXR negative  5/30 BCx NGTD  5/30 UCx   10,000 - 49,000 CFU/mL Gram positive organisms    ROBERT due to ATN  Nephrology following, on HD    6/1, 6/2 Multiple RRT called d/t hypotension  Txferred to SICU on pressors 6/2 AM. Labs notable for persistent anemia and worsening leukocytosis  GOC discussions noted, patient no comfort care     Continue meropenem if c/w GOC  Additional care per primary team      Aisha Mcdowell M.D.  [facility], Division of Infectious Diseases  [phone]  After 5pm on weekdays and all day on weekends - please call [phone] 79-year-old female with past medical history of GERD, MI, chronic pancreatitis, HTN, incontinence, recent hip surgery who presented w/ increased lethargy, failure to thrive, decreased PO intake nausea/vomiting. Patient admitted to ICU for metabolic acidosis and ROBERT requiring HD found to have E Coli bacteremia 2/2 UTI.    E. coli bacteremia due to UTI  5/19 Bcx with E.coli -- 5/21 Bcx cleared   5/19 Ucx with E.coli & E. faecalis  - low CFU of E. faecalis so unlikely to be contributing    now with sepsis/sirs 5/29  Leukocytosis had resolved, now noted with uptrend to ~19k -stable  Had episode of hypothermia 5/29 -- temps now wnl x24h  UA with pyuria - less than prior UA  Remains nonverbal, NGT in place, may be aspirating  RIJ HD catheter with no sign of infection   s/p ceftriaxone 5/20-5/30 > broadened to meropenem 5/30 5/30 CXR negative  5/30 BCx NGTD  5/30 UCx   10,000 - 49,000 CFU/mL Gram positive organisms    ROBERT due to ATN  Nephrology following, on HD    6/1, 6/2 Multiple RRT called d/t hypotension  Txferred to SICU on pressors 6/2 AM. Labs notable for persistent anemia and worsening leukocytosis  GOC discussions noted, patient now comfort care and plan for transfer to PCU    Continue meropenem til 6/6 if c/w GOC  Additional care per primary team  Please call if any questions, thank you.      Aisha Mcdowell M.D.  [facility], Division of Infectious Diseases  [phone]  After 5pm on weekdays and all day on weekends - please call [phone]

## 2024-06-03 NOTE — PROGRESS NOTE ADULT - ATTENDING COMMENTS
79y female with PMH GERD, MI, chronic pancreatitis/insufficiency, HTN, incontinence, recent R hip fx s/p repair (1/2024) initially admitted to the MICU initially for E.coli bacteremia, metabolic acidosis, shock, on pressors, ROBERT requiring CRRT and RRT.     Now readmitted to MICU with sepsis, hypotension, BRBPR. Per prior discussion, pressors are capped, no HD. Patient is comfort care. Transfer to PCU when bed is available.

## 2024-06-03 NOTE — PROGRESS NOTE ADULT - SUBJECTIVE AND OBJECTIVE BOX
[facility] HEMATOLOGY/ONCOLOGY INPATIENT PROGRESS NOTE     Interval Hx:   06-03-24: Ms. Yee was seen at bedside today.    Meds:   MEDICATIONS  (STANDING):  chlorhexidine 2% Cloths 1 Application(s) Topical daily  hydrocortisone sodium succinate Injectable 100 milliGRAM(s) IV Push every 8 hours  lidocaine   4% Patch 1 Patch Transdermal every 24 hours  meropenem  IVPB 500 milliGRAM(s) IV Intermittent every 12 hours  mupirocin 2% Nasal 1 Application(s) Both Nostrils two times a day  norepinephrine Infusion 0.05 MICROgram(s)/kG/Min (4.39 mL/Hr) IV Continuous <Continuous>  pantoprazole  Injectable 40 milliGRAM(s) IV Push two times a day    MEDICATIONS  (PRN):  acetaminophen   Oral Liquid .. 470 milliGRAM(s) Oral every 6 hours PRN Mild Pain (1 - 3), Moderate Pain (4 - 6)  bisacodyl Suppository 10 milliGRAM(s) Rectal daily PRN Constipation  glycopyrrolate Injectable 0.2 milliGRAM(s) IV Push every 6 hours PRN secretions  HYDROmorphone  Injectable 0.2 milliGRAM(s) IV Push every 1 hour PRN Moderate Pain (4 - 6)  HYDROmorphone  Injectable 0.5 milliGRAM(s) IV Push every 1 hour PRN Severe Pain (7 - 10)  HYDROmorphone  Injectable 0.5 milliGRAM(s) IV Push every 1 hour PRN dyspnea  LORazepam   Injectable 0.5 milliGRAM(s) IV Push every 1 hour PRN anxiety, agitation, refractory dyspnea    Vital Signs Last 24 Hrs  T(C): 36.6 (03 Jun 2024 04:00), Max: 37.2 (02 Jun 2024 15:00)  T(F): 97.9 (03 Jun 2024 04:00), Max: 99 (02 Jun 2024 15:00)  HR: 78 (03 Jun 2024 05:45) (74 - 146)  BP: 133/48 (03 Jun 2024 05:45) (54/41 - 198/74)  BP(mean): 69 (03 Jun 2024 05:45) (42 - 122)  RR: 13 (03 Jun 2024 05:45) (12 - 25)  SpO2: 100% (03 Jun 2024 05:45) (91% - 100%)    Parameters below as of 02 Jun 2024 20:00  Patient On (Oxygen Delivery Method): room air  O2 Flow (L/min): 4    Physical Exam:  Gen: NAD, NG in place  HEENT: MMM  Chest: Equal chest rise  Cardiac: irregular  Abd: Nondistended  Neuro: AAOx0    Labs:                        7.7    30.46 )-----------( 241      ( 02 Jun 2024 11:51 )             22.9     CBC Full  -  ( 02 Jun 2024 11:51 )  WBC Count : 30.46 K/uL  RBC Count : 2.62 M/uL  Hemoglobin : 7.7 g/dL  Hematocrit : 22.9 %  Platelet Count - Automated : 241 K/uL  Mean Cell Volume : 87.4 fl  Mean Cell Hemoglobin : 29.4 pg  Mean Cell Hemoglobin Concentration : 33.6 gm/dL  Auto Neutrophil # : 27.93 K/uL  Auto Lymphocyte # : 0.53 K/uL  Auto Monocyte # : 1.40 K/uL  Auto Eosinophil # : 0.01 K/uL  Auto Basophil # : 0.06 K/uL  Auto Neutrophil % : 91.8 %  Auto Lymphocyte % : 1.7 %  Auto Monocyte % : 4.6 %  Auto Eosinophil % : 0.0 %  Auto Basophil % : 0.2 %    06-02    140  |  107  |  65<H>  ----------------------------<  152<H>  3.9   |  13<L>  |  2.48<H>    Ca    8.6      02 Jun 2024 11:51  Phos  6.1     06-02  Mg     1.7     06-02    TPro  5.0<L>  /  Alb  2.7<L>  /  TBili  0.7  /  DBili  x   /  AST  50<H>  /  ALT  28  /  AlkPhos  106  06-02    PT/INR - ( 02 Jun 2024 11:51 )   PT: 17.4 sec;   INR: 1.68 ratio         PTT - ( 02 Jun 2024 11:51 )  PTT:41.0 sec  Bilirubin Total: 0.7 mg/dL (06-02-24 @ 11:51)  Bilirubin Total: 0.7 mg/dL (06-02-24 @ 06:53)   [facility] HEMATOLOGY/ONCOLOGY INPATIENT PROGRESS NOTE     Interval Hx:   06-03-24: Ms. Yee was seen at bedside today, pt transferred to SICU, noted extensive GOC discussions, pt is now comfort care     Meds:   MEDICATIONS  (STANDING):  chlorhexidine 2% Cloths 1 Application(s) Topical daily  hydrocortisone sodium succinate Injectable 100 milliGRAM(s) IV Push every 8 hours  lidocaine   4% Patch 1 Patch Transdermal every 24 hours  meropenem  IVPB 500 milliGRAM(s) IV Intermittent every 12 hours  mupirocin 2% Nasal 1 Application(s) Both Nostrils two times a day  norepinephrine Infusion 0.05 MICROgram(s)/kG/Min (4.39 mL/Hr) IV Continuous <Continuous>  pantoprazole  Injectable 40 milliGRAM(s) IV Push two times a day    MEDICATIONS  (PRN):  acetaminophen   Oral Liquid .. 470 milliGRAM(s) Oral every 6 hours PRN Mild Pain (1 - 3), Moderate Pain (4 - 6)  bisacodyl Suppository 10 milliGRAM(s) Rectal daily PRN Constipation  glycopyrrolate Injectable 0.2 milliGRAM(s) IV Push every 6 hours PRN secretions  HYDROmorphone  Injectable 0.2 milliGRAM(s) IV Push every 1 hour PRN Moderate Pain (4 - 6)  HYDROmorphone  Injectable 0.5 milliGRAM(s) IV Push every 1 hour PRN Severe Pain (7 - 10)  HYDROmorphone  Injectable 0.5 milliGRAM(s) IV Push every 1 hour PRN dyspnea  LORazepam   Injectable 0.5 milliGRAM(s) IV Push every 1 hour PRN anxiety, agitation, refractory dyspnea    Vital Signs Last 24 Hrs  T(C): 36.6 (03 Jun 2024 04:00), Max: 37.2 (02 Jun 2024 15:00)  T(F): 97.9 (03 Jun 2024 04:00), Max: 99 (02 Jun 2024 15:00)  HR: 78 (03 Jun 2024 05:45) (74 - 146)  BP: 133/48 (03 Jun 2024 05:45) (54/41 - 198/74)  BP(mean): 69 (03 Jun 2024 05:45) (42 - 122)  RR: 13 (03 Jun 2024 05:45) (12 - 25)  SpO2: 100% (03 Jun 2024 05:45) (91% - 100%)    Parameters below as of 02 Jun 2024 20:00  Patient On (Oxygen Delivery Method): room air  O2 Flow (L/min): 4    Physical Exam:  Gen: NAD, NG in place  HEENT: MMM  Chest: Equal chest rise  Cardiac: irregular  Abd: Nondistended  Neuro: AAOx0    Labs:                        7.7    30.46 )-----------( 241      ( 02 Jun 2024 11:51 )             22.9     CBC Full  -  ( 02 Jun 2024 11:51 )  WBC Count : 30.46 K/uL  RBC Count : 2.62 M/uL  Hemoglobin : 7.7 g/dL  Hematocrit : 22.9 %  Platelet Count - Automated : 241 K/uL  Mean Cell Volume : 87.4 fl  Mean Cell Hemoglobin : 29.4 pg  Mean Cell Hemoglobin Concentration : 33.6 gm/dL  Auto Neutrophil # : 27.93 K/uL  Auto Lymphocyte # : 0.53 K/uL  Auto Monocyte # : 1.40 K/uL  Auto Eosinophil # : 0.01 K/uL  Auto Basophil # : 0.06 K/uL  Auto Neutrophil % : 91.8 %  Auto Lymphocyte % : 1.7 %  Auto Monocyte % : 4.6 %  Auto Eosinophil % : 0.0 %  Auto Basophil % : 0.2 %    06-02    140  |  107  |  65<H>  ----------------------------<  152<H>  3.9   |  13<L>  |  2.48<H>    Ca    8.6      02 Jun 2024 11:51  Phos  6.1     06-02  Mg     1.7     06-02    TPro  5.0<L>  /  Alb  2.7<L>  /  TBili  0.7  /  DBili  x   /  AST  50<H>  /  ALT  28  /  AlkPhos  106  06-02    PT/INR - ( 02 Jun 2024 11:51 )   PT: 17.4 sec;   INR: 1.68 ratio         PTT - ( 02 Jun 2024 11:51 )  PTT:41.0 sec  Bilirubin Total: 0.7 mg/dL (06-02-24 @ 11:51)  Bilirubin Total: 0.7 mg/dL (06-02-24 @ 06:53)

## 2024-06-04 NOTE — CHART NOTE - NSCHARTNOTEFT_GEN_A_CORE
Interim Note    Pt NPO and comfort measures only, pt inappropriate for nutrition follow up at this time.    RD remains available.  Shanon Duarte, MS, RD, CDN, CNSC, [facility] TEAMS

## 2024-06-04 NOTE — PROGRESS NOTE ADULT - ATTENDING COMMENTS
79-year-old female with past medical history of GERD, MI, chronic pancreatitis, HTN, incontinence, recent hip surgery, recently discharged from [facility] to home admitted with increased lethargy, failure to thrive, decreased PO intake nausea/vomiting. Work up revealed urosepsis and in need of urgent HD.  Patient treated but remains in renal failure, not tolerating hemodialysis due to hypotension, despite treatment of urosepsis and pressors.   Patient transferred to PCU for end of life care and management of symptoms.  She is receiving comfort measures only.    I have reviewed all documentation from prior primary team and consultants, as well as relevant imaging and laboratory data as this patient is new to me.    In the setting of parenteral controlled substance administration, clinical monitoring required for side effects such as respiratory depression, constipation and opioid induced neurotoxicity due to organ failure.    Patient assessment and plan discussed on interdisciplinary team rounds today.    Family updated as to plan of care and current clinical status.

## 2024-06-04 NOTE — PROGRESS NOTE ADULT - SUBJECTIVE AND OBJECTIVE BOX
[phone] GAP TEAM PALLIATIVE CARE UNIT PROGRESS NOTE:      [  ] Patient on hospice program.    INDICATION FOR PALLIATIVE CARE UNIT SERVICES/Interval HPI: 78 yo F with pancreatic insufficiency, hip Fx and FTT. Hospitalization c/b sepsis (E Coli bacteremia), encephalopathy (today not arousable), and ROBERT (on HD). Functionality also severely compromised. Hospital course complicated by inability to tolerate HD in setting of hypotension. Palliative team following for GOc. Goals are now for comfort directed care. Pressor support capped with down titration as tolerated. Pt to be transferred to PCU for symptom management and end of life care when bed available.     OVERNIGHT EVENTS: Seen and examined at bedside. She received Dilaudid 0.5mg IV x1 PRN dyspnea over the last 24 hrs (7a-7a).    DNR on chart: Yes      Allergies    penicillin (Swelling)    Intolerances    epinephrine (Other)  MEDICATIONS  (STANDING):  chlorhexidine 2% Cloths 1 Application(s) Topical daily  hydrocortisone sodium succinate Injectable 100 milliGRAM(s) IV Push every 8 hours  meropenem  IVPB 500 milliGRAM(s) IV Intermittent every 12 hours  pantoprazole  Injectable 40 milliGRAM(s) IV Push two times a day    MEDICATIONS  (PRN):  acetaminophen  Suppository .. 650 milliGRAM(s) Rectal every 6 hours PRN Temp greater or equal to 38C (100.4F), Mild Pain (1 - 3)  bisacodyl Suppository 10 milliGRAM(s) Rectal daily PRN Constipation  glycopyrrolate Injectable 0.4 milliGRAM(s) IV Push every 6 hours PRN secretions  HYDROmorphone  Injectable 0.2 milliGRAM(s) IV Push every 1 hour PRN Moderate Pain (4 - 6)  HYDROmorphone  Injectable 0.5 milliGRAM(s) IV Push every 1 hour PRN Severe Pain (7 - 10)  HYDROmorphone  Injectable 0.5 milliGRAM(s) IV Push every 1 hour PRN dyspnea  LORazepam   Injectable 0.5 milliGRAM(s) IV Push every 1 hour PRN anxiety, agitation, refractory dyspnea    ITEMS UNCHECKED ARE NOT PRESENT    PRESENT SYMPTOMS: [ x]Unable to self-report see PAINAD, RDOS below  Source if other than patient:  [ ]Family   [x ]Team     Pain: [ ] yes [ ] no  QOL impact -   Location -                    Aggravating factors -  Quality -  Radiation -  Timing-  Severity (0-10 scale):  Minimal acceptable level (0-10 scale):     Dyspnea:                           [ ]Mild [ ]Moderate [ ]Severe  Anxiety:                             [ ]Mild [ ]Moderate [ ]Severe  Fatigue:                             [ ]Mild [ ]Moderate [ ]Severe  Nausea:                             [ ]Mild [ ]Moderate [ ]Severe  Loss of appetite:              [ ]Mild [ ]Moderate [ ]Severe  Constipation:                    [ ]Mild [ ]Moderate [ ]Severe    PCSSQ [Palliative Care Spiritual Screening Question]   Severity (0-10):  Score of 4 or > indicate consideration of Chaplaincy referral.  Chaplaincy Referral: [ ] yes [ ] refused [ ] following [x ] deferred    Caregiver [place]? : [ ] yes [ x] no [ ] deferred:  Social work referral [ ] Patient & Family Centered Care Referral [ ]   Anticipatory Grief present?:  [ ] yes [x ] no [ ] deferred:  Social work referral [ ] Patient & Family Centered Care Referral [ ]  	  Other Symptoms:  [ ]All other review of systems negative - unable to obtain     PHYSICAL EXAM:   Vital Signs Last 24 Hrs  T(C): 36.1 (04 Jun 2024 07:47), Max: 36.7 (03 Jun 2024 15:00)  T(F): 96.9 (04 Jun 2024 07:47), Max: 98 (03 Jun 2024 15:00)  HR: 80 (04 Jun 2024 07:47) (74 - 93)  BP: 90/60 (04 Jun 2024 07:47) (90/60 - 147/63)  BP(mean): 67 (03 Jun 2024 23:00) (53 - 100)  RR: 16 (04 Jun 2024 07:47) (10 - 16)  SpO2: 99% (04 Jun 2024 07:47) (99% - 100%)    Parameters below as of 04 Jun 2024 07:47  Patient On (Oxygen Delivery Method): nasal cannula     I&O's Summary    03 Jun 2024 07:01  -  04 Jun 2024 07:00  --------------------------------------------------------  IN: 129.4 mL / OUT: 0 mL / NET: 129.4 mL      GENERAL: [ ]Cachexia    [ ]Alert  [ ]Oriented x   []Lethargic  [x ]Unarousable  [ ]Verbal  [ x]Non-Verbal  Behavioral:   [ ]Anxiety  [ ]Delirium [ ]Agitation [ ]Other  HEENT:  [ ]Normal   [ x]Dry mouth   [ ]ET Tube/Trach  [ ]Oral lesions  PULMONARY:   [ ]Clear [ ]Tachypnea  [ ]Audible excessive secretions   [ ]Rhonchi        [ ]Right [ ]Left [ ]Bilateral  [ ]Crackles        [ ]Right [ ]Left [ ]Bilateral  [ ]Wheezing     [ ]Right [ ]Left [ ]Bilateral  [ x]Diminished BS [ ] Right [ ]Left [ x]Bilateral  CARDIOVASCULAR:    [x ]Regular [ ]Irregular [ ]Tachy  [ ]Pato [ ]Murmur [ ]Other  GASTROINTESTINAL:  [x ]Soft  [ ]Distended   [ ]+BS  [ x]Non tender [ ]Tender  [ ]Other [ ]PEG [ ]OGT/ NGT   Last BM: 6/4  GENITOURINARY:  [ ]Normal [x ]Incontinent   [ ]Oliguria/Anuria   [ ]Carrillo  MUSCULOSKELETAL:   [ ]Normal   [ ]Weakness  [ x]Bed/Wheelchair bound [ ]Edema  NEUROLOGIC:   [ ]No focal deficits  [ ] Cognitive impairment  [ ] Dysphagia [ ]Dysarthria [ ] Paresis [ ]Other   SKIN: mottled finger tips and toes  [ ]Normal  [ ]Rash  [ ]Other  [ ]Pressure ulcer(s) [ ]y [ ]n present on admission    CRITICAL CARE:  [ ]Shock Present  [ ]Septic [ ]Cardiogenic [ ]Neurologic [ ]Hypovolemic  [ ]Vasopressors [ ]Inotropes  [ ]Respiratory failure present [ ]Mechanical Ventilation [ ]Non-invasive ventilatory support [ ]High-Flow   [ ]Acute  [ ]Chronic [ ]Hypoxic  [ ]Hypercarbic [ ]Other  [x ]Other organ failure - renal failure      LABS:                        7.7    30.46 )-----------( 241      ( 02 Jun 2024 11:51 )             22.9   06-02    140  |  107  |  65<H>  ----------------------------<  152<H>  3.9   |  13<L>  |  2.48<H>    Ca    8.6      02 Jun 2024 11:51  Phos  6.1     06-02  Mg     1.7     06-02    TPro  5.0<L>  /  Alb  2.7<L>  /  TBili  0.7  /  DBili  x   /  AST  50<H>  /  ALT  28  /  AlkPhos  106  06-02  PT/INR - ( 02 Jun 2024 11:51 )   PT: 17.4 sec;   INR: 1.68 ratio         PTT - ( 02 Jun 2024 11:51 )  PTT:41.0 sec    Urinalysis Basic - ( 02 Jun 2024 11:51 )    Color: x / Appearance: x / SG: x / pH: x  Gluc: 152 mg/dL / Ketone: x  / Bili: x / Urobili: x   Blood: x / Protein: x / Nitrite: x   Leuk Esterase: x / RBC: x / WBC x   Sq Epi: x / Non Sq Epi: x / Bacteria: x      RADIOLOGY & ADDITIONAL STUDIES:  < from: CT Head No Cont (05.26.24 @ 10:10) >    ACC: [id number] EXAM:  CT BRAIN   ORDERED BY: RAYMOND WILKINSON     PROCEDURE DATE:  05/26/2024          INTERPRETATION:  PROCEDURE: CT head without intravenous contrast    CLINICAL INDICATION: Bacteremia, shock, ROBERT. Now AMS, nonverbal.    TECHNIQUE: CT imaging of the brain is performed with multiplanar images   reviewed and displayed with both bone and soft tissue algorithm. No   contrast given.    COMPARISON: 01/20/2024    FINDINGS:    Initial scan limited by motion, with repeat images acquired.    Noacute hemorrhage. Ventricles are stable. No hydrocephalus, mass effect   or midline shift. No extra-axial collection. Approximately 9 x 4 mm   meningioma the left lateral frontoparietal convexity is stable (6;5,   9;20).    Gray to white differentiation appears preserved, without CT evidence of   recent transcortical or territorial infarct.    Bone algorithm images show no calvarial fracture or acute abnormality of   the calvarium or skull base. Paranasal sinuses and mastoids included on   this scan show no acute disease. Right sided nasogastric tube partially   seen.      IMPRESSION:  No acute intracranial findings. No change compared to 01/20/2024.    --- End of Report ---            CONSTANTIN CISSE MD; Attending Radiologist  This documenthas been electronically signed. May 26 2024 12:32PM    < end of copied text >  < from: US Kidney and Bladder (05.31.24 @ 20:09) >    ACC: [id number] EXAM:  US KIDNEYS AND BLADDER   ORDERED BY:  MANDIE BELTRÁN     PROCEDURE DATE:  05/31/2024          INTERPRETATION:  CLINICAL INFORMATION: Acute kidney injury.  Serum   creatinine 2.79.    COMPARISON: 05/22/2024    TECHNIQUE: Sonography of the kidneys and bladder.    FINDINGS:  Right kidney: 9.5 cm. No renal mass, hydronephrosis or calculi.    Left kidney: 10.0 cm. No renal mass, hydronephrosis or calculi.    Urinary bladder: Underdistended.  Layering echogenic material in the   bladder lumen.    IMPRESSION:  No renal mass, hydronephrosis or calculus is visualized sonographically.  Layering echogenic material in the bladder lumen may represent blood   products, sterile debris, and/or cystitis.  Correlate with clinical   symptoms and urinalysis.        --- End of Report ---            BBI ESTEVEZ MD; Attending Radiologist  This document has been electronically signed. May 31 2024  8:54PM    < end of copied text >      PROTEIN CALORIE MALNUTRITION: [ ] mild [ ] moderate [ ] severe  [ ] underweight [ ] morbid obesity    https://www.andeal.org/vault/2440/web/files/ONC/Table_Clinical%20Characteristics%20to%20Document%20Malnutrition-White%20JV%20et%20al%202012.pdf    Height (cm): 154.9 (05-19-24 @ 14:27), 162.6 (01-20-24 @ 14:53)  Weight (kg): 46.8 (05-19-24 @ 23:00), 54.4 (01-20-24 @ 14:53)  BMI (kg/m2): 19.5 (05-19-24 @ 23:00), 22.7 (05-19-24 @ 14:27), 20.6 (01-20-24 @ 14:53)    [ x] PPSV2 < or = 30% [ ] significant weight loss [ ] poor nutritional intake [ ] anasarca   Artificial Nutrition [ ]     Other REFERRALS:    [ ] Hospice  [ ]Child Life  [ x]Social Work  [ ]Case management [ ]Holistic Therapy [ ] Physical Therapy [ ] Dietary     Progress Notes - Care Coordination [C. Provider] (05-31-24 @ 09:37)      Palliative Performance Scale:  http://npcrc.org/files/news/palliative_performance_scale_ppsv2.pdf  (Ctrl +  left click to view)  Respiratory Distress Observation Tool:  https://homecareinformation.net/handouts/hen/Respiratory_Distress_Observation_Scale.pdf (Ctrl +  left click to view)  PAINAD Score:  http://geriatrictoolkit.[place].[facility]/cog/painad.pdf (Ctrl +  left click to view)                                  Care Coordination Assessment 201    COGNITIVE/LEARNING 201  Mental Status    Answers: Confused    ADMISSION HISTORY 201  Admitted From    Answers: Home    Functional Status Prior to Admission    Answers: Independent,  Answers: Assistive equipment    Notes: Functional Status Prior to Admission    Notes: cane    Services Present on Admission    Answers: None    FINANCIAL 201  Does patient have financial concerns for discharge?    Answers: None    LIVING ARRANGEMENTS/SUPPORT 201  Housing Environment    Answers: House,  Answers: Stairs, number of steps 10    Living Arrangements    Answers: Lives with spouse, significant other    CAREGIVER CONTACT 201  Does the patient wish to identify a Caregiver?    Answers: Unable to assess    EMERGENCY CONTACTS OUTSIDE HOME 201  Emergency Contact    Answers: Emergency Contact Name Elfego Yee,  Answers: Emergency Contact Phone # [phone],  Answers: Emergency Contact Relationship spouse    DISCHARGE PLANNING 201  Potential Discharge Plan and Services    Answers: Skilled Nursing Facility-Short Term    SCREENING 201  Social Work Screen and Referral    Answers: None    SUMMARY 201  Initial Clinical Summary    Notes: Chart reviewed -  consult order received and services  intiiated. Patient confused at this time and therefore interview conducted with  emerg contact - Judy - patient's assistant. Discussed role of c.m., med plan  of tx and d/c planning w understanding verbalized. Patient is s/p right IMN s/p  fx from a fall. Patient lives with spouse in house that has 10 stpes. PTA  patient independnet and used cane. Discussed p/t recommendation of SAI - List  provided- Judy and patient's spouse Law to review list and provide 3-6  choices. Patient also with UTI and on IV rocephin. Provided   contact information. Assigned  to continue to follow and assist  through  discharge.    Anticipated Discharge Plan and Services    Notes: sai       Electronically signed by:  Rula Zapata  Electronically signed on:  2024-01-22  16:17   GAP TEAM PALLIATIVE CARE UNIT PROGRESS NOTE:      [  ] Patient on hospice program.    INDICATION FOR PALLIATIVE CARE UNIT SERVICES/Interval HPI: 78 yo F with pancreatic insufficiency, hip Fx and FTT. Hospitalization c/b sepsis (E Coli bacteremia), encephalopathy (today not arousable), and ROBERT (on HD). Functionality also severely compromised. Hospital course complicated by inability to tolerate HD in setting of hypotension. Palliative team following for GOC. Goals are now for comfort directed care. Pressor support capped with down titration as tolerated. Pt  transferred to PCU for symptom management and end of life care.     OVERNIGHT EVENTS: Seen and examined at bedside. She received Dilaudid 0.5mg IV x1 PRN dyspnea over the last 24 hrs (7a-7a).    DNR on chart: Yes      Allergies    penicillin (Swelling)    Intolerances    epinephrine (Other)  MEDICATIONS  (STANDING):  chlorhexidine 2% Cloths 1 Application(s) Topical daily  hydrocortisone sodium succinate Injectable 100 milliGRAM(s) IV Push every 8 hours  meropenem  IVPB 500 milliGRAM(s) IV Intermittent every 12 hours  pantoprazole  Injectable 40 milliGRAM(s) IV Push two times a day    MEDICATIONS  (PRN):  acetaminophen  Suppository .. 650 milliGRAM(s) Rectal every 6 hours PRN Temp greater or equal to 38C (100.4F), Mild Pain (1 - 3)  bisacodyl Suppository 10 milliGRAM(s) Rectal daily PRN Constipation  glycopyrrolate Injectable 0.4 milliGRAM(s) IV Push every 6 hours PRN secretions  HYDROmorphone  Injectable 0.2 milliGRAM(s) IV Push every 1 hour PRN Moderate Pain (4 - 6)  HYDROmorphone  Injectable 0.5 milliGRAM(s) IV Push every 1 hour PRN Severe Pain (7 - 10)  HYDROmorphone  Injectable 0.5 milliGRAM(s) IV Push every 1 hour PRN dyspnea  LORazepam   Injectable 0.5 milliGRAM(s) IV Push every 1 hour PRN anxiety, agitation, refractory dyspnea    ITEMS UNCHECKED ARE NOT PRESENT    PRESENT SYMPTOMS: [ x]Unable to self-report see PAINAD, RDOS below  Source if other than patient:  [ ]Family   [x ]Team     Pain: [ ] yes [ ] no  QOL impact -   Location -                    Aggravating factors -  Quality -  Radiation -  Timing-  Severity (0-10 scale):  Minimal acceptable level (0-10 scale):     Dyspnea:                           [ ]Mild [ ]Moderate [ ]Severe  Anxiety:                             [ ]Mild [ ]Moderate [ ]Severe  Fatigue:                             [ ]Mild [ ]Moderate [ ]Severe  Nausea:                             [ ]Mild [ ]Moderate [ ]Severe  Loss of appetite:              [ ]Mild [ ]Moderate [ ]Severe  Constipation:                    [ ]Mild [ ]Moderate [ ]Severe    PCSSQ [Palliative Care Spiritual Screening Question]   Severity (0-10):  Score of 4 or > indicate consideration of Chaplaincy referral.  Chaplaincy Referral: [ ] yes [ ] refused [ ] following [x ] deferred    Caregiver [place]? : [ ] yes [ x] no [ ] deferred:  Social work referral [ ] Patient & Family Centered Care Referral [ ]   Anticipatory Grief present?:  [ ] yes [x ] no [ ] deferred:  Social work referral [ ] Patient & Family Centered Care Referral [ ]  	  Other Symptoms:  [ ]All other review of systems negative - unable to obtain     PHYSICAL EXAM:   Vital Signs Last 24 Hrs  T(C): 36.1 (04 Jun 2024 07:47), Max: 36.7 (03 Jun 2024 15:00)  T(F): 96.9 (04 Jun 2024 07:47), Max: 98 (03 Jun 2024 15:00)  HR: 80 (04 Jun 2024 07:47) (74 - 93)  BP: 90/60 (04 Jun 2024 07:47) (90/60 - 147/63)  BP(mean): 67 (03 Jun 2024 23:00) (53 - 100)  RR: 16 (04 Jun 2024 07:47) (10 - 16)  SpO2: 99% (04 Jun 2024 07:47) (99% - 100%)    Parameters below as of 04 Jun 2024 07:47  Patient On (Oxygen Delivery Method): nasal cannula     I&O's Summary    03 Jun 2024 07:01  -  04 Jun 2024 07:00  --------------------------------------------------------  IN: 129.4 mL / OUT: 0 mL / NET: 129.4 mL      GENERAL: [ ]Cachexia    [ ]Alert  [ ]Oriented x   []Lethargic  [x ]Unarousable  [ ]Verbal  [ x]Non-Verbal  Behavioral:   [ ]Anxiety  [ ]Delirium [ ]Agitation [ ]Other  HEENT:  [ ]Normal   [ x]Dry mouth   [ ]ET Tube/Trach  [ ]Oral lesions  PULMONARY:   [ ]Clear [ ]Tachypnea  [ ]Audible excessive secretions   [ ]Rhonchi        [ ]Right [ ]Left [ ]Bilateral  [ ]Crackles        [ ]Right [ ]Left [ ]Bilateral  [ ]Wheezing     [ ]Right [ ]Left [ ]Bilateral  [ x]Diminished BS [ ] Right [ ]Left [ x]Bilateral  CARDIOVASCULAR:    [x ]Regular [ ]Irregular [ ]Tachy  [ ]Pato [ ]Murmur [ ]Other  GASTROINTESTINAL:  [x ]Soft  [ ]Distended   [ ]+BS  [ x]Non tender [ ]Tender  [ ]Other [ ]PEG [ ]OGT/ NGT   Last BM: 6/4  GENITOURINARY:  [ ]Normal [x ]Incontinent   [ ]Oliguria/Anuria   [ ]Carrillo  MUSCULOSKELETAL:   [ ]Normal   [ ]Weakness  [ x]Bed/Wheelchair bound [ ]Edema  NEUROLOGIC:   [ ]No focal deficits  [ ] Cognitive impairment  [ ] Dysphagia [ ]Dysarthria [ ] Paresis [ ]Other   SKIN: mottled finger tips and toes  [ ]Normal  [ ]Rash  [ ]Other  [ ]Pressure ulcer(s) [ ]y [ ]n present on admission    CRITICAL CARE:  [ ]Shock Present  [ ]Septic [ ]Cardiogenic [ ]Neurologic [ ]Hypovolemic  [ ]Vasopressors [ ]Inotropes  [ ]Respiratory failure present [ ]Mechanical Ventilation [ ]Non-invasive ventilatory support [ ]High-Flow   [ ]Acute  [ ]Chronic [ ]Hypoxic  [ ]Hypercarbic [ ]Other  [x ]Other organ failure - renal failure      LABS:                        7.7    30.46 )-----------( 241      ( 02 Jun 2024 11:51 )             22.9   06-02    140  |  107  |  65<H>  ----------------------------<  152<H>  3.9   |  13<L>  |  2.48<H>    Ca    8.6      02 Jun 2024 11:51  Phos  6.1     06-02  Mg     1.7     06-02    TPro  5.0<L>  /  Alb  2.7<L>  /  TBili  0.7  /  DBili  x   /  AST  50<H>  /  ALT  28  /  AlkPhos  106  06-02  PT/INR - ( 02 Jun 2024 11:51 )   PT: 17.4 sec;   INR: 1.68 ratio         PTT - ( 02 Jun 2024 11:51 )  PTT:41.0 sec    Urinalysis Basic - ( 02 Jun 2024 11:51 )    Color: x / Appearance: x / SG: x / pH: x  Gluc: 152 mg/dL / Ketone: x  / Bili: x / Urobili: x   Blood: x / Protein: x / Nitrite: x   Leuk Esterase: x / RBC: x / WBC x   Sq Epi: x / Non Sq Epi: x / Bacteria: x      RADIOLOGY & ADDITIONAL STUDIES:  < from: CT Head No Cont (05.26.24 @ 10:10) >    ACC: [id number] EXAM:  CT BRAIN   ORDERED BY: RAYMOND WILKINSON     PROCEDURE DATE:  05/26/2024          INTERPRETATION:  PROCEDURE: CT head without intravenous contrast    CLINICAL INDICATION: Bacteremia, shock, ROBERT. Now AMS, nonverbal.    TECHNIQUE: CT imaging of the brain is performed with multiplanar images   reviewed and displayed with both bone and soft tissue algorithm. No   contrast given.    COMPARISON: 01/20/2024    FINDINGS:    Initial scan limited by motion, with repeat images acquired.    No acute hemorrhage. Ventricles are stable. No hydrocephalus, mass effect   or midline shift. No extra-axial collection. Approximately 9 x 4 mm   meningioma the left lateral frontoparietal convexity is stable (6;5,   9;20).    Gray to white differentiation appears preserved, without CT evidence of   recent transcortical or territorial infarct.    Bone algorithm images show no calvarial fracture or acute abnormality of   the calvarium or skull base. Paranasal sinuses and mastoids included on   this scan show no acute disease. Right sided nasogastric tube partially   seen.      IMPRESSION:  No acute intracranial findings. No change compared to 01/20/2024.    --- End of Report ---            CONSTANTIN CISSE MD; Attending Radiologist  This document has been electronically signed. May 26 2024 12:32PM    < end of copied text >  < from: US Kidney and Bladder (05.31.24 @ 20:09) >    ACC: [id number] EXAM:  US KIDNEYS AND BLADDER   ORDERED BY:  MANDIE BELTRÁN     PROCEDURE DATE:  05/31/2024          INTERPRETATION:  CLINICAL INFORMATION: Acute kidney injury.  Serum   creatinine 2.79.    COMPARISON: 05/22/2024    TECHNIQUE: Sonography of the kidneys and bladder.    FINDINGS:  Right kidney: 9.5 cm. No renal mass, hydronephrosis or calculi.    Left kidney: 10.0 cm. No renal mass, hydronephrosis or calculi.    Urinary bladder: Underdistended.  Layering echogenic material in the   bladder lumen.    IMPRESSION:  No renal mass, hydronephrosis or calculus is visualized sonographically.  Layering echogenic material in the bladder lumen may represent blood   products, sterile debris, and/or cystitis.  Correlate with clinical   symptoms and urinalysis.        --- End of Report ---            BIB ESTEVEZ MD; Attending Radiologist  This document has been electronically signed. May 31 2024  8:54PM    < end of copied text >      PROTEIN CALORIE MALNUTRITION: [ ] mild [ ] moderate [ ] severe  [ ] underweight [ ] morbid obesity    https://www.andeal.org/vault/2440/web/files/ONC/Table_Clinical%20Characteristics%20to%20Document%20Malnutrition-White%20JV%20et%20al%249409.pdf    Height (cm): 154.9 (05-19-24 @ 14:27), 162.6 (01-20-24 @ 14:53)  Weight (kg): 46.8 (05-19-24 @ 23:00), 54.4 (01-20-24 @ 14:53)  BMI (kg/m2): 19.5 (05-19-24 @ 23:00), 22.7 (05-19-24 @ 14:27), 20.6 (01-20-24 @ 14:53)    [ x] PPSV2 < or = 30% [ ] significant weight loss [ ] poor nutritional intake [ ] anasarca   Artificial Nutrition [ ]     Other REFERRALS:    [ ] Hospice  [ ]Child Life  [ x]Social Work  [ ]Case management [ ]Holistic Therapy [ ] Physical Therapy [ ] Dietary     Progress Notes - Care Coordination [C. Provider] (05-31-24 @ 09:37)      Palliative Performance Scale:  http://npcrc.org/files/news/palliative_performance_scale_ppsv2.pdf  (Ctrl +  left click to view)  Respiratory Distress Observation Tool:  https://homecareinformation.net/handouts/hen/Respiratory_Distress_Observation_Scale.pdf (Ctrl +  left click to view)  PAINAD Score:  http://geriatrictoolkit.[place].[facility]/cog/painad.pdf (Ctrl +  left click to view)                                  Care Coordination Assessment 201    COGNITIVE/LEARNING 201  Mental Status    Answers: Confused    ADMISSION HISTORY 201  Admitted From    Answers: Home    Functional Status Prior to Admission    Answers: Independent,  Answers: Assistive equipment    Notes: Functional Status Prior to Admission    Notes: cane    Services Present on Admission    Answers: None    FINANCIAL 201  Does patient have financial concerns for discharge?    Answers: None    LIVING ARRANGEMENTS/SUPPORT 201  Housing Environment    Answers: House,  Answers: Stairs, number of steps 10    Living Arrangements    Answers: Lives with spouse, significant other    CAREGIVER CONTACT 201  Does the patient wish to identify a Caregiver?    Answers: Unable to assess    EMERGENCY CONTACTS OUTSIDE HOME 201  Emergency Contact    Answers: Emergency Contact Name Elfego Yee,  Answers: Emergency Contact Phone # [phone],  Answers: Emergency Contact Relationship spouse    DISCHARGE PLANNING 201  Potential Discharge Plan and Services    Answers: Skilled Nursing Facility-Short Term    SCREENING 201  Social Work Screen and Referral    Answers: None    SUMMARY 201  Initial Clinical Summary    Notes: Chart reviewed -  consult order received and services  intiiated. Patient confused at this time and therefore interview conducted with  emerg contact - Judy - patient's assistant. Discussed role of c.m., med plan  of tx and d/c planning w understanding verbalized. Patient is s/p right IMN s/p  fx from a fall. Patient lives with spouse in house that has 10 stpes. PTA  patient independnet and used cane. Discussed p/t recommendation of SAI - List  provided- Judy and patient's spouse Law to review list and provide 3-6  choices. Patient also with UTI and on IV rocephin. Provided   contact information. Assigned  to continue to follow and assist  through  discharge.    Anticipated Discharge Plan and Services    Notes: sai       Electronically signed by:  Rula Zapata  Electronically signed on:  2024-01-22  16:17

## 2024-06-04 NOTE — PROGRESS NOTE ADULT - ASSESSMENT
78 yo F with pancreatic insufficiency, hip Fx and FTT. Hospitalization c/b sepsis (E Coli bacteremia), encephalopathy (today not arousable), and ROBERT (on HD). Functionality also severely compromised. Hospital course complicated by inability to tolerate HD in setting of hypotension. Palliative team following for GOc. Goals are now for comfort directed care. Pressor support capped with down titration as tolerated. Pt to be transferred to PCU for symptom management and end of life care when bed available.

## 2024-06-04 NOTE — PROGRESS NOTE ADULT - PROBLEM SELECTOR PLAN 7
Will continue to monitor in the PCU  (Elfego) contacted for updates, left voicemail for callback.     Will continue to monitor in the PCU

## 2024-06-04 NOTE — PROGRESS NOTE ADULT - PROBLEM SELECTOR PLAN 3
would not tolerate HD well due to hypotension and would need shilley exchange   verbalized understanding and wishes to prioritize comfort would not tolerate HD well due to hypotension and would need shiley exchange   verbalized understanding and wishes to prioritize comfort

## 2024-06-05 NOTE — PROGRESS NOTE ADULT - ATTENDING COMMENTS
79-year-old female with past medical history of GERD, MI, chronic pancreatitis, HTN, incontinence, recent hip surgery, recently discharged from [facility] to home admitted with increased lethargy, failure to thrive, decreased PO intake nausea/vomiting. Work up revealed urosepsis and in need of urgent HD.  Patient treated but remains in renal failure, not tolerating hemodialysis due to hypotension, despite treatment of urosepsis and pressors.   Patient transferred to PCU for end of life care and management of symptoms.  She is receiving comfort measures only.  Stress dose steroids started on June 21 for sepsis.  To taper to off.      In the setting of parenteral controlled substance administration, clinical monitoring required for side effects such as respiratory depression, constipation and opioid induced neurotoxicity due to organ failure.    Patient assessment and plan discussed on interdisciplinary team rounds today.    Family updated as to plan of care and current clinical status. 79-year-old female with past medical history of GERD, MI, chronic pancreatitis, HTN, incontinence, recent hip surgery, recently discharged from [facility] to home admitted with increased lethargy, failure to thrive, decreased PO intake nausea/vomiting. Work up revealed urosepsis and in need of urgent HD.  Patient treated but remains in renal failure, not tolerating hemodialysis due to hypotension, despite treatment of urosepsis and pressors.   Patient transferred to PCU for end of life care and management of symptoms.  She is receiving comfort measures only.  Stress dose steroids started on June 21 for sepsis.  To taper to off.      In the setting of parenteral controlled substance administration, clinical monitoring required for side effects such as respiratory depression, constipation and opioid induced neurotoxicity due to organ failure.    Patient assessment and plan discussed on interdisciplinary team rounds today.  DC planning    Family updated as to plan of care and current clinical status.

## 2024-06-05 NOTE — PROGRESS NOTE ADULT - SUBJECTIVE AND OBJECTIVE BOX
GAP TEAM PALLIATIVE CARE UNIT PROGRESS NOTE:      [  ] Patient on hospice program.    INDICATION FOR PALLIATIVE CARE UNIT SERVICES/Interval HPI: 80 yo F with pancreatic insufficiency, hip Fx and FTT. Hospitalization c/b sepsis (E Coli bacteremia), encephalopathy (today not arousable), and ROBERT (on HD). Functionality also severely compromised. Hospital course complicated by inability to tolerate HD in setting of hypotension. Palliative team following for GOC. Goals are now for comfort directed care. Pressor support capped with down titration as tolerated. Pt  transferred to PCU for symptom management and end of life care.    OVERNIGHT EVENTS: Seen and examined at bedside. She is alert this morning, nodding head and resting comfortably. She did not require any PRNs over the last 24 hrs (7a-7a).    DNR on chart: Yes    Allergies    penicillin (Swelling)    Intolerances    epinephrine (Other)  MEDICATIONS  (STANDING):  chlorhexidine 2% Cloths 1 Application(s) Topical daily  hydrocortisone sodium succinate Injectable 100 milliGRAM(s) IV Push every 8 hours  meropenem  IVPB 500 milliGRAM(s) IV Intermittent every 12 hours  pantoprazole  Injectable 40 milliGRAM(s) IV Push two times a day    MEDICATIONS  (PRN):  acetaminophen  Suppository .. 650 milliGRAM(s) Rectal every 6 hours PRN Temp greater or equal to 38C (100.4F), Mild Pain (1 - 3)  bisacodyl Suppository 10 milliGRAM(s) Rectal daily PRN Constipation  glycopyrrolate Injectable 0.4 milliGRAM(s) IV Push every 6 hours PRN secretions  HYDROmorphone  Injectable 0.5 milliGRAM(s) IV Push every 1 hour PRN Severe Pain (7 - 10)  HYDROmorphone  Injectable 0.2 milliGRAM(s) IV Push every 1 hour PRN Moderate Pain (4 - 6)  HYDROmorphone  Injectable 0.5 milliGRAM(s) IV Push every 1 hour PRN dyspnea  LORazepam   Injectable 0.5 milliGRAM(s) IV Push every 1 hour PRN anxiety, agitation, refractory dyspnea    ITEMS UNCHECKED ARE NOT PRESENT    PRESENT SYMPTOMS: [ x]Unable to self-report see PAINAD, RDOS below  Source if other than patient:  [ ]Family   [ x]Team     Pain: [ ] yes [ ] no  QOL impact -   Location -                    Aggravating factors -  Quality -  Radiation -  Timing-  Severity (0-10 scale):  Minimal acceptable level (0-10 scale):     Dyspnea:                           [ ]Mild [ ]Moderate [ ]Severe  Anxiety:                             [ ]Mild [ ]Moderate [ ]Severe  Fatigue:                             [ ]Mild [ ]Moderate [ ]Severe  Nausea:                             [ ]Mild [ ]Moderate [ ]Severe  Loss of appetite:              [ ]Mild [ ]Moderate [ ]Severe  Constipation:                    [ ]Mild [ ]Moderate [ ]Severe    PCSSQ [Palliative Care Spiritual Screening Question]   Severity (0-10):  Score of 4 or > indicate consideration of Chaplaincy referral.  Chaplaincy Referral: [ ] yes [ ] refused [ ] following [x ] deferred    Caregiver [place]? : [ ] yes [x ] no [ ] deferred:  Social work referral [ ] Patient & Family Centered Care Referral [ ]   Anticipatory Grief present?:  [ ] yes [ x] no [ ] deferred:  Social work referral [ ] Patient & Family Centered Care Referral [ ]  	  Other Symptoms:  [ ]All other review of systems negative - unable to obtain    PHYSICAL EXAM:   Vital Signs Last 24 Hrs  T(C): 36.3 (05 Jun 2024 08:15), Max: 36.3 (05 Jun 2024 08:15)  T(F): 97.4 (05 Jun 2024 08:15), Max: 97.4 (05 Jun 2024 08:15)  HR: 87 (05 Jun 2024 08:15) (87 - 87)  BP: 124/82 (05 Jun 2024 08:15) (124/82 - 124/82)  BP(mean): --  RR: 18 (05 Jun 2024 08:15) (18 - 18)  SpO2: 97% (05 Jun 2024 08:15) (97% - 97%)     I&O's Summary    GENERAL: [ ]Cachexia    [ x]Alert  [ ]Oriented x   []Lethargic  []Unarousable  [ ]Verbal  [ x]Non-Verbal  Behavioral:   [ ]Anxiety  [ ]Delirium [ ]Agitation [ ]Other  HEENT:  [x ]Normal   [ ]Dry mouth   [ ]ET Tube/Trach  [ ]Oral lesions  PULMONARY:   [ ]Clear [ ]Tachypnea  [ ]Audible excessive secretions   [ ]Rhonchi        [ ]Right [ ]Left [ ]Bilateral  [ ]Crackles        [ ]Right [ ]Left [ ]Bilateral  [ ]Wheezing     [ ]Right [ ]Left [ ]Bilateral  [ x]Diminished BS [ ] Right [ ]Left [ x]Bilateral  CARDIOVASCULAR:    [x ]Regular [ ]Irregular [ ]Tachy  [ ]Pato [ ]Murmur [ ]Other  GASTROINTESTINAL:  [x ]Soft  [ ]Distended   [ ]+BS  [ x]Non tender [ ]Tender  [ ]Other [ ]PEG [ ]OGT/ NGT   Last BM: 6/4  GENITOURINARY:  [ ]Normal [x ]Incontinent   [ ]Oliguria/Anuria   [ ]Carrillo  MUSCULOSKELETAL:   [ ]Normal   [ ]Weakness  [ x]Bed/Wheelchair bound [ ]Edema  NEUROLOGIC:   [ ]No focal deficits  [ ] Cognitive impairment  [ ] Dysphagia [ ]Dysarthria [ ] Paresis [ ]Other   SKIN: mottled finger tips and toes  [ ]Normal  [ ]Rash  [ ]Other  [ ]Pressure ulcer(s) [ ]y [ ]n present on admission    CRITICAL CARE:  [ ]Shock Present  [ ]Septic [ ]Cardiogenic [ ]Neurologic [ ]Hypovolemic  [ ]Vasopressors [ ]Inotropes  [ ]Respiratory failure present [ ]Mechanical Ventilation [ ]Non-invasive ventilatory support [ ]High-Flow   [ ]Acute  [ ]Chronic [ ]Hypoxic  [ ]Hypercarbic [ ]Other  [x ]Other organ failure - renal failure      PROTEIN CALORIE MALNUTRITION: [ ] mild [ ] moderate [ ] severe  [ ] underweight [ ] morbid obesity    https://www.andeal.org/vault/2440/web/files/ONC/Table_Clinical%20Characteristics%20to%20Document%20Malnutrition-White%20JV%20et%20al%732231.pdf    Height (cm): 154.9 (05-19-24 @ 14:27), 162.6 (01-20-24 @ 14:53)  Weight (kg): 46.8 (05-19-24 @ 23:00), 54.4 (01-20-24 @ 14:53)  BMI (kg/m2): 19.5 (05-19-24 @ 23:00), 22.7 (05-19-24 @ 14:27), 20.6 (01-20-24 @ 14:53)    [x ] PPSV2 < or = 30% [ ] significant weight loss [ ] poor nutritional intake [ ] anasarca   Artificial Nutrition [ ]     Other REFERRALS:    [ ] Hospice  [ ]Child Life  [ x]Social Work  [ ]Case management [ ]Holistic Therapy [ ] Physical Therapy [ ] Dietary     Progress Notes - Care Coordination [C. Provider] (06-05-24 @ 11:36)      Palliative Performance Scale:  http://npcrc.org/files/news/palliative_performance_scale_ppsv2.pdf  (Ctrl +  left click to view)  Respiratory Distress Observation Tool:  https://homecareinformation.net/handouts/hen/Respiratory_Distress_Observation_Scale.pdf (Ctrl +  left click to view)  PAINAD Score:  [email or web address]/cog/painad.pdf (Ctrl +  left click to view)   GAP TEAM PALLIATIVE CARE UNIT PROGRESS NOTE:      [  ] Patient on hospice program.    INDICATION FOR PALLIATIVE CARE UNIT SERVICES/Interval HPI: 80 yo F with pancreatic insufficiency, hip Fx and FTT. Hospitalization c/b sepsis (E Coli bacteremia), encephalopathy (today not arousable), and ROBERT (on HD). Functionality also severely compromised. Hospital course complicated by inability to tolerate HD in setting of hypotension. Palliative team following for GOC. Goals are now for comfort directed care. Pressor support capped with down titration as tolerated. Pt  transferred to PCU for symptom management and end of life care.    OVERNIGHT EVENTS: Seen and examined at bedside. She is alert this morning, nodding head and resting comfortably. She did not require any PRNs over the last 24 hrs (7a-7a).    DNR on chart: Yes    Allergies    penicillin (Swelling)    Intolerances    epinephrine (Other)  MEDICATIONS  (STANDING):  chlorhexidine 2% Cloths 1 Application(s) Topical daily  hydrocortisone sodium succinate Injectable 100 milliGRAM(s) IV Push every 8 hours  meropenem  IVPB 500 milliGRAM(s) IV Intermittent every 12 hours  pantoprazole  Injectable 40 milliGRAM(s) IV Push two times a day    MEDICATIONS  (PRN):  acetaminophen  Suppository .. 650 milliGRAM(s) Rectal every 6 hours PRN Temp greater or equal to 38C (100.4F), Mild Pain (1 - 3)  bisacodyl Suppository 10 milliGRAM(s) Rectal daily PRN Constipation  glycopyrrolate Injectable 0.4 milliGRAM(s) IV Push every 6 hours PRN secretions  HYDROmorphone  Injectable 0.5 milliGRAM(s) IV Push every 1 hour PRN Severe Pain (7 - 10)  HYDROmorphone  Injectable 0.2 milliGRAM(s) IV Push every 1 hour PRN Moderate Pain (4 - 6)  HYDROmorphone  Injectable 0.5 milliGRAM(s) IV Push every 1 hour PRN dyspnea  LORazepam   Injectable 0.5 milliGRAM(s) IV Push every 1 hour PRN anxiety, agitation, refractory dyspnea    ITEMS UNCHECKED ARE NOT PRESENT    PRESENT SYMPTOMS: [ x]Unable to self-report see PAINAD, RDOS below  Source if other than patient:  [ ]Family   [ x]Team     Pain: [ ] yes [ ] no  QOL impact -   Location -                    Aggravating factors -  Quality -  Radiation -  Timing-  Severity (0-10 scale):  Minimal acceptable level (0-10 scale):     Dyspnea:                           [ ]Mild [ ]Moderate [ ]Severe  Anxiety:                             [ ]Mild [ ]Moderate [ ]Severe  Fatigue:                             [ ]Mild [ ]Moderate [ ]Severe  Nausea:                             [ ]Mild [ ]Moderate [ ]Severe  Loss of appetite:              [ ]Mild [ ]Moderate [ ]Severe  Constipation:                    [ ]Mild [ ]Moderate [ ]Severe    PCSSQ [Palliative Care Spiritual Screening Question]   Severity (0-10):  Score of 4 or > indicate consideration of Chaplaincy referral.  Chaplaincy Referral: [ ] yes [ ] refused [ ] following [x ] deferred    Caregiver [place]? : [ ] yes [x ] no [ ] deferred:  Social work referral [ ] Patient & Family Centered Care Referral [ ]   Anticipatory Grief present?:  [ ] yes [ x] no [ ] deferred:  Social work referral [ ] Patient & Family Centered Care Referral [ ]  	  Other Symptoms:  [ ]All other review of systems negative - unable to obtain    PHYSICAL EXAM:   Vital Signs Last 24 Hrs  T(C): 36.3 (05 Jun 2024 08:15), Max: 36.3 (05 Jun 2024 08:15)  T(F): 97.4 (05 Jun 2024 08:15), Max: 97.4 (05 Jun 2024 08:15)  HR: 87 (05 Jun 2024 08:15) (87 - 87)  BP: 124/82 (05 Jun 2024 08:15) (124/82 - 124/82)  BP(mean): --  RR: 18 (05 Jun 2024 08:15) (18 - 18)  SpO2: 97% (05 Jun 2024 08:15) (97% - 97%)     I&O's Summary    GENERAL: [ ]Cachexia    [ x]Alert  [ ]Oriented x   []Lethargic  []Unarousable  [ ]Verbal  [ x]Non-Verbal  Behavioral:   [ ]Anxiety  [ ]Delirium [ ]Agitation [ ]Other  HEENT:  [x ]Normal   [ ]Dry mouth   [ ]ET Tube/Trach  [ ]Oral lesions  PULMONARY:   [ ]Clear [ ]Tachypnea  [ ]Audible excessive secretions   [ ]Rhonchi        [ ]Right [ ]Left [ ]Bilateral  [ ]Crackles        [ ]Right [ ]Left [ ]Bilateral  [ ]Wheezing     [ ]Right [ ]Left [ ]Bilateral  [ x]Diminished BS [ ] Right [ ]Left [ x]Bilateral  CARDIOVASCULAR:    [x ]Regular [ ]Irregular [ ]Tachy  [ ]Pato [ ]Murmur [ ]Other  GASTROINTESTINAL:  [x ]Soft  [ ]Distended   [ ]+BS  [ x]Non tender [ ]Tender  [ ]Other [ ]PEG [ ]OGT/ NGT   Last BM: 6/4  GENITOURINARY:  [ ]Normal [x ]Incontinent   [ ]Oliguria/Anuria   [ ]Carrillo  MUSCULOSKELETAL:   [ ]Normal   [ ]Weakness  [ x]Bed/Wheelchair bound [ ]Edema  NEUROLOGIC:   [ ]No focal deficits  [ ] Cognitive impairment  [ ] Dysphagia [ ]Dysarthria [ ] Paresis [ ]Other   SKIN: mottled finger tips and toes  [ ]Normal  [ ]Rash  [ ]Other  [ ]Pressure ulcer(s) [ ]y [ ]n present on admission    CRITICAL CARE:  [ ]Shock Present  [ ]Septic [ ]Cardiogenic [ ]Neurologic [ ]Hypovolemic  [ ]Vasopressors [ ]Inotropes  [ ]Respiratory failure present [ ]Mechanical Ventilation [ ]Non-invasive ventilatory support [ ]High-Flow   [ ]Acute  [ ]Chronic [ ]Hypoxic  [ ]Hypercarbic [ ]Other  [x ]Other organ failure - renal failure    LABS: None new.     RADIOLOGY & OTHER STUDIES: None new.       PROTEIN CALORIE MALNUTRITION: [ ] mild [ ] moderate [ ] severe  [ ] underweight [ ] morbid obesity    https://www.andeal.org/vault/2440/web/files/ONC/Table_Clinical%20Characteristics%20to%20Document%20Malnutrition-White%20JV%20et%20al%202012.pdf    Height (cm): 154.9 (05-19-24 @ 14:27), 162.6 (01-20-24 @ 14:53)  Weight (kg): 46.8 (05-19-24 @ 23:00), 54.4 (01-20-24 @ 14:53)  BMI (kg/m2): 19.5 (05-19-24 @ 23:00), 22.7 (05-19-24 @ 14:27), 20.6 (01-20-24 @ 14:53)    [x ] PPSV2 < or = 30% [ ] significant weight loss [ ] poor nutritional intake [ ] anasarca   Artificial Nutrition [ ]     Other REFERRALS:    [ ] Hospice  [ ]Child Life  [ x]Social Work  [ ]Case management [ ]Holistic Therapy [ ] Physical Therapy [ ] Dietary     Progress Notes - Care Coordination [C. Provider] (06-05-24 @ 11:36)      Palliative Performance Scale:  http://npcrc.org/files/news/palliative_performance_scale_ppsv2.pdf  (Ctrl +  left click to view)  Respiratory Distress Observation Tool:  https://homecareinformation.net/handouts/hen/Respiratory_Distress_Observation_Scale.pdf (Ctrl +  left click to view)  PAINAD Score:  http://[email or web address].[facility]/cog/painad.pdf (Ctrl +  left click to view)

## 2024-06-05 NOTE — PROGRESS NOTE ADULT - PROBLEM SELECTOR PLAN 7
(Elfego) contacted for update via phone, he said he will come in this afternoon and team will see at bedside.     Will continue to monitor in the PCU  (Elfego) contacted for update via phone, as well as at bedside when he arrived to unit. Spoke to him about disposition, including inpatient and home hospice if patient remains stable and depending if she requires IV medication for symptom management. He states he would like to speak with family and re-assess next steps.      Will continue to monitor in the PCU

## 2024-06-05 NOTE — PROGRESS NOTE ADULT - PROBLEM SELECTOR PLAN 3
would not tolerate HD well due to hypotension and would need shiley exchange   verbalized understanding and wishes to prioritize comfort

## 2024-06-06 NOTE — PROGRESS NOTE ADULT - ASSESSMENT
80 yo F with pancreatic insufficiency, hip Fx and FTT. Hospitalization c/b sepsis (E Coli bacteremia), encephalopathy (today not arousable), and ROBERT (on HD). Functionality also severely compromised. Hospital course complicated by inability to tolerate HD in setting of hypotension. Palliative team following for GOc. Goals are now for comfort directed care. Pressor support capped with down titration as tolerated. Pt transferred to PCU for symptom management and end of life care.

## 2024-06-06 NOTE — PROGRESS NOTE ADULT - PROBLEM SELECTOR PLAN 5
- Will continue to provide the family with updates, answer questions and provide emotional support.  - Ongoing dispo planning with the .

## 2024-06-06 NOTE — PROGRESS NOTE ADULT - PROBLEM SELECTOR PLAN 1
- PPSV 10%. The patient requires nursing assistance with all ADLs.  - Supportive care.  - Turn and position.  - Continue with good skin care. Single

## 2024-06-06 NOTE — PROGRESS NOTE ADULT - PROBLEM SELECTOR PLAN 2
- Likely multifactorial with multiorgan dysfunction.  - Goals are now for comfort directed care.  - Monitor for constipation, urinary retention, pain, hunger, thirst, etc.  Promote sleep wake cycle and reorientation as indicated.

## 2024-06-06 NOTE — PROGRESS NOTE ADULT - PROBLEM SELECTOR PLAN 4
- Would not tolerate HD well due to hypotension and would need shiley exchange  -  verbalized understanding and wishes to prioritize comfort

## 2024-06-06 NOTE — PROGRESS NOTE ADULT - SUBJECTIVE AND OBJECTIVE BOX
GAP TEAM PALLIATIVE CARE UNIT PROGRESS NOTE:      [  ] Patient on hospice program.    INDICATION FOR PALLIATIVE CARE UNIT SERVICES/Interval HPI: 78 yo F with pancreatic insufficiency, hip Fx and FTT. Hospitalization c/b sepsis (E Coli bacteremia), encephalopathy (today not arousable), and ROBERT (on HD). Functionality also severely compromised. Hospital course complicated by inability to tolerate HD in setting of hypotension. Palliative team following for GOC. Goals are now for comfort directed care. Pressor support capped with down titration as tolerated. Pt  transferred to PCU for symptom management and end of life care.    OVERNIGHT EVENTS: Chart reviewed. The patient is seen and examined at the bedside. She did not require any PRNs over the last 24 hrs (7a-7a).    DNR on chart: DNI  DNI      Allergies    penicillin (Swelling)    Intolerances    epinephrine (Other)  MEDICATIONS  (STANDING):  chlorhexidine 2% Cloths 1 Application(s) Topical daily  hydrocortisone sodium succinate Injectable 100 milliGRAM(s) IV Push every 12 hours  pantoprazole  Injectable 40 milliGRAM(s) IV Push two times a day    MEDICATIONS  (PRN):  acetaminophen  Suppository .. 650 milliGRAM(s) Rectal every 6 hours PRN Temp greater or equal to 38C (100.4F), Mild Pain (1 - 3)  bisacodyl Suppository 10 milliGRAM(s) Rectal daily PRN Constipation  glycopyrrolate Injectable 0.4 milliGRAM(s) IV Push every 6 hours PRN secretions  HYDROmorphone  Injectable 0.5 milliGRAM(s) IV Push every 1 hour PRN Severe Pain (7 - 10)  HYDROmorphone  Injectable 0.2 milliGRAM(s) IV Push every 1 hour PRN Moderate Pain (4 - 6)  HYDROmorphone  Injectable 0.5 milliGRAM(s) IV Push every 1 hour PRN dyspnea  LORazepam   Injectable 0.5 milliGRAM(s) IV Push every 1 hour PRN anxiety, agitation, refractory dyspnea    ITEMS UNCHECKED ARE NOT PRESENT    PRESENT SYMPTOMS: [ X]Unable to self-report see PAINAD, RDOS below  Source if other than patient:  [ ]Family   [ X]Team     Pain: [ ] yes [ ] no  QOL impact -   Location -                    Aggravating factors -  Quality -  Radiation -  Timing-  Severity (0-10 scale):  Minimal acceptable level (0-10 scale):     Dyspnea:                           [ ]Mild [ ]Moderate [ ]Severe  Anxiety:                             [ ]Mild [ ]Moderate [ ]Severe  Fatigue:                             [ ]Mild [ ]Moderate [ ]Severe  Nausea:                             [ ]Mild [ ]Moderate [ ]Severe  Loss of appetite:              [ ]Mild [ ]Moderate [ ]Severe  Constipation:                    [ ]Mild [ ]Moderate [ ]Severe    PCSSQ [Palliative Care Spiritual Screening Question]   Severity (0-10):  Score of 4 or > indicate consideration of Chaplaincy referral.  Chaplaincy Referral: [ ] yes [ ] refused [ ] following [ x] deferred    Caregiver [place]? : [ ] yes [ ] no [ x] deferred:  Social work referral [ ] Patient & Family Centered Care Referral [ ]     Anticipatory Grief present?:  [x ] yes [ ] no [ ] deferred:  Social work referral [x ] Patient & Family Centered Care Referral [ ]  	  Other Symptoms:  [ ]All other review of systems negative- Unable to assess due to poor mentation.     PHYSICAL EXAM:   Vital Signs Last 24 Hrs  T(C): 36.4 (06 Jun 2024 07:44), Max: 36.4 (06 Jun 2024 07:44)  T(F): 97.5 (06 Jun 2024 07:44), Max: 97.5 (06 Jun 2024 07:44)  HR: 94 (06 Jun 2024 07:44) (94 - 94)  BP: 100/77 (06 Jun 2024 07:44) (100/77 - 100/77)  BP(mean): --  RR: 18 (06 Jun 2024 07:44) (18 - 18)  SpO2: 95% (06 Jun 2024 07:44) (95% - 95%)    Parameters below as of 06 Jun 2024 07:44  Patient On (Oxygen Delivery Method): nasal cannula    I&O's Summary    GENERAL: [ ] Cachexia  [ X]Alert  [ ]Oriented x   [ ]Lethargic  [ ]Unarousable  [ ]Verbal  [ ]Non-Verbal  Behavioral:   [ ] Anxiety  [ ] Delirium [ ] Agitation [ ] Other  HEENT:  [X ]Normal   [ ]Dry mouth   [ ]ET Tube/Trach  [ ]Oral lesions  PULMONARY:   [ ]Clear [ ]Tachypnea  [ ]Audible excessive secretions   [ ]Rhonchi        [ ]Right [ ]Left [ ]Bilateral  [ ]Crackles        [ ]Right [ ]Left [ ]Bilateral  [ ]Wheezing     [ ]Right [ ]Left [ ]Bilateral  [ X]Diminished BS [ ]Right [ ]Left [ X]Bilateral    CARDIOVASCULAR:    [X ]Regular [ ]Irregular [ ]Tachy  [ ]Pato [ ]Murmur [ ]Other  GASTROINTESTINAL:  [X ]Soft  [ ]Distended   [X ]+BS  [ X]Non tender [ ]Tender  [ ]Other [ ]PEG [ ]OGT/ NGT   Last BM: 6/5  GENITOURINARY:  [ ]Normal [X ] Incontinent   [ ]Oliguria/Anuria   [ ]Carrillo  MUSCULOSKELETAL:   [ ]Normal   [ X]Weakness  [ ]Bed/Wheelchair bound [ ]Edema  NEUROLOGIC:   [ ]No focal deficits  [ ] Cognitive impairment  [ ] Dysphagia [ ]Dysarthria [ ] Paresis [ ]Other   SKIN: right toes dusky   [ ]Normal  [ ]Rash  [ ]Other  [X ]Pressure ulcer(s)  [ ]y [ ]n  Present on admission  Multiple pressure ulcers. Please see nursing assessment for further details for which I have reviewed.    CRITICAL CARE:  [ ] Shock Present  [ ]Septic [ ]Cardiogenic [ ]Neurologic [ ]Hypovolemic  [ ]  Vasopressors [ ]  Inotropes   [ ] Respiratory failure present [ ] Mechanical Ventilation [ ] Non-invasive ventilatory support [ ] High-Flow  [ ] Acute  [ ] Chronic [ ] Hypoxic  [ ] Hypercarbic [ ] Other  [ X] Other organ failure- Kidneys and skin    LABS: None new.    RADIOLOGY & ADDITIONAL STUDIES: None new.    PROTEIN CALORIE MALNUTRITION: [ ] mild [ ] moderate [ ] severe  [ ] underweight [ ] morbid obesity    https://www.andeal.org/vault/2440/web/files/ONC/Table_Clinical%20Characteristics%20to%20Document%20Malnutrition-White%20JV%20et%20al%202012.pdf    Height (cm): 154.9 (05-19-24 @ 14:27), 162.6 (01-20-24 @ 14:53)  Weight (kg): 46.8 (05-19-24 @ 23:00), 54.4 (01-20-24 @ 14:53)  BMI (kg/m2): 19.5 (05-19-24 @ 23:00), 22.7 (05-19-24 @ 14:27), 20.6 (01-20-24 @ 14:53)    [ X] PPSV2 < or = 30% [ ] significant weight loss [ ] poor nutritional intake [ ] anasarca   Artificial Nutrition [ ]     Other REFERRALS:    [ ] Hospice  [ ]Child Life  [ X]Social Work  [ ]Case management [ ]Holistic Therapy [ ] Physical Therapy [ ] Dietary     Progress Notes - Care Coordination [C. Provider] (06-05-24 @ 14:03)      Palliative Performance Scale:  http://npcrc.org/files/news/palliative_performance_scale_ppsv2.pdf  (Ctrl +  left click to view)  Respiratory Distress Observation Tool:  https://homecareinformation.net/handouts/hen/Respiratory_Distress_Observation_Scale.pdf (Ctrl +  left click to view)  PAINAD Score:  http://[email or web address].[facility]/cog/painad.pdf (Ctrl +  left click to view)   GAP TEAM PALLIATIVE CARE UNIT PROGRESS NOTE:      [  ] Patient on hospice program.    INDICATION FOR PALLIATIVE CARE UNIT SERVICES/Interval HPI: 80 yo F with pancreatic insufficiency, hip Fx and FTT. Hospitalization c/b sepsis (E Coli bacteremia), encephalopathy (today not arousable), and ROBERT (on HD). Functionality also severely compromised. Hospital course complicated by inability to tolerate HD in setting of hypotension. Palliative team following for GOC. Goals are now for comfort directed care. Pressor support capped with down titration as tolerated. Pt  transferred to PCU for symptom management and end of life care.    OVERNIGHT EVENTS: Chart reviewed. The patient is seen and examined at the bedside. She did not require any PRNs over the last 24 hrs (7a-7a).    DNR on chart: Yes  DNI      Allergies    penicillin (Swelling)    Intolerances    epinephrine (Other)  MEDICATIONS  (STANDING):  chlorhexidine 2% Cloths 1 Application(s) Topical daily  hydrocortisone sodium succinate Injectable 100 milliGRAM(s) IV Push every 12 hours  pantoprazole  Injectable 40 milliGRAM(s) IV Push two times a day    MEDICATIONS  (PRN):  acetaminophen  Suppository .. 650 milliGRAM(s) Rectal every 6 hours PRN Temp greater or equal to 38C (100.4F), Mild Pain (1 - 3)  bisacodyl Suppository 10 milliGRAM(s) Rectal daily PRN Constipation  glycopyrrolate Injectable 0.4 milliGRAM(s) IV Push every 6 hours PRN secretions  HYDROmorphone  Injectable 0.5 milliGRAM(s) IV Push every 1 hour PRN Severe Pain (7 - 10)  HYDROmorphone  Injectable 0.2 milliGRAM(s) IV Push every 1 hour PRN Moderate Pain (4 - 6)  HYDROmorphone  Injectable 0.5 milliGRAM(s) IV Push every 1 hour PRN dyspnea  LORazepam   Injectable 0.5 milliGRAM(s) IV Push every 1 hour PRN anxiety, agitation, refractory dyspnea    ITEMS UNCHECKED ARE NOT PRESENT    PRESENT SYMPTOMS: [ X]Unable to self-report see PAINAD, RDOS below  Source if other than patient:  [ ]Family   [ X]Team     Pain: [ ] yes [ ] no  QOL impact -   Location -                    Aggravating factors -  Quality -  Radiation -  Timing-  Severity (0-10 scale):  Minimal acceptable level (0-10 scale):     Dyspnea:                           [ ]Mild [ ]Moderate [ ]Severe  Anxiety:                             [ ]Mild [ ]Moderate [ ]Severe  Fatigue:                             [ ]Mild [ ]Moderate [ ]Severe  Nausea:                             [ ]Mild [ ]Moderate [ ]Severe  Loss of appetite:              [ ]Mild [ ]Moderate [ ]Severe  Constipation:                    [ ]Mild [ ]Moderate [ ]Severe    PCSSQ [Palliative Care Spiritual Screening Question]   Severity (0-10):  Score of 4 or > indicate consideration of Chaplaincy referral.  Chaplaincy Referral: [ ] yes [ ] refused [ ] following [ x] deferred    Caregiver [place]? : [ ] yes [ ] no [ x] deferred:  Social work referral [ ] Patient & Family Centered Care Referral [ ]     Anticipatory Grief present?:  [x ] yes [ ] no [ ] deferred:  Social work referral [x ] Patient & Family Centered Care Referral [ ]  	  Other Symptoms:  [ ]All other review of systems negative- Unable to assess due to poor mentation.     PHYSICAL EXAM:   Vital Signs Last 24 Hrs  T(C): 36.4 (06 Jun 2024 07:44), Max: 36.4 (06 Jun 2024 07:44)  T(F): 97.5 (06 Jun 2024 07:44), Max: 97.5 (06 Jun 2024 07:44)  HR: 94 (06 Jun 2024 07:44) (94 - 94)  BP: 100/77 (06 Jun 2024 07:44) (100/77 - 100/77)  BP(mean): --  RR: 18 (06 Jun 2024 07:44) (18 - 18)  SpO2: 95% (06 Jun 2024 07:44) (95% - 95%)    Parameters below as of 06 Jun 2024 07:44  Patient On (Oxygen Delivery Method): nasal cannula    I&O's Summary    GENERAL: [ ] Cachexia  [ X]Alert  [ ]Oriented x   [ ]Lethargic  [ ]Unarousable  [ ]Verbal  [ ]Non-Verbal  Behavioral:   [ ] Anxiety  [ ] Delirium [ ] Agitation [ ] Other  HEENT:  [X ]Normal   [ ]Dry mouth   [ ]ET Tube/Trach  [ ]Oral lesions  PULMONARY:   [ ]Clear [ ]Tachypnea  [ ]Audible excessive secretions   [ ]Rhonchi        [ ]Right [ ]Left [ ]Bilateral  [ ]Crackles        [ ]Right [ ]Left [ ]Bilateral  [ ]Wheezing     [ ]Right [ ]Left [ ]Bilateral  [ X]Diminished BS [ ]Right [ ]Left [ X]Bilateral    CARDIOVASCULAR:    [X ]Regular [ ]Irregular [ ]Tachy  [ ]Pato [ ]Murmur [ ]Other  GASTROINTESTINAL:  [X ]Soft  [ ]Distended   [X ]+BS  [ X]Non tender [ ]Tender  [ ]Other [ ]PEG [ ]OGT/ NGT   Last BM: 6/5  GENITOURINARY:  [ ]Normal [X ] Incontinent   [ ]Oliguria/Anuria   [ ]Carrillo  MUSCULOSKELETAL:   [ ]Normal   [ X]Weakness  [ ]Bed/Wheelchair bound [ ]Edema  NEUROLOGIC:   [ ]No focal deficits  [ ] Cognitive impairment  [ ] Dysphagia [ ]Dysarthria [ ] Paresis [ ]Other   SKIN: right toes dusky   [ ]Normal  [ ]Rash  [ ]Other  [X ]Pressure ulcer(s)  [ ]y [ ]n  Present on admission  Multiple pressure ulcers. Please see nursing assessment for further details for which I have reviewed.    CRITICAL CARE:  [ ] Shock Present  [ ]Septic [ ]Cardiogenic [ ]Neurologic [ ]Hypovolemic  [ ]  Vasopressors [ ]  Inotropes   [ ] Respiratory failure present [ ] Mechanical Ventilation [ ] Non-invasive ventilatory support [ ] High-Flow  [ ] Acute  [ ] Chronic [ ] Hypoxic  [ ] Hypercarbic [ ] Other  [ X] Other organ failure- Kidneys and skin    LABS: None new.    RADIOLOGY & ADDITIONAL STUDIES: None new.    PROTEIN CALORIE MALNUTRITION: [ ] mild [ ] moderate [ ] severe  [ ] underweight [ ] morbid obesity    https://www.andeal.org/vault/2440/web/files/ONC/Table_Clinical%20Characteristics%20to%20Document%20Malnutrition-White%20JV%20et%20al%202012.pdf    Height (cm): 154.9 (05-19-24 @ 14:27), 162.6 (01-20-24 @ 14:53)  Weight (kg): 46.8 (05-19-24 @ 23:00), 54.4 (01-20-24 @ 14:53)  BMI (kg/m2): 19.5 (05-19-24 @ 23:00), 22.7 (05-19-24 @ 14:27), 20.6 (01-20-24 @ 14:53)    [ X] PPSV2 < or = 30% [ ] significant weight loss [ ] poor nutritional intake [ ] anasarca   Artificial Nutrition [ ]     Other REFERRALS:    [ ] Hospice  [ ]Child Life  [ X]Social Work  [ ]Case management [ ]Holistic Therapy [ ] Physical Therapy [ ] Dietary     Progress Notes - Care Coordination [C. Provider] (06-05-24 @ 14:03)      Palliative Performance Scale:  http://[email or web address].org/files/news/palliative_performance_scale_ppsv2.pdf  (Ctrl +  left click to view)  Respiratory Distress Observation Tool:  https://homecareinformation.net/handouts/hen/Respiratory_Distress_Observation_Scale.pdf (Ctrl +  left click to view)  PAINAD Score:  http://[email or web address].[facility]/cog/painad.pdf (Ctrl +  left click to view)

## 2024-06-07 NOTE — PROGRESS NOTE ADULT - PROBLEM SELECTOR PLAN 1
- PPSV 10%. The patient requires nursing assistance with all ADLs.  - Supportive care.  - Turn and position.  - Continue with good skin care.

## 2024-06-07 NOTE — PROGRESS NOTE ADULT - SUBJECTIVE AND OBJECTIVE BOX
GAP TEAM PALLIATIVE CARE UNIT PROGRESS NOTE:      [  ] Patient on hospice program.    INDICATION FOR PALLIATIVE CARE UNIT SERVICES/Interval HPI:  80 yo F with pancreatic insufficiency, hip Fx and FTT. Hospitalization c/b sepsis (E Coli bacteremia), encephalopathy (today not arousable), and ROBERT (on HD). Functionality also severely compromised. Hospital course complicated by inability to tolerate HD in setting of hypotension. Palliative team following for GOC. Goals are now for comfort directed care. Pressor support capped with down titration as tolerated. Pt  transferred to PCU for symptom management, end of life care, and disposition planning.    OVERNIGHT EVENTS: Seen and examined at bedside. She did  not require any PRNs over the last 24 hrs (7a-7a).     DNR on chart: Yes      Allergies    penicillin (Swelling)    Intolerances    epinephrine (Other)  MEDICATIONS  (STANDING):  chlorhexidine 2% Cloths 1 Application(s) Topical daily  hydrocortisone sodium succinate Injectable 100 milliGRAM(s) IV Push every 12 hours  pantoprazole  Injectable 40 milliGRAM(s) IV Push two times a day    MEDICATIONS  (PRN):  acetaminophen  Suppository .. 650 milliGRAM(s) Rectal every 6 hours PRN Temp greater or equal to 38C (100.4F), Mild Pain (1 - 3)  bisacodyl Suppository 10 milliGRAM(s) Rectal daily PRN Constipation  glycopyrrolate Injectable 0.4 milliGRAM(s) IV Push every 6 hours PRN secretions  HYDROmorphone  Injectable 0.5 milliGRAM(s) IV Push every 1 hour PRN Severe Pain (7 - 10)  HYDROmorphone  Injectable 0.2 milliGRAM(s) IV Push every 1 hour PRN Moderate Pain (4 - 6)  HYDROmorphone  Injectable 0.5 milliGRAM(s) IV Push every 1 hour PRN dyspnea  LORazepam   Injectable 0.5 milliGRAM(s) IV Push every 1 hour PRN anxiety, agitation, refractory dyspnea    ITEMS UNCHECKED ARE NOT PRESENT    PRESENT SYMPTOMS: [x ]Unable to self-report see PAINAD, RDOS below  Source if other than patient:  [ ]Family   [x ]Team     Pain: [ ] yes [ ] no  QOL impact -   Location -                    Aggravating factors -  Quality -  Radiation -  Timing-  Severity (0-10 scale):  Minimal acceptable level (0-10 scale):     Dyspnea:                           [ ]Mild [ ]Moderate [ ]Severe  Anxiety:                             [ ]Mild [ ]Moderate [ ]Severe  Fatigue:                             [ ]Mild [ ]Moderate [ ]Severe  Nausea:                             [ ]Mild [ ]Moderate [ ]Severe  Loss of appetite:              [ ]Mild [ ]Moderate [ ]Severe  Constipation:                    [ ]Mild [ ]Moderate [ ]Severe    PCSSQ [Palliative Care Spiritual Screening Question]   Severity (0-10):  Score of 4 or > indicate consideration of Chaplaincy referral.  Chaplaincy Referral: [ ] yes [ ] refused [ ] following [x ] deferred    Caregiver [place]? : [x ] yes [ ] no [ ] deferred:  Social work referral [ ] Patient & Family Centered Care Referral [ ]   Anticipatory Grief present?:  [x ] yes [ ] no [ ] deferred:  Social work referral [ ] Patient & Family Centered Care Referral [ ]  	  Other Symptoms:  [ ]All other review of systems negative - unable to obtain    PHYSICAL EXAM:   Vital Signs Last 24 Hrs  T(C): 36.8 (07 Jun 2024 08:00), Max: 36.8 (07 Jun 2024 08:00)  T(F): 98.2 (07 Jun 2024 08:00), Max: 98.2 (07 Jun 2024 08:00)  HR: 99 (07 Jun 2024 08:00) (99 - 99)  BP: 90/65 (07 Jun 2024 08:00) (90/65 - 90/65)  BP(mean): --  RR: 18 (07 Jun 2024 08:00) (18 - 18)  SpO2: 95% (07 Jun 2024 08:00) (95% - 95%)    Parameters below as of 07 Jun 2024 08:00  Patient On (Oxygen Delivery Method): nasal cannula     I&O's Summary    GENERAL: [ ] Cachexia  [ X]Alert  [ ]Oriented x   [ ]Lethargic  [ ]Unarousable  [ x]Verbal - mumbling, nonsensical [ ]Non-Verbal  Behavioral:   [ ] Anxiety  [ ] Delirium [ ] Agitation [ ] Other  HEENT:  [X ]Normal   [ ]Dry mouth   [ ]ET Tube/Trach  [ ]Oral lesions  PULMONARY:   [ ]Clear [ ]Tachypnea  [ ]Audible excessive secretions   [ ]Rhonchi        [ ]Right [ ]Left [ ]Bilateral  [ ]Crackles        [ ]Right [ ]Left [ ]Bilateral  [ ]Wheezing     [ ]Right [ ]Left [ ]Bilateral  [ X]Diminished BS [ ]Right [ ]Left [ X]Bilateral    CARDIOVASCULAR:    [X ]Regular [ ]Irregular [ ]Tachy  [ ]Pato [ ]Murmur [ ]Other  GASTROINTESTINAL:  [X ]Soft  [ ]Distended   [X ]+BS  [ X]Non tender [ ]Tender  [ ]Other [ ]PEG [ ]OGT/ NGT   Last BM: 6/5  GENITOURINARY:  [ ]Normal [X ] Incontinent   [ ]Oliguria/Anuria   [ ]Carrillo  MUSCULOSKELETAL:   [ ]Normal   [ X]Weakness  [ ]Bed/Wheelchair bound [ ]Edema  NEUROLOGIC:   [ ]No focal deficits  [ ] Cognitive impairment  [ ] Dysphagia [ ]Dysarthria [ ] Paresis [ ]Other   SKIN: right toes dusky   [ ]Normal  [ ]Rash  [ ]Other  [X ]Pressure ulcer(s)  [ ]y [ ]n  Present on admission  Multiple pressure ulcers. Please see nursing assessment for further details for which I have reviewed.    CRITICAL CARE:  [ ] Shock Present  [ ]Septic [ ]Cardiogenic [ ]Neurologic [ ]Hypovolemic  [ ]  Vasopressors [ ]  Inotropes   [ ] Respiratory failure present [ ] Mechanical Ventilation [ ] Non-invasive ventilatory support [ ] High-Flow  [ ] Acute  [ ] Chronic [ ] Hypoxic  [ ] Hypercarbic [ ] Other  [ X] Other organ failure- Kidneys and skin        PROTEIN CALORIE MALNUTRITION: [ ] mild [ ] moderate [ ] severe  [ ] underweight [ ] morbid obesity    https://www.andeal.org/vault/2440/web/files/ONC/Table_Clinical%20Characteristics%20to%20Document%20Malnutrition-White%20JV%20et%20al%202012.pdf    Height (cm): 154.9 (05-19-24 @ 14:27), 162.6 (01-20-24 @ 14:53)  Weight (kg): 46.8 (05-19-24 @ 23:00), 54.4 (01-20-24 @ 14:53)  BMI (kg/m2): 19.5 (05-19-24 @ 23:00), 22.7 (05-19-24 @ 14:27), 20.6 (01-20-24 @ 14:53)    [x ] PPSV2 < or = 30% [ ] significant weight loss [ ] poor nutritional intake [ ] anasarca   Artificial Nutrition [ ]     Other REFERRALS:    [ ] Hospice  [ ]Child Life  [x ]Social Work  [ ]Case management [ ]Holistic Therapy [ ] Physical Therapy [ ] Dietary     Progress Notes - Care Coordination [C. Provider] (06-05-24 @ 14:03)      Palliative Performance Scale:  http://npcrc.org/files/news/palliative_performance_scale_ppsv2.pdf  (Ctrl +  left click to view)  Respiratory Distress Observation Tool:  https://[email or web address].net/handouts/hen/Respiratory_Distress_Observation_Scale.pdf (Ctrl +  left click to view)  PAINAD Score:  http://geriatrictoolkit.[email or web address].[email or web address]/cog/painad.pdf (Ctrl +  left click to view)

## 2024-06-07 NOTE — PROGRESS NOTE ADULT - NS ATTEND AMEND GEN_ALL_CORE FT
79-year-old female with past medical history of GERD, MI, chronic pancreatitis, HTN, incontinence, recent hip surgery, recently discharged from [facility] to home admitted with increased lethargy, failure to thrive, decreased PO intake nausea/vomiting. Work up revealed urosepsis and in need of urgent HD.  Patient treated but remains in renal failure, not tolerating hemodialysis due to hypotension, despite treatment of urosepsis and pressors.   Patient transferred to PCU for end of life care and management of symptoms.  She is receiving comfort measures only.  Stress dose steroids started on June 21 for sepsis.  To taper to off today.    DC planning
79-year-old female with past medical history of GERD, MI, chronic pancreatitis, HTN, incontinence, recent hip surgery, recently discharged from [facility] to home admitted with increased lethargy, failure to thrive, decreased PO intake nausea/vomiting. Work up revealed urosepsis and in need of urgent HD.  Patient treated but remains in renal failure, not tolerating hemodialysis due to hypotension, despite treatment of urosepsis and pressors.   Patient transferred to PCU for end of life care and management of symptoms.  She is receiving comfort measures only.  Stress dose steroids started on June 21 for sepsis.  To taper to off.      In the setting of parenteral controlled substance administration, clinical monitoring required for side effects such as respiratory depression, constipation and opioid induced neurotoxicity due to organ failure.    Patient assessment and plan discussed on interdisciplinary team rounds today.  DC planning    Family updated as to plan of care and current clinical status.

## 2024-06-07 NOTE — PROGRESS NOTE ADULT - PROBLEM SELECTOR PLAN 3
Overactive bladder - 2/2 to UTI  -S/p Meropenem 500mg IV q12h until 6/6 Osteoarthritis, unspecified osteoarthritis type, unspecified site

## 2024-06-08 NOTE — PROGRESS NOTE ADULT - ATTENDING COMMENTS
I have personally seen and examined the patient.  I fully participated in the care of this patient.  I have made amendments to the documentation where necessary, and agree with the history, physical exam, and plan as documented by the Fellow.     79-year-old female with past medical history of GERD, MI, chronic pancreatitis, HTN, incontinence, recent hip surgery, recently discharged from [facility] to home admitted with increased lethargy, failure to thrive, decreased PO intake nausea/vomiting. Work up revealed urosepsis and in need of urgent HD.  Patient treated but remains in renal failure, not tolerating hemodialysis due to hypotension, despite treatment of urosepsis and pressors.   Patient transferred to PCU for end of life care and management of symptoms.  She is receiving comfort measures only.    On exam, opens eyes to voice, minimally verbal, appears comfortable with current meds. Will continue meds as above; dc planning ongoing, possible calvary.

## 2024-06-08 NOTE — PROGRESS NOTE ADULT - ASSESSMENT
78 yo F with pancreatic insufficiency, hip Fx and FTT. Hospitalization c/b sepsis (E Coli bacteremia), encephalopathy (today not arousable), and ROBERT (on HD). Functionality also severely compromised. Hospital course complicated by inability to tolerate HD in setting of hypotension. Palliative team following for GOc. Goals are now for comfort directed care. Pressor support capped with down titration as tolerated. Pt transferred to PCU for symptom management and end of life care.

## 2024-06-08 NOTE — PROGRESS NOTE ADULT - SUBJECTIVE AND OBJECTIVE BOX
GAP TEAM PALLIATIVE CARE UNIT PROGRESS NOTE:      [  ] Patient on hospice program.    INDICATION FOR PALLIATIVE CARE UNIT SERVICES/Interval HPI:  78 yo F with pancreatic insufficiency, hip Fx and FTT. Hospitalization c/b sepsis (E Coli bacteremia), encephalopathy (today not arousable), and ROBERT (on HD). Functionality also severely compromised. Hospital course complicated by inability to tolerate HD in setting of hypotension. Palliative team following for GOC. Goals are now for comfort directed care. Pressor support capped with down titration as tolerated. Pt  transferred to PCU for symptom management, end of life care, and disposition planning.    OVERNIGHT EVENTS: Chart reviewed and patient seen and examined at the bedside. The patient required PRN dilaudid 0.5mg IVx1 for severe pain within a 24hr period 8am-8am.    DNR on chart: Yes      Allergies    penicillin (Swelling)    Intolerances    epinephrine (Other)    MEDICATIONS  (STANDING):  chlorhexidine 2% Cloths 1 Application(s) Topical daily  hydrocortisone sodium succinate Injectable 100 milliGRAM(s) IV Push daily  pantoprazole  Injectable 40 milliGRAM(s) IV Push two times a day    MEDICATIONS  (PRN):  acetaminophen  Suppository .. 650 milliGRAM(s) Rectal every 6 hours PRN Temp greater or equal to 38C (100.4F), Mild Pain (1 - 3)  bisacodyl Suppository 10 milliGRAM(s) Rectal daily PRN Constipation  glycopyrrolate Injectable 0.4 milliGRAM(s) IV Push every 6 hours PRN secretions  HYDROmorphone  Injectable 0.2 milliGRAM(s) IV Push every 1 hour PRN Moderate Pain (4 - 6)  HYDROmorphone  Injectable 0.5 milliGRAM(s) IV Push every 1 hour PRN Severe Pain (7 - 10)  HYDROmorphone  Injectable 0.5 milliGRAM(s) IV Push every 1 hour PRN dyspnea  LORazepam   Injectable 0.5 milliGRAM(s) IV Push every 1 hour PRN anxiety, agitation, refractory dyspnea    ITEMS UNCHECKED ARE NOT PRESENT    PRESENT SYMPTOMS: [x ]Unable to self-report see PAINAD, RDOS below  Source if other than patient:  [ ]Family   [x ]Team     Pain: [ ] yes [ ] no  QOL impact -   Location -                    Aggravating factors -  Quality -  Radiation -  Timing-  Severity (0-10 scale):  Minimal acceptable level (0-10 scale):     Dyspnea:                           [ ]Mild [ ]Moderate [ ]Severe  Anxiety:                             [ ]Mild [ ]Moderate [ ]Severe  Fatigue:                             [ ]Mild [ ]Moderate [ ]Severe  Nausea:                             [ ]Mild [ ]Moderate [ ]Severe  Loss of appetite:              [ ]Mild [ ]Moderate [ ]Severe  Constipation:                    [ ]Mild [ ]Moderate [ ]Severe    PCSSQ [Palliative Care Spiritual Screening Question]   Severity (0-10):  Score of 4 or > indicate consideration of Chaplaincy referral.  Chaplaincy Referral: [ ] yes [ ] refused [ ] following [x ] deferred    Caregiver [place]? : [x ] yes [ ] no [ ] deferred:  Social work referral [ ] Patient & Family Centered Care Referral [ ]   Anticipatory Grief present?:  [x ] yes [ ] no [ ] deferred:  Social work referral [ ] Patient & Family Centered Care Referral [ ]  	  Other Symptoms:  [ ]All other review of systems negative - unable to obtain    PHYSICAL EXAM:   Vital Signs Last 24 Hrs  T(C): 36.1 (08 Jun 2024 08:02), Max: 36.1 (08 Jun 2024 08:02)  T(F): 96.9 (08 Jun 2024 08:02), Max: 96.9 (08 Jun 2024 08:02)  HR: 76 (08 Jun 2024 08:02) (76 - 76)  BP: 92/70 (08 Jun 2024 08:02) (92/70 - 92/70)  BP(mean): --  RR: 16 (08 Jun 2024 08:02) (16 - 16)  SpO2: --     I&O's Summary    GENERAL: [ ] Cachexia  [ X]Alert  [ ]Oriented x   [ ]Lethargic  [ ]Unarousable  [ x]Verbal - mumbling, nonsensical [ ]Non-Verbal  Behavioral:   [ ] Anxiety  [ ] Delirium [ ] Agitation [ ] Other  HEENT:  [X ]Normal   [ ]Dry mouth   [ ]ET Tube/Trach  [ ]Oral lesions  PULMONARY:   [ ]Clear [ ]Tachypnea  [ ]Audible excessive secretions   [ ]Rhonchi        [ ]Right [ ]Left [ ]Bilateral  [ ]Crackles        [ ]Right [ ]Left [ ]Bilateral  [ ]Wheezing     [ ]Right [ ]Left [ ]Bilateral  [ X]Diminished BS [ ]Right [ ]Left [ X]Bilateral    CARDIOVASCULAR:    [X ]Regular [ ]Irregular [ ]Tachy  [ ]Pato [ ]Murmur [ ]Other  GASTROINTESTINAL:  [X ]Soft  [ ]Distended   [X ]+BS  [ X]Non tender [ ]Tender  [ ]Other [ ]PEG [ ]OGT/ NGT   Last BM: 6/5  GENITOURINARY:  [ ]Normal [X ] Incontinent   [ ]Oliguria/Anuria   [ ]Carrillo  MUSCULOSKELETAL:   [ ]Normal   [ X]Weakness  [ ]Bed/Wheelchair bound [ ]Edema  NEUROLOGIC:   [ ]No focal deficits  [ ] Cognitive impairment  [ ] Dysphagia [ ]Dysarthria [ ] Paresis [ ]Other   SKIN: right toes dusky   [ ]Normal  [ ]Rash  [ ]Other  [X ]Pressure ulcer(s)  [ ]y [ ]n  Present on admission  Multiple pressure ulcers. Please see nursing assessment for further details for which I have reviewed.    CRITICAL CARE:  [ ] Shock Present  [ ]Septic [ ]Cardiogenic [ ]Neurologic [ ]Hypovolemic  [ ]  Vasopressors [ ]  Inotropes   [ ] Respiratory failure present [ ] Mechanical Ventilation [ ] Non-invasive ventilatory support [ ] High-Flow  [ ] Acute  [ ] Chronic [ ] Hypoxic  [ ] Hypercarbic [ ] Other  [ X] Other organ failure- Kidneys and skin      PROTEIN CALORIE MALNUTRITION: [ ] mild [ ] moderate [ ] severe  [ ] underweight [ ] morbid obesity    https://www.andeal.org/vault/2440/web/files/ONC/Table_Clinical%20Characteristics%20to%20Document%20Malnutrition-White%20JV%20et%20al%202012.pdf    Height (cm): 154.9 (05-19-24 @ 14:27), 162.6 (01-20-24 @ 14:53)  Weight (kg): 46.8 (05-19-24 @ 23:00), 54.4 (01-20-24 @ 14:53)  BMI (kg/m2): 19.5 (05-19-24 @ 23:00), 22.7 (05-19-24 @ 14:27), 20.6 (01-20-24 @ 14:53)    [x ] PPSV2 < or = 30% [ ] significant weight loss [ ] poor nutritional intake [ ] anasarca   Artificial Nutrition [ ]     Other REFERRALS:    [ ] Hospice  [ ]Child Life  [x ]Social Work  [ ]Case management [ ]Holistic Therapy [ ] Physical Therapy [ ] Dietary     Progress Notes - Care Coordination [C. Provider] (06-05-24 @ 14:03)      Palliative Performance Scale:  http://npcrc.org/files/news/palliative_performance_scale_ppsv2.pdf  (Ctrl +  left click to view)  Respiratory Distress Observation Tool:  https://homecareinformation.net/handouts/hen/Respiratory_Distress_Observation_Scale.pdf (Ctrl +  left click to view)  PAINAD Score:  http://geriatrictoolkit.[place].[facility]/cog/painad.pdf (Ctrl +  left click to view)

## 2024-06-08 NOTE — PROGRESS NOTE ADULT - PROBLEM SELECTOR PLAN 1
- PPSV 10%. The patient requires nursing assistance with all ADLs.  - Supportive care.  - Turn and position.  - Continue with good skin care.  - dilaudid 0.2-0.5mg IV q1h moderate pain to severe pain due to immobility (x1 0.5mg required once for severe pain)

## 2024-06-08 NOTE — PROGRESS NOTE ADULT - PROBLEM SELECTOR PLAN 5
- Will continue to provide the family with updates, answer questions and provide emotional support.  - Ongoing dispo planning with the .     updated on medical condition.

## 2024-06-09 NOTE — PROGRESS NOTE ADULT - PROBLEM SELECTOR PLAN 7
- Will continue to provide the family with updates, answer questions and provide emotional support.  - Ongoing dispo planning with the .     updated on medical condition. - Will continue to provide the family with updates, answer questions and provide emotional support - attempted to reach  today after nurse passed along question about labs, no answer  - Ongoing dispo planning with the

## 2024-06-09 NOTE — PROGRESS NOTE ADULT - SUBJECTIVE AND OBJECTIVE BOX
GAP TEAM PALLIATIVE CARE UNIT PROGRESS NOTE:      [  ] Patient on hospice program.    INDICATION FOR PALLIATIVE CARE UNIT SERVICES/Interval HPI:  78 yo F with pancreatic insufficiency, hip Fx and FTT. Hospitalization c/b sepsis (E Coli bacteremia), encephalopathy (today not arousable), and ROBERT (on HD). Functionality also severely compromised. Hospital course complicated by inability to tolerate HD in setting of hypotension. Palliative team following for GOC. Goals are now for comfort directed care. Pressor support capped with down titration as tolerated. Pt  transferred to PCU for symptom management, end of life care, and disposition planning.    OVERNIGHT EVENTS: Chart reviewed and patient seen and examined at the bedside. The patient required PRN dilaudid 0.5mg IVx2 for severe pain,  dilaudid 0.5mg IVx1 for dyspnea within a 24hr period 8am-8am.    DNR on chart: Yes      Allergies    penicillin (Swelling)    Intolerances    epinephrine (Other)    MEDICATIONS  (STANDING):  chlorhexidine 2% Cloths 1 Application(s) Topical daily  pantoprazole  Injectable 40 milliGRAM(s) IV Push two times a day    MEDICATIONS  (PRN):  acetaminophen  Suppository .. 650 milliGRAM(s) Rectal every 6 hours PRN Temp greater or equal to 38C (100.4F), Mild Pain (1 - 3)  bisacodyl Suppository 10 milliGRAM(s) Rectal daily PRN Constipation  glycopyrrolate Injectable 0.4 milliGRAM(s) IV Push every 6 hours PRN secretions  HYDROmorphone  Injectable 0.2 milliGRAM(s) IV Push every 1 hour PRN Moderate Pain (4 - 6)  HYDROmorphone  Injectable 0.5 milliGRAM(s) IV Push every 1 hour PRN Severe Pain (7 - 10)  HYDROmorphone  Injectable 0.5 milliGRAM(s) IV Push every 1 hour PRN dyspnea  LORazepam   Injectable 0.5 milliGRAM(s) IV Push every 1 hour PRN anxiety, agitation, refractory dyspnea    ITEMS UNCHECKED ARE NOT PRESENT    PRESENT SYMPTOMS: [x ]Unable to self-report see PAINAD, RDOS below  Source if other than patient:  [ ]Family   [x ]Team     Pain: [ ] yes [ ] no  QOL impact -   Location -                    Aggravating factors -  Quality -  Radiation -  Timing-  Severity (0-10 scale):  Minimal acceptable level (0-10 scale):     Dyspnea:                           [ ]Mild [ ]Moderate [ ]Severe  Anxiety:                             [ ]Mild [ ]Moderate [ ]Severe  Fatigue:                             [ ]Mild [ ]Moderate [ ]Severe  Nausea:                             [ ]Mild [ ]Moderate [ ]Severe  Loss of appetite:              [ ]Mild [ ]Moderate [ ]Severe  Constipation:                    [ ]Mild [ ]Moderate [ ]Severe    PCSSQ [Palliative Care Spiritual Screening Question]   Severity (0-10):  Score of 4 or > indicate consideration of Chaplaincy referral.  Chaplaincy Referral: [ ] yes [ ] refused [ ] following [x ] deferred    Caregiver [place]? : [x ] yes [ ] no [ ] deferred:  Social work referral [ ] Patient & Family Centered Care Referral [ ]   Anticipatory Grief present?:  [x ] yes [ ] no [ ] deferred:  Social work referral [ ] Patient & Family Centered Care Referral [ ]  	  Other Symptoms:  [ ]All other review of systems negative - unable to obtain    PHYSICAL EXAM:   Vital Signs Last 24 Hrs  T(C): 36.4 (09 Jun 2024 08:32), Max: 36.4 (09 Jun 2024 08:32)  T(F): 97.5 (09 Jun 2024 08:32), Max: 97.5 (09 Jun 2024 08:32)  HR: 80 (09 Jun 2024 08:32) (80 - 80)  BP: --  BP(mean): --  RR: 16 (09 Jun 2024 08:32) (16 - 16)  SpO2: --     I&O's Summary    GENERAL: [ ] Cachexia  [ X]Alert  [ ]Oriented x   [ ]Lethargic  [ ]Unarousable  [ x]Verbal - mumbling, nonsensical [ ]Non-Verbal  Behavioral:   [ ] Anxiety  [ ] Delirium [ ] Agitation [ ] Other  HEENT:  [X ]Normal   [ ]Dry mouth   [ ]ET Tube/Trach  [ ]Oral lesions  PULMONARY:   [ ]Clear [ ]Tachypnea  [ ]Audible excessive secretions   [ ]Rhonchi        [ ]Right [ ]Left [ ]Bilateral  [ ]Crackles        [ ]Right [ ]Left [ ]Bilateral  [ ]Wheezing     [ ]Right [ ]Left [ ]Bilateral  [ X]Diminished BS [ ]Right [ ]Left [ X]Bilateral    CARDIOVASCULAR:    [X ]Regular [ ]Irregular [ ]Tachy  [ ]Pato [ ]Murmur [ ]Other  GASTROINTESTINAL:  [X ]Soft  [ ]Distended   [X ]+BS  [ X]Non tender [ ]Tender  [ ]Other [ ]PEG [ ]OGT/ NGT   Last BM: 6/5  GENITOURINARY:  [ ]Normal [X ] Incontinent   [ ]Oliguria/Anuria   [ ]Carrillo  MUSCULOSKELETAL:   [ ]Normal   [ X]Weakness  [ ]Bed/Wheelchair bound [ ]Edema  NEUROLOGIC:   [ ]No focal deficits  [ ] Cognitive impairment  [ ] Dysphagia [ ]Dysarthria [ ] Paresis [ ]Other   SKIN: right toes dusky   [ ]Normal  [ ]Rash  [ ]Other  [X ]Pressure ulcer(s)  [ ]y [ ]n  Present on admission  Multiple pressure ulcers. Please see nursing assessment for further details for which I have reviewed.    CRITICAL CARE:  [ ] Shock Present  [ ]Septic [ ]Cardiogenic [ ]Neurologic [ ]Hypovolemic  [ ]  Vasopressors [ ]  Inotropes   [ ] Respiratory failure present [ ] Mechanical Ventilation [ ] Non-invasive ventilatory support [ ] High-Flow  [ ] Acute  [ ] Chronic [ ] Hypoxic  [ ] Hypercarbic [ ] Other  [ X] Other organ failure- Kidneys and skin      PROTEIN CALORIE MALNUTRITION: [ ] mild [ ] moderate [ ] severe  [ ] underweight [ ] morbid obesity    https://www.andeal.org/vault/2440/web/files/ONC/Table_Clinical%20Characteristics%20to%20Document%20Malnutrition-White%20JV%20et%20al%376212.pdf    Height (cm): 154.9 (05-19-24 @ 14:27), 162.6 (01-20-24 @ 14:53)  Weight (kg): 46.8 (05-19-24 @ 23:00), 54.4 (01-20-24 @ 14:53)  BMI (kg/m2): 19.5 (05-19-24 @ 23:00), 22.7 (05-19-24 @ 14:27), 20.6 (01-20-24 @ 14:53)    [x ] PPSV2 < or = 30% [ ] significant weight loss [ ] poor nutritional intake [ ] anasarca   Artificial Nutrition [ ]     Other REFERRALS:    [ ] Hospice  [ ]Child Life  [x ]Social Work  [ ]Case management [ ]Holistic Therapy [ ] Physical Therapy [ ] Dietary     Progress Notes - Care Coordination [C. Provider] (06-05-24 @ 14:03)      Palliative Performance Scale:  http://npcrc.org/files/news/palliative_performance_scale_ppsv2.pdf  (Ctrl +  left click to view)  Respiratory Distress Observation Tool:  https://homecareinformation.net/handouts/hen/Respiratory_Distress_Observation_Scale.pdf (Ctrl +  left click to view)  PAINAD Score:  http://geriatrictoolkit.[email or web address]/cog/painad.pdf (Ctrl +  left click to view)   GAP TEAM PALLIATIVE CARE UNIT PROGRESS NOTE:      [  ] Patient on hospice program.    INDICATION FOR PALLIATIVE CARE UNIT SERVICES/Interval HPI:  80 yo F with pancreatic insufficiency, hip Fx and FTT. Hospitalization c/b sepsis (E Coli bacteremia), encephalopathy (today not arousable), and ROBERT (on HD). Functionality also severely compromised. Hospital course complicated by inability to tolerate HD in setting of hypotension. Palliative team following for GOC. Goals are now for comfort directed care. Pressor support capped with down titration as tolerated. Pt  transferred to PCU for symptom management, end of life care, and disposition planning.    OVERNIGHT EVENTS: Chart reviewed and patient seen and examined at the bedside. The patient required PRN dilaudid 0.5mg IVx2 for severe pain,  dilaudid 0.5mg IVx1 for dyspnea within a 24hr period 8am-8am.    DNR on chart: Yes      Allergies    penicillin (Swelling)    Intolerances    epinephrine (Other)    MEDICATIONS  (STANDING):  chlorhexidine 2% Cloths 1 Application(s) Topical daily  HYDROmorphone  Injectable 0.5 milliGRAM(s) IV Push every 6 hours  pantoprazole  Injectable 40 milliGRAM(s) IV Push two times a day    MEDICATIONS  (PRN):  acetaminophen  Suppository .. 650 milliGRAM(s) Rectal every 6 hours PRN Temp greater or equal to 38C (100.4F), Mild Pain (1 - 3)  bisacodyl Suppository 10 milliGRAM(s) Rectal daily PRN Constipation  glycopyrrolate Injectable 0.4 milliGRAM(s) IV Push every 6 hours PRN secretions  HYDROmorphone  Injectable 0.2 milliGRAM(s) IV Push every 1 hour PRN Moderate Pain (4 - 6)  HYDROmorphone  Injectable 0.5 milliGRAM(s) IV Push every 1 hour PRN Severe Pain (7 - 10)  HYDROmorphone  Injectable 0.5 milliGRAM(s) IV Push every 1 hour PRN dyspnea  LORazepam   Injectable 0.5 milliGRAM(s) IV Push every 1 hour PRN anxiety, agitation, refractory dyspnea    ITEMS UNCHECKED ARE NOT PRESENT    PRESENT SYMPTOMS: [x ]Unable to self-report see PAINAD, RDOS below  Source if other than patient:  [ ]Family   [x ]Team     Pain: [ ] yes [ ] no  QOL impact -   Location -                    Aggravating factors -  Quality -  Radiation -  Timing-  Severity (0-10 scale):  Minimal acceptable level (0-10 scale):     Dyspnea:                           [ ]Mild [ ]Moderate [ ]Severe  Anxiety:                             [ ]Mild [ ]Moderate [ ]Severe  Fatigue:                             [ ]Mild [ ]Moderate [ ]Severe  Nausea:                             [ ]Mild [ ]Moderate [ ]Severe  Loss of appetite:              [ ]Mild [ ]Moderate [ ]Severe  Constipation:                    [ ]Mild [ ]Moderate [ ]Severe    PCSSQ [Palliative Care Spiritual Screening Question]   Severity (0-10):  Score of 4 or > indicate consideration of Chaplaincy referral.  Chaplaincy Referral: [ ] yes [ ] refused [ ] following [x ] deferred    Caregiver [place]? : [x ] yes [ ] no [ ] deferred:  Social work referral [ ] Patient & Family Centered Care Referral [ ]   Anticipatory Grief present?:  [x ] yes [ ] no [ ] deferred:  Social work referral [ ] Patient & Family Centered Care Referral [ ]  	  Other Symptoms:  [ ]All other review of systems negative - unable to obtain    PHYSICAL EXAM:   Vital Signs Last 24 Hrs  T(C): 36.4 (09 Jun 2024 08:32), Max: 36.4 (09 Jun 2024 08:32)  T(F): 97.5 (09 Jun 2024 08:32), Max: 97.5 (09 Jun 2024 08:32)  HR: 80 (09 Jun 2024 08:32) (80 - 80)  BP: --  BP(mean): --  RR: 16 (09 Jun 2024 08:32) (16 - 16)  SpO2: --     I&O's Summary    GENERAL: [ ] Cachexia  [ X]Alert  [ ]Oriented x   [ ]Lethargic  [ ]Unarousable  [ x]Verbal - mumbling, nonsensical [ ]Non-Verbal  Behavioral:   [ ] Anxiety  [ ] Delirium [ ] Agitation [ ] Other  HEENT:  [X ]Normal   [ ]Dry mouth   [ ]ET Tube/Trach  [ ]Oral lesions  PULMONARY:   [ ]Clear [ ]Tachypnea  [ ]Audible excessive secretions   [ ]Rhonchi        [ ]Right [ ]Left [ ]Bilateral  [ ]Crackles        [ ]Right [ ]Left [ ]Bilateral  [ ]Wheezing     [ ]Right [ ]Left [ ]Bilateral  [ X]Diminished BS [ ]Right [ ]Left [ X]Bilateral    CARDIOVASCULAR:    [X ]Regular [ ]Irregular [ ]Tachy  [ ]Pato [ ]Murmur [ ]Other  GASTROINTESTINAL:  [X ]Soft  [ ]Distended   [X ]+BS  [ X]Non tender [ ]Tender  [ ]Other [ ]PEG [ ]OGT/ NGT   Last BM: 6/5  GENITOURINARY:  [ ]Normal [X ] Incontinent   [ ]Oliguria/Anuria   [ ]Carrillo  MUSCULOSKELETAL:   [ ]Normal   [ X]Weakness  [ ]Bed/Wheelchair bound [ ]Edema  NEUROLOGIC:   [ ]No focal deficits  [ ] Cognitive impairment  [ ] Dysphagia [ ]Dysarthria [ ] Paresis [ ]Other   SKIN: right toes dusky   [ ]Normal  [ ]Rash  [ ]Other  [X ]Pressure ulcer(s)  [ ]y [ ]n  Present on admission  Multiple pressure ulcers. Please see nursing assessment for further details for which I have reviewed.    CRITICAL CARE:  [ ] Shock Present  [ ]Septic [ ]Cardiogenic [ ]Neurologic [ ]Hypovolemic  [ ]  Vasopressors [ ]  Inotropes   [ ] Respiratory failure present [ ] Mechanical Ventilation [ ] Non-invasive ventilatory support [ ] High-Flow  [ ] Acute  [ ] Chronic [ ] Hypoxic  [ ] Hypercarbic [ ] Other  [ X] Other organ failure- Kidneys and skin      PROTEIN CALORIE MALNUTRITION: [ ] mild [ ] moderate [ ] severe  [ ] underweight [ ] morbid obesity    https://www.andeal.org/vault/2440/web/files/ONC/Table_Clinical%20Characteristics%20to%20Document%20Malnutrition-White%20JV%20et%20al%202012.pdf    Height (cm): 154.9 (05-19-24 @ 14:27), 162.6 (01-20-24 @ 14:53)  Weight (kg): 46.8 (05-19-24 @ 23:00), 54.4 (01-20-24 @ 14:53)  BMI (kg/m2): 19.5 (05-19-24 @ 23:00), 22.7 (05-19-24 @ 14:27), 20.6 (01-20-24 @ 14:53)    [x ] PPSV2 < or = 30% [ ] significant weight loss [ ] poor nutritional intake [ ] anasarca   Artificial Nutrition [ ]     Other REFERRALS:    [ ] Hospice  [ ]Child Life  [x ]Social Work  [ ]Case management [ ]Holistic Therapy [ ] Physical Therapy [ ] Dietary     Progress Notes - Care Coordination [C. Provider] (06-05-24 @ 14:03)      Palliative Performance Scale:  http://npcrc.org/files/news/palliative_performance_scale_ppsv2.pdf  (Ctrl +  left click to view)  Respiratory Distress Observation Tool:  https://homecareinformation.net/handouts/hen/Respiratory_Distress_Observation_Scale.pdf (Ctrl +  left click to view)  PAINAD Score:  http://geriatrictoolkit.[place].[facility]/cog/painad.pdf (Ctrl +  left click to view)

## 2024-06-09 NOTE — PROGRESS NOTE ADULT - PROBLEM SELECTOR PLAN 2
-Dilaudid 0.5mg IV q1hr PRN dyspnea  (1 required in 24 hour period from 8am-8am) -Dilaudid 0.5mg IV q1hr PRN dyspnea  (1 required in 24 hour period from 8am-8am)  -standing dilaudid 0.5mg q6h ATC added per 24 hour requirement

## 2024-06-09 NOTE — PROGRESS NOTE ADULT - ATTENDING COMMENTS
I have personally seen and examined the patient.  I fully participated in the care of this patient.  I have made amendments to the documentation where necessary, and agree with the history, physical exam, and plan as documented by the Fellow.     79-year-old female with past medical history of GERD, MI, chronic pancreatitis, HTN, incontinence, recent hip surgery, recently discharged from [facility] to home admitted with increased lethargy, failure to thrive, decreased PO intake nausea/vomiting. Work up revealed urosepsis and in need of urgent HD.  Patient treated but remains in renal failure, not tolerating hemodialysis due to hypotension, despite treatment of urosepsis and pressors.   Patient transferred to PCU for end of life care and management of symptoms.  She is receiving comfort measures only.    Seen and discussed with IDT;  Patient noted to have increasing pain and some increased resp distress, needed frequent dialudid; will plan to start Dilaudid ATC as patient w/ extensive wounds and with fracture. Declining clinically.  On exam, opens eyes to voice, minimally verbal today though speech unintelligible;   Dispo pending clinical picture; possible [place].

## 2024-06-09 NOTE — PROGRESS NOTE ADULT - PROBLEM SELECTOR PLAN 1
-Dilaudid 0.2mg IV q1hr PRN MP (none required in 24 hour period from 8am-8am)  -Dilaudid 0.5mg IV q1hr PRN SP (2 required in 24 hour period from 8am-8am) -Dilaudid 0.2mg IV q1hr PRN MP (none required in 24 hour period from 8am-8am)  -Dilaudid 0.5mg IV q1hr PRN SP (2 required in 24 hour period from 8am-8am)  -standing dilaudid 0.5mg q6h ATC added per 24 hour requirement

## 2024-06-10 NOTE — PROGRESS NOTE ADULT - PROBLEM SELECTOR PLAN 2
-Dilaudid 0.5mg IV q1hr PRN dyspnea  (1 required in 24 hour period from 8am-8am)  -dilaudid 0.5mg q6h ATC as above

## 2024-06-10 NOTE — PROGRESS NOTE ADULT - PROBLEM SELECTOR PLAN 7
at bedside. Spoke with him regarding the pt being in the active phases of dying. She will remain in PCU.

## 2024-06-10 NOTE — PROGRESS NOTE ADULT - ASSESSMENT
78 yo F with pancreatic insufficiency, hip Fx and FTT. Hospitalization c/b sepsis (E Coli bacteremia), encephalopathy (today not arousable), and ROBERT (on HD). Functionality also severely compromised. Hospital course complicated by inability to tolerate HD in setting of hypotension. Palliative team following for GOc. Goals are now for comfort directed care. Pressor support capped with down titration as tolerated. Pt transferred to PCU for symptom management and end of life care.   80 yo F with pancreatic insufficiency, hip Fx and FTT. Hospitalization c/b sepsis (E Coli bacteremia), encephalopathy (today not arousable), and ROBERT (on HD). Functionality also severely compromised. Hospital course complicated by inability to tolerate HD in setting of hypotension. Goals are now for comfort directed care. Pt transferred to PCU for symptom management and end of life care.

## 2024-06-10 NOTE — PROGRESS NOTE ADULT - ATTENDING COMMENTS
79-year-old female with past medical history of GERD, MI, chronic pancreatitis, HTN, incontinence, recent hip surgery, recently discharged from [facility] to home admitted with increased lethargy, failure to thrive, decreased PO intake nausea/vomiting. Work up revealed urosepsis and in need of urgent HD.  Patient treated but remains in renal failure, not tolerating hemodialysis due to hypotension, despite treatment of urosepsis and pressors.   Patient transferred to PCU for end of life care and management of symptoms.  She is receiving comfort measures only and is actively dying.    In the setting of parenteral controlled substance administration, clinical monitoring required for side effects such as respiratory depression, constipation and opioid induced neurotoxicity due to organ failure.   requiring ATC dosing and prns for controll of sx.      Patient assessment and plan discussed on interdisciplinary team rounds today.    Family at bedside, updated as to current clinical condition and plan of care.  Educated as to what to expect, questions answered and emotional support provided.

## 2024-06-10 NOTE — PROGRESS NOTE ADULT - PROBLEM SELECTOR PLAN 1
-Dilaudid 0.2mg IV q1hr PRN MP (none required in 24 hour period from 8am-8am)  -Dilaudid 0.5mg IV q1hr PRN SP (3 required in 24 hour period from 8am-8am)  -standing dilaudid 0.5mg q6h ATC

## 2024-06-10 NOTE — PROGRESS NOTE ADULT - SUBJECTIVE AND OBJECTIVE BOX
GAP TEAM PALLIATIVE CARE UNIT PROGRESS NOTE:      [  ] Patient on hospice program.    INDICATION FOR PALLIATIVE CARE UNIT SERVICES/Interval HPI: 80 yo F with pancreatic insufficiency, hip Fx and FTT. Hospitalization c/b sepsis (E Coli bacteremia), encephalopathy (today not arousable), and ROBERT (on HD). Functionality also severely compromised. Hospital course complicated by inability to tolerate HD in setting of hypotension. Palliative team following for GOC. Goals are now for comfort directed care. Pressor support capped with down titration as tolerated. Pt  transferred to PCU for symptom management, end of life care, and disposition planning.      OVERNIGHT EVENTS: Seen and examined at bedside. She received Dilaudid 0.5mg IV x3 PRN severe pain and Ativan 0.5mg IV x1 PRN anxiety/agitation over the last 24 hrs (7a-7a).     DNR on chart: Yes       Allergies    penicillin (Swelling)    Intolerances    epinephrine (Other)  MEDICATIONS  (STANDING):  chlorhexidine 2% Cloths 1 Application(s) Topical daily  HYDROmorphone  Injectable 0.5 milliGRAM(s) IV Push every 6 hours  pantoprazole  Injectable 40 milliGRAM(s) IV Push two times a day    MEDICATIONS  (PRN):  acetaminophen  Suppository .. 650 milliGRAM(s) Rectal every 6 hours PRN Temp greater or equal to 38C (100.4F), Mild Pain (1 - 3)  bisacodyl Suppository 10 milliGRAM(s) Rectal daily PRN Constipation  glycopyrrolate Injectable 0.4 milliGRAM(s) IV Push every 6 hours PRN secretions  HYDROmorphone  Injectable 0.2 milliGRAM(s) IV Push every 1 hour PRN Moderate Pain (4 - 6)  HYDROmorphone  Injectable 0.5 milliGRAM(s) IV Push every 1 hour PRN Severe Pain (7 - 10)  HYDROmorphone  Injectable 0.5 milliGRAM(s) IV Push every 1 hour PRN dyspnea  LORazepam   Injectable 0.5 milliGRAM(s) IV Push every 1 hour PRN anxiety, agitation, refractory dyspnea    ITEMS UNCHECKED ARE NOT PRESENT    PRESENT SYMPTOMS: [ x]Unable to self-report see PAINAD, RDOS below  Source if other than patient:  [ x]Family   [x ]Team     Pain: [ ] yes [ ] no  QOL impact -   Location -                    Aggravating factors -  Quality -  Radiation -  Timing-  Severity (0-10 scale):  Minimal acceptable level (0-10 scale):     Dyspnea:                           [ ]Mild [ ]Moderate [ ]Severe  Anxiety:                             [ ]Mild [ ]Moderate [ ]Severe  Fatigue:                             [ ]Mild [ ]Moderate [ ]Severe  Nausea:                             [ ]Mild [ ]Moderate [ ]Severe  Loss of appetite:              [ ]Mild [ ]Moderate [ ]Severe  Constipation:                    [ ]Mild [ ]Moderate [ ]Severe    PCSSQ [Palliative Care Spiritual Screening Question]   Severity (0-10):  Score of 4 or > indicate consideration of Chaplaincy referral.  Chaplaincy Referral: [ ] yes [ ] refused [ ] following [ x] deferred    Caregiver [place]? : [x ] yes [ ] no [ ] deferred:  Social work referral [ ] Patient & Family Centered Care Referral [ ]   Anticipatory Grief present?:  [ x] yes [ ] no [ ] deferred:  Social work referral [ ] Patient & Family Centered Care Referral [ ]  	  Other Symptoms:  [ ]All other review of systems negative - unable to obtain    PHYSICAL EXAM:   Vital Signs Last 24 Hrs  T(C): 36.3 (10 Grzegorz 2024 07:46), Max: 36.3 (10 Grzegorz 2024 07:46)  T(F): 97.4 (10 Grzegorz 2024 07:46), Max: 97.4 (10 Grzegorz 2024 07:46)  HR: --  BP: 45/36 (10 Grzegorz 2024 07:46) (45/36 - 45/36)  BP(mean): --  RR: 4 (10 Grzegorz 2024 07:46) (4 - 4)  SpO2: --     I&O's Summary    GENERAL: [ ] Cachexia  [ ]Alert  [ ]Oriented x   [ ]Lethargic  [ x]Unarousable  [ ]Verbal  [ x]Non-Verbal  Behavioral:   [ ] Anxiety  [ ] Delirium [ ] Agitation [ ] Other  HEENT:  [X ]Normal   [ ]Dry mouth   [ ]ET Tube/Trach  [ ]Oral lesions  PULMONARY:   [ ]Clear [ ]Tachypnea  [ ]Audible excessive secretions   [ ]Rhonchi        [ ]Right [ ]Left [ ]Bilateral  [ ]Crackles        [ ]Right [ ]Left [ ]Bilateral  [ ]Wheezing     [ ]Right [ ]Left [ ]Bilateral  [ X]Diminished BS [ ]Right [ ]Left [ X]Bilateral    CARDIOVASCULAR:    [X ]Regular [ ]Irregular [ ]Tachy  [ ]Pato [ ]Murmur [ ]Other  GASTROINTESTINAL:  [X ]Soft  [ ]Distended   [X ]+BS  [ X]Non tender [ ]Tender  [ ]Other [ ]PEG [ ]OGT/ NGT   Last BM: 6/9  GENITOURINARY:  [ ]Normal [X ] Incontinent   [ ]Oliguria/Anuria   [ ]Carrillo  MUSCULOSKELETAL:   [ ]Normal   [ X]Weakness  [ ]Bed/Wheelchair bound [ ]Edema  NEUROLOGIC:   [ ]No focal deficits  [ ] Cognitive impairment  [ ] Dysphagia [ ]Dysarthria [ ] Paresis [ ]Other   SKIN: right toes dusky   [ ]Normal  [ ]Rash  [ ]Other  [X ]Pressure ulcer(s)  [ ]y [ ]n  Present on admission  Multiple pressure ulcers. Please see nursing assessment for further details for which I have reviewed.    CRITICAL CARE:  [ ] Shock Present  [ ]Septic [ ]Cardiogenic [ ]Neurologic [ ]Hypovolemic  [ ]  Vasopressors [ ]  Inotropes   [ ] Respiratory failure present [ ] Mechanical Ventilation [ ] Non-invasive ventilatory support [ ] High-Flow  [ ] Acute  [ ] Chronic [ ] Hypoxic  [ ] Hypercarbic [ ] Other  [ X] Other organ failure- Kidneys and skin          PROTEIN CALORIE MALNUTRITION: [ ] mild [ ] moderate [ ] severe  [ ] underweight [ ] morbid obesity    https://www.andeal.org/vault/2440/web/files/ONC/Table_Clinical%20Characteristics%20to%20Document%20Malnutrition-White%20JV%20et%20al%503221.pdf    Height (cm): 154.9 (05-19-24 @ 14:27), 162.6 (01-20-24 @ 14:53)  Weight (kg): 46.8 (05-19-24 @ 23:00), 54.4 (01-20-24 @ 14:53)  BMI (kg/m2): 19.5 (05-19-24 @ 23:00), 22.7 (05-19-24 @ 14:27), 20.6 (01-20-24 @ 14:53)    [ x] PPSV2 < or = 30% [ ] significant weight loss [ ] poor nutritional intake [ ] anasarca   Artificial Nutrition [ ]     Other REFERRALS:    [ ] Hospice  [ ]Child Life  [ x]Social Work  [ ]Case management [ ]Holistic Therapy [ ] Physical Therapy [ ] Dietary     Progress Notes - Care Coordination [C. Provider] (06-07-24 @ 15:03)      Palliative Performance Scale:  http://npcrc.org/files/news/palliative_performance_scale_ppsv2.pdf  (Ctrl +  left click to view)  Respiratory Distress Observation Tool:  https://homecareinformation.net/handouts/hen/Respiratory_Distress_Observation_Scale.pdf (Ctrl +  left click to view)  PAINAD Score:  http://geriatrictoolkit.[place].[facility]/cog/painad.pdf (Ctrl +  left click to view)

## 2024-06-11 NOTE — PROGRESS NOTE ADULT - PROBLEM SELECTOR PLAN 1
-Dilaudid 0.2mg IV q1hr PRN MP (none required in 24 hour period from 7am-7am)  -Dilaudid 0.5mg IV q1hr PRN SP (1 required in 24 hour period from 7am-7am)  -standing dilaudid 0.5mg q6h ATC

## 2024-06-11 NOTE — PROGRESS NOTE ADULT - ATTENDING COMMENTS
79-year-old female with past medical history of GERD, MI, chronic pancreatitis, HTN, incontinence, recent hip surgery, recently discharged from [facility] to home admitted with increased lethargy, failure to thrive, decreased PO intake nausea/vomiting. Work up revealed urosepsis and in need of urgent HD.  Patient treated but remains in renal failure, not tolerating hemodialysis due to hypotension, despite treatment of urosepsis and pressors.   Patient transferred to PCU for end of life care and management of symptoms.  She is receiving comfort measures only and is actively dying.    In the setting of parenteral controlled substance administration, clinical monitoring required for side effects such as respiratory depression, constipation and opioid induced neurotoxicity due to organ failure.   requiring ATC dosing and prns for controll of sx.      Patient assessment and plan discussed on interdisciplinary team rounds today.    Family at bedside, updated as to current clinical condition and plan of care.  Educated as to what to expect, questions answered and emotional support provided.

## 2024-06-11 NOTE — PROGRESS NOTE ADULT - SUBJECTIVE AND OBJECTIVE BOX
GAP TEAM PALLIATIVE CARE UNIT PROGRESS NOTE:      [  ] Patient on hospice program.    INDICATION FOR PALLIATIVE CARE UNIT SERVICES/Interval HPI: 80 yo F with pancreatic insufficiency, hip Fx and FTT. Hospitalization c/b sepsis (E Coli bacteremia), encephalopathy (today not arousable), and ROBERT (on HD). Functionality also severely compromised. Hospital course complicated by inability to tolerate HD in setting of hypotension. Palliative team following for GOC. Goals are now for comfort directed care. Pressor support capped with down titration as tolerated. Pt  transferred to PCU for symptom management and end of life care.      OVERNIGHT EVENTS: Seen and examined at bedside. She received Dilaudid 0.5mg IV x1 PRN severe pain over the last 24 hrs (7a-7a).    DNR on chart: Yes       Allergies    penicillin (Swelling)    Intolerances    epinephrine (Other)  MEDICATIONS  (STANDING):  chlorhexidine 2% Cloths 1 Application(s) Topical daily  HYDROmorphone  Injectable 0.5 milliGRAM(s) IV Push every 6 hours  pantoprazole  Injectable 40 milliGRAM(s) IV Push two times a day    MEDICATIONS  (PRN):  acetaminophen  Suppository .. 650 milliGRAM(s) Rectal every 6 hours PRN Temp greater or equal to 38C (100.4F), Mild Pain (1 - 3)  bisacodyl Suppository 10 milliGRAM(s) Rectal daily PRN Constipation  glycopyrrolate Injectable 0.4 milliGRAM(s) IV Push every 6 hours PRN secretions  HYDROmorphone  Injectable 0.2 milliGRAM(s) IV Push every 1 hour PRN Moderate Pain (4 - 6)  HYDROmorphone  Injectable 0.5 milliGRAM(s) IV Push every 1 hour PRN Severe Pain (7 - 10)  HYDROmorphone  Injectable 0.5 milliGRAM(s) IV Push every 1 hour PRN dyspnea  LORazepam   Injectable 0.5 milliGRAM(s) IV Push every 1 hour PRN anxiety, agitation, refractory dyspnea    ITEMS UNCHECKED ARE NOT PRESENT    PRESENT SYMPTOMS: [ x]Unable to self-report see PAINAD, RDOS below  Source if other than patient:  [ ]Family   [ x]Team     Pain: [ ] yes [ ] no  QOL impact -   Location -                    Aggravating factors -  Quality -  Radiation -  Timing-  Severity (0-10 scale):  Minimal acceptable level (0-10 scale):     Dyspnea:                           [ ]Mild [ ]Moderate [ ]Severe  Anxiety:                             [ ]Mild [ ]Moderate [ ]Severe  Fatigue:                             [ ]Mild [ ]Moderate [ ]Severe  Nausea:                             [ ]Mild [ ]Moderate [ ]Severe  Loss of appetite:              [ ]Mild [ ]Moderate [ ]Severe  Constipation:                    [ ]Mild [ ]Moderate [ ]Severe    PCSSQ [Palliative Care Spiritual Screening Question]   Severity (0-10):  Score of 4 or > indicate consideration of Chaplaincy referral.  Chaplaincy Referral: [ ] yes [ ] refused [ ] following [ x] deferred    Caregiver [place]? : [x ] yes [ ] no [ ] deferred:  Social work referral [ ] Patient & Family Centered Care Referral [ ]   Anticipatory Grief present?:  [ x] yes [ ] no [ ] deferred:  Social work referral [ ] Patient & Family Centered Care Referral [ ]  	  Other Symptoms:  [ ]All other review of systems negative - unable to obtain    PHYSICAL EXAM:   Vital Signs Last 24 Hrs  T(C): 36.1 (11 Jun 2024 07:54), Max: 36.1 (11 Jun 2024 07:54)  T(F): 96.9 (11 Jun 2024 07:54), Max: 96.9 (11 Jun 2024 07:54)    Unable to obtain vital signs today        GENERAL: [ ] Cachexia  [ ]Alert  [ ]Oriented x   [ ]Lethargic  [ x]Unarousable  [ ]Verbal  [ x]Non-Verbal  Behavioral:   [ ] Anxiety  [ ] Delirium [ ] Agitation [ ] Other  HEENT:  [X ]Normal   [ ]Dry mouth   [ ]ET Tube/Trach  [ ]Oral lesions  PULMONARY:   [ ]Clear [ ]Tachypnea  [ ]Audible excessive secretions   [ ]Rhonchi        [ ]Right [ ]Left [ ]Bilateral  [ ]Crackles        [ ]Right [ ]Left [ ]Bilateral  [ ]Wheezing     [ ]Right [ ]Left [ ]Bilateral  [ X]Diminished BS [ ]Right [ ]Left [ X]Bilateral    CARDIOVASCULAR:    [X ]Regular [ ]Irregular [ ]Tachy  [ ]Pato [ ]Murmur [ ]Other  GASTROINTESTINAL:  [X ]Soft  [ ]Distended   [X ]+BS  [ X]Non tender [ ]Tender  [ ]Other [ ]PEG [ ]OGT/ NGT   Last BM: 6/9  GENITOURINARY:  [ ]Normal [X ] Incontinent   [ ]Oliguria/Anuria   [ ]Carrillo  MUSCULOSKELETAL:   [ ]Normal   [ X]Weakness  [ ]Bed/Wheelchair bound [ ]Edema  NEUROLOGIC:   [ ]No focal deficits  [ ] Cognitive impairment  [ ] Dysphagia [ ]Dysarthria [ ] Paresis [ ]Other   SKIN: right toes dusky   [ ]Normal  [ ]Rash  [ ]Other  [X ]Pressure ulcer(s)  [ ]y [ ]n  Present on admission  Multiple pressure ulcers. Please see nursing assessment for further details for which I have reviewed.    CRITICAL CARE:  [ ] Shock Present  [ ]Septic [ ]Cardiogenic [ ]Neurologic [ ]Hypovolemic  [ ]  Vasopressors [ ]  Inotropes   [ ] Respiratory failure present [ ] Mechanical Ventilation [ ] Non-invasive ventilatory support [ ] High-Flow  [ ] Acute  [ ] Chronic [ ] Hypoxic  [ ] Hypercarbic [ ] Other  [ X] Other organ failure- Kidneys and skin        PROTEIN CALORIE MALNUTRITION: [ ] mild [ ] moderate [ ] severe  [ ] underweight [ ] morbid obesity    https://www.andeal.org/vault/2440/web/files/ONC/Table_Clinical%20Characteristics%20to%20Document%20Malnutrition-White%20JV%20et%20al%684598.pdf    Height (cm): 154.9 (05-19-24 @ 14:27), 162.6 (01-20-24 @ 14:53)  Weight (kg): 46.8 (05-19-24 @ 23:00), 54.4 (01-20-24 @ 14:53)  BMI (kg/m2): 19.5 (05-19-24 @ 23:00), 22.7 (05-19-24 @ 14:27), 20.6 (01-20-24 @ 14:53)    [x ] PPSV2 < or = 30% [ ] significant weight loss [ ] poor nutritional intake [ ] anasarca   Artificial Nutrition [ ]     Other REFERRALS:    [ ] Hospice  [ ]Child Life  [ x]Social Work  [ ]Case management [ ]Holistic Therapy [ ] Physical Therapy [ ] Dietary     Progress Notes - Care Coordination [C. Provider] (06-10-24 @ 14:31)      Palliative Performance Scale:  http://[email or web address].org/files/news/palliative_performance_scale_ppsv2.pdf  (Ctrl +  left click to view)  Respiratory Distress Observation Tool:  https://homecareinformation.net/handouts/hen/Respiratory_Distress_Observation_Scale.pdf (Ctrl +  left click to view)  PAINAD Score:  http://geriatrictoolkit.[facility]/cog/painad.pdf (Ctrl +  left click to view)   GAP TEAM PALLIATIVE CARE UNIT PROGRESS NOTE:      [  ] Patient on hospice program.    INDICATION FOR PALLIATIVE CARE UNIT SERVICES/Interval HPI: 78 yo F with pancreatic insufficiency, hip Fx and FTT. Hospitalization c/b sepsis (E Coli bacteremia), encephalopathy (today not arousable), and ROBERT (on HD). Functionality also severely compromised. Hospital course complicated by inability to tolerate HD in setting of hypotension.  Pt  transferred to PCU for symptom management and end of life care.    OVERNIGHT EVENTS: Seen and examined at bedside. She received Dilaudid 0.5mg IV x1 PRN severe pain over the last 24 hrs (7a-7a).    DNR on chart: Yes       Allergies    penicillin (Swelling)    Intolerances    epinephrine (Other)  MEDICATIONS  (STANDING):  chlorhexidine 2% Cloths 1 Application(s) Topical daily  HYDROmorphone  Injectable 0.5 milliGRAM(s) IV Push every 6 hours  pantoprazole  Injectable 40 milliGRAM(s) IV Push two times a day    MEDICATIONS  (PRN):  acetaminophen  Suppository .. 650 milliGRAM(s) Rectal every 6 hours PRN Temp greater or equal to 38C (100.4F), Mild Pain (1 - 3)  bisacodyl Suppository 10 milliGRAM(s) Rectal daily PRN Constipation  glycopyrrolate Injectable 0.4 milliGRAM(s) IV Push every 6 hours PRN secretions  HYDROmorphone  Injectable 0.2 milliGRAM(s) IV Push every 1 hour PRN Moderate Pain (4 - 6)  HYDROmorphone  Injectable 0.5 milliGRAM(s) IV Push every 1 hour PRN Severe Pain (7 - 10)  HYDROmorphone  Injectable 0.5 milliGRAM(s) IV Push every 1 hour PRN dyspnea  LORazepam   Injectable 0.5 milliGRAM(s) IV Push every 1 hour PRN anxiety, agitation, refractory dyspnea    ITEMS UNCHECKED ARE NOT PRESENT    PRESENT SYMPTOMS: [x]Unable to self-report see PAINAD, RDOS below  Source if other than patient:  [ ]Family   [ x]Team     Pain: [ ] yes [ ] no  QOL impact -   Location -                    Aggravating factors -  Quality -  Radiation -  Timing-  Severity (0-10 scale):  Minimal acceptable level (0-10 scale):     Dyspnea:                           [ ]Mild [ ]Moderate [ ]Severe  Anxiety:                             [ ]Mild [ ]Moderate [ ]Severe  Fatigue:                             [ ]Mild [ ]Moderate [ ]Severe  Nausea:                             [ ]Mild [ ]Moderate [ ]Severe  Loss of appetite:              [ ]Mild [ ]Moderate [ ]Severe  Constipation:                    [ ]Mild [ ]Moderate [ ]Severe    PCSSQ [Palliative Care Spiritual Screening Question]   Severity (0-10):  Score of 4 or > indicate consideration of Chaplaincy referral.  Chaplaincy Referral: [ ] yes [ ] refused [ ] following [ x] deferred    Caregiver [place]? : [x ] yes [ ] no [ ] deferred:  Social work referral [ ] Patient & Family Centered Care Referral [ ]   Anticipatory Grief present?:  [ x] yes [ ] no [ ] deferred:  Social work referral [ ] Patient & Family Centered Care Referral [ ]  	  Other Symptoms:  [ ]All other review of systems negative - unable to obtain    PHYSICAL EXAM:   Vital Signs Last 24 Hrs  T(C): 36.1 (11 Jun 2024 07:54), Max: 36.1 (11 Jun 2024 07:54)  T(F): 96.9 (11 Jun 2024 07:54), Max: 96.9 (11 Jun 2024 07:54)    Unable to obtain vital signs today    GENERAL: [ ] Cachexia  [ ]Alert  [ ]Oriented x   [ ]Lethargic  [ x]Unarousable  [ ]Verbal  [ x]Non-Verbal  Behavioral:   [ ] Anxiety  [ ] Delirium [ ] Agitation [ ] Other  HEENT:  [X ]Normal   [ ]Dry mouth   [ ]ET Tube/Trach  [ ]Oral lesions  PULMONARY:   [ ]Clear [ ]Tachypnea  [ ]Audible excessive secretions   [ ]Rhonchi        [ ]Right [ ]Left [ ]Bilateral  [ ]Crackles        [ ]Right [ ]Left [ ]Bilateral  [ ]Wheezing     [ ]Right [ ]Left [ ]Bilateral  [ X]Diminished BS [ ]Right [ ]Left [ X]Bilateral    CARDIOVASCULAR:    [X ]Regular [ ]Irregular [ ]Tachy  [ ]Pato [ ]Murmur [ ]Other  GASTROINTESTINAL:  [X ]Soft  [ ]Distended   [X ]hypoactive BS  [ X]Non tender [ ]Tender  [ ]Other [ ]PEG [ ]OGT/ NGT   Last BM: 6/9  GENITOURINARY:  [ ]Normal [X ] Incontinent   [ ]Oliguria/Anuria   [ ]Carrillo  MUSCULOSKELETAL:   [ ]Normal   [ X]Weakness  [ ]Bed/Wheelchair bound [ ]Edema  NEUROLOGIC:   [ ]No focal deficits  [ ] Cognitive impairment  [ ] Dysphagia [ ]Dysarthria [ ] Paresis [ ]Other   SKIN: right toes dusky   [ ]Normal  [ ]Rash  [ ]Other  [X ]Pressure ulcer(s)  [ ]y [ ]n  Present on admission  Multiple pressure ulcers. Please see nursing assessment for further details for which I have reviewed.    CRITICAL CARE:  [ ] Shock Present  [ ]Septic [ ]Cardiogenic [ ]Neurologic [ ]Hypovolemic  [ ]  Vasopressors [ ]  Inotropes   [ ] Respiratory failure present [ ] Mechanical Ventilation [ ] Non-invasive ventilatory support [ ] High-Flow  [ ] Acute  [ ] Chronic [ ] Hypoxic  [ ] Hypercarbic [ ] Other  [ X] Other organ failure- Kidneys and skin        PROTEIN CALORIE MALNUTRITION: [ ] mild [ ] moderate [ ] severe  [ ] underweight [ ] morbid obesity    https://www.andeal.org/vault/2440/web/files/ONC/Table_Clinical%20Characteristics%20to%20Document%20Malnutrition-White%20JV%20et%20al%042721.pdf    Height (cm): 154.9 (05-19-24 @ 14:27), 162.6 (01-20-24 @ 14:53)  Weight (kg): 46.8 (05-19-24 @ 23:00), 54.4 (01-20-24 @ 14:53)  BMI (kg/m2): 19.5 (05-19-24 @ 23:00), 22.7 (05-19-24 @ 14:27), 20.6 (01-20-24 @ 14:53)    [x ] PPSV2 < or = 30% [ ] significant weight loss [ ] poor nutritional intake [ ] anasarca   Artificial Nutrition [ ]     Other REFERRALS:    [ ] Hospice  [ ]Child Life  [ x]Social Work  [ ]Case management [ ]Holistic Therapy [ ] Physical Therapy [ ] Dietary     Progress Notes - Care Coordination [C. Provider] (06-10-24 @ 14:31)      Palliative Performance Scale:  http://npcrc.org/files/news/palliative_performance_scale_ppsv2.pdf  (Ctrl +  left click to view)  Respiratory Distress Observation Tool:  https://[email or web address].[email or web address]/handouts/hen/Respiratory_Distress_Observation_Scale.pdf (Ctrl +  left click to view)  PAINAD Score:  http://geriatrictoolkit.[facility]/cog/painad.pdf (Ctrl +  left click to view)

## 2024-06-11 NOTE — PROGRESS NOTE ADULT - PROBLEM SELECTOR PLAN 7
Family updated that patient remains in the active phases of dying. She will remain in PCU. Spoke with pt's brother Dr. Otis Wasserman. Provided updates and summary of hospital course.    Family updated that patient remains in the active phases of dying. She will remain in PCU.

## 2024-06-11 NOTE — PROGRESS NOTE ADULT - ASSESSMENT
78 yo F with pancreatic insufficiency, hip Fx and FTT. Hospitalization c/b sepsis (E Coli bacteremia), encephalopathy (today not arousable), and ROBERT (on HD). Functionality also severely compromised. Hospital course complicated by inability to tolerate HD in setting of hypotension. Goals are now for comfort directed care. Pt transferred to PCU for symptom management and end of life care.

## 2024-06-11 NOTE — PROGRESS NOTE ADULT - PROBLEM SELECTOR PLAN 2
-Dilaudid 0.5mg IV q1hr PRN dyspnea  (None required in 24 hour period from 7am-7am)  -Dilaudid 0.5mg q6h ATC as above

## 2024-06-12 NOTE — PROGRESS NOTE ADULT - ATTENDING COMMENTS
79-year-old female with past medical history of GERD, MI, chronic pancreatitis, HTN, incontinence, recent hip surgery, recently discharged from [facility] to home admitted with increased lethargy, failure to thrive, decreased PO intake nausea/vomiting. Work up revealed urosepsis and in need of urgent HD.  Patient treated but remains in renal failure, not tolerating hemodialysis due to hypotension, despite treatment of urosepsis and pressors.   Patient transferred to PCU for end of life care and management of symptoms.  She is receiving comfort measures only and is actively dying.    In the setting of parenteral controlled substance administration, clinical monitoring required for side effects such as respiratory depression, constipation and opioid induced neurotoxicity due to organ failure.  On  ATC dosing and prns for control of sx.      Patient assessment and plan discussed on interdisciplinary team rounds today.    Family at bedside, updated as to current clinical condition and plan of care.  Educated as to what to expect, questions answered and emotional support provided.  Extensive discussion held with brother (cardiologist) who is fixed on the hypotension and a trial of IVF's which spouse agreed to.  They all understand that it will be stopped if her symptoms progress.  No expectation that this will change outcome.  Time in discussion with brother and then spouse and brother 45 minuutes.

## 2024-06-12 NOTE — CHART NOTE - NSCHARTNOTEFT_GEN_A_CORE
Interim Note    Per RN staff, pt inappropriate for nutrition follow up at this time.    RD remains available.  Shanon Duarte, MS, RD, CDN, CNSC, CDCES TEAMS

## 2024-06-12 NOTE — PROGRESS NOTE ADULT - PROBLEM SELECTOR PLAN 7
Family updated that patient remains in the active phases of dying. She will remain in PCU. Spoke with , assistant and brother at bedside. Updates provided and questions answered.    Family updated that patient remains in the active phases of dying. She will remain in PCU.

## 2024-06-12 NOTE — PROGRESS NOTE ADULT - PROBLEM SELECTOR PLAN 1
-Dilaudid 0.2mg IV q1hr PRN MP (none required in 24 hour period from 7am-7am)  -Dilaudid 0.5mg IV q1hr PRN SP (none required in 24 hour period from 7am-7am)  -standing dilaudid 0.5mg q6h ATC

## 2024-06-12 NOTE — PROGRESS NOTE ADULT - SUBJECTIVE AND OBJECTIVE BOX
GAP TEAM PALLIATIVE CARE UNIT PROGRESS NOTE:      [  ] Patient on hospice program.    INDICATION FOR PALLIATIVE CARE UNIT SERVICES/Interval HPI: 78 yo F with pancreatic insufficiency, hip Fx and FTT. Hospitalization c/b sepsis (E Coli bacteremia), encephalopathy (today not arousable), and ROBERT (on HD). Functionality also severely compromised. Hospital course complicated by inability to tolerate HD in setting of hypotension.  Pt  transferred to PCU for symptom management and end of life care.    OVERNIGHT EVENTS: Seen and examined at bedside. She did not receive any PRNs over the last 24 hrs in addition to the ATC Dilaudid (7a-7a).    DNR on chart: Yes      Allergies    penicillin (Swelling)    Intolerances    epinephrine (Other)  MEDICATIONS  (STANDING):  chlorhexidine 2% Cloths 1 Application(s) Topical daily  HYDROmorphone  Injectable 0.5 milliGRAM(s) IV Push every 6 hours  pantoprazole  Injectable 40 milliGRAM(s) IV Push two times a day    MEDICATIONS  (PRN):  acetaminophen  Suppository .. 650 milliGRAM(s) Rectal every 6 hours PRN Temp greater or equal to 38C (100.4F), Mild Pain (1 - 3)  bisacodyl Suppository 10 milliGRAM(s) Rectal daily PRN Constipation  glycopyrrolate Injectable 0.4 milliGRAM(s) IV Push every 6 hours PRN secretions  HYDROmorphone  Injectable 0.5 milliGRAM(s) IV Push every 1 hour PRN Severe Pain (7 - 10)  HYDROmorphone  Injectable 0.5 milliGRAM(s) IV Push every 1 hour PRN dyspnea  HYDROmorphone  Injectable 0.2 milliGRAM(s) IV Push every 1 hour PRN Moderate Pain (4 - 6)  LORazepam   Injectable 0.5 milliGRAM(s) IV Push every 1 hour PRN anxiety, agitation, refractory dyspnea    ITEMS UNCHECKED ARE NOT PRESENT    PRESENT SYMPTOMS: [ ]Unable to self-report see PAINAD, RDOS below  Source if other than patient:  [ ]Family   [x ]Team     Pain: [ ] yes [ ] no  QOL impact -   Location -                    Aggravating factors -  Quality -  Radiation -  Timing-  Severity (0-10 scale):  Minimal acceptable level (0-10 scale):     Dyspnea:                           [ ]Mild [ ]Moderate [ ]Severe  Anxiety:                             [ ]Mild [ ]Moderate [ ]Severe  Fatigue:                             [ ]Mild [ ]Moderate [ ]Severe  Nausea:                             [ ]Mild [ ]Moderate [ ]Severe  Loss of appetite:              [ ]Mild [ ]Moderate [ ]Severe  Constipation:                    [ ]Mild [ ]Moderate [ ]Severe    PCSSQ [Palliative Care Spiritual Screening Question]   Severity (0-10):  Score of 4 or > indicate consideration of Chaplaincy referral.  Chaplaincy Referral: [ ] yes [ ] refused [ ] following [ x] deferred    Caregiver [place]? : [x ] yes [ ] no [ ] deferred:  Social work referral [ ] Patient & Family Centered Care Referral [ ]   Anticipatory Grief present?:  [x ] yes [ ] no [ ] deferred:  Social work referral [ ] Patient & Family Centered Care Referral [ ]  	  Other Symptoms:  [ ]All other review of systems negative - unable to obtain    PHYSICAL EXAM:   Vital Signs Last 24 Hrs  T(C): 35.6 (12 Jun 2024 08:41), Max: 35.6 (12 Jun 2024 08:41)  T(F): 96.1 (12 Jun 2024 08:41), Max: 96.1 (12 Jun 2024 08:41)  HR: 86 (12 Jun 2024 08:41) (86 - 86)  BP: 58/35 (12 Jun 2024 08:41) (58/35 - 58/35)  BP(mean): --  RR: 16 (12 Jun 2024 08:41) (16 - 16)  SpO2: --     I&O's Summary    GENERAL: [ ] Cachexia  [ ]Alert  [ ]Oriented x   [ ]Lethargic  [ x]Unarousable  [ ]Verbal  [ x]Non-Verbal  Behavioral:   [ ] Anxiety  [ ] Delirium [ ] Agitation [ ] Other  HEENT:  [X ]Normal   [ ]Dry mouth   [ ]ET Tube/Trach  [ ]Oral lesions  PULMONARY:   [ ]Clear [ ]Tachypnea  [ ]Audible excessive secretions   [ ]Rhonchi        [ ]Right [ ]Left [ ]Bilateral  [ ]Crackles        [ ]Right [ ]Left [ ]Bilateral  [ ]Wheezing     [ ]Right [ ]Left [ ]Bilateral  [ X]Diminished BS [ ]Right [ ]Left [ X]Bilateral    CARDIOVASCULAR:    [X ]Regular [ ]Irregular [ ]Tachy  [ ]Pato [ ]Murmur [ ]Other  GASTROINTESTINAL:  [X ]Soft  [ ]Distended   [X ]hypoactive BS  [ X]Non tender [ ]Tender  [ ]Other [ ]PEG [ ]OGT/ NGT   Last BM: 6/9  GENITOURINARY:  [ ]Normal [X ] Incontinent   [ ]Oliguria/Anuria   [ ]Carrillo  MUSCULOSKELETAL:   [ ]Normal   [ X]Weakness  [ ]Bed/Wheelchair bound [ ]Edema  NEUROLOGIC:   [ ]No focal deficits  [ ] Cognitive impairment  [ ] Dysphagia [ ]Dysarthria [ ] Paresis [ ]Other   SKIN: right toes dusky   [ ]Normal  [ ]Rash  [ ]Other  [X ]Pressure ulcer(s)  [ ]y [ ]n  Present on admission  Multiple pressure ulcers. Please see nursing assessment for further details for which I have reviewed.    CRITICAL CARE:  [ ] Shock Present  [ ]Septic [ ]Cardiogenic [ ]Neurologic [ ]Hypovolemic  [ ]  Vasopressors [ ]  Inotropes   [ ] Respiratory failure present [ ] Mechanical Ventilation [ ] Non-invasive ventilatory support [ ] High-Flow  [ ] Acute  [ ] Chronic [ ] Hypoxic  [ ] Hypercarbic [ ] Other  [ X] Other organ failure- Kidneys and skin      PROTEIN CALORIE MALNUTRITION: [ ] mild [ ] moderate [ ] severe  [ ] underweight [ ] morbid obesity    https://www.andeal.org/vault/2440/web/files/ONC/Table_Clinical%20Characteristics%20to%20Document%20Malnutrition-White%20JV%20et%20al%726046.pdf    Height (cm): 154.9 (05-19-24 @ 14:27), 162.6 (01-20-24 @ 14:53)  Weight (kg): 46.8 (05-19-24 @ 23:00), 54.4 (01-20-24 @ 14:53)  BMI (kg/m2): 19.5 (05-19-24 @ 23:00), 22.7 (05-19-24 @ 14:27), 20.6 (01-20-24 @ 14:53)    [ x] PPSV2 < or = 30% [ ] significant weight loss [ ] poor nutritional intake [ ] anasarca   Artificial Nutrition [ ]     Other REFERRALS:    [ ] Hospice  [ ]Child Life  [ x]Social Work  [ ]Case management [ ]Holistic Therapy [ ] Physical Therapy [ ] Dietary     Progress Notes - Care Coordination [C. Provider] (06-11-24 @ 14:08)      Palliative Performance Scale:  http://npcrc.org/files/news/palliative_performance_scale_ppsv2.pdf  (Ctrl +  left click to view)  Respiratory Distress Observation Tool:  https://homecareinformation.net/handouts/hen/Respiratory_Distress_Observation_Scale.pdf (Ctrl +  left click to view)  PAINAD Score:  http://geriatrictoolkit.[email or web address]/painad.pdf (Ctrl +  left click to view)   GAP TEAM PALLIATIVE CARE UNIT PROGRESS NOTE:      [  ] Patient on hospice program.    INDICATION FOR PALLIATIVE CARE UNIT SERVICES/Interval HPI: 78 yo F with pancreatic insufficiency, hip Fx and FTT. Hospitalization c/b sepsis (E Coli bacteremia), encephalopathy (today not arousable), and ROBERT (on HD). Functionality also severely compromised. Hospital course complicated by inability to tolerate HD in setting of hypotension.  Pt  transferred to PCU for symptom management and end of life care.    OVERNIGHT EVENTS: Seen and examined at bedside. She did not receive any PRNs over the last 24 hrs in addition to the ATC Dilaudid (7a-7a).    DNR on chart: Yes  DNI    Allergies    penicillin (Swelling)    Intolerances    epinephrine (Other)  MEDICATIONS  (STANDING):  chlorhexidine 2% Cloths 1 Application(s) Topical daily  HYDROmorphone  Injectable 0.5 milliGRAM(s) IV Push every 6 hours  pantoprazole  Injectable 40 milliGRAM(s) IV Push two times a day    MEDICATIONS  (PRN):  acetaminophen  Suppository .. 650 milliGRAM(s) Rectal every 6 hours PRN Temp greater or equal to 38C (100.4F), Mild Pain (1 - 3)  bisacodyl Suppository 10 milliGRAM(s) Rectal daily PRN Constipation  glycopyrrolate Injectable 0.4 milliGRAM(s) IV Push every 6 hours PRN secretions  HYDROmorphone  Injectable 0.5 milliGRAM(s) IV Push every 1 hour PRN Severe Pain (7 - 10)  HYDROmorphone  Injectable 0.5 milliGRAM(s) IV Push every 1 hour PRN dyspnea  HYDROmorphone  Injectable 0.2 milliGRAM(s) IV Push every 1 hour PRN Moderate Pain (4 - 6)  LORazepam   Injectable 0.5 milliGRAM(s) IV Push every 1 hour PRN anxiety, agitation, refractory dyspnea    ITEMS UNCHECKED ARE NOT PRESENT    PRESENT SYMPTOMS: [ ]Unable to self-report see PAINAD, RDOS below  Source if other than patient:  [ ]Family   [x ]Team     Pain: [ ] yes [ ] no  QOL impact -   Location -                    Aggravating factors -  Quality -  Radiation -  Timing-  Severity (0-10 scale):  Minimal acceptable level (0-10 scale):     Dyspnea:                           [ ]Mild [ ]Moderate [ ]Severe  Anxiety:                             [ ]Mild [ ]Moderate [ ]Severe  Fatigue:                             [ ]Mild [ ]Moderate [ ]Severe  Nausea:                             [ ]Mild [ ]Moderate [ ]Severe  Loss of appetite:              [ ]Mild [ ]Moderate [ ]Severe  Constipation:                    [ ]Mild [ ]Moderate [ ]Severe    PCSSQ [Palliative Care Spiritual Screening Question]   Severity (0-10):  Score of 4 or > indicate consideration of Chaplaincy referral.  Chaplaincy Referral: [ ] yes [ ] refused [ ] following [ x] deferred    Caregiver [place]? : [x ] yes [ ] no [ ] deferred:  Social work referral [ ] Patient & Family Centered Care Referral [ ]   Anticipatory Grief present?:  [x ] yes [ ] no [ ] deferred:  Social work referral [ ] Patient & Family Centered Care Referral [ ]  	  Other Symptoms:  [ ]All other review of systems negative - unable to obtain    PHYSICAL EXAM:   Vital Signs Last 24 Hrs  T(C): 35.6 (12 Jun 2024 08:41), Max: 35.6 (12 Jun 2024 08:41)  T(F): 96.1 (12 Jun 2024 08:41), Max: 96.1 (12 Jun 2024 08:41)  HR: 86 (12 Jun 2024 08:41) (86 - 86)  BP: 58/35 (12 Jun 2024 08:41) (58/35 - 58/35)  BP(mean): --  RR: 16 (12 Jun 2024 08:41) (16 - 16)  SpO2: --     I&O's Summary    GENERAL: [ ] Cachexia  [ ]Alert  [ ]Oriented x   [ ]Lethargic  [ x]Unarousable  [ ]Verbal  [ x]Non-Verbal  Behavioral:   [ ] Anxiety  [ ] Delirium [ ] Agitation [ ] Other  HEENT:  [X ]Normal   [ ]Dry mouth   [ ]ET Tube/Trach  [ ]Oral lesions  PULMONARY:   [ ]Clear [ ]Tachypnea  [ ]Audible excessive secretions   [ ]Rhonchi        [ ]Right [ ]Left [ ]Bilateral  [ ]Crackles        [ ]Right [ ]Left [ ]Bilateral  [ ]Wheezing     [ ]Right [ ]Left [ ]Bilateral  [ X]Diminished BS [ ]Right [ ]Left [ X]Bilateral    CARDIOVASCULAR:    [X ]Regular [ ]Irregular [ ]Tachy  [ ]Pato [ ]Murmur [ ]Other  GASTROINTESTINAL:  [X ]Soft  [ ]Distended   [X ]hypoactive BS  [ X]Non tender [ ]Tender  [ ]Other [ ]PEG [ ]OGT/ NGT   Last BM: 6/9  GENITOURINARY:  [ ]Normal [X ] Incontinent   [ ]Oliguria/Anuria   [ ]Carrillo  MUSCULOSKELETAL:   [ ]Normal   [ X]Weakness  [ ]Bed/Wheelchair bound [ ]Edema  NEUROLOGIC:   [ ]No focal deficits  [ ] Cognitive impairment  [ ] Dysphagia [ ]Dysarthria [ ] Paresis [ ]Other   SKIN: right toes dusky   [ ]Normal  [ ]Rash  [ ]Other  [X ]Pressure ulcer(s)  [ ]y [ ]n  Present on admission  Multiple pressure ulcers. Please see nursing assessment for further details for which I have reviewed.    CRITICAL CARE:  [ ] Shock Present  [ ]Septic [ ]Cardiogenic [ ]Neurologic [ ]Hypovolemic  [ ]  Vasopressors [ ]  Inotropes   [ ] Respiratory failure present [ ] Mechanical Ventilation [ ] Non-invasive ventilatory support [ ] High-Flow  [ ] Acute  [ ] Chronic [ ] Hypoxic  [ ] Hypercarbic [ ] Other  [ X] Other organ failure- Kidneys and skin      PROTEIN CALORIE MALNUTRITION: [ ] mild [ ] moderate [ ] severe  [ ] underweight [ ] morbid obesity    https://www.andeal.org/vault/2440/web/files/ONC/Table_Clinical%20Characteristics%20to%20Document%20Malnutrition-White%20JV%20et%20al%618149.pdf    Height (cm): 154.9 (05-19-24 @ 14:27), 162.6 (01-20-24 @ 14:53)  Weight (kg): 46.8 (05-19-24 @ 23:00), 54.4 (01-20-24 @ 14:53)  BMI (kg/m2): 19.5 (05-19-24 @ 23:00), 22.7 (05-19-24 @ 14:27), 20.6 (01-20-24 @ 14:53)    [ x] PPSV2 < or = 30% [ ] significant weight loss [ ] poor nutritional intake [ ] anasarca   Artificial Nutrition [ ]     Other REFERRALS:    [ ] Hospice  [ ]Child Life  [ x]Social Work  [ ]Case management [ ]Holistic Therapy [ ] Physical Therapy [ ] Dietary     Progress Notes - Care Coordination [C. Provider] (06-11-24 @ 14:08)      Palliative Performance Scale:  http://npcrc.org/files/news/palliative_performance_scale_ppsv2.pdf  (Ctrl +  left click to view)  Respiratory Distress Observation Tool:  https://homecareinformation.net/handouts/hen/Respiratory_Distress_Observation_Scale.pdf (Ctrl +  left click to view)  PAINAD Score:  http://geriatrictoolkit.[email or web address]/cog/painad.pdf (Ctrl +  left click to view)

## 2024-06-13 NOTE — PROGRESS NOTE ADULT - PROBLEM SELECTOR PLAN 2
-Dilaudid 0.5mg IV q1hr PRN dyspnea  (None required in 24 hour period from 7am-7am)  -Dilaudid 0.5mg q6h ATC as above    Bowel regimen while on opioids.  Monitor for constipation.

## 2024-06-13 NOTE — PROGRESS NOTE ADULT - TIME BILLING
Time spent for extensive review of the physical chart, electronic medical record, and documentation to obtain collateral information including but not limited to:  [x ] Inpatient records (ED, H&P, primary team, and consultants if applicable, care coordination)  [x] Inpatient values/results (biomarkers, immunoassays, imaging, and microbiology results)  [x] Current or proposed treatment plans  [x] Discussion with the primary team  [x] Discussion with the patient, surrogate decision maker, or family    Time spent: >36 mins
minutes spent on total encounter. The necessity of the time spent during the encounter on this date of service was due to:     Total time spent including the following  [x ] Physical chart review and documentation   An extensive review of the physical chart, electronic health record, and documentation was conducted to obtain collateral information including but not limited to:   - Current inpatient records (ED, H&P, primary team, and consultants [IR])   - Inpatient values/results (CBC, CMP, CT)   - Prior inpatient records (prior GaP notes when he was admitted to [facility])   - Current or proposed treatment plans   - Pharmacotherapy review   [x ]discussion with the interdisciplinary palliative care team -RN, , clinicians, trainees,   [x ]discussion with the patient/family/decision maker  [x ]Physical Exam and/or review of systems   [x ]Formulation of assessment and plan   [x ]Evaluating for response to treatment and side effects of opioids or benzodiazepines.  [ ]Review of care coordination documentation.
minutes spent on total encounter. The necessity of the time spent during the encounter on this date of service was due to:     Total time spent including the following  [x ] Physical chart review and documentation   An extensive review of the physical chart, electronic health record, and documentation was conducted to obtain collateral information including but not limited to:   - Current inpatient records (ED, H&P, primary team, and consultants [IR])   - Inpatient values/results (CBC, CMP, CT)   - Prior inpatient records (prior GaP notes when he was admitted to [facility])   - Current or proposed treatment plans   - Pharmacotherapy review   [x ]discussion with the interdisciplinary palliative care team -RN, , clinicians, trainees,   [ x]discussion with the patient/family/decision maker  [ x]Physical Exam and/or review of systems   [ x]Formulation of assessment and plan   [x ]Evaluating for response to treatment and side effects of opioids or benzodiazepines.  [x ]Review of care coordination documentation.
minutes spent on total encounter. The necessity of the time spent during the encounter on this date of service was due to:     Total time spent including the following  [x ] Physical chart review and documentation   An extensive review of the physical chart, electronic health record, and documentation was conducted to obtain collateral information including but not limited to:   - Current inpatient records (ED, H&P, primary team, and consultants [IR])   - Inpatient values/results (CBC, CMP, CT)   - Prior inpatient records (prior GaP notes when he was admitted to [facility])   - Current or proposed treatment plans   - Pharmacotherapy review   [x ]discussion with the interdisciplinary palliative care team -RN, , clinicians, trainees,   [x ]discussion with the patient/family/decision maker  [x ]Physical Exam and/or review of systems   [x ]Formulation of assessment and plan   [x ]Evaluating for response to treatment and side effects of opioids or benzodiazepines.  [ ]Review of care coordination documentation.

## 2024-06-13 NOTE — PROGRESS NOTE ADULT - PROBLEM SELECTOR PLAN 4
Turn and position at recommended intervals.  Wound care.   Pain management.      Weeping skin, will likely need to stop IVF.

## 2024-06-13 NOTE — PROGRESS NOTE ADULT - PROBLEM SELECTOR PLAN 5
- Likely multifactorial with multiorgan dysfunction.  - Monitor for constipation, urinary retention, pain, hunger, thirst, etc.  Promote sleep wake cycle and reorientation as indicated.

## 2024-06-13 NOTE — PROGRESS NOTE ADULT - PROBLEM SELECTOR PLAN 1
-Dilaudid 0.2mg IV q1hr PRN MP (none required in 24 hour period from 7am-7am)  -Dilaudid 0.5mg IV q1hr PRN SP (none required in 24 hour period from 7am-7am)  -standing dilaudid 0.5mg q6h ATC has controlled symptoms.    Bowel regimen while on opioids.  Monitor for constipation.    Moaning this AM and will require intervention.  May be terminal delirium or pain from pressure ulcers/immobility.

## 2024-06-13 NOTE — PROGRESS NOTE ADULT - ASSESSMENT
79-year-old female with past medical history of GERD, MI, chronic pancreatitis, HTN, incontinence, recent hip surgery, recently discharged from [facility] to home admitted with increased lethargy, failure to thrive, decreased PO intake nausea/vomiting. Work up revealed urosepsis and in need of urgent HD.  Patient treated but remains in renal failure, not tolerating hemodialysis due to hypotension, despite treatment of urosepsis and pressors.   Patient transferred to PCU for end of life care and management of symptoms.  She is receiving comfort measures only and is actively dying.  Extensive discussion with family yesterday - now getting IVF, no change clinically.

## 2024-06-13 NOTE — CHART NOTE - NSCHARTNOTEFT_GEN_A_CORE
Paged by YANDEL Oconnor - Ms. Yee is actively dying with BPs in 50s systolic on standing dilaudid for symptom management. Patient's brother (a cardiologist) called unit requesting we discontinue dilaudid and replace with IV tylenol as he feels it is contributing to her death. Nurses asked that he call patient's  as he is decision maker. Patient's  then called unit requesting IV tylenol in place of dilaudid. Nurse then paged.     I called patient's  Mr. Elfego Yee who repeated his brother-in-law's wishes. Explained that patient is likely actively dying within hours to short days, that the process of dying can be painful, cause respiratory distress or agitation, and that I did not advise discontinuation of her symptom medication at this point in her dying process. When prompted, he reiterated his primary goal to ensure patient is comfortable, but shared anxiety about being "put in the middle" and the pressure his brother is placing on him. I provided emotional support and validation regarding the difficult process he is going through. I informed the patient's  that maintaining her medications as they are would be in line with his overall goal of making her comfortable at this time. He agreed and decided to continue the meds the way they are. I reminded Mr. Yee that we can continue this conversation with the day team tomorrow if his wife is still with us. He understood and expressed gratitude for the guidance. Paged by YANDEL Oconnor - Ms. Yee is actively dying with BPs in 50s systolic on standing dilaudid for symptom management. Patient's brother (a cardiologist) called unit requesting we discontinue dilaudid and replace with IV tylenol as he feels it is contributing to her death and thinks her debility and condition will reverse without opioids. Nurses advised that he call patient's  as he is decision maker. Patient's  then called unit requesting IV tylenol in place of dilaudid. Nurse then paged.     I called patient's  Mr. Elfego Yee who repeated his brother-in-law's wishes. Explained that patient is likely actively dying within hours to short days, that the process of dying can be painful, cause respiratory distress or agitation, and that I did not advise discontinuation of her symptom medication at this point in her dying process. When prompted, he reiterated his primary goal to ensure patient is comfortable, but shared anxiety about being "put in the middle" and the pressure his brother is placing on him. I provided emotional support and validation regarding the difficult process he is going through. I informed the patient's  that maintaining her medications as they are would be in line with his overall goal of making her comfortable at this time. He agreed and decided to continue the meds the way they are. I reminded Mr. Yee that we can continue this conversation with the day team tomorrow if his wife is still with us. He understood and expressed gratitude for the guidance.

## 2024-06-13 NOTE — PROGRESS NOTE ADULT - PROBLEM SELECTOR PLAN 8
Trial of IVF with no change in clinical status.  Family updated as to current clinical condition and plan of care.  Educated as to what to expect, questions answered and emotional support provided.     In the setting of parenteral controlled substance administration, clinical monitoring required for side effects such as respiratory depression, constipation and opioid induced neurotoxicity due to organ failure.    Patient assessment and plan discussed on interdisciplinary team rounds today.    Patient not appropriate for hospice transition due to hypotension.

## 2024-06-13 NOTE — PROGRESS NOTE ADULT - ATTENDING COMMENTS
79-year-old female with past medical history of GERD, MI, chronic pancreatitis, HTN, incontinence, recent hip surgery, recently discharged from [facility] to home admitted with increased lethargy, failure to thrive, decreased PO intake nausea/vomiting. Work up revealed urosepsis and in need of urgent HD.  Patient treated but remains in renal failure, not tolerating hemodialysis due to hypotension, despite treatment of urosepsis and pressors.   Patient transferred to PCU for end of life care and management of symptoms.  She is receiving comfort measures only and is actively dying.    In the setting of parenteral controlled substance administration, clinical monitoring required for side effects such as respiratory depression, constipation and opioid induced neurotoxicity due to organ failure.  On  ATC dosing and prns for control of sx.      Patient assessment and plan discussed on interdisciplinary team rounds today.    Family at bedside, updated as to current clinical condition and plan of care.  Educated as to what to expect, questions answered and emotional support provided.  Extensive discussion held with brother (cardiologist) who is fixed on the hypotension and a trial of IVF's which spouse agreed to.  They all understand that it will be stopped if her symptoms progress.  No expectation that this will change outcome.  Time in discussion with brother and then spouse and brother 45 minuutes.

## 2024-06-13 NOTE — PROGRESS NOTE ADULT - SUBJECTIVE AND OBJECTIVE BOX
GAP TEAM PALLIATIVE CARE UNIT PROGRESS NOTE:      [  ] Patient on hospice program.    INDICATION FOR PALLIATIVE CARE UNIT SERVICES/Interval HPI: 79-year-old female with past medical history of GERD, MI, chronic pancreatitis, HTN, incontinence, recent hip surgery, recently discharged from [facility] to home admitted with increased lethargy, failure to thrive, decreased PO intake nausea/vomiting. Work up revealed urosepsis and in need of urgent HD.  Patient treated but remains in renal failure, not tolerating hemodialysis due to hypotension, despite treatment of urosepsis and pressors.   Patient transferred to PCU for end of life care and management of symptoms.  She is receiving comfort measures only and is actively dying.    OVERNIGHT EVENTS: She has her eyes open and is moaning, unable to make needs known.  Remains on ATC dilaudid for pain/dyspnea and has required no PRNs overnight.      DNR on chart: Yes  DNI    Allergies    penicillin (Swelling)    Intolerances  epinephrine (Other)    MEDICATIONS  (STANDING):  MEDICATIONS  (STANDING):  chlorhexidine 2% Cloths 1 Application(s) Topical daily  dextrose 5% + sodium chloride 0.45%. 1000 milliLiter(s) (100 mL/Hr) IV Continuous <Continuous>  HYDROmorphone  Injectable 0.5 milliGRAM(s) IV Push every 6 hours  pantoprazole  Injectable 40 milliGRAM(s) IV Push two times a day    MEDICATIONS  (PRN):  acetaminophen  Suppository .. 650 milliGRAM(s) Rectal every 6 hours PRN Temp greater or equal to 38C (100.4F), Mild Pain (1 - 3)  bisacodyl Suppository 10 milliGRAM(s) Rectal daily PRN Constipation  glycopyrrolate Injectable 0.4 milliGRAM(s) IV Push every 6 hours PRN secretions  HYDROmorphone  Injectable 0.2 milliGRAM(s) IV Push every 1 hour PRN Moderate Pain (4 - 6)  HYDROmorphone  Injectable 0.5 milliGRAM(s) IV Push every 1 hour PRN Severe Pain (7 - 10)  HYDROmorphone  Injectable 0.5 milliGRAM(s) IV Push every 1 hour PRN dyspnea  LORazepam   Injectable 0.5 milliGRAM(s) IV Push every 1 hour PRN anxiety, agitation, refractory dyspnea      ITEMS UNCHECKED ARE NOT PRESENT    PRESENT SYMPTOMS: [ ]Unable to self-report see PAINAD, RDOS below  Source if other than patient:  [ ]Family   [x ]Team     Pain: [ ] yes [ ] no  QOL impact -   Location -                    Aggravating factors -  Quality -  Radiation -  Timing-  Severity (0-10 scale):  Minimal acceptable level (0-10 scale):     Dyspnea:                           [ ]Mild [ ]Moderate [ ]Severe  Anxiety:                             [ ]Mild [ ]Moderate [ ]Severe  Fatigue:                             [ ]Mild [ ]Moderate [ ]Severe  Nausea:                             [ ]Mild [ ]Moderate [ ]Severe  Loss of appetite:              [ ]Mild [ ]Moderate [ ]Severe  Constipation:                    [ ]Mild [ ]Moderate [ ]Severe    PCSSQ [Palliative Care Spiritual Screening Question]   Severity (0-10):  Score of 4 or > indicate consideration of Chaplaincy referral.  Chaplaincy Referral: [ ] yes [ ] refused [ ] following [ x] deferred    Caregiver [place]? : [x ] yes [ ] no [ ] deferred:  Social work referral [ ] Patient & Family Centered Care Referral [ ]   Anticipatory Grief present?:  [x ] yes [ ] no [ ] deferred:  Social work referral [ ] Patient & Family Centered Care Referral [ ]  	  Other Symptoms:  [ ]All other review of systems negative The patient is unable to self-report.     PHYSICAL EXAM:     Vital Signs Last 24 Hrs  T(C): 36.1 (06-13-24 @ 08:22), Max: 36.1 (06-13-24 @ 08:22)  T(F): 97 (06-13-24 @ 08:22), Max: 97 (06-13-24 @ 08:22)  HR: 93 (06-13-24 @ 08:22) (93 - 93)  BP: 57/31 (06-13-24 @ 08:22) (57/31 - 57/31)  BP(mean): --  RR: 20 (06-13-24 @ 08:22) (20 - 20)  SpO2: 91% (06-13-24 @ 08:22) (91% - 91%)      GENERAL: [x ] Cachexia  [x ]Alert  [ ]Oriented x   [ ]Lethargic  [ ]Unarousable  [ ]Verbal  [ x]Non-Verbal  Behavioral:   [ ] Anxiety  [ ] Delirium [ ] Agitation [x ] Other Moaning  HEENT:  [X ]Normal   [ ]Dry mouth   [ ]ET Tube/Trach  [ ]Oral lesions  PULMONARY:   [ ]Clear [ ]Tachypnea  [ ]Audible excessive secretions   [ ]Rhonchi        [ ]Right [ ]Left [ ]Bilateral  [ ]Crackles        [ ]Right [ ]Left [ ]Bilateral  [ ]Wheezing     [ ]Right [ ]Left [ ]Bilateral  [ X]Diminished BS [ ]Right [ ]Left [ X]Bilateral    CARDIOVASCULAR:    [X ]Regular [ ]Irregular [ ]Tachy  [ ]Pato [ ]Murmur [ ]Other  GASTROINTESTINAL:  [X ]Soft  [ ]Distended   [X ]hypoactive BS  [ X]Non tender [ ]Tender  [ ]Other [ ]PEG [ ]OGT/ NGT   Last BM: 6/12  GENITOURINARY:  [ ]Normal [X ] Incontinent   [ ]Oliguria/Anuria   [ ]Carrillo  MUSCULOSKELETAL:   [ ]Normal   [ X]Weakness  [ ]Bed/Wheelchair bound [ ]Edema  NEUROLOGIC:   [ ]No focal deficits  [ ] Cognitive impairment  [ ] Dysphagia [ ]Dysarthria [ ] Paresis [ ]Other   SKIN: right toes dusky   [ ]Normal  [ ]Rash  [ ]Other  [X ]Pressure ulcer(s)  [ ]y [ ]n  Present on admission  Multiple pressure ulcers. Please see nursing assessment for further details for which I have reviewed.  All extremities are weeping.      CRITICAL CARE:  [ ] Shock Present  [ ]Septic [ ]Cardiogenic [ ]Neurologic [ ]Hypovolemic  [ ]  Vasopressors [ ]  Inotropes   [ ] Respiratory failure present [ ] Mechanical Ventilation [ ] Non-invasive ventilatory support [ ] High-Flow  [ ] Acute  [ ] Chronic [ ] Hypoxic  [ ] Hypercarbic [ ] Other  [ X] Other organ failure- Kidneys and skin      PROTEIN CALORIE MALNUTRITION: [ ] mild [ ] moderate [ ] severe  [ ] underweight [ ] morbid obesity    https://www.andeal.org/vault/2440/web/files/ONC/Table_Clinical%20Characteristics%20to%20Document%20Malnutrition-White%20JV%20et%20al%202012.pdf    Height (cm): 154.9 (05-19-24 @ 14:27), 162.6 (01-20-24 @ 14:53)  Weight (kg): 46.8 (05-19-24 @ 23:00), 54.4 (01-20-24 @ 14:53)  BMI (kg/m2): 19.5 (05-19-24 @ 23:00), 22.7 (05-19-24 @ 14:27), 20.6 (01-20-24 @ 14:53)    [ x] PPSV2 < or = 30% [ ] significant weight loss [ ] poor nutritional intake [ ] anasarca   Artificial Nutrition [ ]     Other REFERRALS:    [ ] Hospice  [ ]Child Life  [ x]Social Work  [ ]Case management [ ]Holistic Therapy [ ] Physical Therapy [ ] Dietary     Progress Notes - Care Coordination [C. Provider] (06-11-24 @ 14:08)      Palliative Performance Scale:  http://npcrc.org/files/news/palliative_performance_scale_ppsv2.pdf  (Ctrl +  left click to view)  Respiratory Distress Observation Tool:  https://homecareinformation.net/handouts/hen/Respiratory_Distress_Observation_Scale.pdf (Ctrl +  left click to view)  PAINAD Score:  http://geriatrictoolkit.[place].[facility]/cog/painad.pdf (Ctrl +  left click to view)

## 2024-06-14 NOTE — PROGRESS NOTE ADULT - PAIN ASSESSMENT ADVANCED DEMENTIA: BODY LANGUAGE
Relaxed

## 2024-06-14 NOTE — PROVIDER CONTACT NOTE (OTHER) - ASSESSMENT
No response to external stimuli, No Spontaneous respirations, No apical heart rate, Negative papillary response to stimuli
Pt is AxOxO, actively dying, extremities are 3-4+, weeping with pitting edema.  Cheyne queen breathing is and audible secretions are noted, Suctioning done PRN.
pt is a&ox0.
pt is a&ox0

## 2024-06-14 NOTE — PROGRESS NOTE ADULT - PROBLEM SELECTOR PROBLEM 3
Functional quadriplegia
Functional quadriplegia
Gurgling breath sounds
ACP (advance care planning)
Functional quadriplegia
ROBERT (acute kidney injury)
E coli bacteremia
Functional quadriplegia
E coli bacteremia
Gurgling breath sounds
E coli bacteremia
Functional quadriplegia
ROBERT (acute kidney injury)
Functional quadriplegia

## 2024-06-14 NOTE — PROGRESS NOTE ADULT - ASSESSMENT
80 yo F with history of GERD, MI, pancreatic insufficiency, recent hip surgery and FTT. Patient was admitted (5/19) for weakness with increased lethargy, decreased PO intake, and nausea/vomitting after discharge from [facility]. Her hospitalization c/b sepsis (E coli bactermia), encephalopathy (today not arousable), and ROBERT with need of urgent HD. Hospital course complicated by inability to tolerate HD in setting of hypotension. PT transferred to PCU for symptom management and end of life care. She is receiving comfort measures only, after trialing IVF, without any clinical change. She is actively dying.

## 2024-06-14 NOTE — DISCHARGE NOTE FOR THE EXPIRED PATIENT - HOSPITAL COURSE
78 yo F with history of GERD, MI, pancreatic insufficiency, recent hip surgery and FTT. Patient was admitted () for weakness with increased lethargy, decreased PO intake, and nausea/vomitting after discharge from [facility]. Her hospitalization c/b sepsis (E coli bactermia), encephalopathy (today not arousable), and ROBERT with need of urgent HD. Hospital course complicated by inability to tolerate HD in setting of hypotension. PT transferred to PCU for symptom management and end of life care. She is receiving comfort measures only, after trialing IVF, without any clinical change. Patient  in the PCU on .

## 2024-06-14 NOTE — PROGRESS NOTE ADULT - PROBLEM SELECTOR PLAN 1
-Dilaudid 0.2mg IV q1hr PRN MP (none required in 24 hour period from 7am-7am)  -Dilaudid 0.5mg IV q1hr PRN SP (none required in 24 hour period from 7am-7am)  -standing dilaudid 0.5mg q6h ATC has controlled symptoms.    Bowel regimen while on opioids.  Monitor for constipation.

## 2024-06-14 NOTE — PROGRESS NOTE ADULT - PAIN ASSESSMENT ADVANCED DEMENTIA: BREATHING
Normal
Occasional labored breathing. Short period of hyperventilation
Normal
Normal
Occasional labored breathing. Short period of hyperventilation
Normal
Normal

## 2024-06-14 NOTE — PROGRESS NOTE ADULT - PROBLEM SELECTOR PROBLEM 1
ROBERT (acute kidney injury)
Functional quadriplegia
ROBERT (acute kidney injury)
Functional quadriplegia
ROBETR (acute kidney injury)
E coli bacteremia
ROBERT (acute kidney injury)
Acute encephalopathy
ROBERT (acute kidney injury)
Acute encephalopathy
ROBERT (acute kidney injury)
Pain management
Pain
Pain management
Pain
Acute encephalopathy
Pain management
Functional quadriplegia
Pain management
Acute encephalopathy

## 2024-06-14 NOTE — PROGRESS NOTE ADULT - PROBLEM SELECTOR PROBLEM 4
Dyspnea
ROBERT (acute kidney injury)
Acute encephalopathy
Pressure ulcers of skin of multiple topographic sites
ROBERT (acute kidney injury)
Encounter for palliative care
Pressure ulcers of skin of multiple topographic sites
Acute encephalopathy
ACP (advance care planning)
Acute encephalopathy
Dyspnea
ACP (advance care planning)
ROBERT (acute kidney injury)
Acute encephalopathy

## 2024-06-14 NOTE — PROVIDER CONTACT NOTE (OTHER) - SITUATION
pt axillary temperature 93.6. pt extremities cool to touch
Patient without spontaneous respirations or pulse
pt having large bright red stool. RRT called during dayshift on 6/1 for hypotension
Family(pt's brother) does not want ATC Dilaudid to be given

## 2024-06-14 NOTE — PROGRESS NOTE ADULT - PROBLEM SELECTOR PLAN 8
Trial of IVF with no change in clinical status.  Family updated as to current clinical condition and plan of care.  Educated as to what to expect, questions answered and emotional support provided.     In the setting of parenteral controlled substance administration, clinical monitoring required for side effects such as respiratory depression, constipation and opioid induced neurotoxicity due to organ failure.    Patient assessment and plan discussed on interdisciplinary team rounds today.    Patient not appropriate for hospice transition due to hypotension. Trial of IVF with no change in clinical status.  Family updated as to current clinical condition and plan of care.  Educated as to what to expect, questions answered and emotional support provided. Yesterday evening, patient's brother asked to change IV dilaudid to IV tylenol. Will follow up with family to revisit.    In the setting of parenteral controlled substance administration, clinical monitoring required for side effects such as respiratory depression, constipation and opioid induced neurotoxicity due to organ failure.    Patient assessment and plan discussed on interdisciplinary team rounds today.    Patient not appropriate for hospice transition due to hypotension. Trial of IVF 6/12 with no change in clinical status.  Family updated as to current clinical condition and plan of care.  Educated as to what to expect, questions answered and emotional support provided. Yesterday evening, patient's brother asked to change IV dilaudid to IV tylenol. On-call physician spoke with pt's  and he agreed to continue IV dilaudid. See chart note from 6/13.    Patient remains in the active phases of dying.

## 2024-06-14 NOTE — PROVIDER CONTACT NOTE (OTHER) - BACKGROUND
Pt admitted for sepsis
Patient has DNR order
pt admitted for septic shock
pt admitted for septic shock

## 2024-06-14 NOTE — PROGRESS NOTE ADULT - NS ATTEST RISK PROBLEM GEN_ALL_CORE FT
1. Number and complexity of problems addressed for this patient:    1.1 Moderate (At least 1)  [ ] 1 or more chronic illnesses with exacerbation, progression, or side effects of treatment  [ ] 2 or more stable chronic illnesses  [ ] 1 undiagnosed new problem with uncertain prognosis  [ ] 1 acute illness with systemic symptoms  [ ] 1 acute complicated injury  1.2 High (At least 1)   [ x] 1 or more chronic illnesses with severe exacerbation, progression, or side effects of treatment  [ x] 1 acute or chronic illnesses or injuries that may pose a threat to life or bodily function    2. Amount and/or Complexity of Data that was Reviewed and Analyzed for this case:       Moderate (1 out of 3)       High (2 out of 3)  2.1. (Any combination of 3 of the following)   [ ] Prior External notes were reviewed  [ ] Each test result was reviewed (see "LABS" and "RADIOLOGY & ADDITIONAL STUDIES" above)  [ ] The following tests were ordered and/or reviewed (Only count 1 point for ordering or reviewing a unique test):  	[ ]CBC  	[ ] Chemistry   	[ ] Imaging   	[ ] Other:   [ ] Assessment requiring an independent historian   		Name of historian and relationship:   2.2  [ ] Personally review and interpretation of  image or testing   2.3  [ ] Discussion of management or test interpretation with external physician/other qualified health care professional\appropriate source (not separately reported)    3. Risk of Complications and/or Morbidity or Mortality of for this Patient’s Management:  3.1 Moderate risk of morbidity from additional diagnostic testing or treatment (At least 1):   [ ] Prescription drug management   [ ] Decision regarding minor surgery, treatment, or procedure with identified patient or procedure risk factors  [ ] Decision regarding elective major surgery, treatment, or procedure without identified patient or procedure risk factors   [ ] Diagnosis or treatment significantly limited by social determinants of health   [ ] Other:   3.2 High risk of morbidity from additional diagnostic testing or treatment (At least 1):   [x ] Drug therapy requiring intensive monitoring for toxicity   [ ] Decision regarding elective major surgery, treatment, or procedure with identified patient or procedure risk factors   [ ] Decision regarding emergency major surgery, treatment, or procedure   [ ] Decision regarding hospitalization or escalation of hospital-level of care  [ ] Decision not to resuscitate, not to intubate, or to de-escalate care because of poor prognosis   [ ] Decision to proceed or not with artificial nutrition   [x ] Parenteral controlled substance  [ ] Other:
In the setting of parenteral controlled substance administration, clinical monitoring required for side effects such as respiratory depression, constipation and opioid induced neurotoxicity.  This patient is at [x ]high [ ] medium [ ] low risk due to worsening sepsis and disease progression with  transitioning to comfort directed care with IV parenteral meds.    [ ] The patient is receiving IV and has required  escalation for uncontrolled symptoms.  [ ] To taper and monitor for emergence of symptoms.  [x ] Symptoms controlled with present regimen.
safety/toxicity monitoring of intravenous opiates and/or benzodiazepines in end stage disease process.
1. Number and complexity of problems addressed for this patient:    1.1 Moderate (At least 1)  [ ] 1 or more chronic illnesses with exacerbation, progression, or side effects of treatment  [ ] 2 or more stable chronic illnesses  [ ] 1 undiagnosed new problem with uncertain prognosis  [ ] 1 acute illness with systemic symptoms  [ ] 1 acute complicated injury  1.2 High (At least 1)   [ x] 1 or more chronic illnesses with severe exacerbation, progression, or side effects of treatment  [ x] 1 acute or chronic illnesses or injuries that may pose a threat to life or bodily function    2. Amount and/or Complexity of Data that was Reviewed and Analyzed for this case:       Moderate (1 out of 3)       High (2 out of 3)  2.1. (Any combination of 3 of the following)   [ ] Prior External notes were reviewed  [ ] Each test result was reviewed (see "LABS" and "RADIOLOGY & ADDITIONAL STUDIES" above)  [ ] The following tests were ordered and/or reviewed (Only count 1 point for ordering or reviewing a unique test):  	[ ]CBC  	[ ] Chemistry   	[ ] Imaging   	[ ] Other:   [ ] Assessment requiring an independent historian   		Name of historian and relationship:   2.2  [ ] Personally review and interpretation of  image or testing   2.3  [ ] Discussion of management or test interpretation with external physician/other qualified health care professional\appropriate source (not separately reported)    3. Risk of Complications and/or Morbidity or Mortality of for this Patient’s Management:  3.1 Moderate risk of morbidity from additional diagnostic testing or treatment (At least 1):   [ ] Prescription drug management   [ ] Decision regarding minor surgery, treatment, or procedure with identified patient or procedure risk factors  [ ] Decision regarding elective major surgery, treatment, or procedure without identified patient or procedure risk factors   [ ] Diagnosis or treatment significantly limited by social determinants of health   [ ] Other:   3.2 High risk of morbidity from additional diagnostic testing or treatment (At least 1):   [x ] Drug therapy requiring intensive monitoring for toxicity   [ ] Decision regarding elective major surgery, treatment, or procedure with identified patient or procedure risk factors   [ ] Decision regarding emergency major surgery, treatment, or procedure   [ ] Decision regarding hospitalization or escalation of hospital-level of care  [ ] Decision not to resuscitate, not to intubate, or to de-escalate care because of poor prognosis   [ ] Decision to proceed or not with artificial nutrition   [x ] Parenteral controlled substance  [ ] Other:
In the setting of parenteral controlled substance administration, clinical monitoring required for side effects such as respiratory depression, constipation and opioid induced neurotoxicity.  This patient is at [x ]high [ ] medium [ ] low risk due to worsening sepsis and disease progression with  transitioning to comfort directed care with IV parenteral meds.    [x ] The patient is receiving IV and has required  escalation for uncontrolled symptoms.  [ ] To taper and monitor for emergence of symptoms.  [ ] Symptoms controlled with present regimen.
1. Number and complexity of problems addressed for this patient:    1.1 Moderate (At least 1)  [ ] 1 or more chronic illnesses with exacerbation, progression, or side effects of treatment  [ ] 2 or more stable chronic illnesses  [ ] 1 undiagnosed new problem with uncertain prognosis  [ ] 1 acute illness with systemic symptoms  [ ] 1 acute complicated injury  1.2 High (At least 1)   [ x] 1 or more chronic illnesses with severe exacerbation, progression, or side effects of treatment  [ x] 1 acute or chronic illnesses or injuries that may pose a threat to life or bodily function    2. Amount and/or Complexity of Data that was Reviewed and Analyzed for this case:       Moderate (1 out of 3)       High (2 out of 3)  2.1. (Any combination of 3 of the following)   [ ] Prior External notes were reviewed  [ ] Each test result was reviewed (see "LABS" and "RADIOLOGY & ADDITIONAL STUDIES" above)  [ ] The following tests were ordered and/or reviewed (Only count 1 point for ordering or reviewing a unique test):  	[ ]CBC  	[ ] Chemistry   	[ ] Imaging   	[ ] Other:   [ ] Assessment requiring an independent historian   		Name of historian and relationship:   2.2  [ ] Personally review and interpretation of  image or testing   2.3  [ ] Discussion of management or test interpretation with external physician/other qualified health care professional\appropriate source (not separately reported)    3. Risk of Complications and/or Morbidity or Mortality of for this Patient’s Management:  3.1 Moderate risk of morbidity from additional diagnostic testing or treatment (At least 1):   [ ] Prescription drug management   [ ] Decision regarding minor surgery, treatment, or procedure with identified patient or procedure risk factors  [ ] Decision regarding elective major surgery, treatment, or procedure without identified patient or procedure risk factors   [ ] Diagnosis or treatment significantly limited by social determinants of health   [ ] Other:   3.2 High risk of morbidity from additional diagnostic testing or treatment (At least 1):   [ x] Drug therapy requiring intensive monitoring for toxicity   [ ] Decision regarding elective major surgery, treatment, or procedure with identified patient or procedure risk factors   [ ] Decision regarding emergency major surgery, treatment, or procedure   [ ] Decision regarding hospitalization or escalation of hospital-level of care  [ ] Decision not to resuscitate, not to intubate, or to de-escalate care because of poor prognosis   [ ] Decision to proceed or not with artificial nutrition   [x ] Parenteral controlled substance  [ ] Other:

## 2024-06-14 NOTE — PROGRESS NOTE ADULT - PROBLEM SELECTOR PROBLEM 2
ROBERT (acute kidney injury)
Metabolic acidosis
Acute encephalopathy
Dyspnea
Metabolic acidosis
E coli bacteremia
Metabolic acidosis
Acute encephalopathy
ROBERT (acute kidney injury)
Dyspnea
E coli bacteremia
ROBERT (acute kidney injury)
Acute encephalopathy
Dyspnea

## 2024-06-14 NOTE — PROVIDER CONTACT NOTE (OTHER) - RECOMMENDATIONS
Notify Dr. Sun Street family(pt's brother) does not want ATC Dilaudid to be given.  Spoke to , he would like to speak to provider.
Patient pronounced dead at 20:27,Spouse, Elfego notified
stat labs
warming blanket

## 2024-06-14 NOTE — PROVIDER CONTACT NOTE (OTHER) - ACTION/TREATMENT ORDERED:
stat CBC and type and screen.
warming blanket ordered
Dr. Sun Street notified and aware family(pt's brother) does not want ATC Dilaudid to be given. Dr Strete will call the , Elfego Yee (HCP).
See patient expiration note

## 2024-06-14 NOTE — PROGRESS NOTE ADULT - PAIN ASSESSMENT ADVANCED DEMENTIA: FACIAL EXPRESSION
Smiling or inexpressive

## 2024-06-14 NOTE — PROVIDER CONTACT NOTE (OTHER) - REASON
pt axillary temperature 93.6
patient without heart rate and respirations
pt having bright red stool
Family(pt's brother) does not want ATC Dilaudid to be given

## 2024-06-14 NOTE — PROGRESS NOTE ADULT - RESPIRATORY DISTRESS OBSERVATION: RESPIRATORY RATE
Less than or equal to 18 breaths
19 – 30 breaths
Less than or equal to 18 breaths

## 2024-06-14 NOTE — PROGRESS NOTE ADULT - PROBLEM SELECTOR PROBLEM 6
Functional quadriplegia
ACP (advance care planning)
ACP (advance care planning)
ROBERT (acute kidney injury)
ORBERT (acute kidney injury)
ROBERT (acute kidney injury)
ROBERT (acute kidney injury)
Functional quadriplegia

## 2024-06-14 NOTE — PROGRESS NOTE ADULT - ATTENDING COMMENTS
79-year-old female with past medical history of GERD, MI, chronic pancreatitis, HTN, incontinence, recent hip surgery, recently discharged from [facility] to home admitted with increased lethargy, failure to thrive, decreased PO intake nausea/vomiting. Work up revealed urosepsis and in need of urgent HD.  Patient treated but remains in renal failure, not tolerating hemodialysis due to hypotension, despite treatment of urosepsis and pressors.   Patient transferred to PCU for end of life care and management of symptoms.  She is receiving comfort measures only and is actively dying.    In the setting of parenteral controlled substance administration, clinical monitoring required for side effects such as respiratory depression, constipation and opioid induced neurotoxicity due to organ failure.  On  ATC dosing and prns for control of sx.      Patient assessment and plan discussed on interdisciplinary team rounds today.    Fluids to be stopped as pt is very congested and weeping skin from arms and legs.

## 2024-06-14 NOTE — PROGRESS NOTE ADULT - SUBJECTIVE AND OBJECTIVE BOX
GAP TEAM PALLIATIVE CARE UNIT PROGRESS NOTE:      [  ] Patient on hospice program.    INDICATION FOR PALLIATIVE CARE UNIT SERVICES/Interval HPI: 80 yo F with history of  pancreatic insufficiency, hip Fx and FTT. Hospitalization c/b sepsis (E coli bactermia), encephalopathy (today not arousable), and ROBERT (on HD). Functionality also severely compromised. Hospital course complicated by inability to tolerate HD in setting of hypotension. PT transferred to PCU for symptom management and end of life care.    OVERNIGHT EVENTS: Seen and examined at bedside. IVF was started yesterday and this morning agonal respirations were present. She did not received any PRNs within the last 24hrs. She remains on ATC Dilaudid for pain/dyspnea.     DNR on chart: DNI  DNI      Allergies    penicillin (Swelling)    Intolerances    epinephrine (Other)  MEDICATIONS  (STANDING):  chlorhexidine 2% Cloths 1 Application(s) Topical daily  glycopyrrolate Injectable 0.4 milliGRAM(s) IV Push every 6 hours  HYDROmorphone  Injectable 0.5 milliGRAM(s) IV Push every 6 hours  pantoprazole  Injectable 40 milliGRAM(s) IV Push two times a day    MEDICATIONS  (PRN):  acetaminophen  Suppository .. 650 milliGRAM(s) Rectal every 6 hours PRN Temp greater or equal to 38C (100.4F), Mild Pain (1 - 3)  bisacodyl Suppository 10 milliGRAM(s) Rectal daily PRN Constipation  HYDROmorphone  Injectable 0.5 milliGRAM(s) IV Push every 1 hour PRN Severe Pain (7 - 10)  HYDROmorphone  Injectable 0.5 milliGRAM(s) IV Push every 1 hour PRN dyspnea  HYDROmorphone  Injectable 0.2 milliGRAM(s) IV Push every 1 hour PRN Moderate Pain (4 - 6)  LORazepam   Injectable 0.5 milliGRAM(s) IV Push every 1 hour PRN anxiety, agitation, refractory dyspnea    ITEMS UNCHECKED ARE NOT PRESENT    PRESENT SYMPTOMS: [ ]Unable to self-report see PAINAD, RDOS below  Source if other than patient:  [ ]Family   [ ]Team     Pain: [ ] yes [ ] no  QOL impact -   Location -                    Aggravating factors -  Quality -  Radiation -  Timing-  Severity (0-10 scale):  Minimal acceptable level (0-10 scale):     Dyspnea:                           [ ]Mild [ ]Moderate [ ]Severe  Anxiety:                             [ ]Mild [ ]Moderate [ ]Severe  Fatigue:                             [ ]Mild [ ]Moderate [ ]Severe  Nausea:                             [ ]Mild [ ]Moderate [ ]Severe  Loss of appetite:              [ ]Mild [ ]Moderate [ ]Severe  Constipation:                    [ ]Mild [ ]Moderate [ ]Severe    PCSSQ [Palliative Care Spiritual Screening Question]   Severity (0-10):  Score of 4 or > indicate consideration of Chaplaincy referral.  Chaplaincy Referral: [ ] yes [ ] refused [ ] following [ ] deferred    Caregiver [place]? : [x ] yes [ ] no [ ] deferred:  Social work referral [ ] Patient & Family Centered Care Referral [ ]   Anticipatory Grief present?:  [x ] yes [ ] no [ ] deferred:  Social work referral [ ] Patient & Family Centered Care Referral [ ]  	  Other Symptoms:  [ ]All other review of systems negative. The patient is not able to self-report    PHYSICAL EXAM:   Vital Signs Last 24 Hrs  T(C): --  T(F): --  HR: 84 (14 Jun 2024 08:14) (84 - 84)  BP: 32/32 (14 Jun 2024 07:46) (32/32 - 32/32)  BP(mean): --  RR: 12 (14 Jun 2024 08:14) (12 - 12)  SpO2: --     I&O's Summary    13 Jun 2024 07:01  -  14 Jun 2024 07:00  --------------------------------------------------------  IN: 0 mL / OUT: 350 mL / NET: -350 mL      GENERAL: [ ] Cachexia  [ ]Alert  [ ]Oriented   [x ]Lethargic  [x ]Unarousable  [ ]Verbal  [ x]Non-Verbal  Behavioral:   [ ] Anxiety  [ ] Delirium [ ] Agitation [ ] Other  HEENT:  [x ]Normal   [ ]Dry mouth   [ ]ET Tube/Trach  [ ]Oral lesions  PULMONARY:   [ ]Clear [ ]Tachypnea  [x ]Audible excessive secretions   [ ]Rhonchi        [ ]Right [ ]Left [ ]Bilateral  [ ]Crackles        [ ]Right [ ]Left [ ]Bilateral  [ ]Wheezing     [ ]Right [ ]Left [ ]Bilateral  [x ]Diminished BS [ ]Right [ ]Left [ ]Bilateral    CARDIOVASCULAR:    [ x]Regular [ ]Irregular [ ]Tachy  [ ]Pato [ ]Murmur [ ]Other  GASTROINTESTINAL:  [x ]Soft  [ ]Distended   [ x]hypoactive BS  [ ]Non tender [ ]Tender  [ ]Other [ ]PEG [ ]OGT/ NGT   Last BM:  6/11  GENITOURINARY:  [ ]Normal [x ] Incontinent   [ ]Oliguria/Anuria   [ ]Carrillo  MUSCULOSKELETAL:   [ ]Normal   [ x]Weakness  [ ]Bed/Wheelchair bound [ ]Edema  NEUROLOGIC:   [ ]No focal deficits  [ ] Cognitive impairment  [ ] Dysphagia [ ]Dysarthria [ ] Paresis [ ]Other   SKIN: dusky toes  [ ]Normal  [ ]Rash  [ ]Other  [ x]Pressure ulcer(s)  [ ]y [ ]n  Present on admission Multiple pressure ulcers. Please see nursing assessment for further details for which I have reviewed.    CRITICAL CARE:  [ ] Shock Present  [ ]Septic [ ]Cardiogenic [ ]Neurologic [ ]Hypovolemic  [ ]  Vasopressors [ ]  Inotropes   [ ] Respiratory failure present [ ] Mechanical Ventilation [ ] Non-invasive ventilatory support [ ] High-Flow  [ ] Acute  [ ] Chronic [ ] Hypoxic  [ ] Hypercarbic [ ] Other  [ x] Other organ failure - Kidneys and Skin    PROTEIN CALORIE MALNUTRITION: [ ] mild [ ] moderate [ ] severe  [ ] underweight [ ] morbid obesity    https://www.andeal.org/vault/2440/web/files/ONC/Table_Clinical%20Characteristics%20to%20Document%20Malnutrition-White%20JV%20et%20al%202012.pdf    Height (cm): 154.9 (05-19-24 @ 14:27), 162.6 (01-20-24 @ 14:53)  Weight (kg): 46.8 (05-19-24 @ 23:00), 54.4 (01-20-24 @ 14:53)  BMI (kg/m2): 19.5 (05-19-24 @ 23:00), 22.7 (05-19-24 @ 14:27), 20.6 (01-20-24 @ 14:53)    [ ] PPSV2 < or = 30% [ ] significant weight loss [ ] poor nutritional intake [ ] anasarca   Artificial Nutrition [ ]     Other REFERRALS:    [ ] Hospice  [ ]Child Life  [ x]Social Work  [ ]Case management [ ]Holistic Therapy [ ] Physical Therapy [ ] Dietary     Progress Notes - Care Coordination [C. Provider] (06-11-24 @ 14:08)      Palliative Performance Scale:  http://npcrc.org/files/news/palliative_performance_scale_ppsv2.pdf  (Ctrl +  left click to view)  Respiratory Distress Observation Tool:  https://homecareinformation.net/handouts/hen/Respiratory_Distress_Observation_Scale.pdf (Ctrl +  left click to view)  PAINAD Score:  http://geriatrictoolkit.[place].[facility]/cog/painad.pdf (Ctrl +  left click to view)   GAP TEAM PALLIATIVE CARE UNIT PROGRESS NOTE:      [  ] Patient on hospice program.    INDICATION FOR PALLIATIVE CARE UNIT SERVICES/Interval HPI: 78 yo F with history of GERD, MI, pancreatic insufficiency, recent hip surgery and FTT. Patient was admitted (5/19) for weakness with increased lethargy, decreased PO intake, and nausea/vomitting after discharge from [facility]. Her hospitalization c/b sepsis (E coli bactermia), encephalopathy (today not arousable), and ROBERT with need of urgent HD. Hospital course complicated by inability to tolerate HD in setting of hypotension. PT transferred to PCU for symptom management and end of life care.    OVERNIGHT EVENTS: Seen and examined at bedside. IVF was started yesterday and this morning agonal respirations were present. She did not received any PRNs within the last 24hrs. She remains on ATC Dilaudid for pain/dyspnea.     DNR on chart: DNI  DNI      Allergies    penicillin (Swelling)    Intolerances    epinephrine (Other)  MEDICATIONS  (STANDING):  chlorhexidine 2% Cloths 1 Application(s) Topical daily  glycopyrrolate Injectable 0.4 milliGRAM(s) IV Push every 6 hours  HYDROmorphone  Injectable 0.5 milliGRAM(s) IV Push every 6 hours  pantoprazole  Injectable 40 milliGRAM(s) IV Push two times a day    MEDICATIONS  (PRN):  acetaminophen  Suppository .. 650 milliGRAM(s) Rectal every 6 hours PRN Temp greater or equal to 38C (100.4F), Mild Pain (1 - 3)  bisacodyl Suppository 10 milliGRAM(s) Rectal daily PRN Constipation  HYDROmorphone  Injectable 0.5 milliGRAM(s) IV Push every 1 hour PRN Severe Pain (7 - 10)  HYDROmorphone  Injectable 0.5 milliGRAM(s) IV Push every 1 hour PRN dyspnea  HYDROmorphone  Injectable 0.2 milliGRAM(s) IV Push every 1 hour PRN Moderate Pain (4 - 6)  LORazepam   Injectable 0.5 milliGRAM(s) IV Push every 1 hour PRN anxiety, agitation, refractory dyspnea    ITEMS UNCHECKED ARE NOT PRESENT    PRESENT SYMPTOMS: [ ]Unable to self-report see PAINAD, RDOS below  Source if other than patient:  [ ]Family   [ ]Team     Pain: [ ] yes [ ] no  QOL impact -   Location -                    Aggravating factors -  Quality -  Radiation -  Timing-  Severity (0-10 scale):  Minimal acceptable level (0-10 scale):     Dyspnea:                           [ ]Mild [ ]Moderate [ ]Severe  Anxiety:                             [ ]Mild [ ]Moderate [ ]Severe  Fatigue:                             [ ]Mild [ ]Moderate [ ]Severe  Nausea:                             [ ]Mild [ ]Moderate [ ]Severe  Loss of appetite:              [ ]Mild [ ]Moderate [ ]Severe  Constipation:                    [ ]Mild [ ]Moderate [ ]Severe    PCSSQ [Palliative Care Spiritual Screening Question]   Severity (0-10):  Score of 4 or > indicate consideration of Chaplaincy referral.  Chaplaincy Referral: [ ] yes [ ] refused [ ] following [ ] deferred    Caregiver [place]? : [x ] yes [ ] no [ ] deferred:  Social work referral [ ] Patient & Family Centered Care Referral [ ]   Anticipatory Grief present?:  [x ] yes [ ] no [ ] deferred:  Social work referral [ ] Patient & Family Centered Care Referral [ ]  	  Other Symptoms:  [ ]All other review of systems negative. The patient is not able to self-report    PHYSICAL EXAM:   Vital Signs Last 24 Hrs  T(C): --  T(F): --  HR: 84 (14 Jun 2024 08:14) (84 - 84)  BP: 32/32 (14 Jun 2024 07:46) (32/32 - 32/32)  BP(mean): --  RR: 12 (14 Jun 2024 08:14) (12 - 12)  SpO2: --     I&O's Summary    13 Jun 2024 07:01  -  14 Jun 2024 07:00  --------------------------------------------------------  IN: 0 mL / OUT: 350 mL / NET: -350 mL      GENERAL: [ ] Cachexia  [ ]Alert  [ ]Oriented   [x ]Lethargic  [x ]Unarousable  [ ]Verbal  [ x]Non-Verbal  Behavioral:   [ ] Anxiety  [ ] Delirium [ ] Agitation [ ] Other  HEENT:  [x ]Normal   [ ]Dry mouth   [ ]ET Tube/Trach  [ ]Oral lesions  PULMONARY:   [ ]Clear [ ]Tachypnea  [x ]Audible excessive secretions   [ ]Rhonchi        [ ]Right [ ]Left [ ]Bilateral  [ ]Crackles        [ ]Right [ ]Left [ ]Bilateral  [ ]Wheezing     [ ]Right [ ]Left [ ]Bilateral  [x ]Diminished BS [ ]Right [ ]Left [ ]Bilateral    CARDIOVASCULAR:    [ x]Regular [ ]Irregular [ ]Tachy  [ ]Pato [ ]Murmur [ ]Other  GASTROINTESTINAL:  [x ]Soft  [ ]Distended   [ x]hypoactive BS  [ ]Non tender [ ]Tender  [ ]Other [ ]PEG [ ]OGT/ NGT   Last BM:  6/11  GENITOURINARY:  [ ]Normal [x ] Incontinent   [ ]Oliguria/Anuria   [ ]Carrillo  MUSCULOSKELETAL:   [ ]Normal   [ x]Weakness  [ ]Bed/Wheelchair bound [ ]Edema  NEUROLOGIC:   [ ]No focal deficits  [ ] Cognitive impairment  [ ] Dysphagia [ ]Dysarthria [ ] Paresis [ ]Other   SKIN: dusky toes  [ ]Normal  [ ]Rash  [ ]Other  [ x]Pressure ulcer(s)  [ ]y [ ]n  Present on admission Multiple pressure ulcers. Please see nursing assessment for further details for which I have reviewed.    CRITICAL CARE:  [ ] Shock Present  [ ]Septic [ ]Cardiogenic [ ]Neurologic [ ]Hypovolemic  [ ]  Vasopressors [ ]  Inotropes   [ ] Respiratory failure present [ ] Mechanical Ventilation [ ] Non-invasive ventilatory support [ ] High-Flow  [ ] Acute  [ ] Chronic [ ] Hypoxic  [ ] Hypercarbic [ ] Other  [ x] Other organ failure - Kidneys and Skin    PROTEIN CALORIE MALNUTRITION: [ ] mild [ ] moderate [ ] severe  [ ] underweight [ ] morbid obesity    https://www.andeal.org/vault/4440/web/files/ONC/Table_Clinical%20Characteristics%20to%20Document%20Malnutrition-White%20JV%20et%20al%202012.pdf    Height (cm): 154.9 (05-19-24 @ 14:27), 162.6 (01-20-24 @ 14:53)  Weight (kg): 46.8 (05-19-24 @ 23:00), 54.4 (01-20-24 @ 14:53)  BMI (kg/m2): 19.5 (05-19-24 @ 23:00), 22.7 (05-19-24 @ 14:27), 20.6 (01-20-24 @ 14:53)    [ ] PPSV2 < or = 30% [ ] significant weight loss [ ] poor nutritional intake [ ] anasarca   Artificial Nutrition [ ]     Other REFERRALS:    [ ] Hospice  [ ]Child Life  [ x]Social Work  [ ]Case management [ ]Holistic Therapy [ ] Physical Therapy [ ] Dietary     Progress Notes - Care Coordination [C. Provider] (06-11-24 @ 14:08)      Palliative Performance Scale:  http://npcrc.org/files/news/palliative_performance_scale_ppsv2.pdf  (Ctrl +  left click to view)  Respiratory Distress Observation Tool:  https://homecareinformation.net/handouts/hen/Respiratory_Distress_Observation_Scale.pdf (Ctrl +  left click to view)  PAINAD Score:  http://geriatrictoolkit.[place].[facility]/cog/painad.pdf (Ctrl +  left click to view)   GAP TEAM PALLIATIVE CARE UNIT PROGRESS NOTE:      [  ] Patient on hospice program.    INDICATION FOR PALLIATIVE CARE UNIT SERVICES/Interval HPI: 78 yo F with history of GERD, MI, pancreatic insufficiency, recent hip surgery and FTT. Patient was admitted (5/19) for weakness with increased lethargy, decreased PO intake, and nausea/vomitting after discharge from [facility]. Her hospitalization c/b sepsis (E coli bactermia), encephalopathy (today not arousable), and ROBERT with need of urgent HD. Hospital course complicated by inability to tolerate HD in setting of hypotension. PT transferred to PCU for symptom management and end of life care.    OVERNIGHT EVENTS: Seen and examined at bedside. IVF started 6/12 as per family request and this morning she has audible secretions. Will stop IVF. She did not received any PRNs within the last 24hrs. She remains on ATC Dilaudid for pain/dyspnea.     DNR on chart: Yes    Allergies    penicillin (Swelling)    Intolerances    epinephrine (Other)  MEDICATIONS  (STANDING):  chlorhexidine 2% Cloths 1 Application(s) Topical daily  glycopyrrolate Injectable 0.4 milliGRAM(s) IV Push every 6 hours  HYDROmorphone  Injectable 0.5 milliGRAM(s) IV Push every 6 hours  pantoprazole  Injectable 40 milliGRAM(s) IV Push two times a day    MEDICATIONS  (PRN):  acetaminophen  Suppository .. 650 milliGRAM(s) Rectal every 6 hours PRN Temp greater or equal to 38C (100.4F), Mild Pain (1 - 3)  bisacodyl Suppository 10 milliGRAM(s) Rectal daily PRN Constipation  HYDROmorphone  Injectable 0.5 milliGRAM(s) IV Push every 1 hour PRN Severe Pain (7 - 10)  HYDROmorphone  Injectable 0.5 milliGRAM(s) IV Push every 1 hour PRN dyspnea  HYDROmorphone  Injectable 0.2 milliGRAM(s) IV Push every 1 hour PRN Moderate Pain (4 - 6)  LORazepam   Injectable 0.5 milliGRAM(s) IV Push every 1 hour PRN anxiety, agitation, refractory dyspnea    ITEMS UNCHECKED ARE NOT PRESENT    PRESENT SYMPTOMS: [x ]Unable to self-report see PAINAD, RDOS below  Source if other than patient:  [ ]Family   [x ]Team     Pain: [ ] yes [ ] no  QOL impact -   Location -                    Aggravating factors -  Quality -  Radiation -  Timing-  Severity (0-10 scale):  Minimal acceptable level (0-10 scale):     Dyspnea:                           [ ]Mild [ ]Moderate [ ]Severe  Anxiety:                             [ ]Mild [ ]Moderate [ ]Severe  Fatigue:                             [ ]Mild [ ]Moderate [ ]Severe  Nausea:                             [ ]Mild [ ]Moderate [ ]Severe  Loss of appetite:              [ ]Mild [ ]Moderate [ ]Severe  Constipation:                    [ ]Mild [ ]Moderate [ ]Severe    PCSSQ [Palliative Care Spiritual Screening Question]   Severity (0-10):  Score of 4 or > indicate consideration of Chaplaincy referral.  Chaplaincy Referral: [ ] yes [ ] refused [ ] following [ x] deferred    Caregiver [place]? : [x ] yes [ ] no [ ] deferred:  Social work referral [ ] Patient & Family Centered Care Referral [ ]   Anticipatory Grief present?:  [x ] yes [ ] no [ ] deferred:  Social work referral [ ] Patient & Family Centered Care Referral [ ]  	  Other Symptoms:  [ ]All other review of systems negative. The patient is not able to self-report    PHYSICAL EXAM:   Vital Signs Last 24 Hrs  T(C): --  T(F): --  HR: 84 (14 Jun 2024 08:14) (84 - 84)  BP: 32/32 (14 Jun 2024 07:46) (32/32 - 32/32)  BP(mean): --  RR: 12 (14 Jun 2024 08:14) (12 - 12)  SpO2: --     I&O's Summary    13 Jun 2024 07:01  -  14 Jun 2024 07:00  --------------------------------------------------------  IN: 0 mL / OUT: 350 mL / NET: -350 mL      GENERAL: [ ] Cachexia  [ ]Alert  [ ]Oriented   [x ]Lethargic  [x ]Unarousable  [ ]Verbal  [ x]Non-Verbal  Behavioral:   [ ] Anxiety  [ ] Delirium [ ] Agitation [ ] Other  HEENT:  [x ]Normal   [ ]Dry mouth   [ ]ET Tube/Trach  [ ]Oral lesions  PULMONARY:   [ ]Clear [ ]Tachypnea  [x ]Audible excessive secretions   [ ]Rhonchi        [ ]Right [ ]Left [ ]Bilateral  [ ]Crackles        [ ]Right [ ]Left [ ]Bilateral  [ ]Wheezing     [ ]Right [ ]Left [ ]Bilateral  [x ]Diminished BS [ ]Right [ ]Left [x ]Bilateral    CARDIOVASCULAR:    [ x]Regular [ ]Irregular [ ]Tachy  [ ]Pato [ ]Murmur [ ]Other  GASTROINTESTINAL:  [x ]Soft  [ ]Distended   [ x]hypoactive BS  [ ]Non tender [ ]Tender  [ ]Other [ ]PEG [ ]OGT/ NGT   Last BM:  6/11  GENITOURINARY:  [ ]Normal [x ] Incontinent   [ ]Oliguria/Anuria   [ ]Carrillo  MUSCULOSKELETAL:   [ ]Normal   [ x]Weakness  [ ]Bed/Wheelchair bound [ ]Edema  NEUROLOGIC:   [ ]No focal deficits  [ ] Cognitive impairment  [ ] Dysphagia [ ]Dysarthria [ ] Paresis [ ]Other   SKIN: dusky toes  [ ]Normal  [ ]Rash  [ ]Other  [ x]Pressure ulcer(s)  [ ]y [ ]n  Present on admission Multiple pressure ulcers. Please see nursing assessment for further details for which I have reviewed.    CRITICAL CARE:  [ ] Shock Present  [ ]Septic [ ]Cardiogenic [ ]Neurologic [ ]Hypovolemic  [ ]  Vasopressors [ ]  Inotropes   [ ] Respiratory failure present [ ] Mechanical Ventilation [ ] Non-invasive ventilatory support [ ] High-Flow  [ ] Acute  [ ] Chronic [ ] Hypoxic  [ ] Hypercarbic [ ] Other  [ x] Other organ failure - Kidneys and Skin    PROTEIN CALORIE MALNUTRITION: [ ] mild [ ] moderate [ ] severe  [ ] underweight [ ] morbid obesity    https://www.andeal.org/vault/5570/web/files/ONC/Table_Clinical%20Characteristics%20to%20Document%20Malnutrition-White%20JV%20et%20al%202012.pdf    Height (cm): 154.9 (05-19-24 @ 14:27), 162.6 (01-20-24 @ 14:53)  Weight (kg): 46.8 (05-19-24 @ 23:00), 54.4 (01-20-24 @ 14:53)  BMI (kg/m2): 19.5 (05-19-24 @ 23:00), 22.7 (05-19-24 @ 14:27), 20.6 (01-20-24 @ 14:53)    [x ] PPSV2 < or = 30% [ ] significant weight loss [ ] poor nutritional intake [ ] anasarca   Artificial Nutrition [ ]     Other REFERRALS:    [ ] Hospice  [ ]Child Life  [ x]Social Work  [ ]Case management [ ]Holistic Therapy [ ] Physical Therapy [ ] Dietary       Palliative Performance Scale:  http://[facility].org/files/news/palliative_performance_scale_ppsv2.pdf  (Ctrl +  left click to view)  Respiratory Distress Observation Tool:  https://homecareinformation.net/handouts/hen/Respiratory_Distress_Observation_Scale.pdf (Ctrl +  left click to view)  PAINAD Score:  http://geriatrictoolkit.[place].[facility]/cog/painad.pdf (Ctrl +  left click to view)   GAP TEAM PALLIATIVE CARE UNIT PROGRESS NOTE:      [  ] Patient on hospice program.    INDICATION FOR PALLIATIVE CARE UNIT SERVICES/Interval HPI: 80 yo F with history of GERD, MI, pancreatic insufficiency, recent hip surgery and FTT. Patient was admitted (5/19) for weakness with increased lethargy, decreased PO intake, and nausea/vomiting after discharge from [facility]. Her hospitalization c/b sepsis (E coli bacteremia), encephalopathy (today not arousable), and ROBERT with need of urgent HD. Hospital course complicated by inability to tolerate HD in setting of hypotension. PT transferred to PCU for symptom management and end of life care.    OVERNIGHT EVENTS: Seen and examined at bedside. IVF started 6/12 as per family request and this morning she has audible secretions. Will stop IVF. She did not received any PRNs within the last 24hrs. She remains on ATC Dilaudid for pain/dyspnea.     DNR on chart: Yes    Allergies    penicillin (Swelling)    Intolerances    epinephrine (Other)  MEDICATIONS  (STANDING):  chlorhexidine 2% Cloths 1 Application(s) Topical daily  glycopyrrolate Injectable 0.4 milliGRAM(s) IV Push every 6 hours  HYDROmorphone  Injectable 0.5 milliGRAM(s) IV Push every 6 hours  pantoprazole  Injectable 40 milliGRAM(s) IV Push two times a day    MEDICATIONS  (PRN):  acetaminophen  Suppository .. 650 milliGRAM(s) Rectal every 6 hours PRN Temp greater or equal to 38C (100.4F), Mild Pain (1 - 3)  bisacodyl Suppository 10 milliGRAM(s) Rectal daily PRN Constipation  HYDROmorphone  Injectable 0.5 milliGRAM(s) IV Push every 1 hour PRN Severe Pain (7 - 10)  HYDROmorphone  Injectable 0.5 milliGRAM(s) IV Push every 1 hour PRN dyspnea  HYDROmorphone  Injectable 0.2 milliGRAM(s) IV Push every 1 hour PRN Moderate Pain (4 - 6)  LORazepam   Injectable 0.5 milliGRAM(s) IV Push every 1 hour PRN anxiety, agitation, refractory dyspnea    ITEMS UNCHECKED ARE NOT PRESENT    PRESENT SYMPTOMS: [x ]Unable to self-report see PAINAD, RDOS below  Source if other than patient:  [ ]Family   [x ]Team     Pain: [ ] yes [ ] no  QOL impact -   Location -                    Aggravating factors -  Quality -  Radiation -  Timing-  Severity (0-10 scale):  Minimal acceptable level (0-10 scale):     Dyspnea:                           [ ]Mild [ ]Moderate [ ]Severe  Anxiety:                             [ ]Mild [ ]Moderate [ ]Severe  Fatigue:                             [ ]Mild [ ]Moderate [ ]Severe  Nausea:                             [ ]Mild [ ]Moderate [ ]Severe  Loss of appetite:              [ ]Mild [ ]Moderate [ ]Severe  Constipation:                    [ ]Mild [ ]Moderate [ ]Severe    PCSSQ [Palliative Care Spiritual Screening Question]   Severity (0-10):  Score of 4 or > indicate consideration of Chaplaincy referral.  Chaplaincy Referral: [ ] yes [ ] refused [ ] following [ x] deferred    Caregiver [place]? : [x ] yes [ ] no [ ] deferred:  Social work referral [ ] Patient & Family Centered Care Referral [ ]   Anticipatory Grief present?:  [x ] yes [ ] no [ ] deferred:  Social work referral [ ] Patient & Family Centered Care Referral [ ]  	  Other Symptoms:  [ ]All other review of systems negative. The patient is not able to self-report    PHYSICAL EXAM:   Vital Signs Last 24 Hrs  T(C): --  T(F): --  HR: 84 (14 Jun 2024 08:14) (84 - 84)  BP: 32/32 (14 Jun 2024 07:46) (32/32 - 32/32)  BP(mean): --  RR: 12 (14 Jun 2024 08:14) (12 - 12)  SpO2: --     I&O's Summary    13 Jun 2024 07:01  -  14 Jun 2024 07:00  --------------------------------------------------------  IN: 0 mL / OUT: 350 mL / NET: -350 mL      GENERAL: [ ] Cachexia  [ ]Alert  [ ]Oriented   [x ]Lethargic  [x ]Unarousable  [ ]Verbal  [ x]Non-Verbal  Behavioral:   [ ] Anxiety  [ ] Delirium [ ] Agitation [ ] Other  HEENT:  [x ]Normal   [ ]Dry mouth   [ ]ET Tube/Trach  [ ]Oral lesions  PULMONARY:   [ ]Clear [ ]Tachypnea  [x ]Audible excessive secretions   [ ]Rhonchi        [ ]Right [ ]Left [ ]Bilateral  [ ]Crackles        [ ]Right [ ]Left [ ]Bilateral  [ ]Wheezing     [ ]Right [ ]Left [ ]Bilateral  [x ]Diminished BS [ ]Right [ ]Left [x ]Bilateral    CARDIOVASCULAR:    [ x]Regular [ ]Irregular [ ]Tachy  [ ]Pato [ ]Murmur [ ]Other  GASTROINTESTINAL:  [x ]Soft  [ ]Distended   [ x]hypoactive BS  [ ]Non tender [ ]Tender  [ ]Other [ ]PEG [ ]OGT/ NGT   Last BM:  6/11  GENITOURINARY:  [x ]Normal [x ] Incontinent   [ ]Oliguria/Anuria   [x ]Carrillo  MUSCULOSKELETAL:   [ ]Normal   [ x]Weakness  [ ]Bed/Wheelchair bound [ ]Edema  NEUROLOGIC:   [ ]No focal deficits  x[ ] Cognitive impairment  [ ] Dysphagia [ ]Dysarthria [ ] Paresis [x ]Other delirium  SKIN: dusky toes  [ ]Normal  [ ]Rash  [ ]Other  [ x]Pressure ulcer(s)  [ ]y [ ]n  Present on admission Multiple pressure ulcers. Please see nursing assessment for further details for which I have reviewed. arms and legs are weeping    CRITICAL CARE:  [ ] Shock Present  [ ]Septic [ ]Cardiogenic [ ]Neurologic [ ]Hypovolemic  [ ]  Vasopressors [ ]  Inotropes   [ ] Respiratory failure present [ ] Mechanical Ventilation [ ] Non-invasive ventilatory support [ ] High-Flow  [ ] Acute  [ ] Chronic [ ] Hypoxic  [ ] Hypercarbic [ ] Other  [ x] Other organ failure - Kidneys and Skin    PROTEIN CALORIE MALNUTRITION: [ ] mild [ ] moderate [ x] severe  [ ] underweight [ ] morbid obesity    https://www.andeal.org/vault/2440/web/files/ONC/Table_Clinical%20Characteristics%20to%20Document%20Malnutrition-White%20JV%20et%20al%202012.pdf    Height (cm): 154.9 (05-19-24 @ 14:27), 162.6 (01-20-24 @ 14:53)  Weight (kg): 46.8 (05-19-24 @ 23:00), 54.4 (01-20-24 @ 14:53)  BMI (kg/m2): 19.5 (05-19-24 @ 23:00), 22.7 (05-19-24 @ 14:27), 20.6 (01-20-24 @ 14:53)    [x ] PPSV2 < or = 30% [ ] significant weight loss [ ] poor nutritional intake [ ] anasarca   Artificial Nutrition [ ]     Other REFERRALS:    [ ] Hospice  [ ]Child Life  [ x]Social Work  [ ]Case management [ ]Holistic Therapy [ ] Physical Therapy [ ] Dietary       Palliative Performance Scale:  http://[email or web address]/news/palliative_performance_scale_[email or web address].pdf  (Ctrl +  left click to view)  Respiratory Distress Observation Tool:  https://homecareinformation.net/handouts/hen/Respiratory_Distress_Observation_Scale.pdf (Ctrl +  left click to view)  PAINAD Score:  http://geriatrictoolkit.[place].[email or web address]/cog/painad.pdf (Ctrl +  left click to view)

## 2024-06-14 NOTE — PROGRESS NOTE ADULT - REASON FOR ADMISSION
Weakness

## 2024-06-14 NOTE — PROGRESS NOTE ADULT - PROBLEM SELECTOR PLAN 3
Turn and position, glycopyrrolate 0.4mg IVP every 6 hours ATC, scopolamine patch if refractory. Excessive secretions on exam   IVF started 6/12 as per family request, but stopped this morning.   glycopyrrolate 0.4mg IVP every 6 hours ATC, scopolamine patch if refractory  Turn and position

## 2024-06-14 NOTE — PROGRESS NOTE ADULT - PROBLEM SELECTOR PLAN 6
- Would not tolerate HD well due to hypotension and would need shiley exchange  -  verbalized understanding and wishes to prioritize comfort
- Would not tolerate HD well due to hypotension and would need shiley exchange  -  verbalized understanding and wishes to prioritize comfort
See goc note 6/2. MOLST completed for DNR/DNI/CMO  transitioned to symptom directed care
- PPSV 10%. The patient requires nursing assistance with all ADLs.  - Supportive care.  - Turn and position.  - Continue with good skin care.
- Would not tolerate HD well due to hypotension and would need shiley exchange  -  verbalized understanding and wishes to prioritize comfort
See goc note 6/2. MOLST completed for DNR/DNI/CMO  transitioned to symptom directed care
- Would not tolerate HD well due to hypotension and would need shiley exchange  -  verbalized understanding and wishes to prioritize comfort
- PPSV 10%. The patient requires nursing assistance with all ADLs.  - Supportive care.  - Turn and position.  - Continue with good skin care.

## 2024-06-14 NOTE — PROGRESS NOTE ADULT - ATTENDING SUPERVISION STATEMENT
Resident
Fellow
Student

## 2024-06-14 NOTE — PROGRESS NOTE ADULT - PROVIDER SPECIALTY LIST ADULT
Endocrinology
Gastroenterology
Heme/Onc
Infectious Disease
Internal Medicine
Internal Medicine
MICU
Nephrology
Palliative Care
Palliative Care
Urology
Endocrinology
Gastroenterology
Heme/Onc
Infectious Disease
Internal Medicine
Nephrology
Gastroenterology
Heme/Onc
Heme/Onc
Infectious Disease
Infectious Disease
Internal Medicine
Internal Medicine
MICU
Palliative Care
Endocrinology
Endocrinology
Gastroenterology
Heme/Onc
Infectious Disease
Internal Medicine
Internal Medicine
Nephrology
Urology
Nephrology
Palliative Care
Wound Care
Nephrology
Palliative Care
Nephrology
Palliative Care

## 2024-06-14 NOTE — PROGRESS NOTE ADULT - PROBLEM SELECTOR PROBLEM 7
Encounter for palliative care
Encounter for palliative care
ROBERT (acute kidney injury)
Encounter for palliative care
ROBERT (acute kidney injury)

## 2024-06-14 NOTE — PROGRESS NOTE ADULT - PAIN ASSESSMENT ADVANCED DEMENTIA: CONSOLABILITY
No need to console

## 2024-06-14 NOTE — PROGRESS NOTE ADULT - RESPIRATORY DISTRESS OBSERVATION: HEART RATE
Less than 90 beats
Less than 90 beats
90 – 109 beats
Less than 90 beats
Less than 90 beats
90 – 109 beats
Less than 90 beats
90 – 109 beats
90 – 109 beats
Less than 90 beats
90 – 109 beats
Less than 90 beats
Less than 90 beats

## 2024-06-14 NOTE — PROGRESS NOTE ADULT - PROBLEM SELECTOR PLAN 4
Turn and position at recommended intervals.  Wound care.   Pain management.      Weeping skin, discontinue IVF and monitor.

## 2024-06-14 NOTE — PROGRESS NOTE ADULT - PROBLEM SELECTOR PLAN 5
- Likely multifactorial with multiorgan dysfunction.  - Goals are now for comfort directed care  - Monitor for constipation, urinary retention, pain, hunger, thirst, etc.  Promote sleep wake cycle and reorientation as indicated.

## 2024-06-14 NOTE — PROGRESS NOTE ADULT - PROBLEM SELECTOR PROBLEM 5
Acute encephalopathy
Encounter for palliative care
Encounter for palliative care
Acute encephalopathy
E coli bacteremia
E coli bacteremia
Functional quadriplegia
Functional quadriplegia
Encounter for palliative care
Encounter for palliative care
E coli bacteremia
E coli bacteremia
Encounter for palliative care

## 2024-08-02 NOTE — ED ADULT NURSE NOTE - NSSUHOSCREENINGYN_ED_ALL_ED
Called pt daughter back to schedule surgery. LVM with call back number   Yes - the patient is able to be screened

## 2025-04-13 NOTE — ED PROVIDER NOTE - NS ED MD DISPO SPECIAL CONSIDERATION1
[facility] Emergency Department      CHIEF COMPLAINT  Fall (States she tripped and fell. Had a R TKR 1 month ago. Pt states her R knee incision opened back up after the fall)      HISTORY OF PRESENT ILLNESS  Blaire Hamm is a 44 y.o. female with a history of depression and hypertension who is 1 month postop from right total knee arthroplasty with Dr. Diaz presents after she fell.  She was getting out of her car to go to [employer] and states she suddenly just fell, lost her balance.  Hit both knees on the ground and did hit her head.  She was taking Eliquis postop and just stopped taking this a few days ago.  Denies loss of consciousness.  Is having a lot of pain in the right knee and bleeding from her incision..   No other complaints, modifying factors or associated symptoms.     History obtained from the patient.    I have reviewed the following from the nursing documentation.    Past Medical History:   Diagnosis Date    Arthritis     Bipolar disorder     Cerebral palsy     legs;mainly right    Depression     Hypertension     PONV (postoperative nausea and vomiting)     Prolonged emergence from general anesthesia      Past Surgical History:   Procedure Laterality Date    FOOT SURGERY      KNEE SURGERY Left 6/12/15    LEG SURGERY      TOTAL KNEE ARTHROPLASTY Left 3/15/2023    LEFT TOTAL KNEE ARTHROPLASTY           RONEL BIOMET NOTE: ADDUCTOR CANAL BLOCK, IPAC performed by Gt Diaz MD at [facility] OR    TOTAL KNEE ARTHROPLASTY Right 3/13/2025    RIGHT TOTAL KNEE ARTHROPLASTY                 RONEL performed by Gt Diaz MD at [facility] OR     History reviewed. No pertinent family history.  Social History     Socioeconomic History    Marital status:      Spouse name: Not on file    Number of children: Not on file    Years of education: Not on file    Highest education level: Not on file   Occupational History    Not on file   Tobacco Use    Smoking status: Never    Smokeless tobacco: Never 
None

## (undated) DEVICE — PACK IV START WITH CHG

## (undated) DEVICE — SUT POLYSORB 1 36" GS-21 UNDYED

## (undated) DEVICE — DRSG WEBRIL 6"

## (undated) DEVICE — BIOPSY FORCEP RADIAL JAW 4 STANDARD WITH NEEDLE

## (undated) DEVICE — DRAPE 3/4 SHEET W REINFORCEMENT 56X77"

## (undated) DEVICE — SOL IRR POUR NS 0.9% 500ML

## (undated) DEVICE — SOL INJ NS 0.9% 500ML 2 PORT

## (undated) DEVICE — ELCTR BOVIE PENCIL SMOKE EVACUATION

## (undated) DEVICE — DRAPE TOWEL BLUE 17" X 24"

## (undated) DEVICE — DRSG TEGADERM 4X4.75"

## (undated) DEVICE — BITE BLOCK ADULT 20 X 27MM (GREEN)

## (undated) DEVICE — GLV 7.5 PROTEXIS (WHITE)

## (undated) DEVICE — DRAPE MAYO STAND 30"

## (undated) DEVICE — SYNTHES REAMING ROD WITH BALL TIP 2.5MM 950MM

## (undated) DEVICE — SUT POLYSORB 2-0 30" GS-21 UNDYED

## (undated) DEVICE — SOL IRR POUR H2O 250ML

## (undated) DEVICE — PACK HIP PINNING

## (undated) DEVICE — BALLOON US ENDO

## (undated) DEVICE — GOWN TRIMAX LG

## (undated) DEVICE — CATH IV SAFE BC 20G X 1.16" (PINK)

## (undated) DEVICE — CATH IV SAFE BC 22G X 1" (BLUE)

## (undated) DEVICE — DRILL BIT STRYKER ORTHO 4.2 X 340MM

## (undated) DEVICE — GLV 8 PROTEXIS (WHITE)

## (undated) DEVICE — TUBING IV SET GRAVITY 3Y 100" MACRO

## (undated) DEVICE — STAPLER SKIN VISI-STAT 35 WIDE

## (undated) DEVICE — DRILL BIT STRYKER ORTHO 4.2X80MM

## (undated) DEVICE — COLONOSCOPE 2416901: Type: DURABLE MEDICAL EQUIPMENT

## (undated) DEVICE — TUBING SUCTION CONN 6FT STERILE

## (undated) DEVICE — FOLEY HOLDER STATLOCK 2 WAY ADULT

## (undated) DEVICE — WARMING BLANKET UPPER ADULT

## (undated) DEVICE — DRSG ACE BANDAGE 6"

## (undated) DEVICE — VENODYNE/SCD SLEEVE CALF LARGE

## (undated) DEVICE — TAPE SILK 3"

## (undated) DEVICE — SUCTION YANKAUER NO CONTROL VENT

## (undated) DEVICE — GLV 8.5 PROTEXIS (WHITE)

## (undated) DEVICE — GLV 7 PROTEXIS (WHITE)

## (undated) DEVICE — POSITIONER FOAM EGG CRATE ULNAR 2PCS (PINK)

## (undated) DEVICE — GLV 6.5 PROTEXIS (WHITE)

## (undated) DEVICE — LAP PAD 18 X 18"

## (undated) DEVICE — POSITIONER FOAM HEADREST (PINK)

## (undated) DEVICE — SENSOR O2 FINGER ADULT

## (undated) DEVICE — NDL ASPIR EXPECT SLIMLINE 22G

## (undated) DEVICE — TUBING SUCTION 20FT

## (undated) DEVICE — SYR ALLIANCE II INFLATION 60ML